# Patient Record
Sex: MALE | Race: WHITE | NOT HISPANIC OR LATINO | Employment: OTHER | URBAN - METROPOLITAN AREA
[De-identification: names, ages, dates, MRNs, and addresses within clinical notes are randomized per-mention and may not be internally consistent; named-entity substitution may affect disease eponyms.]

---

## 2017-07-17 RX ORDER — MONTELUKAST SODIUM 10 MG/1
TABLET ORAL
Qty: 90 TABLET | Refills: 0 | Status: SHIPPED | OUTPATIENT
Start: 2017-07-17 | End: 2017-10-27 | Stop reason: SDUPTHER

## 2017-08-09 DIAGNOSIS — I10 ESSENTIAL HYPERTENSION: ICD-10-CM

## 2017-08-09 DIAGNOSIS — E78.00 HYPERCHOLESTEROLEMIA: ICD-10-CM

## 2017-08-09 RX ORDER — PRAVASTATIN SODIUM 40 MG
TABLET ORAL
Qty: 90 TABLET | Refills: 1 | Status: SHIPPED | OUTPATIENT
Start: 2017-08-09 | End: 2017-10-27 | Stop reason: SDUPTHER

## 2017-08-09 RX ORDER — LISINOPRIL AND HYDROCHLOROTHIAZIDE 25; 20 MG/1; MG/1
TABLET ORAL
Qty: 90 TABLET | Refills: 1 | Status: SHIPPED | OUTPATIENT
Start: 2017-08-09 | End: 2017-10-27 | Stop reason: SDUPTHER

## 2017-10-27 ENCOUNTER — OFFICE VISIT (OUTPATIENT)
Dept: INTERNAL MEDICINE | Facility: CLINIC | Age: 62
End: 2017-10-27

## 2017-10-27 VITALS
OXYGEN SATURATION: 95 % | DIASTOLIC BLOOD PRESSURE: 84 MMHG | WEIGHT: 222.2 LBS | SYSTOLIC BLOOD PRESSURE: 134 MMHG | HEART RATE: 65 BPM | HEIGHT: 69 IN | BODY MASS INDEX: 32.91 KG/M2

## 2017-10-27 DIAGNOSIS — K21.9 GASTROESOPHAGEAL REFLUX DISEASE WITHOUT ESOPHAGITIS: ICD-10-CM

## 2017-10-27 DIAGNOSIS — K21.00 REFLUX ESOPHAGITIS: ICD-10-CM

## 2017-10-27 DIAGNOSIS — I10 ESSENTIAL HYPERTENSION: Primary | ICD-10-CM

## 2017-10-27 DIAGNOSIS — E55.9 VITAMIN D DEFICIENCY: ICD-10-CM

## 2017-10-27 DIAGNOSIS — E78.00 HYPERCHOLESTEROLEMIA: ICD-10-CM

## 2017-10-27 DIAGNOSIS — J43.8 OTHER EMPHYSEMA (HCC): ICD-10-CM

## 2017-10-27 PROCEDURE — 99214 OFFICE O/P EST MOD 30 MIN: CPT | Performed by: INTERNAL MEDICINE

## 2017-10-27 RX ORDER — MELATONIN
1000 DAILY
COMMUNITY
End: 2021-04-20 | Stop reason: HOSPADM

## 2017-10-27 RX ORDER — LISINOPRIL AND HYDROCHLOROTHIAZIDE 25; 20 MG/1; MG/1
1 TABLET ORAL DAILY
Qty: 30 TABLET | Refills: 6 | Status: SHIPPED | OUTPATIENT
Start: 2017-10-27 | End: 2018-03-08 | Stop reason: SDUPTHER

## 2017-10-27 RX ORDER — OMEPRAZOLE 20 MG/1
20 CAPSULE, DELAYED RELEASE ORAL DAILY
Qty: 30 CAPSULE | Refills: 6 | Status: SHIPPED | OUTPATIENT
Start: 2017-10-27 | End: 2018-06-10 | Stop reason: SDUPTHER

## 2017-10-27 RX ORDER — PRAVASTATIN SODIUM 40 MG
40 TABLET ORAL NIGHTLY
Qty: 30 TABLET | Refills: 6 | Status: SHIPPED | OUTPATIENT
Start: 2017-10-27 | End: 2018-03-08 | Stop reason: SDUPTHER

## 2017-10-27 RX ORDER — BUDESONIDE AND FORMOTEROL FUMARATE DIHYDRATE 160; 4.5 UG/1; UG/1
2 AEROSOL RESPIRATORY (INHALATION)
Qty: 1 INHALER | Refills: 3 | Status: SHIPPED | OUTPATIENT
Start: 2017-10-27 | End: 2018-02-09 | Stop reason: SDUPTHER

## 2017-10-27 RX ORDER — MONTELUKAST SODIUM 10 MG/1
10 TABLET ORAL NIGHTLY
Qty: 30 TABLET | Refills: 6 | Status: SHIPPED | OUTPATIENT
Start: 2017-10-27 | End: 2018-08-07 | Stop reason: SDUPTHER

## 2017-10-27 RX ORDER — ALBUTEROL SULFATE 90 UG/1
2 AEROSOL, METERED RESPIRATORY (INHALATION) 2 TIMES DAILY
Qty: 1 INHALER | Refills: 4 | Status: SHIPPED | OUTPATIENT
Start: 2017-10-27 | End: 2021-04-20 | Stop reason: HOSPADM

## 2017-10-27 NOTE — PROGRESS NOTES
Subjective     Claude Spencer is a 62 y.o. male, who presents with a chief complaint of   Chief Complaint   Patient presents with   • Follow-up     med check, follow up- Dr Castle patient    • Med Refill       HPI Comments: 63 yo M here for follow up    He has not had labs done and all of his problems are new to me as he normally sees Dr. Castle    He has COPD and is using Symbicort BID and albuterol as needed. He does not feel SOB regularly.     He was using Niacin and quit in 1/2017 due to flushing. He takes Pravastatin and tolerates well.     He has HTN that is chronic and he takes Prinzide daily. No CP or dizziness.          The following portions of the patient's history were reviewed and updated as appropriate: allergies, current medications, past family history, past medical history, past social history, past surgical history and problem list.    Allergies: Other    Current Outpatient Prescriptions:   •  albuterol (PROVENTIL HFA;VENTOLIN HFA) 108 (90 Base) MCG/ACT inhaler, Inhale 2 puffs 2 (Two) Times a Day., Disp: 1 inhaler, Rfl: 4  •  budesonide-formoterol (SYMBICORT) 160-4.5 MCG/ACT inhaler, Inhale 2 puffs 2 (Two) Times a Day., Disp: 1 inhaler, Rfl: 3  •  cholecalciferol (VITAMIN D3) 1000 units tablet, Take 1,000 Units by mouth Daily., Disp: , Rfl:   •  lisinopril-hydrochlorothiazide (PRINZIDE,ZESTORETIC) 20-25 MG per tablet, Take 1 tablet by mouth Daily., Disp: 30 tablet, Rfl: 6  •  montelukast (SINGULAIR) 10 MG tablet, Take 1 tablet by mouth Every Night., Disp: 30 tablet, Rfl: 6  •  Omega-3 Fatty Acids (FISH OIL) 1000 MG capsule capsule, Take  by mouth daily., Disp: , Rfl:   •  omeprazole (PRILOSEC) 20 MG capsule, Take 1 capsule by mouth Daily., Disp: 30 capsule, Rfl: 6  •  pravastatin (PRAVACHOL) 40 MG tablet, Take 1 tablet by mouth Every Night., Disp: 30 tablet, Rfl: 6  Medications Discontinued During This Encounter   Medication Reason   • budesonide-formoterol (SYMBICORT) 160-4.5 MCG/ACT inhaler  "Therapy completed   • vitamin D (ERGOCALCIFEROL) 88021 UNITS capsule capsule Therapy completed   • pravastatin (PRAVACHOL) 40 MG tablet Therapy completed   • omeprazole (PriLOSEC) 40 MG capsule Therapy completed   • niacin (NIACIN SR,NIASPAN ER) 500 MG CR capsule Therapy completed   • albuterol (PROVENTIL HFA;VENTOLIN HFA) 108 (90 BASE) MCG/ACT inhaler Reorder   • budesonide-formoterol (SYMBICORT) 160-4.5 MCG/ACT inhaler Reorder   • lisinopril-hydrochlorothiazide (PRINZIDE,ZESTORETIC) 20-25 MG per tablet Reorder   • montelukast (SINGULAIR) 10 MG tablet Reorder   • omeprazole (PRILOSEC) 20 MG capsule Reorder   • pravastatin (PRAVACHOL) 40 MG tablet Reorder       Review of Systems   Constitutional: Negative for fatigue and fever.   HENT: Negative for congestion.    Respiratory: Negative for cough and shortness of breath.    Cardiovascular: Negative for chest pain and palpitations.   Gastrointestinal: Negative for constipation, diarrhea, nausea and vomiting.   Genitourinary: Negative for difficulty urinating and dysuria.   Skin: Negative for rash.       Objective     /84 (BP Location: Left arm, Patient Position: Sitting, Cuff Size: Adult)  Pulse 65  Ht 69\" (175.3 cm)  Wt 222 lb 3.2 oz (101 kg)  SpO2 95%  BMI 32.81 kg/m2      Physical Exam   Constitutional: He is oriented to person, place, and time. He appears well-developed and well-nourished. No distress.   HENT:   Head: Normocephalic and atraumatic.   Right Ear: Hearing, tympanic membrane and external ear normal.   Left Ear: Hearing, tympanic membrane and external ear normal.   Nose: Nose normal.   Mouth/Throat: Uvula is midline, oropharynx is clear and moist and mucous membranes are normal. No oropharyngeal exudate. Tonsils are 0 on the right. Tonsils are 0 on the left. No tonsillar exudate.   Eyes: Conjunctivae are normal. Right eye exhibits no discharge. Left eye exhibits no discharge. No scleral icterus.   Neck: Neck supple.   Cardiovascular: Normal " rate, regular rhythm and normal heart sounds.  Exam reveals no gallop and no friction rub.    No murmur heard.  Pulmonary/Chest: Effort normal and breath sounds normal. No respiratory distress. He has no wheezes. He has no rales.   Lymphadenopathy:     He has no cervical adenopathy.   Neurological: He is alert and oriented to person, place, and time.   Skin: Skin is warm. No rash noted.   Psychiatric: He has a normal mood and affect. His behavior is normal.   Nursing note and vitals reviewed.        Results for orders placed or performed in visit on 09/27/16   Lipid Panel With / Chol / HDL Ratio   Result Value Ref Range    Total Cholesterol 171 0 - 200 mg/dL    Triglycerides 92 0 - 150 mg/dL    HDL Cholesterol 42 40 - 60 mg/dL    VLDL Cholesterol 18.4 8 - 32 mg/dL    LDL Cholesterol  111 (H) 0 - 100 mg/dL    Chol/HDL Ratio 4.07    PSA   Result Value Ref Range    PSA 0.938 0.000 - 4.000 ng/mL   Comprehensive Metabolic Panel   Result Value Ref Range    Glucose 85 65 - 99 mg/dL    BUN 11 6 - 20 mg/dL    Creatinine 0.86 0.76 - 1.27 mg/dL    eGFR Non African Am 91 >60 mL/min/1.73    eGFR African Am 110 >60 mL/min/1.73    BUN/Creatinine Ratio 12.8 7.0 - 25.0    Sodium 141 136 - 145 mmol/L    Potassium 4.3 3.5 - 5.2 mmol/L    Chloride 98 98 - 107 mmol/L    Total CO2 30.4 (H) 22.0 - 29.0 mmol/L    Calcium 9.0 8.6 - 10.5 mg/dL    Total Protein 6.8 6.0 - 8.5 g/dL    Albumin 4.40 3.50 - 5.20 g/dL    Globulin 2.4 gm/dL    A/G Ratio 1.8 g/dL    Total Bilirubin 0.4 0.2 - 1.2 mg/dL    Alkaline Phosphatase 50 40 - 129 U/L    AST (SGOT) 35 5 - 40 U/L    ALT (SGPT) 28 5 - 41 U/L   TSH   Result Value Ref Range    TSH 2.030 0.270 - 4.200 mIU/mL   Hemoglobin A1c   Result Value Ref Range    Hemoglobin A1C 5.20 4.80 - 5.60 %   Vitamin D 25 Hydroxy   Result Value Ref Range    25 Hydroxy, Vitamin D 36.6 ng/mL   CBC & AUTO Differential   Result Value Ref Range    WBC 5.83 4.80 - 10.80 10*3/mm3    RBC 4.68 (L) 4.70 - 6.10 10*6/mm3     Hemoglobin 14.9 14.0 - 18.0 g/dL    Hematocrit 45.4 42.0 - 52.0 %    MCV 97.0 (H) 80.0 - 94.0 fL    MCH 31.8 (H) 27.0 - 31.0 pg    MCHC 32.8 31.0 - 37.0 g/dL    RDW 12.2 11.5 - 14.5 %    Platelets 216 140 - 500 10*3/mm3    Neutrophil Rel % 58.8 45.0 - 70.0 %    Lymphocyte Rel % 28.6 20.0 - 45.0 %    Monocyte Rel % 9.4 (H) 3.0 - 8.0 %    Eosinophil Rel % 2.4 0.0 - 4.0 %    Basophil Rel % 0.3 0.0 - 2.0 %    Neutrophils Absolute 3.42 1.50 - 8.30 10*3/mm3    Lymphocytes Absolute 1.67 0.60 - 4.80 10*3/mm3    Monocytes Absolute 0.55 0.00 - 1.00 10*3/mm3    Eosinophils Absolute 0.14 0.10 - 0.30 10*3/mm3    Basophils Absolute 0.02 0.00 - 0.20 10*3/mm3    Immature Granulocyte Rel % 0.5 0.0 - 0.5 %    Immature Grans Absolute 0.03 0.00 - 0.03 10*3/mm3    nRBC 0.0 0.0 - 0.0 /100 WBC       Assessment/Plan   Claude was seen today for follow-up and med refill.    Diagnoses and all orders for this visit:    Essential hypertension  -     CBC & Differential  -     Comprehensive Metabolic Panel  -     Microalbumin / Creatinine Urine Ratio - Urine, Clean Catch    Hypercholesterolemia  -     pravastatin (PRAVACHOL) 40 MG tablet; Take 1 tablet by mouth Every Night.  -     Lipid Panel With LDL / HDL Ratio    Reflux esophagitis    Gastroesophageal reflux disease without esophagitis  -     CBC & Differential    Vitamin D deficiency  -     Vitamin D 25 Hydroxy    Other emphysema    Other orders  -     albuterol (PROVENTIL HFA;VENTOLIN HFA) 108 (90 Base) MCG/ACT inhaler; Inhale 2 puffs 2 (Two) Times a Day.  -     budesonide-formoterol (SYMBICORT) 160-4.5 MCG/ACT inhaler; Inhale 2 puffs 2 (Two) Times a Day.  -     lisinopril-hydrochlorothiazide (PRINZIDE,ZESTORETIC) 20-25 MG per tablet; Take 1 tablet by mouth Daily.  -     montelukast (SINGULAIR) 10 MG tablet; Take 1 tablet by mouth Every Night.  -     omeprazole (PRILOSEC) 20 MG capsule; Take 1 capsule by mouth Daily.      Continue all medications as prescribed. Blood pressure is stable   No  adjustments today until labs are reviewed which he is having drawn today   We will call with results and make changes if needed  Refills sent in today       Return in about 6 months (around 4/27/2018) for Recheck, Medicare Wellness with Dr. Castle .    Christal Harmon MD  10/27/2017

## 2017-10-28 LAB
25(OH)D3+25(OH)D2 SERPL-MCNC: 31.7 NG/ML
ALBUMIN SERPL-MCNC: 4.8 G/DL (ref 3.5–5.2)
ALBUMIN/CREAT UR: 12.6 MG/G CREAT (ref 0–30)
ALBUMIN/GLOB SERPL: 1.7 G/DL
ALP SERPL-CCNC: 45 U/L (ref 40–129)
ALT SERPL-CCNC: 31 U/L (ref 5–41)
AST SERPL-CCNC: 33 U/L (ref 5–40)
BASOPHILS # BLD AUTO: 0.05 10*3/MM3 (ref 0–0.2)
BASOPHILS NFR BLD AUTO: 0.5 % (ref 0–2)
BILIRUB SERPL-MCNC: 0.7 MG/DL (ref 0.2–1.2)
BUN SERPL-MCNC: 15 MG/DL (ref 8–23)
BUN/CREAT SERPL: 17.6 (ref 7–25)
CALCIUM SERPL-MCNC: 9.7 MG/DL (ref 8.8–10.5)
CHLORIDE SERPL-SCNC: 96 MMOL/L (ref 98–107)
CHOLEST SERPL-MCNC: 187 MG/DL (ref 0–200)
CO2 SERPL-SCNC: 30.4 MMOL/L (ref 22–29)
CREAT SERPL-MCNC: 0.85 MG/DL (ref 0.76–1.27)
CREAT UR-MCNC: 137.6 MG/DL
EOSINOPHIL # BLD AUTO: 0.16 10*3/MM3 (ref 0.1–0.3)
EOSINOPHIL NFR BLD AUTO: 1.7 % (ref 0–4)
ERYTHROCYTE [DISTWIDTH] IN BLOOD BY AUTOMATED COUNT: 12 % (ref 11.5–14.5)
GFR SERPLBLD CREATININE-BSD FMLA CKD-EPI: 111 ML/MIN/1.73
GFR SERPLBLD CREATININE-BSD FMLA CKD-EPI: 92 ML/MIN/1.73
GLOBULIN SER CALC-MCNC: 2.8 GM/DL
GLUCOSE SERPL-MCNC: 85 MG/DL (ref 65–99)
HCT VFR BLD AUTO: 47.9 % (ref 42–52)
HDLC SERPL-MCNC: 44 MG/DL (ref 40–60)
HGB BLD-MCNC: 16.5 G/DL (ref 14–18)
IMM GRANULOCYTES # BLD: 0.02 10*3/MM3 (ref 0–0.03)
IMM GRANULOCYTES NFR BLD: 0.2 % (ref 0–0.5)
LDLC SERPL CALC-MCNC: 122 MG/DL (ref 0–100)
LDLC/HDLC SERPL: 2.78 {RATIO}
LYMPHOCYTES # BLD AUTO: 1.97 10*3/MM3 (ref 0.6–4.8)
LYMPHOCYTES NFR BLD AUTO: 21.4 % (ref 20–45)
MCH RBC QN AUTO: 32.6 PG (ref 27–31)
MCHC RBC AUTO-ENTMCNC: 34.4 G/DL (ref 31–37)
MCV RBC AUTO: 94.7 FL (ref 80–94)
MICROALBUMIN UR-MCNC: 17.4 UG/ML
MONOCYTES # BLD AUTO: 0.75 10*3/MM3 (ref 0–1)
MONOCYTES NFR BLD AUTO: 8.2 % (ref 3–8)
NEUTROPHILS # BLD AUTO: 6.24 10*3/MM3 (ref 1.5–8.3)
NEUTROPHILS NFR BLD AUTO: 68 % (ref 45–70)
NRBC BLD AUTO-RTO: 0 /100 WBC (ref 0–0)
PLATELET # BLD AUTO: 253 10*3/MM3 (ref 140–500)
POTASSIUM SERPL-SCNC: 4.1 MMOL/L (ref 3.5–5.2)
PROT SERPL-MCNC: 7.6 G/DL (ref 6–8.5)
RBC # BLD AUTO: 5.06 10*6/MM3 (ref 4.7–6.1)
SODIUM SERPL-SCNC: 138 MMOL/L (ref 136–145)
TRIGL SERPL-MCNC: 103 MG/DL (ref 0–150)
VLDLC SERPL CALC-MCNC: 20.6 MG/DL (ref 8–32)
WBC # BLD AUTO: 9.19 10*3/MM3 (ref 4.8–10.8)

## 2017-10-29 NOTE — PROGRESS NOTES
Please call patient with these results and let him know that his labs are very good. I want him to continue his current medications and call us if he has questions or concerns prior to his follow up with Dr. Castle.

## 2017-10-30 ENCOUNTER — TELEPHONE (OUTPATIENT)
Dept: INTERNAL MEDICINE | Facility: CLINIC | Age: 62
End: 2017-10-30

## 2017-10-30 NOTE — TELEPHONE ENCOUNTER
PEREZ with details.     -- Message from Christal Harmon MD sent at 10/29/2017  6:33 PM EDT -----  Please call patient with these results and let him know that his labs are very good. I want him to continue his current medications and call us if he has questions or concerns prior to his follow up with Dr. Castle.

## 2018-02-09 ENCOUNTER — OFFICE VISIT (OUTPATIENT)
Dept: INTERNAL MEDICINE | Facility: CLINIC | Age: 63
End: 2018-02-09

## 2018-02-09 ENCOUNTER — HOSPITAL ENCOUNTER (OUTPATIENT)
Dept: GENERAL RADIOLOGY | Facility: HOSPITAL | Age: 63
Discharge: HOME | End: 2018-02-09
Attending: INTERNAL MEDICINE | Admitting: INTERNAL MEDICINE

## 2018-02-09 ENCOUNTER — TELEPHONE (OUTPATIENT)
Dept: INTERNAL MEDICINE | Facility: CLINIC | Age: 63
End: 2018-02-09

## 2018-02-09 VITALS — TEMPERATURE: 98.6 F | HEART RATE: 79 BPM | RESPIRATION RATE: 22 BRPM | OXYGEN SATURATION: 89 %

## 2018-02-09 DIAGNOSIS — J43.8 OTHER EMPHYSEMA (HCC): ICD-10-CM

## 2018-02-09 DIAGNOSIS — J40 BRONCHITIS: ICD-10-CM

## 2018-02-09 DIAGNOSIS — J43.8 OTHER EMPHYSEMA (HCC): Primary | ICD-10-CM

## 2018-02-09 PROCEDURE — 71046 X-RAY EXAM CHEST 2 VIEWS: CPT

## 2018-02-09 PROCEDURE — 99214 OFFICE O/P EST MOD 30 MIN: CPT | Performed by: INTERNAL MEDICINE

## 2018-02-09 PROCEDURE — 96372 THER/PROPH/DIAG INJ SC/IM: CPT | Performed by: INTERNAL MEDICINE

## 2018-02-09 RX ORDER — BUDESONIDE AND FORMOTEROL FUMARATE DIHYDRATE 160; 4.5 UG/1; UG/1
2 AEROSOL RESPIRATORY (INHALATION)
Qty: 1 INHALER | Refills: 3 | Status: SHIPPED | OUTPATIENT
Start: 2018-02-09 | End: 2019-03-02 | Stop reason: SDUPTHER

## 2018-02-09 RX ORDER — DOXYCYCLINE HYCLATE 100 MG/1
100 TABLET, DELAYED RELEASE ORAL 2 TIMES DAILY
Qty: 20 TABLET | Refills: 0 | Status: SHIPPED | OUTPATIENT
Start: 2018-02-09 | End: 2018-02-09 | Stop reason: SDUPTHER

## 2018-02-09 RX ORDER — PREDNISONE 10 MG/1
10 TABLET ORAL DAILY
Qty: 35 TABLET | Refills: 0 | Status: SHIPPED | OUTPATIENT
Start: 2018-02-09 | End: 2018-05-09

## 2018-02-09 RX ORDER — METHYLPREDNISOLONE SODIUM SUCCINATE 125 MG/2ML
125 INJECTION, POWDER, LYOPHILIZED, FOR SOLUTION INTRAMUSCULAR; INTRAVENOUS ONCE
Status: COMPLETED | OUTPATIENT
Start: 2018-02-09 | End: 2018-02-09

## 2018-02-09 RX ORDER — DOXYCYCLINE HYCLATE 100 MG/1
100 TABLET, DELAYED RELEASE ORAL 2 TIMES DAILY
Qty: 20 TABLET | Refills: 0 | Status: SHIPPED | OUTPATIENT
Start: 2018-02-09 | End: 2018-02-23 | Stop reason: SDUPTHER

## 2018-02-09 RX ORDER — PREDNISONE 10 MG/1
10 TABLET ORAL DAILY
Qty: 35 TABLET | Refills: 0 | Status: SHIPPED | OUTPATIENT
Start: 2018-02-09 | End: 2018-02-09 | Stop reason: SDUPTHER

## 2018-02-09 RX ORDER — METHYLPREDNISOLONE SODIUM SUCCINATE 125 MG/2ML
125 INJECTION, POWDER, LYOPHILIZED, FOR SOLUTION INTRAMUSCULAR; INTRAVENOUS EVERY 6 HOURS
Status: DISCONTINUED | OUTPATIENT
Start: 2018-02-09 | End: 2018-02-09

## 2018-02-09 RX ADMIN — METHYLPREDNISOLONE SODIUM SUCCINATE 125 MG: 125 INJECTION, POWDER, LYOPHILIZED, FOR SOLUTION INTRAMUSCULAR; INTRAVENOUS at 12:57

## 2018-02-09 NOTE — PROGRESS NOTES
Subjective     Claude Spencer is a 62 y.o. male, who presents with a chief complaint of   Chief Complaint   Patient presents with   • Cough   • Shortness of Breath       HPI     61 yo male with COPD, on home oxygen at night. Started one week of cough and congestion. He denies fever or chills.  Has new production with his cough. No dyspnea. Now yellow sputum which is new for him.  N vomiting. No trouble eating but has lost apetite. No diarrhea. Denies cp or palpitations.   He is not using home inhalers.  He does not know of any specific flu exposure but grandson has been sick. Here with wife and grandson.        The following portions of the patient's history were reviewed and updated as appropriate: allergies, current medications, past family history, past medical history, past social history, past surgical history and problem list.    Allergies: Other    Current Outpatient Prescriptions:   •  albuterol (PROVENTIL HFA;VENTOLIN HFA) 108 (90 Base) MCG/ACT inhaler, Inhale 2 puffs 2 (Two) Times a Day., Disp: 1 inhaler, Rfl: 4  •  budesonide-formoterol (SYMBICORT) 160-4.5 MCG/ACT inhaler, Inhale 2 puffs 2 (Two) Times a Day., Disp: 1 inhaler, Rfl: 3  •  cholecalciferol (VITAMIN D3) 1000 units tablet, Take 1,000 Units by mouth Daily., Disp: , Rfl:   •  lisinopril-hydrochlorothiazide (PRINZIDE,ZESTORETIC) 20-25 MG per tablet, Take 1 tablet by mouth Daily., Disp: 30 tablet, Rfl: 6  •  montelukast (SINGULAIR) 10 MG tablet, Take 1 tablet by mouth Every Night., Disp: 30 tablet, Rfl: 6  •  Omega-3 Fatty Acids (FISH OIL) 1000 MG capsule capsule, Take  by mouth daily., Disp: , Rfl:   •  omeprazole (PRILOSEC) 20 MG capsule, Take 1 capsule by mouth Daily., Disp: 30 capsule, Rfl: 6  •  pravastatin (PRAVACHOL) 40 MG tablet, Take 1 tablet by mouth Every Night., Disp: 30 tablet, Rfl: 6  •  predniSONE (DELTASONE) 10 MG tablet, Take 1 tablet by mouth Daily. 5 tab po daily for 2 days, 4 for 2 days, 3 for 2 days, 2 for 2 days, 1 for 2  days, Disp: 35 tablet, Rfl: 0    Current Facility-Administered Medications:   •  methylPREDNISolone sodium succinate (SOLU-Medrol) injection 125 mg, 125 mg, Intravenous, Q6H, Panfilo Castle MD  Medications Discontinued During This Encounter   Medication Reason   • budesonide-formoterol (SYMBICORT) 160-4.5 MCG/ACT inhaler Reorder   • predniSONE (DELTASONE) 10 MG tablet Reorder       Review of Systems   Constitutional: Positive for appetite change and fatigue. Negative for fever.   HENT: Positive for congestion, postnasal drip and rhinorrhea. Negative for sore throat.    Eyes: Negative.    Respiratory: Positive for cough, shortness of breath and wheezing.         Home oxygen   Cardiovascular: Negative for chest pain, palpitations and leg swelling.        No orthopnea or pND   Endocrine: Negative.    Genitourinary: Negative.    Musculoskeletal: Positive for arthralgias and myalgias.   Skin: Negative.    Neurological: Positive for headaches. Negative for dizziness.       Objective     Pulse 79  Temp 98.6 °F (37 °C)  Resp 22  SpO2 (!) 89%      Physical Exam   Constitutional: He is oriented to person, place, and time. He appears well-developed.   HENT:   Head: Normocephalic.   Right Ear: External ear normal.   Left Ear: External ear normal.   Mouth/Throat: Oropharynx is clear and moist.   Eyes: Pupils are equal, round, and reactive to light. Right eye exhibits no discharge.   Neck: No thyromegaly present.   Cardiovascular: Normal rate and regular rhythm.    No murmur heard.  Pulmonary/Chest: Effort normal. No respiratory distress. He has wheezes. He has rales.   Lymphadenopathy:     He has no cervical adenopathy.   Neurological: He is alert and oriented to person, place, and time.   Skin: Skin is warm.   Psychiatric: He has a normal mood and affect. His behavior is normal.   Nursing note and vitals reviewed.      Lab Results (most recent)     None      Flu testing negative    Results for orders placed or performed in  visit on 10/27/17   Comprehensive Metabolic Panel   Result Value Ref Range    Glucose 85 65 - 99 mg/dL    BUN 15 8 - 23 mg/dL    Creatinine 0.85 0.76 - 1.27 mg/dL    eGFR Non African Am 92 >60 mL/min/1.73    eGFR African Am 111 >60 mL/min/1.73    BUN/Creatinine Ratio 17.6 7.0 - 25.0    Sodium 138 136 - 145 mmol/L    Potassium 4.1 3.5 - 5.2 mmol/L    Chloride 96 (L) 98 - 107 mmol/L    Total CO2 30.4 (H) 22.0 - 29.0 mmol/L    Calcium 9.7 8.8 - 10.5 mg/dL    Total Protein 7.6 6.0 - 8.5 g/dL    Albumin 4.80 3.50 - 5.20 g/dL    Globulin 2.8 gm/dL    A/G Ratio 1.7 g/dL    Total Bilirubin 0.7 0.2 - 1.2 mg/dL    Alkaline Phosphatase 45 40 - 129 U/L    AST (SGOT) 33 5 - 40 U/L    ALT (SGPT) 31 5 - 41 U/L   Vitamin D 25 Hydroxy   Result Value Ref Range    25 Hydroxy, Vitamin D 31.7 ng/mL   Lipid Panel With LDL / HDL Ratio   Result Value Ref Range    Total Cholesterol 187 0 - 200 mg/dL    Triglycerides 103 0 - 150 mg/dL    HDL Cholesterol 44 40 - 60 mg/dL    VLDL Cholesterol 20.6 8 - 32 mg/dL    LDL Cholesterol  122 (H) 0 - 100 mg/dL    LDL/HDL Ratio 2.78    Microalbumin / Creatinine Urine Ratio - Urine, Clean Catch   Result Value Ref Range    Creatinine, Urine 137.6 Not Estab. mg/dL    Microalbumin, Urine 17.4 Not Estab. ug/mL    Microalbumin/Creatinine Ratio 12.6 0.0 - 30.0 mg/g creat   CBC & Differential   Result Value Ref Range    WBC 9.19 4.80 - 10.80 10*3/mm3    RBC 5.06 4.70 - 6.10 10*6/mm3    Hemoglobin 16.5 14.0 - 18.0 g/dL    Hematocrit 47.9 42.0 - 52.0 %    MCV 94.7 (H) 80.0 - 94.0 fL    MCH 32.6 (H) 27.0 - 31.0 pg    MCHC 34.4 31.0 - 37.0 g/dL    RDW 12.0 11.5 - 14.5 %    Platelets 253 140 - 500 10*3/mm3    Neutrophil Rel % 68.0 45.0 - 70.0 %    Lymphocyte Rel % 21.4 20.0 - 45.0 %    Monocyte Rel % 8.2 (H) 3.0 - 8.0 %    Eosinophil Rel % 1.7 0.0 - 4.0 %    Basophil Rel % 0.5 0.0 - 2.0 %    Neutrophils Absolute 6.24 1.50 - 8.30 10*3/mm3    Lymphocytes Absolute 1.97 0.60 - 4.80 10*3/mm3    Monocytes Absolute 0.75  0.00 - 1.00 10*3/mm3    Eosinophils Absolute 0.16 0.10 - 0.30 10*3/mm3    Basophils Absolute 0.05 0.00 - 0.20 10*3/mm3    Immature Granulocyte Rel % 0.2 0.0 - 0.5 %    Immature Grans Absolute 0.02 0.00 - 0.03 10*3/mm3    nRBC 0.0 0.0 - 0.0 /100 WBC   Oxygen 92%RA here, which is normal for him, does home oxgygen.     Assessment/Plan   Claude was seen today for cough and shortness of breath.    Diagnoses and all orders for this visit:    Other emphysema  -     XR Chest 2 View; Future  -     Discontinue: predniSONE (DELTASONE) 10 MG tablet; Take 1 tablet by mouth Daily. 5 tab po daily for 2 days, 4 for 2 days, 3 for 2 days, 2 for 2 days, 1 for 2 days  -     methylPREDNISolone sodium succinate (SOLU-Medrol) injection 125 mg; Infuse 2 mL into a venous catheter Every 6 (Six) Hours.  -     predniSONE (DELTASONE) 10 MG tablet; Take 1 tablet by mouth Daily. 5 tab po daily for 2 days, 4 for 2 days, 3 for 2 days, 2 for 2 days, 1 for 2 days    Bronchitis  -     XR Chest 2 View; Future  -     budesonide-formoterol (SYMBICORT) 160-4.5 MCG/ACT inhaler; Inhale 2 puffs 2 (Two) Times a Day.      Home nebulizer every 4 hours  Start prednisone tonight with dinner  Resume symbicort 2 puff twice daily  Continue home oxygen continuous while at home until sickness over  Er for any chest pain or trouble catching breath  Antibiotic, will do doxy if no consolidation.    Symbicort samples given     Return in about 4 weeks (around 3/9/2018).    Panfilo Castle MD  02/09/2018

## 2018-02-09 NOTE — TELEPHONE ENCOUNTER
Patients wife notified, she asked to have antbx sent to [facility] in [place] instead of [facility] in [place]. Meds sent.  ----- Message from Panfilo Castle MD sent at 2/9/2018  3:57 PM EST -----  No pneumonia.  Sent antibiotic

## 2018-02-09 NOTE — PATIENT INSTRUCTIONS
Assessment/Plan   Claude was seen today for cough and shortness of breath.    Diagnoses and all orders for this visit:    Other emphysema  -     XR Chest 2 View; Future  -     Discontinue: predniSONE (DELTASONE) 10 MG tablet; Take 1 tablet by mouth Daily. 5 tab po daily for 2 days, 4 for 2 days, 3 for 2 days, 2 for 2 days, 1 for 2 days  -     methylPREDNISolone sodium succinate (SOLU-Medrol) injection 125 mg; Infuse 2 mL into a venous catheter Every 6 (Six) Hours.  -     predniSONE (DELTASONE) 10 MG tablet; Take 1 tablet by mouth Daily. 5 tab po daily for 2 days, 4 for 2 days, 3 for 2 days, 2 for 2 days, 1 for 2 days    Bronchitis  -     XR Chest 2 View; Future  -     budesonide-formoterol (SYMBICORT) 160-4.5 MCG/ACT inhaler; Inhale 2 puffs 2 (Two) Times a Day.    Home nebulizer every 4 hours  Start prednisone tonight with dinner  Resume symbicort 2 puff twice daily  Continue home oxygen continuous while at home until sickness over  Er for any chest pain or trouble catching breath      No Follow-up on file.    Panfilo Castle MD  02/09/2018

## 2018-02-23 ENCOUNTER — TELEPHONE (OUTPATIENT)
Dept: INTERNAL MEDICINE | Facility: CLINIC | Age: 63
End: 2018-02-23

## 2018-02-23 RX ORDER — DOXYCYCLINE HYCLATE 100 MG/1
100 TABLET, DELAYED RELEASE ORAL 2 TIMES DAILY
Qty: 20 TABLET | Refills: 0 | Status: SHIPPED | OUTPATIENT
Start: 2018-02-23 | End: 2018-05-09

## 2018-02-23 RX ORDER — BENZONATATE 200 MG/1
200 CAPSULE ORAL 3 TIMES DAILY PRN
Qty: 30 CAPSULE | Refills: 0 | Status: SHIPPED | OUTPATIENT
Start: 2018-02-23 | End: 2018-05-09

## 2018-03-07 ENCOUNTER — TELEPHONE (OUTPATIENT)
Dept: INTERNAL MEDICINE | Facility: CLINIC | Age: 63
End: 2018-03-07

## 2018-03-07 NOTE — TELEPHONE ENCOUNTER
Scheduled with Dr. ARCE tomorrow.      ----- Message from KHADRA Cunningham sent at 3/7/2018 12:42 PM EST -----  Regarding: FW: COUGH MEDICINE  Contact: [phone]  We cannot prescribed codeine by phone, he will need an appt  ----- Message -----     From: Maribeth Mendez MA     Sent: 3/7/2018  11:15 AM       To: KHADRA Cunningham  Subject: FW: COUGH MEDICINE                               See 2/23 telephone note.  Appt?  ----- Message -----     From: Aria Ceja     Sent: 3/7/2018  10:48 AM       To: [facility]  Subject: COUGH MEDICINE                                   KINDIG PT    Patient called to say that he still cannot get rid of this cough, and he feels better, but cannot sleep because of the cough. He is coughing so hard that he is losing control of his bladder. He said that he can feel tightness in his chest when he coughs so hard. He is requesting cough med with codeine. I did question him about the chest pain and he said that it is only when he coughs.    irineo agrawal    Alternate phone number [phone]    Thanks!  aria

## 2018-03-08 DIAGNOSIS — E78.00 HYPERCHOLESTEROLEMIA: ICD-10-CM

## 2018-03-08 RX ORDER — PRAVASTATIN SODIUM 40 MG
TABLET ORAL
Qty: 30 TABLET | Refills: 3 | Status: SHIPPED | OUTPATIENT
Start: 2018-03-08 | End: 2018-05-09

## 2018-03-08 RX ORDER — LISINOPRIL AND HYDROCHLOROTHIAZIDE 25; 20 MG/1; MG/1
TABLET ORAL
Qty: 30 TABLET | Refills: 3 | Status: SHIPPED | OUTPATIENT
Start: 2018-03-08 | End: 2018-07-08 | Stop reason: SDUPTHER

## 2018-04-20 DIAGNOSIS — I10 ESSENTIAL HYPERTENSION: ICD-10-CM

## 2018-04-20 DIAGNOSIS — J43.8 OTHER EMPHYSEMA (HCC): ICD-10-CM

## 2018-04-20 DIAGNOSIS — E78.00 HYPERCHOLESTEROLEMIA: Primary | ICD-10-CM

## 2018-04-20 DIAGNOSIS — E55.9 VITAMIN D DEFICIENCY: ICD-10-CM

## 2018-04-20 DIAGNOSIS — E87.6 HYPOKALEMIA: ICD-10-CM

## 2018-04-20 DIAGNOSIS — M85.80 OSTEOPENIA, UNSPECIFIED LOCATION: ICD-10-CM

## 2018-04-20 LAB
25(OH)D3+25(OH)D2 SERPL-MCNC: 29.7 NG/ML
ALBUMIN SERPL-MCNC: 4.6 G/DL (ref 3.5–5.2)
ALBUMIN/GLOB SERPL: 1.6 G/DL
ALP SERPL-CCNC: 43 U/L (ref 40–129)
ALT SERPL-CCNC: 28 U/L (ref 5–41)
AST SERPL-CCNC: 36 U/L (ref 5–40)
BASOPHILS # BLD AUTO: 0.03 10*3/MM3 (ref 0–0.2)
BASOPHILS NFR BLD AUTO: 0.4 % (ref 0–2)
BILIRUB SERPL-MCNC: 0.8 MG/DL (ref 0.2–1.2)
BUN SERPL-MCNC: 17 MG/DL (ref 8–23)
BUN/CREAT SERPL: 17 (ref 7–25)
CALCIUM SERPL-MCNC: 9.6 MG/DL (ref 8.8–10.5)
CHLORIDE SERPL-SCNC: 97 MMOL/L (ref 98–107)
CHOLEST SERPL-MCNC: 196 MG/DL (ref 0–200)
CHOLEST/HDLC SERPL: 4.56 {RATIO}
CO2 SERPL-SCNC: 31.2 MMOL/L (ref 22–29)
CREAT SERPL-MCNC: 1 MG/DL (ref 0.76–1.27)
EOSINOPHIL # BLD AUTO: 0.2 10*3/MM3 (ref 0.1–0.3)
EOSINOPHIL NFR BLD AUTO: 2.6 % (ref 0–4)
ERYTHROCYTE [DISTWIDTH] IN BLOOD BY AUTOMATED COUNT: 12.5 % (ref 11.5–14.5)
GFR SERPLBLD CREATININE-BSD FMLA CKD-EPI: 76 ML/MIN/1.73
GFR SERPLBLD CREATININE-BSD FMLA CKD-EPI: 92 ML/MIN/1.73
GLOBULIN SER CALC-MCNC: 2.9 GM/DL
GLUCOSE SERPL-MCNC: 82 MG/DL (ref 65–99)
HBA1C MFR BLD: 5.2 % (ref 4.8–5.6)
HCT VFR BLD AUTO: 49.3 % (ref 42–52)
HDLC SERPL-MCNC: 43 MG/DL (ref 40–60)
HGB BLD-MCNC: 16.4 G/DL (ref 14–18)
IMM GRANULOCYTES # BLD: 0.02 10*3/MM3 (ref 0–0.03)
IMM GRANULOCYTES NFR BLD: 0.3 % (ref 0–0.5)
LDLC SERPL CALC-MCNC: 131 MG/DL (ref 0–100)
LYMPHOCYTES # BLD AUTO: 2.62 10*3/MM3 (ref 0.6–4.8)
LYMPHOCYTES NFR BLD AUTO: 34.1 % (ref 20–45)
MCH RBC QN AUTO: 33.1 PG (ref 27–31)
MCHC RBC AUTO-ENTMCNC: 33.3 G/DL (ref 31–37)
MCV RBC AUTO: 99.4 FL (ref 80–94)
MONOCYTES # BLD AUTO: 0.71 10*3/MM3 (ref 0–1)
MONOCYTES NFR BLD AUTO: 9.2 % (ref 3–8)
NEUTROPHILS # BLD AUTO: 4.11 10*3/MM3 (ref 1.5–8.3)
NEUTROPHILS NFR BLD AUTO: 53.4 % (ref 45–70)
NRBC BLD AUTO-RTO: 0 /100 WBC (ref 0–0)
PLATELET # BLD AUTO: 246 10*3/MM3 (ref 140–500)
POTASSIUM SERPL-SCNC: 4.2 MMOL/L (ref 3.5–5.2)
PROT SERPL-MCNC: 7.5 G/DL (ref 6–8.5)
PSA SERPL-MCNC: 0.97 NG/ML (ref 0–4)
RBC # BLD AUTO: 4.96 10*6/MM3 (ref 4.7–6.1)
SODIUM SERPL-SCNC: 139 MMOL/L (ref 136–145)
TRIGL SERPL-MCNC: 111 MG/DL (ref 0–150)
TSH SERPL DL<=0.005 MIU/L-ACNC: 1.35 MIU/ML (ref 0.27–4.2)
VLDLC SERPL CALC-MCNC: 22.2 MG/DL (ref 8–32)
WBC # BLD AUTO: 7.69 10*3/MM3 (ref 4.8–10.8)

## 2018-04-23 ENCOUNTER — TRANSCRIBE ORDERS (OUTPATIENT)
Dept: ADMINISTRATIVE | Facility: HOSPITAL | Age: 63
End: 2018-04-23

## 2018-04-23 ENCOUNTER — HOSPITAL ENCOUNTER (OUTPATIENT)
Dept: PULMONOLOGY | Facility: HOSPITAL | Age: 63
Discharge: HOME | End: 2018-04-23
Attending: INTERNAL MEDICINE | Admitting: INTERNAL MEDICINE

## 2018-04-23 DIAGNOSIS — R06.09 DOE (DYSPNEA ON EXERTION): Primary | ICD-10-CM

## 2018-04-23 PROCEDURE — 94726 PLETHYSMOGRAPHY LUNG VOLUMES: CPT

## 2018-04-23 PROCEDURE — 94729 DIFFUSING CAPACITY: CPT

## 2018-04-23 PROCEDURE — 94010 BREATHING CAPACITY TEST: CPT

## 2018-04-23 RX ORDER — ALBUTEROL SULFATE 2.5 MG/3ML
2.5 SOLUTION RESPIRATORY (INHALATION) ONCE
Status: COMPLETED | OUTPATIENT
Start: 2018-04-23 | End: 2018-04-23

## 2018-04-23 RX ADMIN — ALBUTEROL SULFATE 2.5 MG: 2.5 SOLUTION RESPIRATORY (INHALATION) at 15:56

## 2018-05-04 ENCOUNTER — TELEPHONE (OUTPATIENT)
Dept: INTERNAL MEDICINE | Facility: CLINIC | Age: 63
End: 2018-05-04

## 2018-05-04 NOTE — TELEPHONE ENCOUNTER
Has appointment next tuesday      ----- Message from Panfilo Castle MD sent at 5/4/2018  3:27 PM EDT -----  Has slight change to blood count, needs to stop alcohol as much as possible. Needs to take b complex.  Will talk cholesterol at next appt.

## 2018-05-09 ENCOUNTER — OFFICE VISIT (OUTPATIENT)
Dept: INTERNAL MEDICINE | Facility: CLINIC | Age: 63
End: 2018-05-09

## 2018-05-09 VITALS
HEIGHT: 69 IN | DIASTOLIC BLOOD PRESSURE: 68 MMHG | WEIGHT: 225 LBS | BODY MASS INDEX: 33.33 KG/M2 | RESPIRATION RATE: 16 BRPM | OXYGEN SATURATION: 94 % | HEART RATE: 55 BPM | SYSTOLIC BLOOD PRESSURE: 130 MMHG

## 2018-05-09 DIAGNOSIS — K63.5 COLORECTAL POLYP DETECTED ON COLONOSCOPY: ICD-10-CM

## 2018-05-09 DIAGNOSIS — E55.9 VITAMIN D DEFICIENCY: ICD-10-CM

## 2018-05-09 DIAGNOSIS — I10 ESSENTIAL HYPERTENSION: ICD-10-CM

## 2018-05-09 DIAGNOSIS — E78.00 HYPERCHOLESTEROLEMIA: Primary | ICD-10-CM

## 2018-05-09 DIAGNOSIS — Z00.00 ROUTINE HEALTH MAINTENANCE: ICD-10-CM

## 2018-05-09 LAB
APPEARANCE UR: CLEAR
BACTERIA #/AREA URNS HPF: ABNORMAL /HPF
BILIRUB BLD-MCNC: NEGATIVE MG/DL
BILIRUB UR QL STRIP: NEGATIVE
CLARITY, POC: CLEAR
COLOR UR: ABNORMAL
COLOR UR: YELLOW
EPI CELLS #/AREA URNS HPF: ABNORMAL /HPF
GLUCOSE UR QL: NEGATIVE
GLUCOSE UR STRIP-MCNC: NEGATIVE MG/DL
HGB UR QL STRIP: (no result)
KETONES UR QL STRIP: (no result)
KETONES UR QL: ABNORMAL
LEUKOCYTE EST, POC: NEGATIVE
LEUKOCYTE ESTERASE UR QL STRIP: NEGATIVE
NITRITE UR QL STRIP: NEGATIVE
NITRITE UR-MCNC: NEGATIVE MG/ML
PH UR STRIP: 6 [PH] (ref 4.5–8)
PH UR: 6 [PH] (ref 5–8)
PROT UR QL STRIP: NEGATIVE
PROT UR STRIP-MCNC: NEGATIVE MG/DL
RBC # UR STRIP: ABNORMAL /UL
RBC #/AREA URNS HPF: ABNORMAL /HPF
SP GR UR: 1.02 (ref 1–1.03)
SP GR UR: 1.03 (ref 1–1.03)
UROBILINOGEN UR QL: NORMAL
UROBILINOGEN UR STRIP-MCNC: (no result) MG/DL
WBC #/AREA URNS HPF: ABNORMAL /HPF

## 2018-05-09 PROCEDURE — 81003 URINALYSIS AUTO W/O SCOPE: CPT | Performed by: INTERNAL MEDICINE

## 2018-05-09 PROCEDURE — 99396 PREV VISIT EST AGE 40-64: CPT | Performed by: INTERNAL MEDICINE

## 2018-05-09 PROCEDURE — 93000 ELECTROCARDIOGRAM COMPLETE: CPT | Performed by: INTERNAL MEDICINE

## 2018-05-09 PROCEDURE — 99213 OFFICE O/P EST LOW 20 MIN: CPT | Performed by: INTERNAL MEDICINE

## 2018-05-09 RX ORDER — ROSUVASTATIN CALCIUM 10 MG/1
10 TABLET, COATED ORAL DAILY
Qty: 90 TABLET | Refills: 2 | Status: SHIPPED | OUTPATIENT
Start: 2018-05-09 | End: 2019-02-06 | Stop reason: SDUPTHER

## 2018-05-09 NOTE — PROGRESS NOTES
Claude Spencer is a 62 y.o. male, who presents with a chief complaint of   Chief Complaint   Patient presents with   • Annual Exam       HPI   61 yo male with pmhx COPD (30 pack year plus current smoke exposure).  HTN on lisinopril doing well.  HL on pravastatin.  He is having new R lower leg aching that is transient and inconsistent, worsening varicosities. Worried since mother with similar symptoms.  This is a new problem for me. He denies cold or consistently painful extremities. Some swelling bilateral that is mild and better with rest and elevation. He does not wear ERIC hose. Does have significant second hand smoke exposure.    No new cp or palpitations.     Family history updated. His mother did have amputations which has increased his anxiety regarding his leg pain.     The following portions of the patient's history were reviewed and updated as appropriate: allergies, current medications, past family history, past medical history, past social history, past surgical history and problem list.    Allergies: Other    Current Outpatient Prescriptions:   •  albuterol (PROVENTIL HFA;VENTOLIN HFA) 108 (90 Base) MCG/ACT inhaler, Inhale 2 puffs 2 (Two) Times a Day., Disp: 1 inhaler, Rfl: 4  •  budesonide-formoterol (SYMBICORT) 160-4.5 MCG/ACT inhaler, Inhale 2 puffs 2 (Two) Times a Day., Disp: 1 inhaler, Rfl: 3  •  cholecalciferol (VITAMIN D3) 1000 units tablet, Take 1,000 Units by mouth Daily., Disp: , Rfl:   •  lisinopril-hydrochlorothiazide (PRINZIDE,ZESTORETIC) 20-25 MG per tablet, TAKE ONE TABLET BY MOUTH DAILY, Disp: 30 tablet, Rfl: 3  •  montelukast (SINGULAIR) 10 MG tablet, Take 1 tablet by mouth Every Night., Disp: 30 tablet, Rfl: 6  •  Omega-3 Fatty Acids (FISH OIL) 1000 MG capsule capsule, Take  by mouth daily., Disp: , Rfl:   •  omeprazole (PRILOSEC) 20 MG capsule, Take 1 capsule by mouth Daily., Disp: 30 capsule, Rfl: 6  •  rosuvastatin (CRESTOR) 10 MG tablet, Take 1 tablet by mouth Daily., Disp:  "90 tablet, Rfl: 2  Medications Discontinued During This Encounter   Medication Reason   • benzonatate (TESSALON) 200 MG capsule *Therapy completed   • predniSONE (DELTASONE) 10 MG tablet *Therapy completed   • doxycycline (DORYX) 100 MG enteric coated tablet    • pravastatin (PRAVACHOL) 40 MG tablet        Review of Systems   Constitutional: Negative.    HENT: Negative.    Eyes: Negative.    Respiratory: Positive for cough. Negative for shortness of breath.         Chronic cough production of green sputum in morning   Endocrine: Negative.    Genitourinary: Negative.    Musculoskeletal: Negative for gait problem.        Leg pain   Skin:        Worried about moles and sore area R mustache region   Neurological: Negative.    Hematological: Negative.    Psychiatric/Behavioral: Negative.    All other systems reviewed and are negative.            /68   Pulse 55   Resp 16   Ht 175.3 cm (69\")   Wt 102 kg (225 lb)   SpO2 94%   BMI 33.23 kg/m²       Physical Exam   Constitutional: He is oriented to person, place, and time. He appears well-developed and well-nourished.   HENT:   Head: Normocephalic and atraumatic.   Right Ear: External ear normal.   Left Ear: External ear normal.   Nose: Nose normal.   Mouth/Throat: No oropharyngeal exudate.   Eyes: Conjunctivae and EOM are normal. Pupils are equal, round, and reactive to light.   Neck: Normal range of motion. Neck supple. No JVD present. No tracheal deviation present. No thyromegaly present.   Cardiovascular: Normal rate, regular rhythm, normal heart sounds and intact distal pulses.    No murmur heard.  Pulmonary/Chest: Effort normal and breath sounds normal.   Abdominal: Soft. Bowel sounds are normal. He exhibits no distension and no mass.   Musculoskeletal: Normal range of motion. He exhibits no edema.   Neurological: He is alert and oriented to person, place, and time.   Skin: Skin is warm and dry. Capillary refill takes less than 2 seconds.   Several moles on " back, numerous assymetric  One nevi R cheeck  Visible small varicosities   Psychiatric: He has a normal mood and affect. His behavior is normal.   Nursing note and vitals reviewed.      Lab Results (most recent)     None        UA today    Results for orders placed or performed in visit on 04/20/18   Comprehensive Metabolic Panel   Result Value Ref Range    Glucose 82 65 - 99 mg/dL    BUN 17 8 - 23 mg/dL    Creatinine 1.00 0.76 - 1.27 mg/dL    eGFR Non African Am 76 >60 mL/min/1.73    eGFR African Am 92 >60 mL/min/1.73    BUN/Creatinine Ratio 17.0 7.0 - 25.0    Sodium 139 136 - 145 mmol/L    Potassium 4.2 3.5 - 5.2 mmol/L    Chloride 97 (L) 98 - 107 mmol/L    Total CO2 31.2 (H) 22.0 - 29.0 mmol/L    Calcium 9.6 8.8 - 10.5 mg/dL    Total Protein 7.5 6.0 - 8.5 g/dL    Albumin 4.60 3.50 - 5.20 g/dL    Globulin 2.9 gm/dL    A/G Ratio 1.6 g/dL    Total Bilirubin 0.8 0.2 - 1.2 mg/dL    Alkaline Phosphatase 43 40 - 129 U/L    AST (SGOT) 36 5 - 40 U/L    ALT (SGPT) 28 5 - 41 U/L   Hemoglobin A1c   Result Value Ref Range    Hemoglobin A1C 5.20 4.80 - 5.60 %   Lipid Panel With / Chol / HDL Ratio   Result Value Ref Range    Total Cholesterol 196 0 - 200 mg/dL    Triglycerides 111 0 - 150 mg/dL    HDL Cholesterol 43 40 - 60 mg/dL    VLDL Cholesterol 22.2 8 - 32 mg/dL    LDL Cholesterol  131 (H) 0 - 100 mg/dL    Chol/HDL Ratio 4.56    TSH   Result Value Ref Range    TSH 1.350 0.270 - 4.200 mIU/mL   PSA SCREENING   Result Value Ref Range    PSA 0.966 0.000 - 4.000 ng/mL   Vitamin D 25 Hydroxy   Result Value Ref Range    25 Hydroxy, Vitamin D 29.7 ng/ml   CBC & Differential   Result Value Ref Range    WBC 7.69 4.80 - 10.80 10*3/mm3    RBC 4.96 4.70 - 6.10 10*6/mm3    Hemoglobin 16.4 14.0 - 18.0 g/dL    Hematocrit 49.3 42.0 - 52.0 %    MCV 99.4 (H) 80.0 - 94.0 fL    MCH 33.1 (H) 27.0 - 31.0 pg    MCHC 33.3 31.0 - 37.0 g/dL    RDW 12.5 11.5 - 14.5 %    Platelets 246 140 - 500 10*3/mm3    Neutrophil Rel % 53.4 45.0 - 70.0 %     Lymphocyte Rel % 34.1 20.0 - 45.0 %    Monocyte Rel % 9.2 (H) 3.0 - 8.0 %    Eosinophil Rel % 2.6 0.0 - 4.0 %    Basophil Rel % 0.4 0.0 - 2.0 %    Neutrophils Absolute 4.11 1.50 - 8.30 10*3/mm3    Lymphocytes Absolute 2.62 0.60 - 4.80 10*3/mm3    Monocytes Absolute 0.71 0.00 - 1.00 10*3/mm3    Eosinophils Absolute 0.20 0.10 - 0.30 10*3/mm3    Basophils Absolute 0.03 0.00 - 0.20 10*3/mm3    Immature Granulocyte Rel % 0.3 0.0 - 0.5 %    Immature Grans Absolute 0.02 0.00 - 0.03 10*3/mm3    nRBC 0.0 0.0 - 0.0 /100 WBC     ECG 12 Lead  Date/Time: 5/9/2018 9:39 AM  Performed by: BETH CASTLE  Authorized by: BETH CASTLE   Comparison: not compared with previous ECG   Rhythm: sinus rhythm  Rate: normal  BPM: 52  QRS axis: normal  Clinical impression: normal ECG          LDL not to goal, discussed with patient.      Claude was seen today for annual exam.    Diagnoses and all orders for this visit:    Hypercholesterolemia  -     rosuvastatin (CRESTOR) 10 MG tablet; Take 1 tablet by mouth Daily.    Essential hypertension    Vitamin D deficiency    Routine health maintenance  -     ECG 12 Lead  -     Ambulatory Referral to Dermatology    Colorectal polyp detected on colonoscopy  -     Ambulatory Referral to Gastroenterology    Continue symbicort 2 puff twice daily and rinse  Albuterol 4 puff at time of cough.  Try to avoid secondhand exposure as much as you can  prevnar 2016  Recommend pneumovax #1 at health department soon, then second after age 65.  Shingles vaccine is an option.   Flu shot at health department  Make sure to take vitamin D   PFTs with Dr. Lange  Vascular screening recommended in near future.  Handout given, recommend 3 vascular screening for 75 dollar  Vit D, folate and B12 level next visit. With cmp and flp   Colonoscopy 8/1/14  PSA normal    Return in about 6 months (around 11/9/2018).    Beth Castle MD  05/09/2018

## 2018-05-09 NOTE — PATIENT INSTRUCTIONS
Claude was seen today for annual exam.    Diagnoses and all orders for this visit:    Hypercholesterolemia  -     rosuvastatin (CRESTOR) 10 MG tablet; Take 1 tablet by mouth Daily.    Essential hypertension    Vitamin D deficiency    Routine health maintenance    Continue symbicort 2 puff twice daily and rinse  Albuterol 4 puff at time of cough.  Try to avoid secondhand exposure as much as you can  prevnar 2016  Recommend pneumovax #1 at health department soon, then second after age 65.  Shingles vaccine is an option.   Flu shot at health department  Make sure to take vitamin D   PFTs with Dr. Lange  Vascular screening recommended in near future.  Handout given, recommend 3 vascular screening for 75 dollar  Vit D, folate and B12 level next visit. With cmp and flp     Return in about 6 months (around 11/9/2018).    Panfilo Castle MD  05/09/2018

## 2018-06-11 RX ORDER — OMEPRAZOLE 20 MG/1
CAPSULE, DELAYED RELEASE ORAL
Qty: 90 CAPSULE | Refills: 3 | Status: SHIPPED | OUTPATIENT
Start: 2018-06-11 | End: 2019-02-22 | Stop reason: SDUPTHER

## 2018-07-09 RX ORDER — LISINOPRIL AND HYDROCHLOROTHIAZIDE 25; 20 MG/1; MG/1
TABLET ORAL
Qty: 90 TABLET | Refills: 2 | Status: SHIPPED | OUTPATIENT
Start: 2018-07-09 | End: 2019-03-12 | Stop reason: SDUPTHER

## 2018-08-07 RX ORDER — MONTELUKAST SODIUM 10 MG/1
TABLET ORAL
Qty: 90 TABLET | Refills: 1 | Status: SHIPPED | OUTPATIENT
Start: 2018-08-07 | End: 2019-02-06 | Stop reason: SDUPTHER

## 2018-11-08 ENCOUNTER — OFFICE VISIT (OUTPATIENT)
Dept: INTERNAL MEDICINE | Facility: CLINIC | Age: 63
End: 2018-11-08

## 2018-11-08 VITALS
SYSTOLIC BLOOD PRESSURE: 134 MMHG | BODY MASS INDEX: 30.36 KG/M2 | RESPIRATION RATE: 14 BRPM | HEART RATE: 64 BPM | OXYGEN SATURATION: 97 % | WEIGHT: 205 LBS | DIASTOLIC BLOOD PRESSURE: 86 MMHG | HEIGHT: 69 IN

## 2018-11-08 DIAGNOSIS — R63.4 WEIGHT LOSS, ABNORMAL: ICD-10-CM

## 2018-11-08 DIAGNOSIS — E78.00 HYPERCHOLESTEROLEMIA: ICD-10-CM

## 2018-11-08 DIAGNOSIS — Z72.0 TOBACCO ABUSE DISORDER: Primary | ICD-10-CM

## 2018-11-08 DIAGNOSIS — J43.8 OTHER EMPHYSEMA (HCC): ICD-10-CM

## 2018-11-08 DIAGNOSIS — I10 ESSENTIAL HYPERTENSION: ICD-10-CM

## 2018-11-08 LAB
ALBUMIN SERPL-MCNC: 4.7 G/DL (ref 3.5–5.2)
ALBUMIN/GLOB SERPL: 1.7 G/DL
ALP SERPL-CCNC: 55 U/L (ref 40–129)
ALT SERPL-CCNC: 25 U/L (ref 5–41)
AST SERPL-CCNC: 32 U/L (ref 5–40)
BASOPHILS # BLD AUTO: 0.04 10*3/MM3 (ref 0–0.2)
BASOPHILS NFR BLD AUTO: 0.5 % (ref 0–2)
BILIRUB SERPL-MCNC: 0.6 MG/DL (ref 0.2–1.2)
BUN SERPL-MCNC: 16 MG/DL (ref 8–23)
BUN/CREAT SERPL: 21.1 (ref 7–25)
CALCIUM SERPL-MCNC: 9.4 MG/DL (ref 8.8–10.5)
CHLORIDE SERPL-SCNC: 97 MMOL/L (ref 98–107)
CHOLEST SERPL-MCNC: 142 MG/DL (ref 0–200)
CO2 SERPL-SCNC: 32.9 MMOL/L (ref 22–29)
CREAT SERPL-MCNC: 0.76 MG/DL (ref 0.76–1.27)
EOSINOPHIL # BLD AUTO: 0.22 10*3/MM3 (ref 0.1–0.3)
EOSINOPHIL NFR BLD AUTO: 2.7 % (ref 0–4)
ERYTHROCYTE [DISTWIDTH] IN BLOOD BY AUTOMATED COUNT: 12 % (ref 11.5–14.5)
GLOBULIN SER CALC-MCNC: 2.7 GM/DL
GLUCOSE SERPL-MCNC: 77 MG/DL (ref 65–99)
HCT VFR BLD AUTO: 47.4 % (ref 42–52)
HDLC SERPL-MCNC: 42 MG/DL (ref 40–60)
HGB BLD-MCNC: 15.7 G/DL (ref 14–18)
IMM GRANULOCYTES # BLD: 0.02 10*3/MM3 (ref 0–0.03)
IMM GRANULOCYTES NFR BLD: 0.2 % (ref 0–0.5)
LDLC SERPL CALC-MCNC: 81 MG/DL (ref 0–100)
LDLC/HDLC SERPL: 1.93 {RATIO}
LYMPHOCYTES # BLD AUTO: 2.51 10*3/MM3 (ref 0.6–4.8)
LYMPHOCYTES NFR BLD AUTO: 30.3 % (ref 20–45)
MCH RBC QN AUTO: 32 PG (ref 27–31)
MCHC RBC AUTO-ENTMCNC: 33.1 G/DL (ref 31–37)
MCV RBC AUTO: 96.5 FL (ref 80–94)
MONOCYTES # BLD AUTO: 0.73 10*3/MM3 (ref 0–1)
MONOCYTES NFR BLD AUTO: 8.8 % (ref 3–8)
NEUTROPHILS # BLD AUTO: 4.77 10*3/MM3 (ref 1.5–8.3)
NEUTROPHILS NFR BLD AUTO: 57.5 % (ref 45–70)
NRBC BLD AUTO-RTO: 0 /100 WBC (ref 0–0)
PLATELET # BLD AUTO: 243 10*3/MM3 (ref 140–500)
POTASSIUM SERPL-SCNC: 4 MMOL/L (ref 3.5–5.2)
PROT SERPL-MCNC: 7.4 G/DL (ref 6–8.5)
RBC # BLD AUTO: 4.91 10*6/MM3 (ref 4.7–6.1)
SODIUM SERPL-SCNC: 140 MMOL/L (ref 136–145)
TRIGL SERPL-MCNC: 94 MG/DL (ref 0–150)
TSH SERPL DL<=0.005 MIU/L-ACNC: 1.75 MIU/ML (ref 0.27–4.2)
VLDLC SERPL CALC-MCNC: 18.8 MG/DL (ref 8–32)
WBC # BLD AUTO: 8.29 10*3/MM3 (ref 4.8–10.8)

## 2018-11-08 PROCEDURE — 99214 OFFICE O/P EST MOD 30 MIN: CPT | Performed by: INTERNAL MEDICINE

## 2018-11-08 RX ORDER — AZITHROMYCIN 250 MG/1
TABLET, FILM COATED ORAL
Qty: 6 TABLET | Refills: 0 | Status: SHIPPED | OUTPATIENT
Start: 2018-11-08 | End: 2021-04-16

## 2018-11-08 RX ORDER — METHYLPREDNISOLONE SODIUM SUCCINATE 125 MG/2ML
125 INJECTION, POWDER, LYOPHILIZED, FOR SOLUTION INTRAMUSCULAR; INTRAVENOUS EVERY 6 HOURS
Status: DISCONTINUED | OUTPATIENT
Start: 2018-11-08 | End: 2021-04-16

## 2018-11-08 RX ADMIN — METHYLPREDNISOLONE SODIUM SUCCINATE 125 MG: 125 INJECTION, POWDER, LYOPHILIZED, FOR SOLUTION INTRAMUSCULAR; INTRAVENOUS at 09:27
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
## 2019-02-06 DIAGNOSIS — E78.00 HYPERCHOLESTEROLEMIA: ICD-10-CM

## 2019-02-06 RX ORDER — ROSUVASTATIN CALCIUM 10 MG/1
TABLET, COATED ORAL
Qty: 90 TABLET | Refills: 1 | Status: SHIPPED | OUTPATIENT
Start: 2019-02-06

## 2019-02-06 RX ORDER — MONTELUKAST SODIUM 10 MG/1
TABLET ORAL
Qty: 30 TABLET | Refills: 0 | Status: SHIPPED | OUTPATIENT
Start: 2019-02-06 | End: 2019-03-12 | Stop reason: SDUPTHER

## 2019-02-22 ENCOUNTER — TELEPHONE (OUTPATIENT)
Dept: SURGERY | Facility: CLINIC | Age: 64
End: 2019-02-22

## 2019-02-22 RX ORDER — OMEPRAZOLE 20 MG/1
20 CAPSULE, DELAYED RELEASE ORAL DAILY
Qty: 90 CAPSULE | Refills: 3 | Status: SHIPPED | OUTPATIENT
Start: 2019-02-22 | End: 2021-04-16

## 2019-02-22 NOTE — TELEPHONE ENCOUNTER
PT CALLED BACK TO SAY HIS MED IS OMEPRAZOLE 20MG QD.  HE USES [facility] IN [place].  PT # [id number].

## 2019-03-02 DIAGNOSIS — J40 BRONCHITIS: ICD-10-CM

## 2019-03-04 RX ORDER — BUDESONIDE AND FORMOTEROL FUMARATE DIHYDRATE 160; 4.5 UG/1; UG/1
AEROSOL RESPIRATORY (INHALATION)
Qty: 10.2 G | Refills: 6 | Status: SHIPPED | OUTPATIENT
Start: 2019-03-04

## 2019-03-12 RX ORDER — MONTELUKAST SODIUM 10 MG/1
TABLET ORAL
Qty: 90 TABLET | Refills: 1 | Status: SHIPPED | OUTPATIENT
Start: 2019-03-12

## 2019-03-12 RX ORDER — OMEPRAZOLE 20 MG/1
CAPSULE, DELAYED RELEASE ORAL
Qty: 90 CAPSULE | Refills: 1 | Status: SHIPPED | OUTPATIENT
Start: 2019-03-12

## 2019-03-12 RX ORDER — LISINOPRIL AND HYDROCHLOROTHIAZIDE 25; 20 MG/1; MG/1
TABLET ORAL
Qty: 90 TABLET | Refills: 1 | Status: SHIPPED | OUTPATIENT
Start: 2019-03-12 | End: 2021-04-20 | Stop reason: HOSPADM

## 2019-03-27 ENCOUNTER — TELEPHONE (OUTPATIENT)
Dept: SURGERY | Facility: CLINIC | Age: 64
End: 2019-03-27

## 2019-03-27 NOTE — TELEPHONE ENCOUNTER
"We received a referral from  PCP, Dr. Jarvis to see the patient for abd pain/blood in stool.  I called the patient, no answer, Mr. Spencer called right back.     I tried scheduling him but he denied, said that he was feeling better, but he already has a stomach doctor and if he needed to see a doctor he would get in with his doctor, Dr. Gil.  He also said he \"really didn't have the expenses right now to pay for another dr visit\".    He questioned me about a medication that Dr. Jarvis had put him on,Omeprazole,  ask me what it was.  I told him it was a medicine for his stomach, he said Dr. Gil had already put him on a stomach medicine and wanted me to tell him whether to stop the medication or not.  I explained to Mr. Spencer that since we didn't prescribe either medicine and we hadnt seen him, that he needed to contact his PCP for advice.    Letter has been sent to Dr. Jarvis  "

## 2019-11-06 ENCOUNTER — TRANSCRIBE ORDERS (OUTPATIENT)
Dept: ADMINISTRATIVE | Facility: HOSPITAL | Age: 64
End: 2019-11-06

## 2019-11-06 ENCOUNTER — HOSPITAL ENCOUNTER (OUTPATIENT)
Dept: GENERAL RADIOLOGY | Facility: HOSPITAL | Age: 64
Discharge: HOME | End: 2019-11-06
Admitting: INTERNAL MEDICINE

## 2019-11-06 DIAGNOSIS — R05.9 COUGH: Primary | ICD-10-CM

## 2019-11-06 DIAGNOSIS — R05.9 COUGH: ICD-10-CM

## 2019-11-06 PROCEDURE — 71046 X-RAY EXAM CHEST 2 VIEWS: CPT

## 2020-11-30 ENCOUNTER — TRANSCRIBE ORDERS (OUTPATIENT)
Dept: PULMONOLOGY | Facility: HOSPITAL | Age: 65
End: 2020-11-30

## 2020-11-30 DIAGNOSIS — J44.9 CHRONIC OBSTRUCTIVE PULMONARY DISEASE, UNSPECIFIED COPD TYPE (HCC): Primary | ICD-10-CM

## 2021-04-16 ENCOUNTER — APPOINTMENT (OUTPATIENT)
Dept: GENERAL RADIOLOGY | Facility: HOSPITAL | Age: 66
End: 2021-04-16

## 2021-04-16 ENCOUNTER — HOSPITAL ENCOUNTER (INPATIENT)
Facility: HOSPITAL | Age: 66
LOS: 4 days | Discharge: ANOTHER HOSPITAL | End: 2021-04-20
Attending: EMERGENCY MEDICINE | Admitting: HOSPITALIST

## 2021-04-16 DIAGNOSIS — I48.91 ATRIAL FIBRILLATION WITH RAPID VENTRICULAR RESPONSE (HCC): Primary | ICD-10-CM

## 2021-04-16 LAB
ALBUMIN SERPL-MCNC: 4 G/DL (ref 3.5–5.2)
ALBUMIN/GLOB SERPL: 1.6 G/DL
ALP SERPL-CCNC: 84 U/L (ref 39–117)
ALT SERPL W P-5'-P-CCNC: 228 U/L (ref 1–41)
ANION GAP SERPL CALCULATED.3IONS-SCNC: 9.3 MMOL/L (ref 5–15)
AST SERPL-CCNC: 67 U/L (ref 1–40)
BASOPHILS # BLD AUTO: 0.02 10*3/MM3 (ref 0–0.2)
BASOPHILS NFR BLD AUTO: 0.1 % (ref 0–1.5)
BILIRUB SERPL-MCNC: 1.3 MG/DL (ref 0–1.2)
BUN SERPL-MCNC: 15 MG/DL (ref 8–23)
BUN/CREAT SERPL: 17.4 (ref 7–25)
CALCIUM SPEC-SCNC: 8.9 MG/DL (ref 8.6–10.5)
CHLORIDE SERPL-SCNC: 98 MMOL/L (ref 98–107)
CO2 SERPL-SCNC: 30.7 MMOL/L (ref 22–29)
CREAT SERPL-MCNC: 0.86 MG/DL (ref 0.76–1.27)
DEPRECATED RDW RBC AUTO: 50.2 FL (ref 37–54)
EOSINOPHIL # BLD AUTO: 0.09 10*3/MM3 (ref 0–0.4)
EOSINOPHIL NFR BLD AUTO: 0.6 % (ref 0.3–6.2)
ERYTHROCYTE [DISTWIDTH] IN BLOOD BY AUTOMATED COUNT: 13.6 % (ref 12.3–15.4)
GFR SERPL CREATININE-BSD FRML MDRD: 90 ML/MIN/1.73
GLOBULIN UR ELPH-MCNC: 2.5 GM/DL
GLUCOSE SERPL-MCNC: 104 MG/DL (ref 65–99)
HCT VFR BLD AUTO: 49.3 % (ref 37.5–51)
HGB BLD-MCNC: 16.1 G/DL (ref 13–17.7)
IMM GRANULOCYTES # BLD AUTO: 0.1 10*3/MM3 (ref 0–0.05)
IMM GRANULOCYTES NFR BLD AUTO: 0.7 % (ref 0–0.5)
LYMPHOCYTES # BLD AUTO: 1.55 10*3/MM3 (ref 0.7–3.1)
LYMPHOCYTES NFR BLD AUTO: 10.8 % (ref 19.6–45.3)
MAGNESIUM SERPL-MCNC: 1.9 MG/DL (ref 1.6–2.4)
MCH RBC QN AUTO: 32.7 PG (ref 26.6–33)
MCHC RBC AUTO-ENTMCNC: 32.7 G/DL (ref 31.5–35.7)
MCV RBC AUTO: 100 FL (ref 79–97)
MONOCYTES # BLD AUTO: 1.61 10*3/MM3 (ref 0.1–0.9)
MONOCYTES NFR BLD AUTO: 11.3 % (ref 5–12)
NEUTROPHILS NFR BLD AUTO: 10.93 10*3/MM3 (ref 1.7–7)
NEUTROPHILS NFR BLD AUTO: 76.5 % (ref 42.7–76)
NRBC BLD AUTO-RTO: 0 /100 WBC (ref 0–0.2)
NT-PROBNP SERPL-MCNC: 2963 PG/ML (ref 0–900)
PLATELET # BLD AUTO: 196 10*3/MM3 (ref 140–450)
PMV BLD AUTO: 10.7 FL (ref 6–12)
POTASSIUM SERPL-SCNC: 3.9 MMOL/L (ref 3.5–5.2)
PROT SERPL-MCNC: 6.5 G/DL (ref 6–8.5)
RBC # BLD AUTO: 4.93 10*6/MM3 (ref 4.14–5.8)
SARS-COV-2 RNA PNL SPEC NAA+PROBE: NOT DETECTED
SODIUM SERPL-SCNC: 138 MMOL/L (ref 136–145)
T4 FREE SERPL-MCNC: 1.48 NG/DL (ref 0.93–1.7)
TROPONIN T SERPL-MCNC: <0.01 NG/ML (ref 0–0.03)
TSH SERPL DL<=0.05 MIU/L-ACNC: 2.66 UIU/ML (ref 0.27–4.2)
WBC # BLD AUTO: 14.3 10*3/MM3 (ref 3.4–10.8)

## 2021-04-16 PROCEDURE — 85025 COMPLETE CBC W/AUTO DIFF WBC: CPT | Performed by: EMERGENCY MEDICINE

## 2021-04-16 PROCEDURE — 93010 ELECTROCARDIOGRAM REPORT: CPT | Performed by: INTERNAL MEDICINE

## 2021-04-16 PROCEDURE — 93005 ELECTROCARDIOGRAM TRACING: CPT | Performed by: EMERGENCY MEDICINE

## 2021-04-16 PROCEDURE — 84439 ASSAY OF FREE THYROXINE: CPT | Performed by: EMERGENCY MEDICINE

## 2021-04-16 PROCEDURE — 80053 COMPREHEN METABOLIC PANEL: CPT | Performed by: EMERGENCY MEDICINE

## 2021-04-16 PROCEDURE — 83880 ASSAY OF NATRIURETIC PEPTIDE: CPT | Performed by: EMERGENCY MEDICINE

## 2021-04-16 PROCEDURE — 25010000002 DIGOXIN PER 500 MCG: Performed by: EMERGENCY MEDICINE

## 2021-04-16 PROCEDURE — 99284 EMERGENCY DEPT VISIT MOD MDM: CPT

## 2021-04-16 PROCEDURE — 71045 X-RAY EXAM CHEST 1 VIEW: CPT

## 2021-04-16 PROCEDURE — 84484 ASSAY OF TROPONIN QUANT: CPT | Performed by: EMERGENCY MEDICINE

## 2021-04-16 PROCEDURE — 87635 SARS-COV-2 COVID-19 AMP PRB: CPT | Performed by: EMERGENCY MEDICINE

## 2021-04-16 PROCEDURE — 84443 ASSAY THYROID STIM HORMONE: CPT | Performed by: EMERGENCY MEDICINE

## 2021-04-16 PROCEDURE — 99223 1ST HOSP IP/OBS HIGH 75: CPT | Performed by: FAMILY MEDICINE

## 2021-04-16 PROCEDURE — 83735 ASSAY OF MAGNESIUM: CPT | Performed by: EMERGENCY MEDICINE

## 2021-04-16 PROCEDURE — 99285 EMERGENCY DEPT VISIT HI MDM: CPT | Performed by: EMERGENCY MEDICINE

## 2021-04-16 RX ORDER — ONDANSETRON 4 MG/1
4 TABLET, FILM COATED ORAL EVERY 6 HOURS PRN
Status: DISCONTINUED | OUTPATIENT
Start: 2021-04-16 | End: 2021-04-20 | Stop reason: HOSPADM

## 2021-04-16 RX ORDER — NITROGLYCERIN 0.4 MG/1
0.4 TABLET SUBLINGUAL
Status: DISCONTINUED | OUTPATIENT
Start: 2021-04-16 | End: 2021-04-20 | Stop reason: HOSPADM

## 2021-04-16 RX ORDER — DIGOXIN 0.25 MG/ML
250 INJECTION INTRAMUSCULAR; INTRAVENOUS ONCE
Status: COMPLETED | OUTPATIENT
Start: 2021-04-16 | End: 2021-04-16

## 2021-04-16 RX ORDER — ROSUVASTATIN CALCIUM 5 MG/1
10 TABLET, COATED ORAL DAILY
Status: DISCONTINUED | OUTPATIENT
Start: 2021-04-17 | End: 2021-04-20 | Stop reason: HOSPADM

## 2021-04-16 RX ORDER — ALBUTEROL SULFATE 2.5 MG/3ML
2.5 SOLUTION RESPIRATORY (INHALATION) EVERY 4 HOURS PRN
Status: DISCONTINUED | OUTPATIENT
Start: 2021-04-16 | End: 2021-04-16

## 2021-04-16 RX ORDER — ALUMINA, MAGNESIA, AND SIMETHICONE 2400; 2400; 240 MG/30ML; MG/30ML; MG/30ML
15 SUSPENSION ORAL EVERY 6 HOURS PRN
Status: DISCONTINUED | OUTPATIENT
Start: 2021-04-16 | End: 2021-04-20 | Stop reason: HOSPADM

## 2021-04-16 RX ORDER — DILTIAZEM HYDROCHLORIDE 5 MG/ML
INJECTION INTRAVENOUS
Status: COMPLETED
Start: 2021-04-16 | End: 2021-04-16

## 2021-04-16 RX ORDER — ACETAMINOPHEN 325 MG/1
650 TABLET ORAL EVERY 4 HOURS PRN
Status: DISCONTINUED | OUTPATIENT
Start: 2021-04-16 | End: 2021-04-20 | Stop reason: HOSPADM

## 2021-04-16 RX ORDER — PANTOPRAZOLE SODIUM 40 MG/1
40 TABLET, DELAYED RELEASE ORAL EVERY MORNING
Refills: 1 | Status: DISCONTINUED | OUTPATIENT
Start: 2021-04-17 | End: 2021-04-20 | Stop reason: HOSPADM

## 2021-04-16 RX ORDER — ACETAMINOPHEN 650 MG/1
650 SUPPOSITORY RECTAL EVERY 4 HOURS PRN
Status: DISCONTINUED | OUTPATIENT
Start: 2021-04-16 | End: 2021-04-20 | Stop reason: HOSPADM

## 2021-04-16 RX ORDER — SODIUM CHLORIDE 0.9 % (FLUSH) 0.9 %
1-10 SYRINGE (ML) INJECTION AS NEEDED
Status: DISCONTINUED | OUTPATIENT
Start: 2021-04-16 | End: 2021-04-20 | Stop reason: HOSPADM

## 2021-04-16 RX ORDER — ONDANSETRON 2 MG/ML
4 INJECTION INTRAMUSCULAR; INTRAVENOUS EVERY 6 HOURS PRN
Status: DISCONTINUED | OUTPATIENT
Start: 2021-04-16 | End: 2021-04-20 | Stop reason: HOSPADM

## 2021-04-16 RX ORDER — CALCIUM CARBONATE 200(500)MG
1 TABLET,CHEWABLE ORAL 2 TIMES DAILY PRN
Status: DISCONTINUED | OUTPATIENT
Start: 2021-04-16 | End: 2021-04-20 | Stop reason: HOSPADM

## 2021-04-16 RX ORDER — MONTELUKAST SODIUM 10 MG/1
10 TABLET ORAL NIGHTLY
Status: DISCONTINUED | OUTPATIENT
Start: 2021-04-16 | End: 2021-04-20 | Stop reason: HOSPADM

## 2021-04-16 RX ORDER — BUDESONIDE AND FORMOTEROL FUMARATE DIHYDRATE 160; 4.5 UG/1; UG/1
2 AEROSOL RESPIRATORY (INHALATION) 2 TIMES DAILY
Status: DISCONTINUED | OUTPATIENT
Start: 2021-04-16 | End: 2021-04-17

## 2021-04-16 RX ORDER — SODIUM CHLORIDE 0.9 % (FLUSH) 0.9 %
10 SYRINGE (ML) INJECTION AS NEEDED
Status: DISCONTINUED | OUTPATIENT
Start: 2021-04-16 | End: 2021-04-20 | Stop reason: HOSPADM

## 2021-04-16 RX ORDER — SODIUM CHLORIDE 9 MG/ML
40 INJECTION, SOLUTION INTRAVENOUS AS NEEDED
Status: DISCONTINUED | OUTPATIENT
Start: 2021-04-16 | End: 2021-04-20 | Stop reason: HOSPADM

## 2021-04-16 RX ORDER — IPRATROPIUM BROMIDE AND ALBUTEROL SULFATE 2.5; .5 MG/3ML; MG/3ML
3 SOLUTION RESPIRATORY (INHALATION)
Status: DISCONTINUED | OUTPATIENT
Start: 2021-04-16 | End: 2021-04-19

## 2021-04-16 RX ORDER — ACETAMINOPHEN 160 MG/5ML
650 SOLUTION ORAL EVERY 4 HOURS PRN
Status: DISCONTINUED | OUTPATIENT
Start: 2021-04-16 | End: 2021-04-20 | Stop reason: HOSPADM

## 2021-04-16 RX ORDER — SODIUM CHLORIDE 0.9 % (FLUSH) 0.9 %
10 SYRINGE (ML) INJECTION EVERY 12 HOURS SCHEDULED
Status: DISCONTINUED | OUTPATIENT
Start: 2021-04-16 | End: 2021-04-20 | Stop reason: HOSPADM

## 2021-04-16 RX ORDER — DILTIAZEM HYDROCHLORIDE 5 MG/ML
20 INJECTION INTRAVENOUS ONCE
Status: COMPLETED | OUTPATIENT
Start: 2021-04-16 | End: 2021-04-16

## 2021-04-16 RX ORDER — BUMETANIDE 0.25 MG/ML
2 INJECTION INTRAMUSCULAR; INTRAVENOUS ONCE
Status: COMPLETED | OUTPATIENT
Start: 2021-04-16 | End: 2021-04-16

## 2021-04-16 RX ADMIN — DILTIAZEM HYDROCHLORIDE 20 MG: 5 INJECTION INTRAVENOUS at 21:42

## 2021-04-16 RX ADMIN — DIGOXIN 250 MCG: 0.25 INJECTION INTRAMUSCULAR; INTRAVENOUS at 23:38

## 2021-04-16 RX ADMIN — SODIUM CHLORIDE 10 MG/HR: 900 INJECTION, SOLUTION INTRAVENOUS at 22:01

## 2021-04-16 RX ADMIN — BUMETANIDE 2 MG: 0.25 INJECTION INTRAMUSCULAR; INTRAVENOUS at 23:36

## 2021-04-17 PROBLEM — R06.02 SHORTNESS OF BREATH: Status: ACTIVE | Noted: 2021-04-17

## 2021-04-17 PROBLEM — J40 BRONCHITIS: Status: RESOLVED | Noted: 2018-02-09 | Resolved: 2021-04-17

## 2021-04-17 PROBLEM — E66.09 CLASS 1 OBESITY DUE TO EXCESS CALORIES WITH SERIOUS COMORBIDITY IN ADULT: Status: ACTIVE | Noted: 2021-04-17

## 2021-04-17 PROBLEM — I50.9 ACUTE CHF (CONGESTIVE HEART FAILURE): Status: ACTIVE | Noted: 2021-04-17

## 2021-04-17 PROBLEM — R63.4 WEIGHT LOSS, ABNORMAL: Status: RESOLVED | Noted: 2018-11-08 | Resolved: 2021-04-17

## 2021-04-17 PROBLEM — E66.811 CLASS 1 OBESITY DUE TO EXCESS CALORIES WITH SERIOUS COMORBIDITY IN ADULT: Status: ACTIVE | Noted: 2021-04-17

## 2021-04-17 PROBLEM — R07.89 CHEST DISCOMFORT: Status: ACTIVE | Noted: 2021-04-17

## 2021-04-17 PROBLEM — I48.91 ATRIAL FIBRILLATION: Status: ACTIVE | Noted: 2021-04-17

## 2021-04-17 LAB
ANION GAP SERPL CALCULATED.3IONS-SCNC: 10 MMOL/L (ref 5–15)
B PARAPERT DNA SPEC QL NAA+PROBE: NOT DETECTED
B PERT DNA SPEC QL NAA+PROBE: NOT DETECTED
BUN SERPL-MCNC: 12 MG/DL (ref 8–23)
BUN/CREAT SERPL: 16.9 (ref 7–25)
C PNEUM DNA NPH QL NAA+NON-PROBE: NOT DETECTED
CALCIUM SPEC-SCNC: 8.6 MG/DL (ref 8.6–10.5)
CHLORIDE SERPL-SCNC: 98 MMOL/L (ref 98–107)
CO2 SERPL-SCNC: 31 MMOL/L (ref 22–29)
CREAT SERPL-MCNC: 0.71 MG/DL (ref 0.76–1.27)
DEPRECATED RDW RBC AUTO: 49.7 FL (ref 37–54)
ERYTHROCYTE [DISTWIDTH] IN BLOOD BY AUTOMATED COUNT: 13.6 % (ref 12.3–15.4)
FLUAV SUBTYP SPEC NAA+PROBE: NOT DETECTED
FLUBV RNA ISLT QL NAA+PROBE: NOT DETECTED
GFR SERPL CREATININE-BSD FRML MDRD: 112 ML/MIN/1.73
GLUCOSE SERPL-MCNC: 103 MG/DL (ref 65–99)
HADV DNA SPEC NAA+PROBE: NOT DETECTED
HCOV 229E RNA SPEC QL NAA+PROBE: NOT DETECTED
HCOV HKU1 RNA SPEC QL NAA+PROBE: NOT DETECTED
HCOV NL63 RNA SPEC QL NAA+PROBE: NOT DETECTED
HCOV OC43 RNA SPEC QL NAA+PROBE: NOT DETECTED
HCT VFR BLD AUTO: 47.4 % (ref 37.5–51)
HGB BLD-MCNC: 15.5 G/DL (ref 13–17.7)
HMPV RNA NPH QL NAA+NON-PROBE: NOT DETECTED
HPIV1 RNA SPEC QL NAA+PROBE: NOT DETECTED
HPIV2 RNA SPEC QL NAA+PROBE: NOT DETECTED
HPIV3 RNA NPH QL NAA+PROBE: NOT DETECTED
HPIV4 P GENE NPH QL NAA+PROBE: NOT DETECTED
M PNEUMO IGG SER IA-ACNC: NOT DETECTED
MCH RBC QN AUTO: 32.2 PG (ref 26.6–33)
MCHC RBC AUTO-ENTMCNC: 32.7 G/DL (ref 31.5–35.7)
MCV RBC AUTO: 98.5 FL (ref 79–97)
PLATELET # BLD AUTO: 191 10*3/MM3 (ref 140–450)
PMV BLD AUTO: 10.6 FL (ref 6–12)
POTASSIUM SERPL-SCNC: 3.4 MMOL/L (ref 3.5–5.2)
RBC # BLD AUTO: 4.81 10*6/MM3 (ref 4.14–5.8)
RHINOVIRUS RNA SPEC NAA+PROBE: NOT DETECTED
RSV RNA NPH QL NAA+NON-PROBE: NOT DETECTED
SODIUM SERPL-SCNC: 139 MMOL/L (ref 136–145)
WBC # BLD AUTO: 12.89 10*3/MM3 (ref 3.4–10.8)

## 2021-04-17 PROCEDURE — 87070 CULTURE OTHR SPECIMN AEROBIC: CPT | Performed by: FAMILY MEDICINE

## 2021-04-17 PROCEDURE — 25010000002 FUROSEMIDE PER 20 MG: Performed by: INTERNAL MEDICINE

## 2021-04-17 PROCEDURE — 94640 AIRWAY INHALATION TREATMENT: CPT

## 2021-04-17 PROCEDURE — 25010000002 DIGOXIN PER 500 MCG: Performed by: INTERNAL MEDICINE

## 2021-04-17 PROCEDURE — 94799 UNLISTED PULMONARY SVC/PX: CPT

## 2021-04-17 PROCEDURE — 87633 RESP VIRUS 12-25 TARGETS: CPT | Performed by: FAMILY MEDICINE

## 2021-04-17 PROCEDURE — 80048 BASIC METABOLIC PNL TOTAL CA: CPT | Performed by: FAMILY MEDICINE

## 2021-04-17 PROCEDURE — 0100U HC BIOFIRE FILMARRAY RESP PANEL 2: CPT | Performed by: FAMILY MEDICINE

## 2021-04-17 PROCEDURE — 99254 IP/OBS CNSLTJ NEW/EST MOD 60: CPT | Performed by: INTERNAL MEDICINE

## 2021-04-17 PROCEDURE — 87205 SMEAR GRAM STAIN: CPT | Performed by: FAMILY MEDICINE

## 2021-04-17 PROCEDURE — 25010000002 ENOXAPARIN PER 10 MG: Performed by: FAMILY MEDICINE

## 2021-04-17 PROCEDURE — 99232 SBSQ HOSP IP/OBS MODERATE 35: CPT | Performed by: INTERNAL MEDICINE

## 2021-04-17 PROCEDURE — 85027 COMPLETE CBC AUTOMATED: CPT | Performed by: FAMILY MEDICINE

## 2021-04-17 RX ORDER — ARFORMOTEROL TARTRATE 15 UG/2ML
15 SOLUTION RESPIRATORY (INHALATION)
Status: DISCONTINUED | OUTPATIENT
Start: 2021-04-17 | End: 2021-04-19

## 2021-04-17 RX ORDER — BUMETANIDE 0.25 MG/ML
2 INJECTION INTRAMUSCULAR; INTRAVENOUS ONCE
Status: COMPLETED | OUTPATIENT
Start: 2021-04-17 | End: 2021-04-17

## 2021-04-17 RX ORDER — DIGOXIN 0.25 MG/ML
500 INJECTION INTRAMUSCULAR; INTRAVENOUS ONCE
Status: COMPLETED | OUTPATIENT
Start: 2021-04-17 | End: 2021-04-17

## 2021-04-17 RX ORDER — FUROSEMIDE 10 MG/ML
40 INJECTION INTRAMUSCULAR; INTRAVENOUS 2 TIMES DAILY
Status: DISCONTINUED | OUTPATIENT
Start: 2021-04-17 | End: 2021-04-18

## 2021-04-17 RX ORDER — BUDESONIDE 0.5 MG/2ML
0.5 INHALANT ORAL
Status: DISCONTINUED | OUTPATIENT
Start: 2021-04-17 | End: 2021-04-19

## 2021-04-17 RX ORDER — DIGOXIN 0.25 MG/ML
250 INJECTION INTRAMUSCULAR; INTRAVENOUS ONCE
Status: COMPLETED | OUTPATIENT
Start: 2021-04-17 | End: 2021-04-17

## 2021-04-17 RX ORDER — POTASSIUM CHLORIDE 20 MEQ/1
40 TABLET, EXTENDED RELEASE ORAL SEE ADMIN INSTRUCTIONS
Status: DISCONTINUED | OUTPATIENT
Start: 2021-04-17 | End: 2021-04-20 | Stop reason: HOSPADM

## 2021-04-17 RX ADMIN — BUMETANIDE 2 MG: 0.25 INJECTION INTRAMUSCULAR; INTRAVENOUS at 08:11

## 2021-04-17 RX ADMIN — DIGOXIN 500 MCG: 0.25 INJECTION INTRAMUSCULAR; INTRAVENOUS at 11:02

## 2021-04-17 RX ADMIN — ROSUVASTATIN CALCIUM 10 MG: 5 TABLET, FILM COATED ORAL at 08:11

## 2021-04-17 RX ADMIN — ENOXAPARIN SODIUM 40 MG: 40 INJECTION SUBCUTANEOUS at 08:11

## 2021-04-17 RX ADMIN — BUDESONIDE 0.5 MG: 0.5 INHALANT RESPIRATORY (INHALATION) at 19:55

## 2021-04-17 RX ADMIN — IPRATROPIUM BROMIDE AND ALBUTEROL SULFATE 3 ML: .5; 3 SOLUTION RESPIRATORY (INHALATION) at 19:55

## 2021-04-17 RX ADMIN — IPRATROPIUM BROMIDE AND ALBUTEROL SULFATE 3 ML: .5; 3 SOLUTION RESPIRATORY (INHALATION) at 12:49

## 2021-04-17 RX ADMIN — PANTOPRAZOLE SODIUM 40 MG: 40 TABLET, DELAYED RELEASE ORAL at 08:15

## 2021-04-17 RX ADMIN — MONTELUKAST SODIUM 10 MG: 10 TABLET, COATED ORAL at 20:53

## 2021-04-17 RX ADMIN — SODIUM CHLORIDE, PRESERVATIVE FREE 10 ML: 5 INJECTION INTRAVENOUS at 20:54

## 2021-04-17 RX ADMIN — SODIUM CHLORIDE, PRESERVATIVE FREE 10 ML: 5 INJECTION INTRAVENOUS at 08:17

## 2021-04-17 RX ADMIN — BUDESONIDE AND FORMOTEROL FUMARATE DIHYDRATE 2 PUFF: 160; 4.5 AEROSOL RESPIRATORY (INHALATION) at 09:10

## 2021-04-17 RX ADMIN — DILTIAZEM HYDROCHLORIDE 60 MG: 30 TABLET, FILM COATED ORAL at 17:13

## 2021-04-17 RX ADMIN — SODIUM CHLORIDE 15 MG/HR: 900 INJECTION, SOLUTION INTRAVENOUS at 04:32

## 2021-04-17 RX ADMIN — ARFORMOTEROL TARTRATE 15 MCG: 15 SOLUTION RESPIRATORY (INHALATION) at 20:39

## 2021-04-17 RX ADMIN — IPRATROPIUM BROMIDE AND ALBUTEROL SULFATE 3 ML: .5; 3 SOLUTION RESPIRATORY (INHALATION) at 15:55

## 2021-04-17 RX ADMIN — IPRATROPIUM BROMIDE AND ALBUTEROL SULFATE 3 ML: .5; 3 SOLUTION RESPIRATORY (INHALATION) at 07:55

## 2021-04-17 RX ADMIN — DILTIAZEM HYDROCHLORIDE 60 MG: 30 TABLET, FILM COATED ORAL at 11:02

## 2021-04-17 RX ADMIN — CALCIUM CARBONATE 1 TABLET: 500 TABLET, CHEWABLE ORAL at 08:15

## 2021-04-17 RX ADMIN — APIXABAN 5 MG: 2.5 TABLET, FILM COATED ORAL at 20:54

## 2021-04-17 RX ADMIN — FUROSEMIDE 40 MG: 10 INJECTION, SOLUTION INTRAMUSCULAR; INTRAVENOUS at 16:04

## 2021-04-17 RX ADMIN — APIXABAN 5 MG: 2.5 TABLET, FILM COATED ORAL at 08:11

## 2021-04-17 RX ADMIN — POTASSIUM CHLORIDE 40 MEQ: 1500 TABLET, EXTENDED RELEASE ORAL at 08:11

## 2021-04-17 RX ADMIN — DIGOXIN 250 MCG: 0.25 INJECTION INTRAMUSCULAR; INTRAVENOUS at 17:13

## 2021-04-17 NOTE — H&P
HISTORY AND PHYSICAL      Patient Care Team:  Armaan Jarvis DO as PCP - General (Family Medicine)    CHIEF COMPLAINT: Shortness of breath    HISTORY OF PRESENT ILLNESS:    65-year-old white male became ill several weeks ago with cough chest congestion shortness of breath and some sputum production.  He saw his primary care physician was given IM and p.o. antibiotics as well as steroid pack initially noticed to be provement.  Patient stated about a week later he started having symptoms again.  Has developed some orthopnea for the last 3 weeks some lower extremity edema and some abdominal distention.  He saw his physician again recommended hospitalization but he refused.  His daughter felt congested, the hospital.  Patient also has been having some intermittent vague left and right-sided chest discomfort feels like the burning pain.  He also has been having palpitations off and on for several weeks.\    After his course of antibiotics esterases cough has become productive scant sputum but occasionally still yellow-colored.  Not any fever chills or hemoptysis.  He has no prior cardiac history.    Past Medical History:   Diagnosis Date   • COPD (chronic obstructive pulmonary disease) (CMS/[facility])    • Hyperlipidemia    • Hypertension      Past Surgical History:   Procedure Laterality Date   • ANKLE SURGERY       Family History   Problem Relation Age of Onset   • COPD Mother 78   • Seizures Father    • Vasculitis Father    • Prostate cancer Maternal Uncle      Social History     Tobacco Use   • Smoking status: Former Smoker     Packs/day: 1.00     Years: 30.00     Pack years: 30.00     Quit date: 2000     Years since quittin.0   • Smokeless tobacco: Current User     Types: Chew   • Tobacco comment: quit 15 years ago, wife current smoker outside   Substance Use Topics   • Alcohol use: No   • Drug use: No     (Not in a hospital admission)    Allergies:  Patient has no known allergies.     Review of Systems  "  Constitutional: Positive for activity change and fatigue. Negative for appetite change.   HENT: Positive for congestion and postnasal drip. Negative for trouble swallowing.    Respiratory: Positive for cough, chest tightness, shortness of breath and wheezing.    Cardiovascular: Positive for chest pain and leg swelling.   Gastrointestinal: Positive for abdominal distention. Negative for abdominal pain, diarrhea, nausea and vomiting.   Endocrine: Negative for polyphagia and polyuria.   Genitourinary: Negative for frequency.   Musculoskeletal: Positive for back pain.   Skin: Negative for rash.   Neurological: Positive for weakness. Negative for light-headedness.   Hematological: Does not bruise/bleed easily.   Psychiatric/Behavioral: Negative for agitation and behavioral problems.       Vital Signs  Temp:  [98.1 °F (36.7 °C)] 98.1 °F (36.7 °C)  Heart Rate:  [115-158] 115  Resp:  [18] 18  BP: (142-156)/(129-136) 142/129  Oxygen Therapy  SpO2: 92 %  Device (Oxygen Therapy): nasal cannula  Flow (L/min): 2}  Body mass index is 32.49 kg/m².  Flowsheet Rows      First Filed Value   Admission Height  175.3 cm (69\") Documented at 04/16/2021 2120   Admission Weight  99.8 kg (220 lb) Documented at 04/16/2021 2120                 Physical Exam  Vitals and nursing note reviewed.   Constitutional:       General: He is not in acute distress.     Appearance: He is well-developed. He is ill-appearing. He is not toxic-appearing.   HENT:      Head: Normocephalic.   Eyes:      Conjunctiva/sclera: Conjunctivae normal.   Neck:      Thyroid: No thyromegaly.      Vascular: No JVD.   Cardiovascular:      Rate and Rhythm: Tachycardia present. Rhythm regularly irregular.      Heart sounds: Normal heart sounds. No murmur heard.     Pulmonary:      Effort: Pulmonary effort is normal. No respiratory distress.      Breath sounds: Examination of the right-lower field reveals rales. Examination of the left-lower field reveals rales. Wheezing " (Bilateral diffuse) and rales present.   Abdominal:      General: Bowel sounds are normal. There is no distension.      Palpations: Abdomen is soft.      Tenderness: There is no abdominal tenderness. There is no guarding.   Musculoskeletal:      Cervical back: Normal range of motion.      Right lower le+ Edema present.      Left lower le+ Edema present.   Skin:     General: Skin is warm and dry.      Findings: No rash.   Neurological:      Mental Status: He is alert and oriented to person, place, and time.      Cranial Nerves: Cranial nerves are intact.      Sensory: Sensation is intact.      Deep Tendon Reflexes: Reflexes are normal and symmetric.          Debilities/Disabilities Identified: Mild respiratory distress    Emotional Behavior: Anxious    Results Review:    I reviewed the patient's new clinical results.  Lab Results (most recent)     Procedure Component Value Units Date/Time    T4, Free [[id number]]  (Normal) Collected: 21    Specimen: Blood Updated: 21     Free T4 1.48 ng/dL     Narrative:      Results may be falsely increased if patient taking Biotin.      TSH [[id number]]  (Normal) Collected: 21    Specimen: Blood Updated: 21     TSH 2.660 uIU/mL     Troponin [[id number]]  (Normal) Collected: 21    Specimen: Blood Updated: 21     Troponin T <0.010 ng/mL     Narrative:      Troponin T Reference Range:  <= 0.03 ng/mL-   Negative for AMI  >0.03 ng/mL-     Abnormal for myocardial necrosis.  Clinicians would have to utilize clinical acumen, EKG, Troponin and serial changes to determine if it is an Acute Myocardial Infarction or myocardial injury due to an underlying chronic condition.       Results may be falsely decreased if patient taking Biotin.      BNP [[id number]]  (Abnormal) Collected: 21    Specimen: Blood Updated: 21     proBNP 2,963.0 pg/mL     Narrative:      Among patients with dyspnea, NT-proBNP is  highly sensitive for the detection of acute congestive heart failure. In addition NT-proBNP of <300 pg/ml effectively rules out acute congestive heart failure with 99% negative predictive value.    Results may be falsely decreased if patient taking Biotin.      Comprehensive Metabolic Panel [[id number]]  (Abnormal) Collected: 04/16/21 2147    Specimen: Blood Updated: 04/16/21 2214     Glucose 104 mg/dL      BUN 15 mg/dL      Creatinine 0.86 mg/dL      Sodium 138 mmol/L      Potassium 3.9 mmol/L      Chloride 98 mmol/L      CO2 30.7 mmol/L      Calcium 8.9 mg/dL      Total Protein 6.5 g/dL      Albumin 4.00 g/dL      ALT (SGPT) 228 U/L      AST (SGOT) 67 U/L      Alkaline Phosphatase 84 U/L      Total Bilirubin 1.3 mg/dL      eGFR Non African Amer 90 mL/min/1.73      Globulin 2.5 gm/dL      A/G Ratio 1.6 g/dL      BUN/Creatinine Ratio 17.4     Anion Gap 9.3 mmol/L     Narrative:      GFR Normal >60  Chronic Kidney Disease <60  Kidney Failure <15      Magnesium [[id number]]  (Normal) Collected: 04/16/21 2147    Specimen: Blood Updated: 04/16/21 2214     Magnesium 1.9 mg/dL     CBC & Differential [[id number]]  (Abnormal) Collected: 04/16/21 2147    Specimen: Blood Updated: 04/16/21 2200    Narrative:      The following orders were created for panel order CBC & Differential.  Procedure                               Abnormality         Status                     ---------                               -----------         ------                     CBC Auto Differential[[id number]]        Abnormal            Final result                 Please view results for these tests on the individual orders.    CBC Auto Differential [[id number]]  (Abnormal) Collected: 04/16/21 2147    Specimen: Blood Updated: 04/16/21 2200     WBC 14.30 10*3/mm3      RBC 4.93 10*6/mm3      Hemoglobin 16.1 g/dL      Hematocrit 49.3 %      .0 fL      MCH 32.7 pg      MCHC 32.7 g/dL      RDW 13.6 %      RDW-SD 50.2 fl      MPV 10.7 fL      Platelets  196 10*3/mm3      Neutrophil % 76.5 %      Lymphocyte % 10.8 %      Monocyte % 11.3 %      Eosinophil % 0.6 %      Basophil % 0.1 %      Immature Grans % 0.7 %      Neutrophils, Absolute 10.93 10*3/mm3      Lymphocytes, Absolute 1.55 10*3/mm3      Monocytes, Absolute 1.61 10*3/mm3      Eosinophils, Absolute 0.09 10*3/mm3      Basophils, Absolute 0.02 10*3/mm3      Immature Grans, Absolute 0.10 10*3/mm3      nRBC 0.0 /100 WBC           Imaging Results (Most Recent)     Procedure Component Value Units Date/Time    XR Chest 1 View [[id number]] Collected: 04/16/21 2240     Updated: 04/16/21 2243    Narrative:      CR Chest 1 Vw    INDICATION:   Shortness of air for 3 weeks.     COMPARISON:    11/6/2019    FINDINGS:  2 portable AP view(s) of the chest.  Heart size is top normal. There is vascular congestion. There are infiltrates in both mid to lower lung zones certainly worrisome for pulmonary edema although pneumonia is not excluded. No pneumothorax is identified.      Impression:      Findings most suggestive of CHF although pneumonia is not radiographically excluded.    Signer Name: Jay Angel MD   Signed: 4/16/2021 10:40 PM   Workstation Name: [facility]    [facility]        reviewed    ECG/EMG Results (most recent)     Procedure Component Value Units Date/Time    ECG 12 Lead [[id number]] Collected: 04/16/21 2121     Updated: 04/16/21 2130     QT Interval 307 ms     Narrative:      HEART RATE= 138  bpm  RR Interval= 434  ms  MA Interval=   ms  P Horizontal Axis=   deg  P Front Axis=   deg  QRSD Interval= 103  ms  QT Interval= 307  ms  QRS Axis= 59  deg  T Wave Axis= 19  deg  - ABNORMAL ECG -  Atrial fibrillation  Ventricular premature complex  Electronically Signed By:   Date and Time of Study: 2021-04-16 21:21:07        reviewed    Assessment/Plan       1.  New onset atrial fibrillation with rapid response patient was started on Cardizem drip his rate is improving still tachycardic we  will continue to monitor has been given 1 dose of digoxin.  We will continue with Cardizem drip and monitor cardiology be consulted.  His  CHADS2 shows appropriate for anticoagulation so we will go ahead and start Eliquis    2.  New onset congestive heart failure etiology be determined likely due to atrial fibrillation echocardiogram has been ordered    3.  COPD likely exacerbation patient is actively wheezing but not sure with some COPD exacerbation versus decompensated congestive heart failure.  We will go ahead and continue with duo nebs and monitor respiratory viral panel sputum culture be ordered    4.  GERD nothing acute continue with PPI      5.  Hypertension controlled hold home antihypertensives for now    6.  Chest pain atypical but has multiple risk factors for coronary disease we will await cardiology evaluation .    7.  Hyperlipidemia we will continue with home statin          I discussed the patients findings and my recommendations with patient and daughter    Gumaro Jarrell MD  04/16/21  23:12 EDT      Much of this encounter note is an electronic transcription/translation of spoken language to printed text using Dragon Software

## 2021-04-17 NOTE — PLAN OF CARE
"Goal Outcome Evaluation:  Plan of Care Reviewed With: patient  Progress: improving  Outcome Summary: Pt denies pain or discomfort this shift but does c/o \"devon horse\" in legs and it was explained to the pt that his potassium has been low and this can cause leg cramps and he is getting potassium replacement.  IV Cardiazem discontinued and PO Cardiazem started.  Denies chest pain.  Up with assist x1.  Utilizes urinal and is urinating frequently due to IV Bumex.  One dose of IV Digoxin administered.  O2 titrated down to 1L and pt is tolerating.  Wife in to visit this AM.  Daughter in to visit this afternoon.  Remains A-fib on the monitor.  Bed in lowest position.  "

## 2021-04-17 NOTE — H&P (VIEW-ONLY)
Date of Hospital Visit: 21  Encounter Provider: Wilfred Draper MD  Place of Service: [facility] CARDIOLOGY  Patient Name: Claude Spencer  :1955  Referral Provider: Dr Jarrell    Chief complaint: SOA, CP    History of Present Illness    Mr. Spencer is a 65-year-old gentleman who looks much older than stated age who presents with several weeks of generalized malaise, dyspnea, chest discomfort, and palpitations.  He saw his primary care physician several weeks ago and was given oral steroids, intramuscular antibiotics, oral antibiotics, and bronchodilators.  This helped for a few days but then he started to feel poorly again. He noted a rapid clearing heartbeat, exertional shortness of breath, increased thin yellow sputum production, chest discomfort in the left upper chest, orthopnea, and mild leg swelling.  He saw his primary care physician again, who recommended hospitalization, but the patient refused.  He finally agreed to come in yesterday after his symptoms became unbearable.  He has not had any fevers.  He cannot reliably tell me if his symptoms get worse with exertion.  He no longer smokes cigarettes but he does chew tobacco.  He does have COPD and uses oxygen at night, but has been having to use it during the day for the past several weeks as well.    He denies any history of heart problems and states that he had a heart work-up many many years ago that was unremarkable.    Past Medical History:   Diagnosis Date   • COPD (chronic obstructive pulmonary disease) (CMS/[facility])    • Gastroesophageal reflux disease 3/4/2016   • Hyperlipidemia    • Hypertension    • Osteopenia 3/4/2016    Description: Her bone density 2015 secondary to steroid use   • Vitamin D deficiency 3/4/2016       Past Surgical History:   Procedure Laterality Date   • ANKLE SURGERY         Prior to Admission medications    Medication Sig Start Date End Date Taking? Authorizing Provider    albuterol (PROVENTIL HFA;VENTOLIN HFA) 108 (90 Base) MCG/ACT inhaler Inhale 2 puffs 2 (Two) Times a Day. 10/27/17  Yes Christal Harmon MD   cholecalciferol (VITAMIN D3) 1000 units tablet Take 1,000 Units by mouth Daily.   Yes ProviderMaricarmen MD   lisinopril-hydrochlorothiazide (PRINZIDE,ZESTORETIC) 20-25 MG per tablet TAKE ONE TABLET BY MOUTH DAILY 3/12/19  Yes Panfilo Castle MD   montelukast (SINGULAIR) 10 MG tablet TAKE ONE TABLET BY MOUTH ONCE NIGHTLY 3/12/19  Yes Christal Harmon MD   Omega-3 Fatty Acids (FISH OIL) 1000 MG capsule capsule Take  by mouth daily. 7/30/15  Yes ProviderMaricarmen MD   omeprazole (priLOSEC) 20 MG capsule TAKE ONE CAPSULE BY MOUTH DAILY 3/12/19  Yes Christal Harmon MD   rosuvastatin (CRESTOR) 10 MG tablet TAKE ONE TABLET BY MOUTH DAILY 19  Yes Panfilo Castle MD   SYMBICORT 160-4.5 MCG/ACT inhaler INHALE TWO PUFFS BY MOUTH TWICE A DAY 3/4/19  Yes Lidia Shannon MD       Social History     Socioeconomic History   • Marital status:      Spouse name: Not on file   • Number of children: Not on file   • Years of education: Not on file   • Highest education level: Not on file   Tobacco Use   • Smoking status: Former Smoker     Packs/day: 1.00     Years: 30.00     Pack years: 30.00     Quit date: 2000     Years since quittin.0   • Smokeless tobacco: Current User     Types: Chew   • Tobacco comment: quit 15 years ago, wife current smoker outside   Substance and Sexual Activity   • Alcohol use: No   • Drug use: No   • Sexual activity: Yes     Partners: Female       Family History   Problem Relation Age of Onset   • COPD Mother 78   • Seizures Father    • Vasculitis Father    • Prostate cancer Maternal Uncle        Review of Systems   Constitutional: Positive for fatigue.   Respiratory: Positive for cough, shortness of breath and wheezing.    Cardiovascular: Positive for chest pain and palpitations.   All other systems reviewed and are  "negative.       Objective:     Vitals:    21 0804 21 0900 21 0910 21 0915   BP:  93/60     BP Location:  Right arm     Patient Position:  Lying     Pulse: 90 106 93 100   Resp:    Temp:       TempSrc:       SpO2: 93% 92% 94%    Weight:       Height:         Body mass index is 31.84 kg/m².    Last Weight and Admission Weight        21  0500   Weight: 97.8 kg (215 lb 9.6 oz)     Flowsheet Rows      First Filed Value   Admission Height  175.3 cm (69\") Documented at 2021 2120   Admission Weight  99.8 kg (220 lb) Documented at 2021 2120            Intake/Output Summary (Last 24 hours) at 2021 0956  Last data filed at 2021 0513  Gross per 24 hour   Intake 69 ml   Output 3150 ml   Net -3081 ml         Physical Exam  Constitutional:       General: He is not in acute distress.     Appearance: Normal appearance.   HENT:      Head: Normocephalic.      Nose: Nose normal.      Mouth/Throat:      Pharynx: Oropharynx is clear.   Eyes:      Conjunctiva/sclera: Conjunctivae normal.   Cardiovascular:      Rate and Rhythm: Tachycardia present. Rhythm irregular.      Pulses: Normal pulses.      Heart sounds: Normal heart sounds.      Comments: + varicosities  Pulmonary:      Effort: Pulmonary effort is normal.      Breath sounds: Examination of the right-middle field reveals rales. Examination of the right-lower field reveals decreased breath sounds. Examination of the left-lower field reveals wheezing. Decreased breath sounds, wheezing and rales present.   Abdominal:      Palpations: Abdomen is soft.      Tenderness: There is no abdominal tenderness.   Musculoskeletal:      Right lower le+ Edema present.      Left lower le+ Edema present.   Skin:     General: Skin is warm and dry.   Neurological:      General: No focal deficit present.      Mental Status: He is alert.   Psychiatric:         Mood and Affect: Mood normal.                 Lab Review:              "   Results from last 7 days   Lab Units 04/17/21  0434   SODIUM mmol/L 139   POTASSIUM mmol/L 3.4*   CHLORIDE mmol/L 98   CO2 mmol/L 31.0*   BUN mg/dL 12   CREATININE mg/dL 0.71*   GLUCOSE mg/dL 103*   CALCIUM mg/dL 8.6     Results from last 7 days   Lab Units 04/16/21  2147   TROPONIN T ng/mL <0.010     Results from last 7 days   Lab Units 04/17/21  0434   WBC 10*3/mm3 12.89*   HEMOGLOBIN g/dL 15.5   HEMATOCRIT % 47.4   PLATELETS 10*3/mm3 191             Results from last 7 days   Lab Units 04/16/21  2147   MAGNESIUM mg/dL 1.9           I personally viewed and interpreted the patient's EKG/Telemetry data -- AF, PVC, rapid rate, no ischemic changes    Assessment/Plan:     1. SOA  2. Chest discomfort  3. Acute CHF, suspect diastolic  4. COPD  5. Atrial fibrillation   6. HTN    Suspect that he has been in atrial fibrillation for a few weeks now and has developed congestive heart failure as result.  I suspect that it is diastolic, but we will need an echocardiogram to assess his left ventricular ejection fraction.  His chest discomfort is a bit vague and is not necessarily exertional.  He is certainly at high risk for coronary artery disease.  His troponin is normal and his EKG shows no ischemic changes, so he does not have acute coronary syndrome at this time.    He is actively wheezing on exam, so I am reticent to use a beta-blocker.  I am going to try to transition him to oral diltiazem.  I will give him a digoxin load.  He has been placed on apixaban so I will stop enoxaparin.  We will diurese him with intravenous furosemide and follow his electrolytes.  I will defer bronchodilators to the primary service.    Once his echo was performed on Monday, he will need some type of ischemic evaluation, but whether or not this is noninvasive or invasive will depend on the results of the echocardiogram.  He understands that he needs to stay through the weekend.

## 2021-04-17 NOTE — PLAN OF CARE
Goal Outcome Evaluation:      Vss , on Cardizem gtt.   No c/o chest pain. Cardiology consulted.

## 2021-04-17 NOTE — ED PROVIDER NOTES
"Subjective     History provided by:  Patient  History limited by: The patient is a very poor historian.    History of Present Illness    · Chief complaint: Shortness of breath and chest pain    · Location: Chest pain in the center of the chest    · Quality/Severity: Shortness of breath and pressure sensation in the center of the chest.    · Timing/Onset: 3 weeks ago    · Modifying Factors: The patient is unable to articulate any or aggravating or relieving factors.    · Associated symptoms: Denies cough or fever.  Reports associated swelling of his feet.    · Narrative: The patient is a 65-year-old white male complaining of shortness of breath and chest discomfort that has come and gone for the last 3 weeks.  He states that he saw his PCP Dr. King 2 weeks ago and again a week ago.  He states Dr. Christine saldaña got a chest x-ray and saw \"fluid\".  He states Dr. King prescribed him a pill which has not helped.  The patient is a very poor historian and has difficulty articulating his symptoms and will Dr. Joseph saldaña prescribed him.  The patient has a history of COPD and Dr. Lange is his pulmonologist.  He also has a history of hypertension.  He has no previous cardiac history.    Review of Systems   Constitutional: Negative for activity change, appetite change, chills, diaphoresis, fatigue and fever.   HENT: Negative for congestion, dental problem, ear pain, hearing loss, mouth sores, postnasal drip, rhinorrhea, sinus pressure, sore throat and voice change.    Eyes: Negative for photophobia, pain, discharge, redness and visual disturbance.   Respiratory: Positive for shortness of breath. Negative for cough, chest tightness, wheezing and stridor.    Cardiovascular: Positive for chest pain and leg swelling. Negative for palpitations.   Gastrointestinal: Negative for abdominal pain, diarrhea, nausea and vomiting.   Genitourinary: Negative for difficulty urinating, dysuria, flank pain, frequency, hematuria and urgency. " "  Musculoskeletal: Negative for arthralgias, back pain, gait problem, joint swelling, myalgias, neck pain and neck stiffness.   Skin: Negative for color change and rash.   Neurological: Negative for dizziness, tremors, seizures, syncope, facial asymmetry, speech difficulty, weakness, light-headedness, numbness and headaches.   Hematological: Negative for adenopathy.   Psychiatric/Behavioral: Negative.  Negative for confusion and decreased concentration. The patient is not nervous/anxious.      Past Medical History:   Diagnosis Date   • COPD (chronic obstructive pulmonary disease) (CMS/HCC)    • Hyperlipidemia    • Hypertension      BP (!) 142/129   Pulse 115   Temp 98.1 °F (36.7 °C) (Oral)   Resp 18   Ht 175.3 cm (69\")   Wt 99.8 kg (220 lb)   SpO2 92%   BMI 32.49 kg/m²     Past Medical History:   Diagnosis Date   • COPD (chronic obstructive pulmonary disease) (CMS/[facility])    • Hyperlipidemia    • Hypertension        No Known Allergies    Past Surgical History:   Procedure Laterality Date   • ANKLE SURGERY         Family History   Problem Relation Age of Onset   • COPD Mother 78   • Seizures Father    • Vasculitis Father    • Prostate cancer Maternal Uncle        Social History     Socioeconomic History   • Marital status:      Spouse name: Not on file   • Number of children: Not on file   • Years of education: Not on file   • Highest education level: Not on file   Tobacco Use   • Smoking status: Former Smoker     Packs/day: 1.00     Years: 30.00     Pack years: 30.00     Quit date: 2000     Years since quittin.0   • Smokeless tobacco: Current User     Types: Chew   • Tobacco comment: quit 15 years ago, wife current smoker outside   Substance and Sexual Activity   • Alcohol use: No   • Drug use: No   • Sexual activity: Yes     Partners: Female           Objective   Physical Exam  Vitals and nursing note reviewed.   Constitutional:       General: He is not in acute distress.     Appearance: He is " well-developed. He is not diaphoretic.      Comments: Patient appears in no acute distress and does not appear toxic.  Review of his vital signs: He is afebrile with a temperature 98.1, respirations normal 18 with a slightly diminished oxygen saturation of 93% on room air, tachycardic with a heart rate of 138, blood pressure markedly elevated 156/136.   HENT:      Head: Normocephalic and atraumatic.      Nose: Nose normal.      Mouth/Throat:      Mouth: Mucous membranes are moist.      Pharynx: Oropharynx is clear. No oropharyngeal exudate.   Eyes:      General: No scleral icterus.        Right eye: No discharge.         Left eye: No discharge.      Pupils: Pupils are equal, round, and reactive to light.   Neck:      Thyroid: No thyromegaly.      Vascular: No JVD.   Cardiovascular:      Rate and Rhythm: Tachycardia present. Rhythm irregular.      Heart sounds: Normal heart sounds. No murmur heard.     Pulmonary:      Effort: Pulmonary effort is normal.      Breath sounds: Wheezing present. No rales.      Comments: The patient has distant breath sounds with faint in phase expiratory wheezing consistent with COPD.  Chest:      Chest wall: No tenderness.   Abdominal:      General: Bowel sounds are normal. There is no distension.      Palpations: Abdomen is soft.      Tenderness: There is no abdominal tenderness.   Musculoskeletal:         General: No tenderness or deformity. Normal range of motion.      Cervical back: Normal range of motion and neck supple.      Right lower leg: Edema ( 1+) present.      Left lower leg: Edema ( 1+) present.   Lymphadenopathy:      Cervical: No cervical adenopathy.   Skin:     General: Skin is warm and dry.      Capillary Refill: Capillary refill takes less than 2 seconds.      Findings: No rash.   Neurological:      General: No focal deficit present.      Mental Status: He is alert and oriented to person, place, and time.      Cranial Nerves: No cranial nerve deficit.       Coordination: Coordination normal.      Comments: No focal motor sensory deficit   Psychiatric:         Mood and Affect: Mood normal.         Behavior: Behavior normal.         Thought Content: Thought content normal.         Judgment: Judgment normal.         Procedures           ED Course  ED Course as of Apr 16 2259 Fri Apr 16, 2021 2226 My interpretation of the patient's EKG tracing performed 21: 21 is atrial fibrillation with a rapid ventricular response of 138, normal axis, normal QRS conduction, isolated PVC, no acute ST segment elevation or depression consistent with ischemia.  The A. fib is new in comparison to tracing 5/9/2018.    [TP]   2226 My interpretation of the patient's chest x-ray is pulmonary vascular congestion consistent with congestive heart failure.    [TP]   2227 Review the patient's laboratory studies: His cardiac troponin is negative.  His proBNP is elevated at 2963 consistent with CHF.  Magnesium is normal.  CBC has an elevated white count of 14.3 with a left shift.  Hemoglobin, hematocrit and platelets within normal limits.  CMP has normal electrolytes and normal renal function test.  He has elevated transaminases with a ALT of 228 and AST is 67 and an elevated total bili of 1.3.    [TP]   2228 The patient was administered diltiazem 20 mg IV bolus and placed on a 10 mg/h drip.  Repeat examination at 22: 30 patient remained in A. fib with RVR with a rate of 115-130 and a blood pressure that improved to 128/100.    [TP]   2237 22: 36 the patient was discussed with Dr. Jarrell, hospitalist, who agreed to admit the patient to the intensive care unit for further evaluation and treatment of his A. fib with RVR.  Add Dr. Jarrell request the patient was administered Bumex 2 mg IV and digoxin 0.25mg IV.    [TP]      ED Course User Index  [TP] West Lopez MD                                           MDM  Number of Diagnoses or Management Options  Atrial fibrillation with rapid  ventricular response (CMS/HCC): new and requires workup     Amount and/or Complexity of Data Reviewed  Clinical lab tests: ordered and reviewed  Tests in the radiology section of CPT®: ordered and reviewed  Tests in the medicine section of CPT®: ordered and reviewed  Discuss the patient with other providers: yes    Risk of Complications, Morbidity, and/or Mortality  Presenting problems: high  Diagnostic procedures: high  Management options: high  General comments: My differential diagnosis for dyspnea includes but is not limited to:  Asthma, COPD, pneumonia, pulmonary embolus, acute respiratory distress syndrome, pneumothorax, pleural effusion, pulmonary fibrosis, congestive heart failure, myocardial infarction, DKA, uremia, acidosis, sepsis, anemia, drug related, hyperventilation, CNS disease   My differential diagnosis for chest pain includes but is not limited to:  Muscle strain, costochondritis, myositis, pleurisy, rib fracture, intercostal neuritis, herpes zoster, tumor, myocardial infarction, coronary syndrome, unstable angina, angina, aortic dissection, mitral valve prolapse, pericarditis, palpitations, pulmonary embolus, pneumonia, pneumothorax, lung cancer, GERD, esophagitis, esophageal spasm    Patient Progress  Patient progress: stable      Final diagnoses:   Atrial fibrillation with rapid ventricular response (CMS/HCC)       ED Disposition  ED Disposition     ED Disposition Condition Comment    Decision to Admit  Level of Care: Critical Care [6]   Diagnosis: Atrial fibrillation with rapid ventricular response (CMS/HCC) [472403]   Admitting Physician: JAYDON SOLIS [[id number]]   Bed Request Comments: New onset A. fib with RVR   Certification: I Certify That Inpatient Hospital Services Are Medically Necessary For Greater Than 2 Midnights            No follow-up provider specified.       Medication List      ASK your doctor about these medications    omeprazole 20 MG capsule  Commonly known as:  priLOSEC  TAKE ONE CAPSULE BY MOUTH DAILY  Ask about: Which instructions should I use?            Labs Reviewed   COMPREHENSIVE METABOLIC PANEL - Abnormal; Notable for the following components:       Result Value    Glucose 104 (*)     CO2 30.7 (*)     ALT (SGPT) 228 (*)     AST (SGOT) 67 (*)     Total Bilirubin 1.3 (*)     All other components within normal limits    Narrative:     GFR Normal >60  Chronic Kidney Disease <60  Kidney Failure <15     BNP (IN-HOUSE) - Abnormal; Notable for the following components:    proBNP 2,963.0 (*)     All other components within normal limits    Narrative:     Among patients with dyspnea, NT-proBNP is highly sensitive for the detection of acute congestive heart failure. In addition NT-proBNP of <300 pg/ml effectively rules out acute congestive heart failure with 99% negative predictive value.    Results may be falsely decreased if patient taking Biotin.     CBC WITH AUTO DIFFERENTIAL - Abnormal; Notable for the following components:    WBC 14.30 (*)     .0 (*)     Neutrophil % 76.5 (*)     Lymphocyte % 10.8 (*)     Immature Grans % 0.7 (*)     Neutrophils, Absolute 10.93 (*)     Monocytes, Absolute 1.61 (*)     Immature Grans, Absolute 0.10 (*)     All other components within normal limits   TROPONIN (IN-HOUSE) - Normal    Narrative:     Troponin T Reference Range:  <= 0.03 ng/mL-   Negative for AMI  >0.03 ng/mL-     Abnormal for myocardial necrosis.  Clinicians would have to utilize clinical acumen, EKG, Troponin and serial changes to determine if it is an Acute Myocardial Infarction or myocardial injury due to an underlying chronic condition.       Results may be falsely decreased if patient taking Biotin.     T4, FREE - Normal    Narrative:     Results may be falsely increased if patient taking Biotin.     TSH - Normal   MAGNESIUM - Normal   COVID PRE-OP / PRE-PROCEDURE SCREENING ORDER (NO ISOLATION)    Narrative:     The following orders were created for panel order  COVID PRE-OP / PRE-PROCEDURE SCREENING ORDER (NO ISOLATION) - Swab, Nasopharynx.  Procedure                               Abnormality         Status                     ---------                               -----------         ------                     COVID-19,[facility] IN-YI...[[id number]]                                                   Please view results for these tests on the individual orders.   COVID-19,[facility] IN-HOUSE,NASAL SWAB NO TRANSPORT MEDIA 2 HR TAT   CBC AND DIFFERENTIAL    Narrative:     The following orders were created for panel order CBC & Differential.  Procedure                               Abnormality         Status                     ---------                               -----------         ------                     CBC Auto Differential[[id number]]        Abnormal            Final result                 Please view results for these tests on the individual orders.     XR Chest 1 View   Final Result   Findings most suggestive of CHF although pneumonia is not radiographically excluded.      Signer Name: Jay Angel MD    Signed: 4/16/2021 10:40 PM    Workstation Name: [facility]     [facility]             Medication List      ASK your doctor about these medications    omeprazole 20 MG capsule  Commonly known as: priLOSEC  TAKE ONE CAPSULE BY MOUTH DAILY  Ask about: Which instructions should I use?                 West Lopez MD  04/16/21 [id number]

## 2021-04-17 NOTE — NURSING NOTE
Discharge Planning Assessment  ROXY Valdovinos     Patient Name: Claude Spencer  MRN: [id number]  Today's Date: 4/17/2021    Admit Date: 4/16/2021    Discharge Needs Assessment     Row Name 04/17/21 1225       Living Environment    Lives With  spouse    Name(s) of Who Lives With Patient  Kati Spencer, wife    Current Living Arrangements  home/apartment/condo single story house with a ramp to gain entry    Duration at Residence  8 years    Potentially Unsafe Housing Conditions  -- none    Primary Care Provided by  self    Provides Primary Care For  no one    Caregiving Concerns  no care giving concerns voiced by patient at this time    Family Caregiver if Needed  spouse    Family Caregiver Names  armando Parikh    Quality of Family Relationships  helpful;involved;supportive    Able to Return to Prior Arrangements  yes    Living Arrangement Comments  Pt states he lives with his wife in a single story house with a ramp to gain entry       Resource/Environmental Concerns    Resource/Environmental Concerns  none    Transportation Concerns  -- none       Transition Planning    Patient/Family Anticipates Transition to  home with family    Patient/Family Anticipated Services at Transition  none    Transportation Anticipated  family or friend will provide Pt states his wife will be able to provide ride home at discharge       Discharge Needs Assessment    Readmission Within the Last 30 Days  no previous admission in last 30 days    Current Outpatient/Agency/Support Group  -- none    Equipment Currently Used at Home  oxygen 2L [place] oxygen through [facility]    Concerns to be Addressed  no discharge needs identified;denies needs/concerns at this time    Concerns Comments  no discharge needs voiced by patient at this time.    Anticipated Changes Related to Illness  none    Equipment Needed After Discharge  none    Outpatient/Agency/Support Group Needs  -- pt states he does not think he will need these services at discharge     Discharge Facility/Level of Care Needs  -- pt states he does not think he will need these services at discharge    Provided Post Acute Provider List?  Refused    Refused Provider List Comment  offered community resources but patient declines the need for them at this time.    Patient's Choice of Community Agency(s)  none    Current Discharge Risk  -- none    Discharge Coordination/Progress  Pt states he plans on returning home at discharge with his wife to help if needed, no discharge needs voiced by patient at this time.        Discharge Plan     Row Name 04/17/21 1235       Plan    Plan  Home with wife    Patient/Family in Agreement with Plan  yes wife is at bedside and is also agreeable to this discharge plan    Plan Comments  Into room and introduced self and role of CM. Discussed discharge disposition with patient and his wife Kati with permission. Patient is currently resting in bed with Cardizem gtt remains infusing. Patient confirms that the info on his face is correct and that he see's Dr. King in [place] as his PCP. He states he using [facility] in [place] and has no problem picking up or paying for his medications. He also states that he does not  have a living will and declines information regarding one. Patient states he lives with his wife in a single story house with a ramp to gain entry and has no problem maneuvering the ramp or within the home. He states that he is independent with his ADL's and drives but his wife will be able to provide ride home at discharge. He also states that the only DME he uses at home is oxygen 2L via [place] at night and it is through [facility] and does not anticipate needing any other equipment at discharge. Patient states he has not used a home health agency in the past and does not think he will need this service at discharge. CM offered community resources but patient declines the need for them at this time. Patient states he plans on returning home  at discharge with his wife to help if needed, no discharge needs voiced by patient at this time. Patient and wife had no other questions or concerns regarding discharge plans. Name and number placed on white board in room. CM will continue to follow for needs.        Continued Care and Services - Admitted Since 4/16/2021    Coordination has not been started for this encounter.         Demographic Summary     Row Name 04/17/21 1234       General Information    Admission Type  inpatient    Arrived From  home    Referral Source  admission list    Reason for Consult  discharge planning    Preferred Language  English     Used During This Interaction  no       Contact Information    Permission Granted to Share Info With      Row Name 04/17/21 1224       General Information    Admission Type  inpatient    Arrived From  home    Required Notices Provided  Important Message from Medicare    Referral Source  admission list    Reason for Consult  discharge planning    Preferred Language  English     Used During This Interaction  no       Contact Information    Permission Granted to Share Info With          Functional Status    No documentation.       Psychosocial    No documentation.       Abuse/Neglect    No documentation.       Legal    No documentation.       Substance Abuse    No documentation.       Patient Forms    No documentation.           Sussy Beltran RN

## 2021-04-17 NOTE — PLAN OF CARE
Discharge Planning Assessment  ROXY Valdovinos     Patient Name: Claude Spencer  MRN: [id number]  Today's Date: 4/17/2021    Admit Date: 4/16/2021    Discharge Needs Assessment       Row Name 04/17/21 1225       Living Environment    Lives With  spouse    Name(s) of Who Lives With Patient  Kati Spencer, wife    Current Living Arrangements  home/apartment/condo single story house with a ramp to gain entry    Duration at Residence  8 years    Potentially Unsafe Housing Conditions  -- none    Primary Care Provided by  self    Provides Primary Care For  no one    Caregiving Concerns  no care giving concerns voiced by patient at this time    Family Caregiver if Needed  spouse    Family Caregiver Names  armando Parikh    Quality of Family Relationships  helpful;involved;supportive    Able to Return to Prior Arrangements  yes    Living Arrangement Comments  Pt states he lives with his wife in a single story house with a ramp to gain entry       Resource/Environmental Concerns    Resource/Environmental Concerns  none    Transportation Concerns  -- none       Transition Planning    Patient/Family Anticipates Transition to  home with family    Patient/Family Anticipated Services at Transition  none    Transportation Anticipated  family or friend will provide Pt states his wife will be able to provide ride home at discharge       Discharge Needs Assessment    Readmission Within the Last 30 Days  no previous admission in last 30 days    Current Outpatient/Agency/Support Group  -- none    Equipment Currently Used at Home  oxygen 2L [place] oxygen through [facility]    Concerns to be Addressed  no discharge needs identified;denies needs/concerns at this time    Concerns Comments  no discharge needs voiced by patient at this time.    Anticipated Changes Related to Illness  none    Equipment Needed After Discharge  none    Outpatient/Agency/Support Group Needs  -- pt states he does not think he will need these services at  discharge    Discharge Facility/Level of Care Needs  -- pt states he does not think he will need these services at discharge    Provided Post Acute Provider List?  Refused    Refused Provider List Comment  offered community resources but patient declines the need for them at this time.    Patient's Choice of Community Agency(s)  none    Current Discharge Risk  -- none    Discharge Coordination/Progress  Pt states he plans on returning home at discharge with his wife to help if needed, no discharge needs voiced by patient at this time.          Discharge Plan       Row Name 04/17/21 1235       Plan    Plan  Home with wife    Patient/Family in Agreement with Plan  yes wife is at bedside and is also agreeable to this discharge plan    Plan Comments  Into room and introduced self and role of CM. Discussed discharge disposition with patient and his wife Kati with permission. Patient is currently resting in bed with Cardizem gtt remains infusing. Patient confirms that the info on his face is correct and that he see's Dr. King in [place] as his PCP. He states he using [facility] pharmacy in [place] and has no problem picking up or paying for his medications. He also states that he does not  have a living will and declines information regarding one. Patient states he lives with his wife in a single story house with a ramp to gain entry and has no problem maneuvering the ramp or within the home. He states that he is independent with his ADL's and drives but his wife will be able to provide ride home at discharge. He also states that the only DME he uses at home is oxygen 2L via [place] at night and it is through [facility] and does not anticipate needing any other equipment at discharge. Patient states he has not used a home health agency in the past and does not think he will need this service at discharge. CM offered community resources but patient declines the need for them at this time. Patient states he plans on  returning home at discharge with his wife to help if needed, no discharge needs voiced by patient at this time. Patient and wife had no other quesitons or concerns regarding discharge plans. Name and number placed on white board in room. CM will continue to follow for needs.          Continued Care and Services - Admitted Since 4/16/2021    Coordination has not been started for this encounter.         Demographic Summary       Row Name 04/17/21 1234       General Information    Admission Type  inpatient    Arrived From  home    Referral Source  admission list    Reason for Consult  discharge planning    Preferred Language  English     Used During This Interaction  no       Contact Information    Permission Granted to Share Info With        Row Name 04/17/21 1224       General Information    Admission Type  inpatient    Arrived From  home    Required Notices Provided  Important Message from Medicare    Referral Source  admission list    Reason for Consult  discharge planning    Preferred Language  English     Used During This Interaction  no       Contact Information    Permission Granted to Share Info With            Functional Status    No documentation.       Psychosocial    No documentation.       Abuse/Neglect    No documentation.       Legal    No documentation.       Substance Abuse    No documentation.       Patient Forms    No documentation.           Sussy Beltran RN  Goal Outcome Evaluation:

## 2021-04-17 NOTE — PROGRESS NOTES
"    [facility] HOSPITALIST TEAM   PROGRESS NOTE      Patient Care Team:  Armaan Jarvis DO as PCP - General (Family Medicine)    Patient seen at bedside    Hospitalist Team      Patient Care Team:  Freddy, Christina Known as PCP - General          Subjective      Presenting History and Chief Complaints:      Chief Complaint:      Shortness of breath    Admission History:    Mr. Claude Spencer is a 65-year-old  male who is known to have HTN, HLD, COPD and GERD, who became ill several weeks ago with cough chest congestion shortness of breath and some sputum production.  He saw his primary care physician was given  antibiotics and steroids. He started having symptoms again.  Has developed some orthopnea for the last 3 weeks some lower extremity edema and some abdominal distention.  Also has been having some intermittent vague left and right-sided chest discomfort feels like the burning pain.  He also has been having palpitations off and on for several weeks.       Interval History and ROS:     Patient States As above: cough, palpitations and chest pain  Patient Complaints: As above  Patient Denies:  Any sx of fever, headache, abdominal pain or n/v  History taken from: Patient      Objective    Vital Signs  Temp:  [98 °F (36.7 °C)-98.7 °F (37.1 °C)] 98 °F (36.7 °C)  Heart Rate:  [] 86  Resp:  [16-20] 18  BP: ()/() 123/92    Flowsheet Rows      First Filed Value   Admission Height  175.3 cm (69\") Documented at 04/16/2021 2120   Admission Weight  99.8 kg (220 lb) Documented at 04/16/2021 2120              PHYSICAL EXAMINATION:      Physical Exam   VS: As above    Gen: pt alert, comfortably lying in bed, having no pain or difficulty breathing   HEENT: Normocephalic. PERRL     CV: Irregularly irregular, S1+ and S2+. No murmur, rubs or gallops appreciated.   Pul: A few rales and rhonchi to auscultation.    Abdm: bowel sounds present, abdomen soft, non-distended.   Extr: 1+ ankle/pedal " edema. No cyanosis or clubbing    MSK: Muscle tone and muscle strength are unremarkable    Neuro: pt is alert and responsive. No focal neurologic deficits    Skin: Warm and dry. Not diaphoretic         Results Review:     I reviewed the patient's new clinical results.    Lab Results (last 24 hours)     Procedure Component Value Units Date/Time    Basic Metabolic Panel [[id number]]  (Abnormal) Collected: 04/17/21 0434    Specimen: Blood Updated: 04/17/21 0615     Glucose 103 mg/dL      BUN 12 mg/dL      Creatinine 0.71 mg/dL      Sodium 139 mmol/L      Potassium 3.4 mmol/L      Chloride 98 mmol/L      CO2 31.0 mmol/L      Calcium 8.6 mg/dL      eGFR Non African Amer 112 mL/min/1.73      BUN/Creatinine Ratio 16.9     Anion Gap 10.0 mmol/L     Narrative:      GFR Normal >60  Chronic Kidney Disease <60  Kidney Failure <15      CBC (No Diff) [[id number]]  (Abnormal) Collected: 04/17/21 0434    Specimen: Blood Updated: 04/17/21 0527     WBC 12.89 10*3/mm3      RBC 4.81 10*6/mm3      Hemoglobin 15.5 g/dL      Hematocrit 47.4 %      MCV 98.5 fL      MCH 32.2 pg      MCHC 32.7 g/dL      RDW 13.6 %      RDW-SD 49.7 fl      MPV 10.6 fL      Platelets 191 10*3/mm3     COVID PRE-OP / PRE-PROCEDURE SCREENING ORDER (NO ISOLATION) - Swab, Nasal Cavity [[id number]]  (Normal) Collected: 04/16/21 2312    Specimen: Swab from Nasal Cavity Updated: 04/16/21 8579    Narrative:      The following orders were created for panel order COVID PRE-OP / PRE-PROCEDURE SCREENING ORDER (NO ISOLATION) - Swab, Nasal Cavity.  Procedure                               Abnormality         Status                     ---------                               -----------         ------                     COVID-19,[employer] IN-YI...[[id number]]  Normal              Final result                 Please view results for these tests on the individual orders.    COVID-19,[employer] IN-HOUSE,Nasal Swab No Transport Media 3-4 HR TAT - Swab, Nasal Cavity [[id number]]   (Normal) Collected: 04/16/21 2312    Specimen: Swab from Nasal Cavity Updated: 04/16/21 2350     COVID19 Not Detected    Narrative:      Fact sheet for providers: https://www.fda.gov/media/664054/download     Fact sheet for patients: https://www.fda.gov/media/213965/download    Test performed by PCR.    Consider negative results in combination with clinical observations, patient history, and epidemiological information.    T4, Free [[id number]]  (Normal) Collected: 04/16/21 2147    Specimen: Blood Updated: 04/16/21 2226     Free T4 1.48 ng/dL     Narrative:      Results may be falsely increased if patient taking Biotin.      TSH [[id number]]  (Normal) Collected: 04/16/21 2147    Specimen: Blood Updated: 04/16/21 2226     TSH 2.660 uIU/mL     Troponin [[id number]]  (Normal) Collected: 04/16/21 2147    Specimen: Blood Updated: 04/16/21 2222     Troponin T <0.010 ng/mL     Narrative:      Troponin T Reference Range:  <= 0.03 ng/mL-   Negative for AMI  >0.03 ng/mL-     Abnormal for myocardial necrosis.  Clinicians would have to utilize clinical acumen, EKG, Troponin and serial changes to determine if it is an Acute Myocardial Infarction or myocardial injury due to an underlying chronic condition.       Results may be falsely decreased if patient taking Biotin.      BNP [[id number]]  (Abnormal) Collected: 04/16/21 2147    Specimen: Blood Updated: 04/16/21 2220     proBNP 2,963.0 pg/mL     Narrative:      Among patients with dyspnea, NT-proBNP is highly sensitive for the detection of acute congestive heart failure. In addition NT-proBNP of <300 pg/ml effectively rules out acute congestive heart failure with 99% negative predictive value.    Results may be falsely decreased if patient taking Biotin.      Comprehensive Metabolic Panel [[id number]]  (Abnormal) Collected: 04/16/21 2147    Specimen: Blood Updated: 04/16/21 2214     Glucose 104 mg/dL      BUN 15 mg/dL      Creatinine 0.86 mg/dL      Sodium 138 mmol/L      Potassium  3.9 mmol/L      Chloride 98 mmol/L      CO2 30.7 mmol/L      Calcium 8.9 mg/dL      Total Protein 6.5 g/dL      Albumin 4.00 g/dL      ALT (SGPT) 228 U/L      AST (SGOT) 67 U/L      Alkaline Phosphatase 84 U/L      Total Bilirubin 1.3 mg/dL      eGFR Non African Amer 90 mL/min/1.73      Globulin 2.5 gm/dL      A/G Ratio 1.6 g/dL      BUN/Creatinine Ratio 17.4     Anion Gap 9.3 mmol/L     Narrative:      GFR Normal >60  Chronic Kidney Disease <60  Kidney Failure <15      Magnesium [[id number]]  (Normal) Collected: 04/16/21 2147    Specimen: Blood Updated: 04/16/21 2214     Magnesium 1.9 mg/dL     CBC & Differential [[id number]]  (Abnormal) Collected: 04/16/21 2147    Specimen: Blood Updated: 04/16/21 2200    Narrative:      The following orders were created for panel order CBC & Differential.  Procedure                               Abnormality         Status                     ---------                               -----------         ------                     CBC Auto Differential[[id number]]        Abnormal            Final result                 Please view results for these tests on the individual orders.    CBC Auto Differential [[id number]]  (Abnormal) Collected: 04/16/21 2147    Specimen: Blood Updated: 04/16/21 2200     WBC 14.30 10*3/mm3      RBC 4.93 10*6/mm3      Hemoglobin 16.1 g/dL      Hematocrit 49.3 %      .0 fL      MCH 32.7 pg      MCHC 32.7 g/dL      RDW 13.6 %      RDW-SD 50.2 fl      MPV 10.7 fL      Platelets 196 10*3/mm3      Neutrophil % 76.5 %      Lymphocyte % 10.8 %      Monocyte % 11.3 %      Eosinophil % 0.6 %      Basophil % 0.1 %      Immature Grans % 0.7 %      Neutrophils, Absolute 10.93 10*3/mm3      Lymphocytes, Absolute 1.55 10*3/mm3      Monocytes, Absolute 1.61 10*3/mm3      Eosinophils, Absolute 0.09 10*3/mm3      Basophils, Absolute 0.02 10*3/mm3      Immature Grans, Absolute 0.10 10*3/mm3      nRBC 0.0 /100 WBC           Imaging Results (Last 24 Hours)     Procedure  Component Value Units Date/Time    XR Chest 1 View [[id number]] Collected: 04/16/21 2240     Updated: 04/16/21 2243    Narrative:      CR Chest 1 Vw    INDICATION:   Shortness of air for 3 weeks.     COMPARISON:    11/6/2019    FINDINGS:  2 portable AP view(s) of the chest.  Heart size is top normal. There is vascular congestion. There are infiltrates in both mid to lower lung zones certainly worrisome for pulmonary edema although pneumonia is not excluded. No pneumothorax is identified.      Impression:      Findings most suggestive of CHF although pneumonia is not radiographically excluded.    Signer Name: Jay Angel MD   Signed: 4/16/2021 10:40 PM   Workstation Name: [facility]    [facility]          Xray reviewed personally by physician.      ECG reviewed personally by physician  ECG/EMG Results (most recent)     Procedure Component Value Units Date/Time    ECG 12 Lead [[id number]] Collected: 04/16/21 2121     Updated: 04/16/21 2130     QT Interval 307 ms     Narrative:      HEART RATE= 138  bpm  RR Interval= 434  ms  KS Interval=   ms  P Horizontal Axis=   deg  P Front Axis=   deg  QRSD Interval= 103  ms  QT Interval= 307  ms  QRS Axis= 59  deg  T Wave Axis= 19  deg  - ABNORMAL ECG -  Atrial fibrillation  Ventricular premature complex  Electronically Signed By:   Date and Time of Study: 2021-04-16 21:21:07          Medication Review:   I have reviewed the patient's current medication list    Current Facility-Administered Medications:   •  acetaminophen (TYLENOL) tablet 650 mg, 650 mg, Oral, Q4H PRN **OR** acetaminophen (TYLENOL) 160 MG/5ML solution 650 mg, 650 mg, Oral, Q4H PRN **OR** acetaminophen (TYLENOL) suppository 650 mg, 650 mg, Rectal, Q4H PRN, Gumaro Jarrell MD  •  aluminum-magnesium hydroxide-simethicone (MAALOX MAX) 400-400-40 MG/5ML suspension 15 mL, 15 mL, Oral, Q6H PRN, Gumaro Jarrell MD  •  apixaban (ELIQUIS) tablet 5 mg, 5 mg, Oral, Q12H,  Gumaro Jarrell MD, 5 mg at 04/17/21 0811  •  budesonide-formoterol (SYMBICORT) 160-4.5 MCG/ACT inhaler 2 puff, 2 puff, Inhalation, BID, Gumaro Jarrell MD, 2 puff at 04/17/21 0910  •  calcium carbonate (TUMS) chewable tablet 500 mg (200 mg elemental), 1 tablet, Oral, BID PRN, Gumaro Jarrell MD, 1 tablet at 04/17/21 0815  •  digoxin (LANOXIN) injection 250 mcg, 250 mcg, Intravenous, Once, Wilfred Draper MD  •  dilTIAZem (CARDIZEM) 100 mg in 100 mL NS infusion (ADV), 10 mg/hr, Intravenous, Titrated, Wilfred Draper MD, Last Rate: 10 mL/hr at 04/17/21 0513, 10 mg/hr at 04/17/21 0513  •  dilTIAZem (CARDIZEM) tablet 60 mg, 60 mg, Oral, Q6H, Wilfred Draper MD, 60 mg at 04/17/21 1102  •  furosemide (LASIX) injection 40 mg, 40 mg, Intravenous, BID, Wilfred Draper MD  •  ipratropium-albuterol (DUO-NEB) nebulizer solution 3 mL, 3 mL, Nebulization, 4x Daily - RT, Gumaro Jarrell MD, 3 mL at 04/17/21 0755  •  montelukast (SINGULAIR) tablet 10 mg, 10 mg, Oral, Nightly, Gumaro Jarrell MD  •  nitroglycerin (NITROSTAT) SL tablet 0.4 mg, 0.4 mg, Sublingual, Q5 Min PRN, Gumaro Jarrell MD  •  ondansetron (ZOFRAN) tablet 4 mg, 4 mg, Oral, Q6H PRN **OR** ondansetron (ZOFRAN) injection 4 mg, 4 mg, Intravenous, Q6H PRN, Gumaro Jarrell MD  •  pantoprazole (PROTONIX) EC tablet 40 mg, 40 mg, Oral, QAM, Gumaro Jarrell MD, 40 mg at 04/17/21 0815  •  potassium chloride (K-DUR,KLOR-CON) CR tablet 40 mEq, 40 mEq, Oral, See Admin Instructions, Gumaro Jarrell MD, 40 mEq at 04/17/21 0811  •  rosuvastatin (CRESTOR) tablet 10 mg, 10 mg, Oral, Daily, Gumaro Jarrell MD, 10 mg at 04/17/21 0811  •  sodium chloride 0.9 % flush 1-10 mL, 1-10 mL, Intravenous, PRN, Gumaro Jarrell MD  •  [COMPLETED] Insert peripheral IV, , , Once **AND** sodium chloride 0.9 % flush 10 mL, 10 mL, Intravenous, PRN, West Lopez MD  •  sodium  "chloride 0.9 % flush 10 mL, 10 mL, Intravenous, Q12H, Gumaro Jarrell MD, 10 mL at 04/17/21 0817  •  sodium chloride 0.9 % infusion 40 mL, 40 mL, Intravenous, PRN, Gumaro Jarrell MD      Assessment/Plan           PLAN:    -Labs and diagnostic tests reviewed: YES    -Diagnostic tests reviewed: YES    -Consultations: Cardiology    -Any new recommendations: As per Cardiology    -New Labs ordered: Serial Troponins, CBC, CMP    -New diagnostic tests ordered: N/A    -Any changes in medications:  MEDICATION CHANGES:   New Medications added: As per order sheet   Medication Dose changed: N/A   Medications deleted: N/A    -Placement issues: N/A    -Patient is clinically and hemodynamically stable    -To continue current management and supportive care    -Will follow patient closely    -Nothing new to add for right now    -Discharge planning issues: Patient should be able to go back home once ready for discharge      DIAGNOSES:      PRIMARY DIAGNOSES:          New onset Atrial Fib: On Inj Diltiazem and Inj Digoxin. Last pulse 84. TSH 2.660 and Free T4 1.48. Seen by Dr Draper. Note appreciated     Chronic anticoagulation: On Apixaban    CHF: New onset, most likely secondary to atrial fibrillation. On IV Furosemide. Echo has been ordered    Chest Pain: Atypical. Could be associated with \"Demand ischemia\" as Troponis are normal and EKG does not show any ST-T wave changes.     Leukocytosis: WBC 12.89 down from 14.30    HTN: Last /79. Will monitor    HLD: On Rosuvastatin    GERD: On Pantoprazole    COPD: On DuoNeb, Brovana and Budesonide via nebulizer. Shortness of breath has improved.     Radiographic Review:  CXR: From 4/16/2021  Findings most suggestive of CHF although pneumonia is not radiographically excluded.    EKG: From 4/16/2021  ABNORMAL ECG -  Atrial fibrillation  Ventricular premature complex    Anthropometric Analysis: BMI:  31.84 (Obesity Class I)    CODE Status: FULL CODE    Tobacco use: " Former. Quit 2000    Alcohol intake: N/A    Illegal Drug use: N/A    DVT Prophylaxis: Apixaban and SCDs          ?     SECONDARY DIAGNOSES:  ?     As above      SURGICAL DIAGNOSES:      As per Problem List      TODAY'S DISCHARGE:        N/A          Plan for disposition: Patient should be able to go back to Home once ready for discharge    Georges Jones MD  04/17/21  12:10 EDT            Georges Jones M.D.; MS; FACP; MPH; FRANCIE  Internal Medicine/ Hospitalist          Time: 30  minutes

## 2021-04-17 NOTE — CONSULTS
Date of Hospital Visit: 21  Encounter Provider: Wilfred Draper MD  Place of Service: [facility] CARDIOLOGY  Patient Name: Claude Spencer  :1955  Referral Provider: Dr Jarrell    Chief complaint: SOA, CP    History of Present Illness    Mr. Spencer is a 65-year-old gentleman who looks much older than stated age who presents with several weeks of generalized malaise, dyspnea, chest discomfort, and palpitations.  He saw his primary care physician several weeks ago and was given oral steroids, intramuscular antibiotics, oral antibiotics, and bronchodilators.  This helped for a few days but then he started to feel poorly again. He noted a rapid clearing heartbeat, exertional shortness of breath, increased thin yellow sputum production, chest discomfort in the left upper chest, orthopnea, and mild leg swelling.  He saw his primary care physician again, who recommended hospitalization, but the patient refused.  He finally agreed to come in yesterday after his symptoms became unbearable.  He has not had any fevers.  He cannot reliably tell me if his symptoms get worse with exertion.  He no longer smokes cigarettes but he does chew tobacco.  He does have COPD and uses oxygen at night, but has been having to use it during the day for the past several weeks as well.    He denies any history of heart problems and states that he had a heart work-up many many years ago that was unremarkable.    Past Medical History:   Diagnosis Date   • COPD (chronic obstructive pulmonary disease) (CMS/[facility])    • Gastroesophageal reflux disease 3/4/2016   • Hyperlipidemia    • Hypertension    • Osteopenia 3/4/2016    Description: Her bone density 2015 secondary to steroid use   • Vitamin D deficiency 3/4/2016       Past Surgical History:   Procedure Laterality Date   • ANKLE SURGERY         Prior to Admission medications    Medication Sig Start Date End Date Taking? Authorizing Provider    albuterol (PROVENTIL HFA;VENTOLIN HFA) 108 (90 Base) MCG/ACT inhaler Inhale 2 puffs 2 (Two) Times a Day. 10/27/17  Yes Christal Harmon MD   cholecalciferol (VITAMIN D3) 1000 units tablet Take 1,000 Units by mouth Daily.   Yes ProviderMaricarmen MD   lisinopril-hydrochlorothiazide (PRINZIDE,ZESTORETIC) 20-25 MG per tablet TAKE ONE TABLET BY MOUTH DAILY 3/12/19  Yes Panfilo Castle MD   montelukast (SINGULAIR) 10 MG tablet TAKE ONE TABLET BY MOUTH ONCE NIGHTLY 3/12/19  Yes Christal Harmon MD   Omega-3 Fatty Acids (FISH OIL) 1000 MG capsule capsule Take  by mouth daily. 7/30/15  Yes ProviderMaricarmen MD   omeprazole (priLOSEC) 20 MG capsule TAKE ONE CAPSULE BY MOUTH DAILY 3/12/19  Yes Christal Harmon MD   rosuvastatin (CRESTOR) 10 MG tablet TAKE ONE TABLET BY MOUTH DAILY 19  Yes Panfilo Castle MD   SYMBICORT 160-4.5 MCG/ACT inhaler INHALE TWO PUFFS BY MOUTH TWICE A DAY 3/4/19  Yes Lidia Shannon MD       Social History     Socioeconomic History   • Marital status:      Spouse name: Not on file   • Number of children: Not on file   • Years of education: Not on file   • Highest education level: Not on file   Tobacco Use   • Smoking status: Former Smoker     Packs/day: 1.00     Years: 30.00     Pack years: 30.00     Quit date: 2000     Years since quittin.0   • Smokeless tobacco: Current User     Types: Chew   • Tobacco comment: quit 15 years ago, wife current smoker outside   Substance and Sexual Activity   • Alcohol use: No   • Drug use: No   • Sexual activity: Yes     Partners: Female       Family History   Problem Relation Age of Onset   • COPD Mother 78   • Seizures Father    • Vasculitis Father    • Prostate cancer Maternal Uncle        Review of Systems   Constitutional: Positive for fatigue.   Respiratory: Positive for cough, shortness of breath and wheezing.    Cardiovascular: Positive for chest pain and palpitations.   All other systems reviewed and are  "negative.       Objective:     Vitals:    21 0804 21 0900 21 0910 21 0915   BP:  93/60     BP Location:  Right arm     Patient Position:  Lying     Pulse: 90 106 93 100   Resp:    Temp:       TempSrc:       SpO2: 93% 92% 94%    Weight:       Height:         Body mass index is 31.84 kg/m².    Last Weight and Admission Weight        21  0500   Weight: 97.8 kg (215 lb 9.6 oz)     Flowsheet Rows      First Filed Value   Admission Height  175.3 cm (69\") Documented at 2021 2120   Admission Weight  99.8 kg (220 lb) Documented at 2021 2120            Intake/Output Summary (Last 24 hours) at 2021 0956  Last data filed at 2021 0513  Gross per 24 hour   Intake 69 ml   Output 3150 ml   Net -3081 ml         Physical Exam  Constitutional:       General: He is not in acute distress.     Appearance: Normal appearance.   HENT:      Head: Normocephalic.      Nose: Nose normal.      Mouth/Throat:      Pharynx: Oropharynx is clear.   Eyes:      Conjunctiva/sclera: Conjunctivae normal.   Cardiovascular:      Rate and Rhythm: Tachycardia present. Rhythm irregular.      Pulses: Normal pulses.      Heart sounds: Normal heart sounds.      Comments: + varicosities  Pulmonary:      Effort: Pulmonary effort is normal.      Breath sounds: Examination of the right-middle field reveals rales. Examination of the right-lower field reveals decreased breath sounds. Examination of the left-lower field reveals wheezing. Decreased breath sounds, wheezing and rales present.   Abdominal:      Palpations: Abdomen is soft.      Tenderness: There is no abdominal tenderness.   Musculoskeletal:      Right lower le+ Edema present.      Left lower le+ Edema present.   Skin:     General: Skin is warm and dry.   Neurological:      General: No focal deficit present.      Mental Status: He is alert.   Psychiatric:         Mood and Affect: Mood normal.                 Lab Review:              "   Results from last 7 days   Lab Units 04/17/21  0434   SODIUM mmol/L 139   POTASSIUM mmol/L 3.4*   CHLORIDE mmol/L 98   CO2 mmol/L 31.0*   BUN mg/dL 12   CREATININE mg/dL 0.71*   GLUCOSE mg/dL 103*   CALCIUM mg/dL 8.6     Results from last 7 days   Lab Units 04/16/21  2147   TROPONIN T ng/mL <0.010     Results from last 7 days   Lab Units 04/17/21  0434   WBC 10*3/mm3 12.89*   HEMOGLOBIN g/dL 15.5   HEMATOCRIT % 47.4   PLATELETS 10*3/mm3 191             Results from last 7 days   Lab Units 04/16/21  2147   MAGNESIUM mg/dL 1.9           I personally viewed and interpreted the patient's EKG/Telemetry data -- AF, PVC, rapid rate, no ischemic changes    Assessment/Plan:     1. SOA  2. Chest discomfort  3. Acute CHF, suspect diastolic  4. COPD  5. Atrial fibrillation   6. HTN    Suspect that he has been in atrial fibrillation for a few weeks now and has developed congestive heart failure as result.  I suspect that it is diastolic, but we will need an echocardiogram to assess his left ventricular ejection fraction.  His chest discomfort is a bit vague and is not necessarily exertional.  He is certainly at high risk for coronary artery disease.  His troponin is normal and his EKG shows no ischemic changes, so he does not have acute coronary syndrome at this time.    He is actively wheezing on exam, so I am reticent to use a beta-blocker.  I am going to try to transition him to oral diltiazem.  I will give him a digoxin load.  He has been placed on apixaban so I will stop enoxaparin.  We will diurese him with intravenous furosemide and follow his electrolytes.  I will defer bronchodilators to the primary service.    Once his echo was performed on Monday, he will need some type of ischemic evaluation, but whether or not this is noninvasive or invasive will depend on the results of the echocardiogram.  He understands that he needs to stay through the weekend.

## 2021-04-18 LAB
ALBUMIN SERPL-MCNC: 3.5 G/DL (ref 3.5–5.2)
ALBUMIN/GLOB SERPL: 1.3 G/DL
ALP SERPL-CCNC: 66 U/L (ref 39–117)
ALT SERPL W P-5'-P-CCNC: 144 U/L (ref 1–41)
ANION GAP SERPL CALCULATED.3IONS-SCNC: 11 MMOL/L (ref 5–15)
AST SERPL-CCNC: 36 U/L (ref 1–40)
BASOPHILS # BLD AUTO: 0.01 10*3/MM3 (ref 0–0.2)
BASOPHILS NFR BLD AUTO: 0.1 % (ref 0–1.5)
BILIRUB SERPL-MCNC: 1.2 MG/DL (ref 0–1.2)
BUN SERPL-MCNC: 15 MG/DL (ref 8–23)
BUN/CREAT SERPL: 22.7 (ref 7–25)
CALCIUM SPEC-SCNC: 8.4 MG/DL (ref 8.6–10.5)
CHLORIDE SERPL-SCNC: 100 MMOL/L (ref 98–107)
CO2 SERPL-SCNC: 24 MMOL/L (ref 22–29)
CREAT SERPL-MCNC: 0.66 MG/DL (ref 0.76–1.27)
DEPRECATED RDW RBC AUTO: 48.9 FL (ref 37–54)
EOSINOPHIL # BLD AUTO: 0.17 10*3/MM3 (ref 0–0.4)
EOSINOPHIL NFR BLD AUTO: 1.7 % (ref 0.3–6.2)
ERYTHROCYTE [DISTWIDTH] IN BLOOD BY AUTOMATED COUNT: 13.4 % (ref 12.3–15.4)
GFR SERPL CREATININE-BSD FRML MDRD: 122 ML/MIN/1.73
GLOBULIN UR ELPH-MCNC: 2.6 GM/DL
GLUCOSE SERPL-MCNC: 100 MG/DL (ref 65–99)
HCT VFR BLD AUTO: 48.2 % (ref 37.5–51)
HGB BLD-MCNC: 15.7 G/DL (ref 13–17.7)
IMM GRANULOCYTES # BLD AUTO: 0.05 10*3/MM3 (ref 0–0.05)
IMM GRANULOCYTES NFR BLD AUTO: 0.5 % (ref 0–0.5)
LYMPHOCYTES # BLD AUTO: 1.36 10*3/MM3 (ref 0.7–3.1)
LYMPHOCYTES NFR BLD AUTO: 13.8 % (ref 19.6–45.3)
MAGNESIUM SERPL-MCNC: 2.1 MG/DL (ref 1.6–2.4)
MCH RBC QN AUTO: 32.3 PG (ref 26.6–33)
MCHC RBC AUTO-ENTMCNC: 32.6 G/DL (ref 31.5–35.7)
MCV RBC AUTO: 99.2 FL (ref 79–97)
MONOCYTES # BLD AUTO: 0.99 10*3/MM3 (ref 0.1–0.9)
MONOCYTES NFR BLD AUTO: 10 % (ref 5–12)
NEUTROPHILS NFR BLD AUTO: 7.29 10*3/MM3 (ref 1.7–7)
NEUTROPHILS NFR BLD AUTO: 73.9 % (ref 42.7–76)
NRBC BLD AUTO-RTO: 0 /100 WBC (ref 0–0.2)
PLATELET # BLD AUTO: 173 10*3/MM3 (ref 140–450)
PMV BLD AUTO: 10 FL (ref 6–12)
POTASSIUM SERPL-SCNC: 3.6 MMOL/L (ref 3.5–5.2)
PROCALCITONIN SERPL-MCNC: 0.06 NG/ML (ref 0–0.25)
PROT SERPL-MCNC: 6.1 G/DL (ref 6–8.5)
QT INTERVAL: 307 MS
RBC # BLD AUTO: 4.86 10*6/MM3 (ref 4.14–5.8)
SODIUM SERPL-SCNC: 135 MMOL/L (ref 136–145)
WBC # BLD AUTO: 9.87 10*3/MM3 (ref 3.4–10.8)

## 2021-04-18 PROCEDURE — 85025 COMPLETE CBC W/AUTO DIFF WBC: CPT | Performed by: INTERNAL MEDICINE

## 2021-04-18 PROCEDURE — 94799 UNLISTED PULMONARY SVC/PX: CPT

## 2021-04-18 PROCEDURE — 84145 PROCALCITONIN (PCT): CPT | Performed by: INTERNAL MEDICINE

## 2021-04-18 PROCEDURE — 80053 COMPREHEN METABOLIC PANEL: CPT | Performed by: INTERNAL MEDICINE

## 2021-04-18 PROCEDURE — 25010000002 FUROSEMIDE PER 20 MG: Performed by: INTERNAL MEDICINE

## 2021-04-18 PROCEDURE — 83735 ASSAY OF MAGNESIUM: CPT | Performed by: INTERNAL MEDICINE

## 2021-04-18 PROCEDURE — 99232 SBSQ HOSP IP/OBS MODERATE 35: CPT | Performed by: INTERNAL MEDICINE

## 2021-04-18 RX ORDER — DILTIAZEM HYDROCHLORIDE 120 MG/1
240 CAPSULE, COATED, EXTENDED RELEASE ORAL
Status: DISCONTINUED | OUTPATIENT
Start: 2021-04-18 | End: 2021-04-20 | Stop reason: HOSPADM

## 2021-04-18 RX ORDER — DIGOXIN 125 MCG
125 TABLET ORAL
Status: DISCONTINUED | OUTPATIENT
Start: 2021-04-18 | End: 2021-04-20 | Stop reason: HOSPADM

## 2021-04-18 RX ORDER — POTASSIUM CHLORIDE 20 MEQ/1
10 TABLET, EXTENDED RELEASE ORAL DAILY
Status: DISCONTINUED | OUTPATIENT
Start: 2021-04-18 | End: 2021-04-20 | Stop reason: HOSPADM

## 2021-04-18 RX ORDER — FUROSEMIDE 20 MG/1
40 TABLET ORAL DAILY
Status: DISCONTINUED | OUTPATIENT
Start: 2021-04-19 | End: 2021-04-20 | Stop reason: HOSPADM

## 2021-04-18 RX ADMIN — DILTIAZEM HYDROCHLORIDE 240 MG: 120 CAPSULE, COATED, EXTENDED RELEASE ORAL at 11:47

## 2021-04-18 RX ADMIN — APIXABAN 5 MG: 2.5 TABLET, FILM COATED ORAL at 08:49

## 2021-04-18 RX ADMIN — IPRATROPIUM BROMIDE AND ALBUTEROL SULFATE 3 ML: .5; 3 SOLUTION RESPIRATORY (INHALATION) at 15:17

## 2021-04-18 RX ADMIN — ARFORMOTEROL TARTRATE 15 MCG: 15 SOLUTION RESPIRATORY (INHALATION) at 09:41

## 2021-04-18 RX ADMIN — IPRATROPIUM BROMIDE AND ALBUTEROL SULFATE 3 ML: .5; 3 SOLUTION RESPIRATORY (INHALATION) at 11:01

## 2021-04-18 RX ADMIN — BUDESONIDE 0.5 MG: 0.5 INHALANT RESPIRATORY (INHALATION) at 07:04

## 2021-04-18 RX ADMIN — BUDESONIDE 0.5 MG: 0.5 INHALANT RESPIRATORY (INHALATION) at 19:31

## 2021-04-18 RX ADMIN — ROSUVASTATIN CALCIUM 10 MG: 5 TABLET, FILM COATED ORAL at 08:49

## 2021-04-18 RX ADMIN — DILTIAZEM HYDROCHLORIDE 60 MG: 30 TABLET, FILM COATED ORAL at 00:21

## 2021-04-18 RX ADMIN — IPRATROPIUM BROMIDE AND ALBUTEROL SULFATE 3 ML: .5; 3 SOLUTION RESPIRATORY (INHALATION) at 07:04

## 2021-04-18 RX ADMIN — ARFORMOTEROL TARTRATE 15 MCG: 15 SOLUTION RESPIRATORY (INHALATION) at 20:03

## 2021-04-18 RX ADMIN — APIXABAN 5 MG: 2.5 TABLET, FILM COATED ORAL at 20:39

## 2021-04-18 RX ADMIN — SODIUM CHLORIDE, PRESERVATIVE FREE 10 ML: 5 INJECTION INTRAVENOUS at 08:49

## 2021-04-18 RX ADMIN — POTASSIUM CHLORIDE 10 MEQ: 1500 TABLET, EXTENDED RELEASE ORAL at 08:49

## 2021-04-18 RX ADMIN — PANTOPRAZOLE SODIUM 40 MG: 40 TABLET, DELAYED RELEASE ORAL at 06:00

## 2021-04-18 RX ADMIN — IPRATROPIUM BROMIDE AND ALBUTEROL SULFATE 3 ML: .5; 3 SOLUTION RESPIRATORY (INHALATION) at 19:31

## 2021-04-18 RX ADMIN — DIGOXIN 125 MCG: 125 TABLET ORAL at 11:46

## 2021-04-18 RX ADMIN — FUROSEMIDE 40 MG: 10 INJECTION, SOLUTION INTRAMUSCULAR; INTRAVENOUS at 08:49

## 2021-04-18 RX ADMIN — DILTIAZEM HYDROCHLORIDE 60 MG: 30 TABLET, FILM COATED ORAL at 06:00

## 2021-04-18 RX ADMIN — MONTELUKAST SODIUM 10 MG: 10 TABLET, COATED ORAL at 20:39

## 2021-04-18 RX ADMIN — SODIUM CHLORIDE, PRESERVATIVE FREE 10 ML: 5 INJECTION INTRAVENOUS at 20:39

## 2021-04-18 NOTE — PLAN OF CARE
Problem: Adult Inpatient Plan of Care  Goal: Plan of Care Review  Outcome: Ongoing, Progressing  Flowsheets (Taken 4/18/2021 1936)  Progress: improving  Plan of Care Reviewed With: patient     Problem: Respiratory Compromise COPD (Chronic Obstructive Pulmonary Disease)  Goal: Effective Oxygenation and Ventilation  Intervention: Promote Airway Secretion Clearance  Recent Flowsheet Documentation  Taken 4/18/2021 1931 by Sixto Smith, RRT  Breathing Techniques/Airway Clearance: deep/controlled cough encouraged     Problem: Respiratory Compromise COPD (Chronic Obstructive Pulmonary Disease)  Goal: Effective Oxygenation and Ventilation  Intervention: Optimize Oxygenation and Ventilation  Recent Flowsheet Documentation  Taken 4/18/2021 1931 by Sixto Smith, RRT  Airway/Ventilation Management:   airway patency maintained   pulmonary hygiene promoted   Goal Outcome Evaluation:  Plan of Care Reviewed With: patient  Progress: improving

## 2021-04-18 NOTE — PROGRESS NOTES
*Acute CHF -- echo pending to determine if HFrEF vs HFpEF.  Patient has had good diuresis and improvement in respiratory function, and has terrible muscle cramping.  Will hold diuretics today and start oral diuretics tomorrow.    *Atrial fibrillation -- rate controlled.  Switch to long acting diltiazem, continue oral digoxin.  Digoxin level is not indicated in digoxin loading, I cancelled the lab order for tomorrow.  Will increase apixaban to 5mg BID.    *Chest pain -- no evidence of ACS.  If EF normal, will get perfusion stress test tomorrow (NPO after MN).  If not, will likely need cath given risk factors for CAD.

## 2021-04-18 NOTE — PLAN OF CARE
Goal Outcome Evaluation:  Plan of Care Reviewed With: patient, daughter  Progress: improving  Outcome Summary: Pt continues to improve; successfully weaned off of O2 during day but will  require O2 at night, pt is on O2 at night when he is at home. Pt will be NPO at midnight for an echo in the am and pt stated that he understood.

## 2021-04-18 NOTE — PLAN OF CARE
Problem: Adult Inpatient Plan of Care  Goal: Plan of Care Review  Outcome: Ongoing, Progressing  Flowsheets (Taken 4/17/2021 2001)  Progress: improving  Plan of Care Reviewed With: patient   Goal Outcome Evaluation:  Plan of Care Reviewed With: patient  Progress: improving

## 2021-04-18 NOTE — PROGRESS NOTES
"    [facility] HOSPITALIST TEAM   PROGRESS NOTE      Patient Care Team:  Jason King MD as PCP - General (Internal Medicine)    Patient seen at bedside    Hospitalist Team      Patient Care Team:  Provider, No Known as PCP - General          Subjective      Presenting History and Chief Complaints:      Chief Complaint:       Shortness of breath     Admission History:     Mr. Claude Spencer is a 65-year-old  male who is known to have HTN, HLD, COPD and GERD, who became ill several weeks ago with cough chest congestion shortness of breath and some sputum production.  He saw his primary care physician was given  antibiotics and steroids. He started having symptoms again.  Has developed some orthopnea for the last 3 weeks some lower extremity edema and some abdominal distention.  Also has been having some intermittent vague left and right-sided chest discomfort feels like the burning pain.  He also has been having palpitations off and on for several weeks.    Interval History and ROS:     Patient States Patient feels better. Breathing has improved since he has been here  Patient Complaints:  No new complaints except \"devon horse\" pain in legs  Patient Denies:  Any sx of fever, chest pain, abdominal pain or n/v  History taken from: Patient      Objective    Vital Signs  Temp:  [98 °F (36.7 °C)-98.7 °F (37.1 °C)] 98.7 °F (37.1 °C)  Heart Rate:  [] 75  Resp:  [14-24] 14  BP: ()/() 128/98    Flowsheet Rows      First Filed Value   Admission Height  175.3 cm (69\") Documented at 04/16/2021 2120   Admission Weight  99.8 kg (220 lb) Documented at 04/16/2021 2120              PHYSICAL EXAMINATION:      Physical Exam:     VS: As documented above  PE: GEN - A&O x 3, in NAD   HEENT - Normocephalic, PERRL, EOMI   CV - Irregularly irregualr, S1+, S2+. with no m/r/g  RESP - CTAB x no rales or rhonchi    ABD - s/nt/nd, NABS, no HSMeg, no palpable masses   MS - MAEW(moves all " extremities well) , 5/5 strength UE/LE   EXT - 1+ ankle/pedal edema. No cyanosis or clubbing  NEURO - CN II-XII intact, normal motor and sensory examinations without any focal neurologic deficits.  PSYCH - affect, mood congruent and appropriate       Results Review:     I reviewed the patient's new clinical results.    Lab Results (last 24 hours)     Procedure Component Value Units Date/Time    CBC & Differential [[id number]]  (Abnormal) Collected: 04/18/21 0558    Specimen: Blood Updated: 04/18/21 0609    Narrative:      The following orders were created for panel order CBC & Differential.  Procedure                               Abnormality         Status                     ---------                               -----------         ------                     CBC Auto Differential[[id number]]        Abnormal            Final result                 Please view results for these tests on the individual orders.    CBC Auto Differential [[id number]]  (Abnormal) Collected: 04/18/21 0558    Specimen: Blood Updated: 04/18/21 0609     WBC 9.87 10*3/mm3      RBC 4.86 10*6/mm3      Hemoglobin 15.7 g/dL      Hematocrit 48.2 %      MCV 99.2 fL      MCH 32.3 pg      MCHC 32.6 g/dL      RDW 13.4 %      RDW-SD 48.9 fl      MPV 10.0 fL      Platelets 173 10*3/mm3      Neutrophil % 73.9 %      Lymphocyte % 13.8 %      Monocyte % 10.0 %      Eosinophil % 1.7 %      Basophil % 0.1 %      Immature Grans % 0.5 %      Neutrophils, Absolute 7.29 10*3/mm3      Lymphocytes, Absolute 1.36 10*3/mm3      Monocytes, Absolute 0.99 10*3/mm3      Eosinophils, Absolute 0.17 10*3/mm3      Basophils, Absolute 0.01 10*3/mm3      Immature Grans, Absolute 0.05 10*3/mm3      nRBC 0.0 /100 WBC     Magnesium [[id number]] Collected: 04/18/21 0558    Specimen: Blood Updated: 04/18/21 0606    Comprehensive Metabolic Panel [[id number]] Collected: 04/18/21 0558    Specimen: Blood Updated: 04/18/21 0606    Procalcitonin [[id number]] Collected: 04/18/21 0558     Specimen: Blood Updated: 04/18/21 0606    Respiratory Culture - Sputum, Cough [[id number]] Collected: 04/17/21 1732    Specimen: Sputum from Cough Updated: 04/17/21 1930     Gram Stain Few (2+) WBCs per low power field      Moderate (3+) Gram positive cocci in pairs    Respiratory Panel, PCR (WITHOUT COVID) - Swab, Nasopharynx [[id number]]  (Normal) Collected: 04/17/21 1412    Specimen: Swab from Nasopharynx Updated: 04/17/21 1821     ADENOVIRUS, PCR Not Detected     Coronavirus 229E Not Detected     Coronavirus HKU1 Not Detected     Coronavirus NL63 Not Detected     Coronavirus OC43 Not Detected     Human Metapneumovirus Not Detected     Human Rhinovirus/Enterovirus Not Detected     Influenza B PCR Not Detected     Parainfluenza Virus 1 Not Detected     Parainfluenza Virus 2 Not Detected     Parainfluenza Virus 3 Not Detected     Parainfluenza Virus 4 Not Detected     Bordetella pertussis pcr Not Detected     Chlamydophila pneumoniae PCR Not Detected     Mycoplasma pneumo by PCR Not Detected     Influenza A PCR Not Detected     RSV, PCR Not Detected     Bordetella parapertussis PCR Not Detected    Narrative:      The coronavirus on the RVP is NOT COVID-19 and is NOT indicative of infection with COVID-19.           Imaging Results (Last 24 Hours)     ** No results found for the last 24 hours. **          Xray reviewed personally by physician.      ECG reviewed personally by physician  ECG/EMG Results (most recent)     Procedure Component Value Units Date/Time    ECG 12 Lead [[id number]] Collected: 04/16/21 2121     Updated: 04/16/21 2130     QT Interval 307 ms     Narrative:      HEART RATE= 138  bpm  RR Interval= 434  ms  GA Interval=   ms  P Horizontal Axis=   deg  P Front Axis=   deg  QRSD Interval= 103  ms  QT Interval= 307  ms  QRS Axis= 59  deg  T Wave Axis= 19  deg  - ABNORMAL ECG -  Atrial fibrillation  Ventricular premature complex  Electronically Signed By:   Date and Time of Study: 2021-04-16 21:21:07           Medication Review:   I have reviewed the patient's current medication list    Current Facility-Administered Medications:   •  acetaminophen (TYLENOL) tablet 650 mg, 650 mg, Oral, Q4H PRN **OR** acetaminophen (TYLENOL) 160 MG/5ML solution 650 mg, 650 mg, Oral, Q4H PRN **OR** acetaminophen (TYLENOL) suppository 650 mg, 650 mg, Rectal, Q4H PRN, Gumaro Jarrell MD  •  aluminum-magnesium hydroxide-simethicone (MAALOX MAX) 400-400-40 MG/5ML suspension 15 mL, 15 mL, Oral, Q6H PRN, Gumaro Jarrell MD  •  apixaban (ELIQUIS) tablet 5 mg, 5 mg, Oral, Q12H, Gumaro Jarrell MD, 5 mg at 04/17/21 2054  •  arformoterol (BROVANA) nebulizer solution 15 mcg, 15 mcg, Nebulization, BID - RT, Gumaro Jarrell MD, 15 mcg at 04/17/21 2039  •  budesonide (PULMICORT) nebulizer solution 0.5 mg, 0.5 mg, Nebulization, BID - RT, Gumrao Jarrell MD, 0.5 mg at 04/17/21 1955  •  calcium carbonate (TUMS) chewable tablet 500 mg (200 mg elemental), 1 tablet, Oral, BID PRN, Gumaro Jarrell MD, 1 tablet at 04/17/21 0815  •  dilTIAZem (CARDIZEM) 100 mg in 100 mL NS infusion (ADV), 10 mg/hr, Intravenous, Titrated, Wilfred Draper MD, Last Rate: 10 mL/hr at 04/17/21 0513, 10 mg/hr at 04/17/21 0513  •  dilTIAZem (CARDIZEM) tablet 60 mg, 60 mg, Oral, Q6H, Wilfred Draper MD, 60 mg at 04/18/21 0600  •  furosemide (LASIX) injection 40 mg, 40 mg, Intravenous, BID, Wilfred Draper MD, 40 mg at 04/17/21 1604  •  ipratropium-albuterol (DUO-NEB) nebulizer solution 3 mL, 3 mL, Nebulization, 4x Daily - RT, Gumaro Jarrell MD, 3 mL at 04/17/21 1955  •  montelukast (SINGULAIR) tablet 10 mg, 10 mg, Oral, Nightly, Gumaro Jarrell MD, 10 mg at 04/17/21 2053  •  nitroglycerin (NITROSTAT) SL tablet 0.4 mg, 0.4 mg, Sublingual, Q5 Min PRN, Gumaro Jarrell MD  •  ondansetron (ZOFRAN) tablet 4 mg, 4 mg, Oral, Q6H PRN **OR** ondansetron (ZOFRAN) injection 4 mg, 4 mg, Intravenous,  Q6H PRN, Gumaro Jarrell MD  •  pantoprazole (PROTONIX) EC tablet 40 mg, 40 mg, Oral, QAM, Gumaro Jarrell MD, 40 mg at 04/18/21 0600  •  potassium chloride (K-DUR,KLOR-CON) CR tablet 40 mEq, 40 mEq, Oral, See Admin Instructions, Gumaro Jarrell MD, 40 mEq at 04/17/21 0811  •  rosuvastatin (CRESTOR) tablet 10 mg, 10 mg, Oral, Daily, Gumaro Jarrell MD, 10 mg at 04/17/21 0811  •  sodium chloride 0.9 % flush 1-10 mL, 1-10 mL, Intravenous, PRN, Gumaro Jarrell MD  •  [COMPLETED] Insert peripheral IV, , , Once **AND** sodium chloride 0.9 % flush 10 mL, 10 mL, Intravenous, PRN, West Lopez MD  •  sodium chloride 0.9 % flush 10 mL, 10 mL, Intravenous, Q12H, Gumaor Jarrell MD, 10 mL at 04/17/21 2054  •  sodium chloride 0.9 % infusion 40 mL, 40 mL, Intravenous, PRN, Gumaro Jarrell MD      Assessment/Plan           PLAN:    -Labs and diagnostic tests reviewed: YES    -Diagnostic tests reviewed: YES    -Consultations: Cardiology    -Any new recommendations: As per cardiology    -New Labs ordered: CBC, CMP and Serum Digixin    -New diagnostic tests ordered: Repeat CXR in AM and ECHO in AM as well    -Any changes in medications:  MEDICATION CHANGES:   New Medications added: As per Order sheet   Medication Dose changed: N/A   Medications deleted: N/A    -Placement issues: N/A    -Patient is clinically and hemodynamically stable    -To continue current management and supportive care    -Will follow patient closely    -Nothing new to add for right now    -Discharge planning issues: Patient should be able to go back home once ready for discharge      DIAGNOSES:      PRIMARY DIAGNOSES:        New onset Atrial Fib: On Inj Diltiazem and Inj Digoxin. Last pulse 75. TSH 2.660 and Free T4 1.48. Seen by Dr Draper. Note appreciated. Rate under control. Will order PO Digoxin and check serum Digoxin level in AM     Chronic anticoagulation: On Apixaban     CHF:  "New onset, most likely secondary to atrial fibrillation. On IV Furosemide. Echo has been ordered. Will be done tomorrow. Will repeat CXR tomorrow    Hypokalemia: Serum K 3.4. Will add KCl     Chest Pain: Atypical. Could be associated with \"Demand ischemia\" as Troponis are normal and EKG does not show any ST-T wave changes. Patient denies any sx of chest pain at this time     Leukocytosis: WBC 9.87 down from 14.30. Procalcitonin 0.06. Noninfectious etiology     HTN: Last /98. Will monitor     HLD: On Rosuvastatin     GERD: On Pantoprazole     COPD: On DuoNeb, Brovana and Budesonide via nebulizer. Shortness of breath has improved. Patient is receiving breathing treatment this morning     Radiographic Review:  CXR: From 4/16/2021  Findings most suggestive of CHF although pneumonia is not radiographically excluded.     EKG: From 4/16/2021  ABNORMAL ECG -  Atrial fibrillation  Ventricular premature complex     Anthropometric Analysis: BMI:  31.84 (Obesity Class I)     CODE Status: FULL CODE     Tobacco use: Former. Quit 2000     Alcohol intake: N/A     Illegal Drug use: N/A     DVT Prophylaxis: Apixaban and SCDs                ?     SECONDARY DIAGNOSES:  ?     As above      SURGICAL DIAGNOSES:      As per Problem List      TODAY'S DISCHARGE:        N/A          Plan for disposition: Patient should be able to go back to Home once ready for discharge    Georges Jones MD  04/18/21  07:04 EDT            Georges Jones M.D.; MS; FACP; MPH; FRANCIE  Internal Medicine/ Hospitalist          Time:  30  minutes    "

## 2021-04-18 NOTE — PLAN OF CARE
Goal Outcome Evaluation:           Problem: Adult Inpatient Plan of Care  Goal: Plan of Care Review  Outcome: Ongoing, Progressing  Flowsheets  Taken 4/18/2021 0643 by Laura Del Angel RN  Progress: improving  Outcome Summary: Pt continues to improve throughout my shift 1900 - 0700.  VSS.  Rested well tonight.  Taken 4/17/2021 2001 by Sixto Smith RRT  Plan of Care Reviewed With: patient  Goal: Patient-Specific Goal (Individualized)  4/18/2021 0643 by Laura Del Angel RN  Outcome: Ongoing, Progressing  4/18/2021 0642 by Laura Del Angel RN  Flowsheets (Taken 4/18/2021 0642)  Patient-Specific Goals (Include Timeframe): maybe monday  Individualized Care Needs: getting better to get back home  Anxieties, Fears or Concerns: getting medications correctly  Goal: Absence of Hospital-Acquired Illness or Injury  Outcome: Ongoing, Progressing  Intervention: Identify and Manage Fall Risk  Recent Flowsheet Documentation  Taken 4/18/2021 0600 by Laura Del Angel RN  Safety Promotion/Fall Prevention:   safety round/check completed   lighting adjusted   fall prevention program maintained   clutter free environment maintained   assistive device/personal items within reach  Taken 4/18/2021 0415 by Laura Del Angel RN  Safety Promotion/Fall Prevention:   safety round/check completed   fall prevention program maintained   clutter free environment maintained   assistive device/personal items within reach   toileting scheduled  Taken 4/18/2021 0330 by Laura Del Angel RN  Safety Promotion/Fall Prevention:   safety round/check completed   lighting adjusted   fall prevention program maintained   assistive device/personal items within reach  Taken 4/18/2021 0200 by Laura Del Angel RN  Safety Promotion/Fall Prevention:   safety round/check completed   lighting adjusted   fall prevention program maintained   clutter free environment maintained   assistive device/personal items within reach  Taken 4/18/2021 0020 by Laura Del Angel  RN  Safety Promotion/Fall Prevention:   safety round/check completed   lighting adjusted   fall prevention program maintained   clutter free environment maintained   assistive device/personal items within reach  Taken 4/17/2021 2315 by Laura Del Angel RN  Safety Promotion/Fall Prevention:   safety round/check completed   lighting adjusted   fall prevention program maintained   clutter free environment maintained   assistive device/personal items within reach  Taken 4/17/2021 2200 by Laura Del Angel RN  Safety Promotion/Fall Prevention:   safety round/check completed   lighting adjusted   fall prevention program maintained   clutter free environment maintained   assistive device/personal items within reach  Taken 4/17/2021 2055 by Laura Del Angel RN  Safety Promotion/Fall Prevention:   safety round/check completed   lighting adjusted   fall prevention program maintained   clutter free environment maintained   assistive device/personal items within reach  Taken 4/17/2021 1930 by Laura Del Angel RN  Safety Promotion/Fall Prevention:   safety round/check completed   lighting adjusted   fall prevention program maintained   clutter free environment maintained   assistive device/personal items within reach  Intervention: Prevent Skin Injury  Recent Flowsheet Documentation  Taken 4/18/2021 0600 by Laura Del Angel RN  Body Position: position changed independently  Taken 4/18/2021 0415 by Laura Del Angel RN  Body Position: position changed independently  Taken 4/18/2021 0330 by Laura Del Angel RN  Body Position: position changed independently  Taken 4/18/2021 0200 by aLura Del Angel RN  Body Position: position changed independently  Taken 4/18/2021 0020 by Laura Del Angel RN  Body Position: position changed independently  Taken 4/17/2021 2315 by Laura Del Angel RN  Body Position: position changed independently  Taken 4/17/2021 2200 by Laura Del Angel RN  Body Position: position changed  independently  Taken 4/17/2021 2055 by Laura Del Angel RN  Body Position: position changed independently  Taken 4/17/2021 1930 by Laura Del Angel RN  Body Position: position changed independently  Goal: Optimal Comfort and Wellbeing  Outcome: Ongoing, Progressing  Intervention: Provide Person-Centered Care  Recent Flowsheet Documentation  Taken 4/18/2021 0415 by Laura Del Angel RN  Trust Relationship/Rapport:   care explained   choices provided   emotional support provided   empathic listening provided   questions answered   thoughts/feelings acknowledged  Taken 4/18/2021 0020 by Laura Del Angel RN  Trust Relationship/Rapport:   care explained   choices provided   emotional support provided   empathic listening provided   thoughts/feelings acknowledged  Taken 4/17/2021 2055 by Laura Del Angel RN  Trust Relationship/Rapport:   care explained   choices provided   emotional support provided   empathic listening provided   questions answered   questions encouraged   reassurance provided   thoughts/feelings acknowledged  Goal: Readiness for Transition of Care  Outcome: Ongoing, Progressing     Problem: Arrhythmia/Dysrhythmia  Goal: Normalized Cardiac Rhythm  Outcome: Ongoing, Progressing     Problem: Adjustment to Illness COPD (Chronic Obstructive Pulmonary Disease)  Goal: Optimal Chronic Illness Coping  Outcome: Ongoing, Progressing  Intervention: Support and Optimize Psychosocial Response  Recent Flowsheet Documentation  Taken 4/17/2021 2055 by Laura Del Angel RN  Life Transition/Adjustment: decision-making facilitated  Supportive Measures:   active listening utilized   counseling provided   self-care encouraged   self-reflection promoted   self-responsibility promoted   verbalization of feelings encouraged     Problem: Functional Ability Impaired COPD (Chronic Obstructive Pulmonary Disease)  Goal: Optimal Level of Functional [place]  Outcome: Ongoing, Progressing  Intervention: Optimize Functional  Ability  Recent Flowsheet Documentation  Taken 4/18/2021 0600 by Laura Del Angel RN  Activity Management: activity adjusted per tolerance  Taken 4/18/2021 0415 by Laura Del Angel RN  Activity Management: activity adjusted per tolerance  Taken 4/18/2021 0330 by Laura Del Angel RN  Activity Management: activity adjusted per tolerance  Taken 4/18/2021 0200 by Laura Del Angel RN  Activity Management: activity adjusted per tolerance  Taken 4/18/2021 0020 by Laura Del Angel RN  Activity Management: activity adjusted per tolerance  Taken 4/17/2021 2315 by Laura Del Angel RN  Activity Management: activity adjusted per tolerance  Taken 4/17/2021 2200 by Laura Del Angel RN  Activity Management: activity adjusted per tolerance  Taken 4/17/2021 2055 by Laura Del Angel RN  Activity Management: activity adjusted per tolerance  Environmental Support: calm environment promoted  Self-Care Promotion: independence encouraged  Taken 4/17/2021 1930 by Laura Del Angel RN  Activity Management: activity adjusted per tolerance     Problem: Infection COPD (Chronic Obstructive Pulmonary Disease)  Goal: Absence of Infection Signs and Symptoms  Outcome: Ongoing, Progressing     Problem: Oral Intake Inadequate COPD (Chronic Obstructive Pulmonary Disease)  Goal: Improved Nutrition Intake  Outcome: Ongoing, Progressing     Problem: Respiratory Compromise COPD (Chronic Obstructive Pulmonary Disease)  Goal: Effective Oxygenation and Ventilation  Outcome: Ongoing, Progressing  Intervention: Promote Airway Secretion Clearance  Recent Flowsheet Documentation  Taken 4/18/2021 0600 by Laura Del Angel RN  Activity Management: activity adjusted per tolerance  Taken 4/18/2021 0415 by Laura Del Angel RN  Activity Management: activity adjusted per tolerance  Taken 4/18/2021 0330 by Laura Del Angel RN  Activity Management: activity adjusted per tolerance  Taken 4/18/2021 0200 by Laura Del Angel RN  Activity Management: activity  adjusted per tolerance  Taken 4/18/2021 0020 by Laura Del Angel RN  Activity Management: activity adjusted per tolerance  Taken 4/17/2021 2315 by Laura Del Angel RN  Activity Management: activity adjusted per tolerance  Taken 4/17/2021 2200 by Laura Del Angel RN  Activity Management: activity adjusted per tolerance  Taken 4/17/2021 2055 by Laura Del Angel RN  Activity Management: activity adjusted per tolerance  Taken 4/17/2021 1930 by Laura Del Angel RN  Activity Management: activity adjusted per tolerance

## 2021-04-19 ENCOUNTER — APPOINTMENT (OUTPATIENT)
Dept: NUCLEAR MEDICINE | Facility: HOSPITAL | Age: 66
End: 2021-04-19

## 2021-04-19 ENCOUNTER — APPOINTMENT (OUTPATIENT)
Dept: GENERAL RADIOLOGY | Facility: HOSPITAL | Age: 66
End: 2021-04-19

## 2021-04-19 ENCOUNTER — APPOINTMENT (OUTPATIENT)
Dept: CARDIOLOGY | Facility: HOSPITAL | Age: 66
End: 2021-04-19

## 2021-04-19 DIAGNOSIS — R07.9 CHEST PAIN, UNSPECIFIED TYPE: Primary | ICD-10-CM

## 2021-04-19 LAB
ALBUMIN SERPL-MCNC: 3.6 G/DL (ref 3.5–5.2)
ALBUMIN/GLOB SERPL: 1.4 G/DL
ALP SERPL-CCNC: 71 U/L (ref 39–117)
ALT SERPL W P-5'-P-CCNC: 117 U/L (ref 1–41)
ANION GAP SERPL CALCULATED.3IONS-SCNC: 7.3 MMOL/L (ref 5–15)
AORTIC DIMENSIONLESS INDEX: 0.67 (DI)
AST SERPL-CCNC: 32 U/L (ref 1–40)
BACTERIA SPEC RESP CULT: NORMAL
BASOPHILS # BLD AUTO: 0.03 10*3/MM3 (ref 0–0.2)
BASOPHILS NFR BLD AUTO: 0.3 % (ref 0–1.5)
BH CV ECHO MEAS - ACS: 1.9 CM
BH CV ECHO MEAS - AO MAX PG: 5 MMHG
BH CV ECHO MEAS - AO MEAN PG (FULL): 2 MMHG
BH CV ECHO MEAS - AO MEAN PG: 3 MMHG
BH CV ECHO MEAS - AO ROOT AREA (BSA CORRECTED): 1.3
BH CV ECHO MEAS - AO ROOT AREA: 6.2 CM^2
BH CV ECHO MEAS - AO ROOT DIAM: 2.8 CM
BH CV ECHO MEAS - AO V2 MAX: 111 CM/SEC
BH CV ECHO MEAS - AO V2 MEAN: 73.3 CM/SEC
BH CV ECHO MEAS - AO V2 VTI: 20 CM
BH CV ECHO MEAS - AVA(I,A): 2.5 CM^2
BH CV ECHO MEAS - AVA(I,D): 2.5 CM^2
BH CV ECHO MEAS - BSA(HAYCOCK): 2.2 M^2
BH CV ECHO MEAS - BSA: 2.1 M^2
BH CV ECHO MEAS - BZI_BMI: 30.6 KILOGRAMS/M^2
BH CV ECHO MEAS - BZI_METRIC_HEIGHT: 175.3 CM
BH CV ECHO MEAS - BZI_METRIC_WEIGHT: 93.9 KG
BH CV ECHO MEAS - EDV(CUBED): 212.8 ML
BH CV ECHO MEAS - EDV(MOD-SP2): 108 ML
BH CV ECHO MEAS - EDV(MOD-SP4): 140 ML
BH CV ECHO MEAS - EDV(TEICH): 177.9 ML
BH CV ECHO MEAS - EF(CUBED): 58.3 %
BH CV ECHO MEAS - EF(MOD-BP): 51.7 %
BH CV ECHO MEAS - EF(MOD-SP2): 49.2 %
BH CV ECHO MEAS - EF(MOD-SP4): 52.7 %
BH CV ECHO MEAS - EF(TEICH): 49.1 %
BH CV ECHO MEAS - ESV(CUBED): 88.7 ML
BH CV ECHO MEAS - ESV(MOD-SP2): 54.9 ML
BH CV ECHO MEAS - ESV(MOD-SP4): 66.2 ML
BH CV ECHO MEAS - ESV(TEICH): 90.5 ML
BH CV ECHO MEAS - FS: 25.3 %
BH CV ECHO MEAS - IVS/LVPW: 0.92
BH CV ECHO MEAS - IVSD: 1.3 CM
BH CV ECHO MEAS - LAT PEAK E' VEL: 14.3 CM/SEC
BH CV ECHO MEAS - LV DIASTOLIC VOL/BSA (35-75): 66.8 ML/M^2
BH CV ECHO MEAS - LV MASS(C)D: 377.1 GRAMS
BH CV ECHO MEAS - LV MASS(C)DI: 179.9 GRAMS/M^2
BH CV ECHO MEAS - LV MAX PG: 2.6 MMHG
BH CV ECHO MEAS - LV MEAN PG: 1 MMHG
BH CV ECHO MEAS - LV SYSTOLIC VOL/BSA (12-30): 31.6 ML/M^2
BH CV ECHO MEAS - LV V1 MAX: 81.3 CM/SEC
BH CV ECHO MEAS - LV V1 MEAN: 51.6 CM/SEC
BH CV ECHO MEAS - LV V1 VTI: 13.4 CM
BH CV ECHO MEAS - LVIDD: 6 CM
BH CV ECHO MEAS - LVIDS: 4.5 CM
BH CV ECHO MEAS - LVLD AP2: 8.7 CM
BH CV ECHO MEAS - LVLD AP4: 8.1 CM
BH CV ECHO MEAS - LVLS AP2: 7.3 CM
BH CV ECHO MEAS - LVLS AP4: 7.1 CM
BH CV ECHO MEAS - LVOT AREA (M): 3.8 CM^2
BH CV ECHO MEAS - LVOT AREA: 3.8 CM^2
BH CV ECHO MEAS - LVOT DIAM: 2.2 CM
BH CV ECHO MEAS - LVPWD: 1.4 CM
BH CV ECHO MEAS - MED PEAK E' VEL: 9.3 CM/SEC
BH CV ECHO MEAS - MR MAX PG: 84.3 MMHG
BH CV ECHO MEAS - MR MAX VEL: 459 CM/SEC
BH CV ECHO MEAS - MV DEC SLOPE: 771 CM/SEC^2
BH CV ECHO MEAS - MV DEC TIME: 109 SEC
BH CV ECHO MEAS - MV E MAX VEL: 88.8 CM/SEC
BH CV ECHO MEAS - MV MEAN PG: 2 MMHG
BH CV ECHO MEAS - MV P1/2T MAX VEL: 94.5 CM/SEC
BH CV ECHO MEAS - MV P1/2T: 35.9 MSEC
BH CV ECHO MEAS - MV V2 MEAN: 60.2 CM/SEC
BH CV ECHO MEAS - MV V2 VTI: 15 CM
BH CV ECHO MEAS - MVA P1/2T LCG: 2.3 CM^2
BH CV ECHO MEAS - MVA(P1/2T): 6.1 CM^2
BH CV ECHO MEAS - MVA(VTI): 3.4 CM^2
BH CV ECHO MEAS - PA MAX PG: 2.8 MMHG
BH CV ECHO MEAS - PA V2 MAX: 84.2 CM/SEC
BH CV ECHO MEAS - QP/QS: 0.91
BH CV ECHO MEAS - RAP SYSTOLE: 3 MMHG
BH CV ECHO MEAS - RV MEAN PG: 1 MMHG
BH CV ECHO MEAS - RV V1 MEAN: 43.8 CM/SEC
BH CV ECHO MEAS - RV V1 VTI: 13.4 CM
BH CV ECHO MEAS - RVOT AREA: 3.5 CM^2
BH CV ECHO MEAS - RVOT DIAM: 2.1 CM
BH CV ECHO MEAS - SI(AO): 58.8 ML/M^2
BH CV ECHO MEAS - SI(CUBED): 59.2 ML/M^2
BH CV ECHO MEAS - SI(LVOT): 24.3 ML/M^2
BH CV ECHO MEAS - SI(MOD-SP2): 25.3 ML/M^2
BH CV ECHO MEAS - SI(MOD-SP4): 35.2 ML/M^2
BH CV ECHO MEAS - SI(TEICH): 41.7 ML/M^2
BH CV ECHO MEAS - SV(AO): 123.2 ML
BH CV ECHO MEAS - SV(CUBED): 124.1 ML
BH CV ECHO MEAS - SV(LVOT): 50.9 ML
BH CV ECHO MEAS - SV(MOD-SP2): 53.1 ML
BH CV ECHO MEAS - SV(MOD-SP4): 73.8 ML
BH CV ECHO MEAS - SV(RVOT): 46.4 ML
BH CV ECHO MEAS - SV(TEICH): 87.4 ML
BH CV ECHO MEAS - TAPSE (>1.6): 2.2 CM
BH CV ECHO MEASUREMENTS AVERAGE E/E' RATIO: 7.53
BH CV NUCLEAR PRIOR STUDY: 3
BH CV REST NUCLEAR ISOTOPE DOSE: 10 MCI
BH CV STRESS BP STAGE 1: NORMAL
BH CV STRESS COMMENTS STAGE 1: NORMAL
BH CV STRESS DOSE REGADENOSON STAGE 1: 0.4
BH CV STRESS DURATION MIN STAGE 1: 0
BH CV STRESS DURATION SEC STAGE 1: 30
BH CV STRESS HR STAGE 1: 106
BH CV STRESS NUCLEAR ISOTOPE DOSE: 30 MCI
BH CV STRESS O2 STAGE 1: 91
BH CV STRESS PROTOCOL 1: NORMAL
BH CV STRESS RECOVERY BP: NORMAL MMHG
BH CV STRESS RECOVERY HR: 135 BPM
BH CV STRESS RECOVERY O2: 94 %
BH CV STRESS STAGE 1: 1
BH CV XLRA - TDI S': 15 CM/SEC
BILIRUB SERPL-MCNC: 0.9 MG/DL (ref 0–1.2)
BUN SERPL-MCNC: 17 MG/DL (ref 8–23)
BUN/CREAT SERPL: 23 (ref 7–25)
CALCIUM SPEC-SCNC: 8.2 MG/DL (ref 8.6–10.5)
CHLORIDE SERPL-SCNC: 101 MMOL/L (ref 98–107)
CO2 SERPL-SCNC: 27.7 MMOL/L (ref 22–29)
CREAT SERPL-MCNC: 0.74 MG/DL (ref 0.76–1.27)
DEPRECATED RDW RBC AUTO: 48.5 FL (ref 37–54)
EOSINOPHIL # BLD AUTO: 0.23 10*3/MM3 (ref 0–0.4)
EOSINOPHIL NFR BLD AUTO: 1.9 % (ref 0.3–6.2)
ERYTHROCYTE [DISTWIDTH] IN BLOOD BY AUTOMATED COUNT: 13.2 % (ref 12.3–15.4)
GFR SERPL CREATININE-BSD FRML MDRD: 107 ML/MIN/1.73
GLOBULIN UR ELPH-MCNC: 2.5 GM/DL
GLUCOSE SERPL-MCNC: 98 MG/DL (ref 65–99)
GRAM STN SPEC: NORMAL
GRAM STN SPEC: NORMAL
HCT VFR BLD AUTO: 48.4 % (ref 37.5–51)
HGB BLD-MCNC: 16.1 G/DL (ref 13–17.7)
IMM GRANULOCYTES # BLD AUTO: 0.09 10*3/MM3 (ref 0–0.05)
IMM GRANULOCYTES NFR BLD AUTO: 0.8 % (ref 0–0.5)
LEFT ATRIUM VOLUME INDEX: 26 ML/M2
LV EF NUC BP: 34 %
LYMPHOCYTES # BLD AUTO: 1.7 10*3/MM3 (ref 0.7–3.1)
LYMPHOCYTES NFR BLD AUTO: 14.3 % (ref 19.6–45.3)
MAXIMAL PREDICTED HEART RATE: 157 BPM
MCH RBC QN AUTO: 32.9 PG (ref 26.6–33)
MCHC RBC AUTO-ENTMCNC: 33.3 G/DL (ref 31.5–35.7)
MCV RBC AUTO: 99 FL (ref 79–97)
MONOCYTES # BLD AUTO: 0.97 10*3/MM3 (ref 0.1–0.9)
MONOCYTES NFR BLD AUTO: 8.1 % (ref 5–12)
NEUTROPHILS NFR BLD AUTO: 74.6 % (ref 42.7–76)
NEUTROPHILS NFR BLD AUTO: 8.9 10*3/MM3 (ref 1.7–7)
NRBC BLD AUTO-RTO: 0 /100 WBC (ref 0–0.2)
PERCENT MAX PREDICTED HR: 98.09 %
PLATELET # BLD AUTO: 202 10*3/MM3 (ref 140–450)
PMV BLD AUTO: 10.4 FL (ref 6–12)
POTASSIUM SERPL-SCNC: 3.7 MMOL/L (ref 3.5–5.2)
PROT SERPL-MCNC: 6.1 G/DL (ref 6–8.5)
RBC # BLD AUTO: 4.89 10*6/MM3 (ref 4.14–5.8)
SINUS: 3.5 CM
SODIUM SERPL-SCNC: 136 MMOL/L (ref 136–145)
STRESS BASELINE BP: NORMAL MMHG
STRESS BASELINE HR: 114 BPM
STRESS O2 SAT REST: 92 %
STRESS PERCENT HR: 115 %
STRESS POST ESTIMATED WORKLOAD: 1 METS
STRESS POST EXERCISE DUR SEC: 30 SEC
STRESS POST O2 SAT PEAK: 94 %
STRESS POST PEAK BP: NORMAL MMHG
STRESS POST PEAK HR: 154 BPM
STRESS TARGET HR: 133 BPM
WBC # BLD AUTO: 11.92 10*3/MM3 (ref 3.4–10.8)

## 2021-04-19 PROCEDURE — 93016 CV STRESS TEST SUPVJ ONLY: CPT | Performed by: INTERNAL MEDICINE

## 2021-04-19 PROCEDURE — 0 TECHNETIUM SESTAMIBI: Performed by: HOSPITALIST

## 2021-04-19 PROCEDURE — 25010000002 PERFLUTREN (DEFINITY) 8.476 MG IN SODIUM CHLORIDE (PF) 0.9 % 10 ML INJECTION: Performed by: HOSPITALIST

## 2021-04-19 PROCEDURE — 99238 HOSP IP/OBS DSCHRG MGMT 30/<: CPT | Performed by: INTERNAL MEDICINE

## 2021-04-19 PROCEDURE — 78452 HT MUSCLE IMAGE SPECT MULT: CPT | Performed by: INTERNAL MEDICINE

## 2021-04-19 PROCEDURE — 71046 X-RAY EXAM CHEST 2 VIEWS: CPT

## 2021-04-19 PROCEDURE — 99232 SBSQ HOSP IP/OBS MODERATE 35: CPT | Performed by: HOSPITALIST

## 2021-04-19 PROCEDURE — 80053 COMPREHEN METABOLIC PANEL: CPT | Performed by: INTERNAL MEDICINE

## 2021-04-19 PROCEDURE — 85025 COMPLETE CBC W/AUTO DIFF WBC: CPT | Performed by: INTERNAL MEDICINE

## 2021-04-19 PROCEDURE — 94799 UNLISTED PULMONARY SVC/PX: CPT

## 2021-04-19 PROCEDURE — 93306 TTE W/DOPPLER COMPLETE: CPT

## 2021-04-19 PROCEDURE — 93017 CV STRESS TEST TRACING ONLY: CPT

## 2021-04-19 PROCEDURE — 93306 TTE W/DOPPLER COMPLETE: CPT | Performed by: INTERNAL MEDICINE

## 2021-04-19 PROCEDURE — 78452 HT MUSCLE IMAGE SPECT MULT: CPT

## 2021-04-19 PROCEDURE — 93018 CV STRESS TEST I&R ONLY: CPT | Performed by: INTERNAL MEDICINE

## 2021-04-19 PROCEDURE — 25010000002 REGADENOSON 0.4 MG/5ML SOLUTION: Performed by: HOSPITALIST

## 2021-04-19 PROCEDURE — A9500 TC99M SESTAMIBI: HCPCS | Performed by: HOSPITALIST

## 2021-04-19 RX ORDER — DIGOXIN 125 MCG
125 TABLET ORAL
Qty: 30 TABLET | Refills: 0 | Status: ON HOLD | OUTPATIENT
Start: 2021-04-20 | End: 2021-04-20 | Stop reason: SDUPTHER

## 2021-04-19 RX ORDER — IPRATROPIUM BROMIDE AND ALBUTEROL SULFATE 2.5; .5 MG/3ML; MG/3ML
3 SOLUTION RESPIRATORY (INHALATION) EVERY 4 HOURS PRN
Status: DISCONTINUED | OUTPATIENT
Start: 2021-04-19 | End: 2021-04-20 | Stop reason: HOSPADM

## 2021-04-19 RX ORDER — BUDESONIDE AND FORMOTEROL FUMARATE DIHYDRATE 160; 4.5 UG/1; UG/1
2 AEROSOL RESPIRATORY (INHALATION)
Status: DISCONTINUED | OUTPATIENT
Start: 2021-04-19 | End: 2021-04-20 | Stop reason: HOSPADM

## 2021-04-19 RX ORDER — DILTIAZEM HYDROCHLORIDE 240 MG/1
240 CAPSULE, COATED, EXTENDED RELEASE ORAL
Qty: 30 CAPSULE | Refills: 0 | Status: ON HOLD | OUTPATIENT
Start: 2021-04-20 | End: 2021-04-20 | Stop reason: SDUPTHER

## 2021-04-19 RX ADMIN — DILTIAZEM HYDROCHLORIDE 240 MG: 120 CAPSULE, COATED, EXTENDED RELEASE ORAL at 10:40

## 2021-04-19 RX ADMIN — TECHNETIUM TC 99M SESTAMIBI 1 DOSE: 1 INJECTION INTRAVENOUS at 08:40

## 2021-04-19 RX ADMIN — MONTELUKAST SODIUM 10 MG: 10 TABLET, COATED ORAL at 20:21

## 2021-04-19 RX ADMIN — BUDESONIDE AND FORMOTEROL FUMARATE DIHYDRATE 2 PUFF: 160; 4.5 AEROSOL RESPIRATORY (INHALATION) at 19:37

## 2021-04-19 RX ADMIN — POTASSIUM CHLORIDE 10 MEQ: 1500 TABLET, EXTENDED RELEASE ORAL at 10:41

## 2021-04-19 RX ADMIN — DIGOXIN 125 MCG: 125 TABLET ORAL at 12:54

## 2021-04-19 RX ADMIN — IPRATROPIUM BROMIDE AND ALBUTEROL SULFATE 3 ML: .5; 3 SOLUTION RESPIRATORY (INHALATION) at 07:16

## 2021-04-19 RX ADMIN — SODIUM CHLORIDE 3 ML: 9 INJECTION INTRAMUSCULAR; INTRAVENOUS; SUBCUTANEOUS at 08:14

## 2021-04-19 RX ADMIN — REGADENOSON 0.4 MG: 0.08 INJECTION, SOLUTION INTRAVENOUS at 10:18

## 2021-04-19 RX ADMIN — TECHNETIUM TC 99M SESTAMIBI 1 DOSE: 1 INJECTION INTRAVENOUS at 10:18

## 2021-04-19 RX ADMIN — ROSUVASTATIN CALCIUM 10 MG: 5 TABLET, FILM COATED ORAL at 10:42

## 2021-04-19 RX ADMIN — SODIUM CHLORIDE, PRESERVATIVE FREE 10 ML: 5 INJECTION INTRAVENOUS at 20:21

## 2021-04-19 RX ADMIN — SODIUM CHLORIDE, PRESERVATIVE FREE 10 ML: 5 INJECTION INTRAVENOUS at 10:44

## 2021-04-19 RX ADMIN — PANTOPRAZOLE SODIUM 40 MG: 40 TABLET, DELAYED RELEASE ORAL at 10:47

## 2021-04-19 RX ADMIN — BUDESONIDE 0.5 MG: 0.5 INHALANT RESPIRATORY (INHALATION) at 07:17

## 2021-04-19 RX ADMIN — ARFORMOTEROL TARTRATE 15 MCG: 15 SOLUTION RESPIRATORY (INHALATION) at 07:28

## 2021-04-19 RX ADMIN — FUROSEMIDE 40 MG: 20 TABLET ORAL at 10:42

## 2021-04-19 NOTE — PLAN OF CARE
Goal Outcome Evaluation:  Plan of Care Reviewed With: patient  Progress: no change  Outcome Summary: pt rested well overnight; VSS; Remains in AFIB with rate 's overnight. O2 @ 1L while asleep. Plan for echo this am. am labs pending. continue to monitor.

## 2021-04-19 NOTE — PROGRESS NOTES
"Adult Nutrition  Assessment/PES    Patient Name:  Claude Spencer  YOB: 1955  MRN: [id number]  Admit Date:  4/16/2021    Assessment Date:  4/19/2021    Comments:  Adv diet as indicated to Cardiac.     Pt will need further edu when more open to it, angry right now b/c wants water and food.     Will cont to follow and monitor.     Reason for Assessment     Row Name 04/19/21 1408          Reason for Assessment    Reason For Assessment  diagnosis/disease state CHF     Diagnosis  cardiac disease AFIB, new CHF, COPD pending stress for poss cath         Nutrition/Diet History     Row Name 04/19/21 1408          Nutrition/Diet History    Typical Food/Fluid Intake  RN reports daughter with pt. Pt asleep then easily wakes hearing our voices. NKFA. Denies issue chew/swallowing. Seems frustrated wants water. Reports using salt shaker and loves cheese \" don't take that away from me\". Daughter chimes in about canned soup and salt. Pt denies scale at home. Reports he has already lost wt. Doesn't appear ready for edu at this visit.         Anthropometrics     Row Name 04/19/21 1410 04/19/21 0813       Anthropometrics    Height  --  175.3 cm (69\")    Weight  -- 207#  93.9 kg (207 lb)       Ideal Body Weight (IBW)    Ideal Body Weight (IBW) (kg)  --  73.69    % Ideal Body Weight  --  127.43       Usual Body Weight (UBW)    Weight Gain  intentional diuretics  --    Weight Loss Time Frame  during admit 12# if scale accurate  --       Body Mass Index (BMI)    BMI (kg/m2)  --  30.63    BMI Assessment  BMI 30-34.9: obesity grade I  --        Labs/Tests/Procedures/Meds     Row Name 04/19/21 1411          Labs/Procedures/Meds    Lab Results Reviewed  reviewed     Lab Results Comments          Diagnostic Tests/Procedures    Diagnostic Test/Procedure Reviewed  reviewed     Diagnostic Test/Procedures Comments  stress results pending possible cath        Medications    Pertinent Medications Reviewed  reviewed     " "Pertinent Medications Comments  lasix, KCL           Estimated/Assessed Needs     Row Name 04/19/21 1411 04/19/21 0813       Calculation Measurements    Height  --  175.3 cm (69\")       Estimated/Assessed Needs    Additional Documentation  Calorie Requirements (Group);Fluid Requirements (Group);Protein Requirements (Group)  --       Calorie Requirements    Estimated Calorie Need Method  [facility]  --    Estimated Calorie Requirement Comment  2071 kcal ( mifflin 1.2)  --       Protein Requirements    Est Protein Requirement Amount (gms/kg)  1.0 gm protein 94 gm pro  --       Fluid Requirements    Estimated Fluid Requirement Method  other (see comments) 9143-9345 ml chf guidelines  --        Nutrition Prescription Ordered     Row Name 04/19/21 1412          Nutrition Prescription PO    Current PO Diet  NPO         Evaluation of Received Nutrient/Fluid Intake     Row Name 04/19/21 1413          Fluid Intake Evaluation    Oral Fluid (mL)  1680 100%        PO Evaluation    Number of Meals  5     % PO Intake  90               Problem/Interventions:  Problem 1     Row Name 04/19/21 1413          Nutrition Diagnoses Problem 1    Problem 1  Knowledge Deficit     Etiology (related to)  Medical Diagnosis     Cardiac  CHF     Signs/Symptoms (evidenced by)  Reported  Information Deficit               Intervention Goal     Row Name 04/19/21 1413          Intervention Goal    General  Provide information regarding MNT for treatment/condition;Maintain nutrition     PO  PO intake (%)     PO Intake %  50 % or greater     Weight  Appropriate weight loss lasix noted         Nutrition Intervention     Row Name 04/19/21 1414          Nutrition Intervention    RD/Tech Action  Follow Tx progress           Education/Evaluation     Row Name 04/19/21 1414          Education    Education  Education topics;Advised regarding habits/behavior;Provided education regarding;Education offered and refused Discussed avoid direct Na+ & processed " foods ( cured meat, can soup, ramen, cheese). Edu daily wt to monitor changes. Edu signs of fluid like SOA/swelling. Edu goal address yovani benoit [place] to avoid hospital.     Provided education regarding  Diet rationale;Key food habit change;Avoidance of associated complications     Education Topics  CHF;Na+     Advised Regarding Habits/Behavior  Seasoning food;Food choices        Monitor/Evaluation    Monitor  Per protocol;I&O;PO intake;Pertinent labs;Symptoms;Weight     Education Follow-up  Other (comment) pt angry he cannot drink water or eat. mad he will have to cut salt and cheese. pt not ready for edu at this time.           Electronically signed by:  Basia Vega RD  04/19/21 14:16 EDT

## 2021-04-19 NOTE — PAYOR COMM NOTE
"Annika Spencer (65 y.o. Male)     ATTN: NURSE REVIEWER   RE:  INPATIENT AUTH REQUEST   REF# [id number]  PLS REPLY TO [facility] PHONE [phone] FAX [phone]      Date of Birth Social Security Number Address Home Phone MRN    1955  [id number] [place] [address] [phone] [id number]    Anabaptism Marital Status          Other        Admission Date Admission Type Admitting Provider Attending Provider Department, Room/Bed    4/16/21 Emergency  Glen Holman MD [facility] ICU, ICU1/1    Discharge Date Discharge Disposition Discharge Destination                       Attending Provider: Glen Holman MD    Allergies: No Known Allergies    Isolation: None   Infection: None   Code Status: CPR    Ht: 175.3 cm (69\")   Wt: 93.9 kg (207 lb)    Admission Cmt: None   Principal Problem: None                Active Insurance as of 4/16/2021     Primary Coverage     Payor Plan Insurance Group Employer/Plan Group    Formerly [facility] [employer] Formerly [facility] [employer] BLUE [employer]IELD PPO [id number]     Payor Plan Address Payor Plan Phone Number Payor Plan Fax Number Effective Dates    [address] [phone]  1/1/2019 - None Entered    [facility] [address]       Subscriber Name Subscriber Birth Date Member ID       RINKUANTONIETA 3/13/1962 [id number]           Secondary Coverage     Payor Plan Insurance Group Employer/Plan Group    MEDICARE MEDICARE A ONLY      Payor Plan Address Payor Plan Phone Number Payor Plan Fax Number Effective Dates    [address] [phone]  3/1/2015 - None Entered    [facility] [address]       Subscriber Name Subscriber Birth Date Member ID       ANNIKA SPENCER 1955 [id number]                 Emergency Contacts      (Rel.) Home Phone Work Phone Mobile Phone    RINKULEXIE (Daughter) -- -- [phone]    Antonieta Spencer (Relative) [phone] -- --               History & Physical      MisbahmparaGumaro mar MD at 04/16/21 2312      "         HISTORY AND PHYSICAL      Patient Care Team:  Armaan Jarvis DO as PCP - General (Family Medicine)    CHIEF COMPLAINT: Shortness of breath    HISTORY OF PRESENT ILLNESS:    65-year-old white male became ill several weeks ago with cough chest congestion shortness of breath and some sputum production.  He saw his primary care physician was given IM and p.o. antibiotics as well as steroid pack initially noticed to be provement.  Patient stated about a week later he started having symptoms again.  Has developed some orthopnea for the last 3 weeks some lower extremity edema and some abdominal distention.  He saw his physician again recommended hospitalization but he refused.  His daughter felt congested, the hospital.  Patient also has been having some intermittent vague left and right-sided chest discomfort feels like the burning pain.  He also has been having palpitations off and on for several weeks.\    After his course of antibiotics esterases cough has become productive scant sputum but occasionally still yellow-colored.  Not any fever chills or hemoptysis.  He has no prior cardiac history.    Past Medical History:   Diagnosis Date   • COPD (chronic obstructive pulmonary disease) (CMS/[facility])    • Hyperlipidemia    • Hypertension      Past Surgical History:   Procedure Laterality Date   • ANKLE SURGERY       Family History   Problem Relation Age of Onset   • COPD Mother 78   • Seizures Father    • Vasculitis Father    • Prostate cancer Maternal Uncle      Social History     Tobacco Use   • Smoking status: Former Smoker     Packs/day: 1.00     Years: 30.00     Pack years: 30.00     Quit date: 2000     Years since quittin.0   • Smokeless tobacco: Current User     Types: Chew   • Tobacco comment: quit 15 years ago, wife current smoker outside   Substance Use Topics   • Alcohol use: No   • Drug use: No     (Not in a hospital admission)    Allergies:  Patient has no known allergies.     Review of Systems  "  Constitutional: Positive for activity change and fatigue. Negative for appetite change.   HENT: Positive for congestion and postnasal drip. Negative for trouble swallowing.    Respiratory: Positive for cough, chest tightness, shortness of breath and wheezing.    Cardiovascular: Positive for chest pain and leg swelling.   Gastrointestinal: Positive for abdominal distention. Negative for abdominal pain, diarrhea, nausea and vomiting.   Endocrine: Negative for polyphagia and polyuria.   Genitourinary: Negative for frequency.   Musculoskeletal: Positive for back pain.   Skin: Negative for rash.   Neurological: Positive for weakness. Negative for light-headedness.   Hematological: Does not bruise/bleed easily.   Psychiatric/Behavioral: Negative for agitation and behavioral problems.       Vital Signs  Temp:  [98.1 °F (36.7 °C)] 98.1 °F (36.7 °C)  Heart Rate:  [115-158] 115  Resp:  [18] 18  BP: (142-156)/(129-136) 142/129  Oxygen Therapy  SpO2: 92 %  Device (Oxygen Therapy): nasal cannula  Flow (L/min): 2}  Body mass index is 32.49 kg/m².  Flowsheet Rows      First Filed Value   Admission Height  175.3 cm (69\") Documented at 04/16/2021 2120   Admission Weight  99.8 kg (220 lb) Documented at 04/16/2021 2120                 Physical Exam  Vitals and nursing note reviewed.   Constitutional:       General: He is not in acute distress.     Appearance: He is well-developed. He is ill-appearing. He is not toxic-appearing.   HENT:      Head: Normocephalic.   Eyes:      Conjunctiva/sclera: Conjunctivae normal.   Neck:      Thyroid: No thyromegaly.      Vascular: No JVD.   Cardiovascular:      Rate and Rhythm: Tachycardia present. Rhythm regularly irregular.      Heart sounds: Normal heart sounds. No murmur heard.     Pulmonary:      Effort: Pulmonary effort is normal. No respiratory distress.      Breath sounds: Examination of the right-lower field reveals rales. Examination of the left-lower field reveals rales. Wheezing " (Bilateral diffuse) and rales present.   Abdominal:      General: Bowel sounds are normal. There is no distension.      Palpations: Abdomen is soft.      Tenderness: There is no abdominal tenderness. There is no guarding.   Musculoskeletal:      Cervical back: Normal range of motion.      Right lower le+ Edema present.      Left lower le+ Edema present.   Skin:     General: Skin is warm and dry.      Findings: No rash.   Neurological:      Mental Status: He is alert and oriented to person, place, and time.      Cranial Nerves: Cranial nerves are intact.      Sensory: Sensation is intact.      Deep Tendon Reflexes: Reflexes are normal and symmetric.          Debilities/Disabilities Identified: Mild respiratory distress    Emotional Behavior: Anxious    Results Review:    I reviewed the patient's new clinical results.  Lab Results (most recent)     Procedure Component Value Units Date/Time    T4, Free [[id number]]  (Normal) Collected: 21    Specimen: Blood Updated: 21     Free T4 1.48 ng/dL     Narrative:      Results may be falsely increased if patient taking Biotin.      TSH [[id number]]  (Normal) Collected: 21    Specimen: Blood Updated: 21     TSH 2.660 uIU/mL     Troponin [[id number]]  (Normal) Collected: 21    Specimen: Blood Updated: 21     Troponin T <0.010 ng/mL     Narrative:      Troponin T Reference Range:  <= 0.03 ng/mL-   Negative for AMI  >0.03 ng/mL-     Abnormal for myocardial necrosis.  Clinicians would have to utilize clinical acumen, EKG, Troponin and serial changes to determine if it is an Acute Myocardial Infarction or myocardial injury due to an underlying chronic condition.       Results may be falsely decreased if patient taking Biotin.      BNP [[id number]]  (Abnormal) Collected: 21    Specimen: Blood Updated: 21     proBNP 2,963.0 pg/mL     Narrative:      Among patients with dyspnea, NT-proBNP is  highly sensitive for the detection of acute congestive heart failure. In addition NT-proBNP of <300 pg/ml effectively rules out acute congestive heart failure with 99% negative predictive value.    Results may be falsely decreased if patient taking Biotin.      Comprehensive Metabolic Panel [[id number]]  (Abnormal) Collected: 04/16/21 2147    Specimen: Blood Updated: 04/16/21 2214     Glucose 104 mg/dL      BUN 15 mg/dL      Creatinine 0.86 mg/dL      Sodium 138 mmol/L      Potassium 3.9 mmol/L      Chloride 98 mmol/L      CO2 30.7 mmol/L      Calcium 8.9 mg/dL      Total Protein 6.5 g/dL      Albumin 4.00 g/dL      ALT (SGPT) 228 U/L      AST (SGOT) 67 U/L      Alkaline Phosphatase 84 U/L      Total Bilirubin 1.3 mg/dL      eGFR Non African Amer 90 mL/min/1.73      Globulin 2.5 gm/dL      A/G Ratio 1.6 g/dL      BUN/Creatinine Ratio 17.4     Anion Gap 9.3 mmol/L     Narrative:      GFR Normal >60  Chronic Kidney Disease <60  Kidney Failure <15      Magnesium [[id number]]  (Normal) Collected: 04/16/21 2147    Specimen: Blood Updated: 04/16/21 2214     Magnesium 1.9 mg/dL     CBC & Differential [[id number]]  (Abnormal) Collected: 04/16/21 2147    Specimen: Blood Updated: 04/16/21 2200    Narrative:      The following orders were created for panel order CBC & Differential.  Procedure                               Abnormality         Status                     ---------                               -----------         ------                     CBC Auto Differential[[id number]]        Abnormal            Final result                 Please view results for these tests on the individual orders.    CBC Auto Differential [[id number]]  (Abnormal) Collected: 04/16/21 2147    Specimen: Blood Updated: 04/16/21 2200     WBC 14.30 10*3/mm3      RBC 4.93 10*6/mm3      Hemoglobin 16.1 g/dL      Hematocrit 49.3 %      .0 fL      MCH 32.7 pg      MCHC 32.7 g/dL      RDW 13.6 %      RDW-SD 50.2 fl      MPV 10.7 fL      Platelets  196 10*3/mm3      Neutrophil % 76.5 %      Lymphocyte % 10.8 %      Monocyte % 11.3 %      Eosinophil % 0.6 %      Basophil % 0.1 %      Immature Grans % 0.7 %      Neutrophils, Absolute 10.93 10*3/mm3      Lymphocytes, Absolute 1.55 10*3/mm3      Monocytes, Absolute 1.61 10*3/mm3      Eosinophils, Absolute 0.09 10*3/mm3      Basophils, Absolute 0.02 10*3/mm3      Immature Grans, Absolute 0.10 10*3/mm3      nRBC 0.0 /100 WBC           Imaging Results (Most Recent)     Procedure Component Value Units Date/Time    XR Chest 1 View [[id number]] Collected: 04/16/21 2240     Updated: 04/16/21 2243    Narrative:      CR Chest 1 Vw    INDICATION:   Shortness of air for 3 weeks.     COMPARISON:    11/6/2019    FINDINGS:  2 portable AP view(s) of the chest.  Heart size is top normal. There is vascular congestion. There are infiltrates in both mid to lower lung zones certainly worrisome for pulmonary edema although pneumonia is not excluded. No pneumothorax is identified.      Impression:      Findings most suggestive of CHF although pneumonia is not radiographically excluded.    Signer Name: Jay Angel MD   Signed: 4/16/2021 10:40 PM   [facility] Name[facility]    [facility]        reviewed    ECG/EMG Results (most recent)     Procedure Component Value Units Date/Time    ECG 12 Lead [[id number]] Collected: 04/16/21 2121     Updated: 04/16/21 2130     QT Interval 307 ms     Narrative:      HEART RATE= 138  bpm  RR Interval= 434  ms  OK Interval=   ms  P Horizontal Axis=   deg  P Front Axis=   deg  QRSD Interval= 103  ms  QT Interval= 307  ms  QRS Axis= 59  deg  T Wave Axis= 19  deg  - ABNORMAL ECG -  Atrial fibrillation  Ventricular premature complex  Electronically Signed By:   Date and Time of Study: 2021-04-16 21:21:07        reviewed    Assessment/Plan       1.  New onset atrial fibrillation with rapid response patient was started on Cardizem drip his rate is improving still tachycardic we  will continue to monitor has been given 1 dose of digoxin.  We will continue with Cardizem drip and monitor cardiology be consulted.  His  CHADS2 shows appropriate for anticoagulation so we will go ahead and start Eliquis    2.  New onset congestive heart failure etiology be determined likely due to atrial fibrillation echocardiogram has been ordered    3.  COPD likely exacerbation patient is actively wheezing but not sure with some COPD exacerbation versus decompensated congestive heart failure.  We will go ahead and continue with duo nebs and monitor respiratory viral panel sputum culture be ordered    4.  GERD nothing acute continue with PPI      5.  Hypertension controlled hold home antihypertensives for now    6.  Chest pain atypical but has multiple risk factors for coronary disease we will await cardiology evaluation .    7.  Hyperlipidemia we will continue with home statin          I discussed the patients findings and my recommendations with patient and daughter    Gumaro Jarrell MD  04/16/21  23:12 EDT      Much of this encounter note is an electronic transcription/translation of spoken language to printed text using Dragon Software      Electronically signed by Gumaro Jarrell MD at 04/17/21 1259       Vital Signs (last 3 days)     Date/Time   Temp   Temp src   Pulse   Resp   BP   Patient Position   SpO2    04/19/21 1302   --   --   115   --   --   --   92    04/19/21 1254   --   --   (!) 123   --   (!) 121/101   --   --    04/19/21 1201   97.5 (36.4)   Oral   118   18   (!) 121/101   Lying   92    04/19/21 1154   --   --   (!) 126   --   --   --   92    04/19/21 1040   --   --   (!) 135   --   (!) 147/110   --   --    04/19/21 0813   --   --   --   --   113/84   --   --    04/19/21 0728   --   --   --   --   --   --   95    04/19/21 0716   --   --   100   20   --   --   93    04/19/21 0600   --   --   91   18   113/84   Lying   93    04/19/21 0530   --   --   86   --   --   --   93     04/19/21 0400   98 (36.7)   Oral   71   20   105/79   Lying   93    04/19/21 0100   --   --   80   --   --   --   92    04/19/21 0000   98.1 (36.7)   Oral   114   20   131/89   Lying   93    04/18/21 2330   --   --   94   --   --   --   93    04/18/21 2142   --   --   110   --   --   --   93    04/18/21 2127   --   --   --   --   --   --   (!) 88    04/18/21 2007   --   --   99   22   --   --   --    04/18/21 2003   --   --   97   20   --   Lying   92    04/18/21 2001   98.9 (37.2)   Oral   120   22   114/91   Lying   90    04/18/21 1940   --   --   103   22   --   --   --    04/18/21 1931   --   --   105   20   --   Lying   93    04/18/21 1800   --   --   92   18   115/81   Lying   91    04/18/21 1700   --   --   92   18   --   --   91 04/18/21 1600   --   --   111   18   126/96   Lying   91    04/18/21 1543   --   --   97   --   --   --   91 04/18/21 1524   --   --   102   16   --   --   91 04/18/21 1517   --   --   100   16   --   --   92    04/18/21 1400   --   --   103   --   136/93   --   91 04/18/21 1300   --   --   (!) 130   --   --   --   92    04/18/21 1200   --   --   118   --   129/97   --   90 04/18/21 1146   --   --   (!) 130   --   --   --   --    04/18/21 1109   --   --   95   16   --   --   90 04/18/21 1102   --   --   90   16   --   --   92    04/18/21 1101   --   --   86   16   --   --   92    04/18/21 1000   --   --   89   18   121/73   Sitting   94    04/18/21 0948   --   --   91   20   --   --   93    04/18/21 0941   --   --   90   20   --   --   93    04/18/21 0923   --   --   90   --   --   --   93    04/18/21 0900   --   --   103   --   --   --   95    04/18/21 0845   --   --   87   18   --   --   92 04/18/21 0801   97.5 (36.4)   Oral   77   18   125/84   Lying   94    04/18/21 0800   --   --   89   --   --   --   92    04/18/21 0714   --   --   69   16   --   --   94    04/18/21 0704   --   --   80   16   --   --   96    04/18/21 0700   --   --   70   16   --   --    95    04/18/21 0600   --   --   75   14   128/98   Lying   94    04/18/21 0400   --   --   66   16   126/99   Lying   94    04/18/21 0200   --   --   86   18   112/79   Lying   94    04/18/21 0000   98.7 (37.1)   Oral   77   18   129/97   Lying   95    04/17/21 2300   --   --   81   18   134/86   Lying   95    04/17/21 2200   --   --   108   18   111/84   Lying   94    04/17/21 2100   --   --   111   18   129/89   Lying   (!) 88    04/17/21 2046   --   --   87   20   --   --   --    04/17/21 2039   --   --   86   20   --   Lying   92    04/17/21 2005   --   --   79   20   --   --   --    04/17/21 1955   --   --   75   22   --   Lying   94    04/17/21 1933   98 (36.7)   Oral   --   --   --   --   --    04/17/21 1800   --   --   86   20   126/81   Lying   90    04/17/21 1700   --   --   89   22   92/81   Lying   90    04/17/21 1602   --   --   --   24   --   --   --    04/17/21 1600   --   --   74   20   120/84   Lying   93    04/17/21 1555   --   --   72   24   --   --   93    04/17/21 1506   98.3 (36.8)   Oral   90   16   108/92   Lying   94    04/17/21 1500   --   --   66   18   108/92   Lying   92    04/17/21 1431   --   --   84   --   107/79   --   95    04/17/21 1400   --   --   85   18   104/77   Lying   94    04/17/21 1300   --   --   64   20   104/74   Lying   93    04/17/21 1258   --   --   68   24   --   --   93    04/17/21 1249   --   --   64   24   --   --   94    04/17/21 1200   98.4 (36.9)   Oral   99   22   128/99   Lying   94    04/17/21 1100   --   --   86   18   123/92   Lying   95    04/17/21 1000   --   --   97   18   115/86   Lying   95    04/17/21 0915   --   --   100   16   --   --   --    04/17/21 0910   --   --   93   16   --   --   94    04/17/21 0900   --   --   106   18   93/60   Lying   92    04/17/21 0804   --   --   90   16   --   --   93 04/17/21 0800   98 (36.7)   Oral   80   18   (!) 144/126   Lying   95    04/17/21 0755   --   --   100   20   --   --   95 04/17/21 0530   --    --   68   --   139/95   --   94    04/17/21 0500   --   --   82   --   (!) 88/81   --   95    04/17/21 0430   --   --   96   --   118/82   --   94    04/17/21 0400   --   --   96   --   (!) 127/102   --   94    04/17/21 0330   --   --   89   --   105/80   --   93    04/17/21 0300   --   --   90   16   112/97   --   93    04/17/21 0230   --   --   105   18   127/85   --   94    04/17/21 0200   --   --   99   --   116/95   --   93    04/17/21 0145   --   --   --   --   --   --   93    04/17/21 0130   --   --   120   18   124/89   --   93    04/17/21 0100   --   --   (!) 144   18   (!) 119/109   --   94    04/17/21 0030   --   --   115   20   103/86   --   93    04/17/21 0000   98.7 (37.1)   Oral   --   16   --   Lying   --    04/16/21 2330   --   --   117   --   123/93   --   95    04/16/21 2300   --   --   (!) 126   --   109/97   --   93 04/16/21 2258   --   --   (!) 122   --   (!) 115/102   --   94 04/16/21 2230   --   --   (!) 134   --   126/99   --   94 04/16/21 2215   --   --   102   --   128/100   --   94 04/16/21 2201   --   --   90   --   (!) 133/103   --   93 04/16/21 2145   --   --   (!) 133   --   102/89   --   92 04/16/21 2143   --   --   115   --   126/88   --   92    04/16/21 2133   --   --   (!) 158   --   (!) 142/129   --   91    04/16/21 2123   98.1 (36.7)   Oral   --   --   --   --   --    04/16/21 2120   --   --   (!) 138   18   (!) 156/136   --   93    04/16/21 2119   --   --   --   --   (!) 156/136   --   --              Lines, Drains & Airways    Active LDAs     Name:   Placement date:   Placement time:   Site:   Days:    Peripheral IV 04/18/21 2040 Left Forearm   04/18/21 2040    Forearm   less than 1         Inactive LDAs     Name:   Placement date:   Placement time:   Removal date:   Removal time:   Site:   Days:    [REMOVED] Peripheral IV 04/16/21 2148 Anterior;Distal;Left;Upper Antecubital   04/16/21 2148 04/18/21 2030    Antecubital   1                   Facility-Administered Medications as of 4/19/2021   Medication Dose Route Frequency Provider Last Rate Last Admin   • acetaminophen (TYLENOL) tablet 650 mg  650 mg Oral Q4H PRN Gumaro Jarrell MD        Or   • acetaminophen (TYLENOL) 160 MG/5ML solution 650 mg  650 mg Oral Q4H PRN Gumaro Jarrell MD        Or   • acetaminophen (TYLENOL) suppository 650 mg  650 mg Rectal Q4H PRN Gumaro Jarrell MD       • aluminum-magnesium hydroxide-simethicone (MAALOX MAX) 400-400-40 MG/5ML suspension 15 mL  15 mL Oral Q6H PRN Gumaro Jarrell MD       • arformoterol (BROVANA) nebulizer solution 15 mcg  15 mcg Nebulization BID - RT Gumaro Jarrell MD   15 mcg at 04/19/21 0728   • budesonide (PULMICORT) nebulizer solution 0.5 mg  0.5 mg Nebulization BID - RT Gumaro Jarrell MD   0.5 mg at 04/19/21 0717   • [COMPLETED] bumetanide (BUMEX) injection 2 mg  2 mg Intravenous Once West Lopez MD   2 mg at 04/16/21 2336   • [COMPLETED] bumetanide (BUMEX) injection 2 mg  2 mg Intravenous Once Gumaro Jarrell MD   2 mg at 04/17/21 0811   • calcium carbonate (TUMS) chewable tablet 500 mg (200 mg elemental)  1 tablet Oral BID PRN Gumaro Jarrell MD   1 tablet at 04/17/21 0815   • [COMPLETED] digoxin (LANOXIN) injection 250 mcg  250 mcg Intravenous Once West Lopez MD   250 mcg at 04/16/21 2338   • [COMPLETED] digoxin (LANOXIN) injection 250 mcg  250 mcg Intravenous Once Wilfred Draper MD   250 mcg at 04/17/21 1713   • [COMPLETED] digoxin (LANOXIN) injection 500 mcg  500 mcg Intravenous Once Wilfred Draper MD   500 mcg at 04/17/21 1102   • digoxin (LANOXIN) tablet 125 mcg  125 mcg Oral Daily Georges Jones MD   125 mcg at 04/19/21 1254   • [COMPLETED] dilTIAZem (CARDIZEM) injection 20 mg  20 mg Intravenous Once West Lopez MD   20 mg at 04/16/21 8742   • dilTIAZem CD (CARDIZEM CD) 24 hr capsule 240 mg  240 mg Oral Q24H Wilfred Draper,  MD   240 mg at 04/19/21 1040   • furosemide (LASIX) tablet 40 mg  40 mg Oral Daily Wilfred Draper MD   40 mg at 04/19/21 1042   • ipratropium-albuterol (DUO-NEB) nebulizer solution 3 mL  3 mL Nebulization 4x Daily - RT Gumaro Jarrell MD   3 mL at 04/19/21 0716   • montelukast (SINGULAIR) tablet 10 mg  10 mg Oral Nightly Gumaro Jarrell MD   10 mg at 04/18/21 2039   • nitroglycerin (NITROSTAT) SL tablet 0.4 mg  0.4 mg Sublingual Q5 Min PRN Gumaro Jarrell MD       • ondansetron (ZOFRAN) tablet 4 mg  4 mg Oral Q6H PRN Gumaro Jarrell MD        Or   • ondansetron (ZOFRAN) injection 4 mg  4 mg Intravenous Q6H PRN Gumaro Jarrell MD       • pantoprazole (PROTONIX) EC tablet 40 mg  40 mg Oral QAM Gumaro Jarrell MD   40 mg at 04/19/21 1047   • [COMPLETED] perflutren (DEFINITY) 8.476 mg in Sodium Chloride (PF) 0.9 % 10 mL injection  3 mL Intravenous Once in imaging Glen Holman MD   3 mL at 04/19/21 0814   • potassium chloride (K-DUR,KLOR-CON) CR tablet 10 mEq  10 mEq Oral Daily Georges Jones MD   10 mEq at 04/19/21 1041   • potassium chloride (K-DUR,KLOR-CON) CR tablet 40 mEq  40 mEq Oral See Admin Instructions Gumaro Jarrell MD   40 mEq at 04/17/21 0811   • [COMPLETED] regadenoson (LEXISCAN) injection 0.4 mg  0.4 mg Intravenous Once in imaging Glen Holman MD   0.4 mg at 04/19/21 1018   • rosuvastatin (CRESTOR) tablet 10 mg  10 mg Oral Daily Gumaro Jarrell MD   10 mg at 04/19/21 1042   • sodium chloride 0.9 % flush 1-10 mL  1-10 mL Intravenous PRN Gumaro Jarrell MD       • sodium chloride 0.9 % flush 10 mL  10 mL Intravenous PRN West Lopez MD       • sodium chloride 0.9 % flush 10 mL  10 mL Intravenous Q12H Gumaro Jarrell MD   10 mL at 04/19/21 1044   • sodium chloride 0.9 % infusion 40 mL  40 mL Intravenous PRN Gumaro Jarrell MD       • [COMPLETED] technetium sestamibi  (CARDIOLITE) injection 1 dose  1 dose Intravenous Once in imaging Glen Holman MD   1 dose at 04/19/21 0840   • [COMPLETED] technetium sestamibi (CARDIOLITE) injection 1 dose  1 dose Intravenous Once in imaging Glen Holman MD   1 dose at 04/19/21 1018     Blood Administration Record (From admission, onward)    None        Lab Results (last 72 hours)     Procedure Component Value Units Date/Time    Respiratory Culture - Sputum, Cough [[id number]] Collected: 04/17/21 1732    Specimen: Sputum from Cough Updated: 04/19/21 1236     Respiratory Culture Light growth (2+) Normal Respiratory Kyara: NO S.aureus/MRSA or Pseudomonas aeruginosa     Gram Stain Few (2+) WBCs per low power field      Moderate (3+) Gram positive cocci in pairs    Comprehensive Metabolic Panel [[id number]]  (Abnormal) Collected: 04/19/21 0443    Specimen: Blood Updated: 04/19/21 0554     Glucose 98 mg/dL      BUN 17 mg/dL      Creatinine 0.74 mg/dL      Sodium 136 mmol/L      Potassium 3.7 mmol/L      Chloride 101 mmol/L      CO2 27.7 mmol/L      Calcium 8.2 mg/dL      Total Protein 6.1 g/dL      Albumin 3.60 g/dL      ALT (SGPT) 117 U/L      AST (SGOT) 32 U/L      Alkaline Phosphatase 71 U/L      Total Bilirubin 0.9 mg/dL      eGFR Non African Amer 107 mL/min/1.73      Globulin 2.5 gm/dL      A/G Ratio 1.4 g/dL      BUN/Creatinine Ratio 23.0     Anion Gap 7.3 mmol/L     Narrative:      GFR Normal >60  Chronic Kidney Disease <60  Kidney Failure <15      CBC & Differential [[id number]]  (Abnormal) Collected: 04/19/21 0443    Specimen: Blood Updated: 04/19/21 0539    Narrative:      The following orders were created for panel order CBC & Differential.  Procedure                               Abnormality         Status                     ---------                               -----------         ------                     CBC Auto Differential[[id number]]        Abnormal            Final result                 Please view results for these tests on  the individual orders.    CBC Auto Differential [[id number]]  (Abnormal) Collected: 04/19/21 0443    Specimen: Blood Updated: 04/19/21 0539     WBC 11.92 10*3/mm3      RBC 4.89 10*6/mm3      Hemoglobin 16.1 g/dL      Hematocrit 48.4 %      MCV 99.0 fL      MCH 32.9 pg      MCHC 33.3 g/dL      RDW 13.2 %      RDW-SD 48.5 fl      MPV 10.4 fL      Platelets 202 10*3/mm3      Neutrophil % 74.6 %      Lymphocyte % 14.3 %      Monocyte % 8.1 %      Eosinophil % 1.9 %      Basophil % 0.3 %      Immature Grans % 0.8 %      Neutrophils, Absolute 8.90 10*3/mm3      Lymphocytes, Absolute 1.70 10*3/mm3      Monocytes, Absolute 0.97 10*3/mm3      Eosinophils, Absolute 0.23 10*3/mm3      Basophils, Absolute 0.03 10*3/mm3      Immature Grans, Absolute 0.09 10*3/mm3      nRBC 0.0 /100 WBC     Comprehensive Metabolic Panel [[id number]]  (Abnormal) Collected: 04/18/21 0558    Specimen: Blood Updated: 04/18/21 0731     Glucose 100 mg/dL      BUN 15 mg/dL      Creatinine 0.66 mg/dL      Sodium 135 mmol/L      Potassium 3.6 mmol/L      Comment: Slight hemolysis detected by analyzer. Results may be affected.        Chloride 100 mmol/L      CO2 24.0 mmol/L      Calcium 8.4 mg/dL      Total Protein 6.1 g/dL      Albumin 3.50 g/dL      ALT (SGPT) 144 U/L      AST (SGOT) 36 U/L      Alkaline Phosphatase 66 U/L      Total Bilirubin 1.2 mg/dL      eGFR Non African Amer 122 mL/min/1.73      Globulin 2.6 gm/dL      A/G Ratio 1.3 g/dL      BUN/Creatinine Ratio 22.7     Anion Gap 11.0 mmol/L     Narrative:      GFR Normal >60  Chronic Kidney Disease <60  Kidney Failure <15      Procalcitonin [[id number]]  (Normal) Collected: 04/18/21 0558    Specimen: Blood Updated: 04/18/21 0715     Procalcitonin 0.06 ng/mL     Narrative:      Results may be falsely decreased if patient taking Biotin.     Magnesium [[id number]]  (Normal) Collected: 04/18/21 0558    Specimen: Blood Updated: 04/18/21 0710     Magnesium 2.1 mg/dL     CBC & Differential [[id number]]   (Abnormal) Collected: 04/18/21 0558    Specimen: Blood Updated: 04/18/21 0609    Narrative:      The following orders were created for panel order CBC & Differential.  Procedure                               Abnormality         Status                     ---------                               -----------         ------                     CBC Auto Differential[[id number]]        Abnormal            Final result                 Please view results for these tests on the individual orders.    CBC Auto Differential [[id number]]  (Abnormal) Collected: 04/18/21 0558    Specimen: Blood Updated: 04/18/21 0609     WBC 9.87 10*3/mm3      RBC 4.86 10*6/mm3      Hemoglobin 15.7 g/dL      Hematocrit 48.2 %      MCV 99.2 fL      MCH 32.3 pg      MCHC 32.6 g/dL      RDW 13.4 %      RDW-SD 48.9 fl      MPV 10.0 fL      Platelets 173 10*3/mm3      Neutrophil % 73.9 %      Lymphocyte % 13.8 %      Monocyte % 10.0 %      Eosinophil % 1.7 %      Basophil % 0.1 %      Immature Grans % 0.5 %      Neutrophils, Absolute 7.29 10*3/mm3      Lymphocytes, Absolute 1.36 10*3/mm3      Monocytes, Absolute 0.99 10*3/mm3      Eosinophils, Absolute 0.17 10*3/mm3      Basophils, Absolute 0.01 10*3/mm3      Immature Grans, Absolute 0.05 10*3/mm3      nRBC 0.0 /100 WBC     Respiratory Panel, PCR (WITHOUT COVID) - Swab, Nasopharynx [[id number]]  (Normal) Collected: 04/17/21 1412    Specimen: Swab from Nasopharynx Updated: 04/17/21 1821     ADENOVIRUS, PCR Not Detected     Coronavirus 229E Not Detected     Coronavirus HKU1 Not Detected     Coronavirus NL63 Not Detected     Coronavirus OC43 Not Detected     Human Metapneumovirus Not Detected     Human Rhinovirus/Enterovirus Not Detected     Influenza B PCR Not Detected     Parainfluenza Virus 1 Not Detected     Parainfluenza Virus 2 Not Detected     Parainfluenza Virus 3 Not Detected     Parainfluenza Virus 4 Not Detected     Bordetella pertussis pcr Not Detected     Chlamydophila pneumoniae PCR Not  Detected     Mycoplasma pneumo by PCR Not Detected     Influenza A PCR Not Detected     RSV, PCR Not Detected     Bordetella parapertussis PCR Not Detected    Narrative:      The coronavirus on the RVP is NOT COVID-19 and is NOT indicative of infection with COVID-19.     Basic Metabolic Panel [[id number]]  (Abnormal) Collected: 04/17/21 0434    Specimen: Blood Updated: 04/17/21 0615     Glucose 103 mg/dL      BUN 12 mg/dL      Creatinine 0.71 mg/dL      Sodium 139 mmol/L      Potassium 3.4 mmol/L      Chloride 98 mmol/L      CO2 31.0 mmol/L      Calcium 8.6 mg/dL      eGFR Non African Amer 112 mL/min/1.73      BUN/Creatinine Ratio 16.9     Anion Gap 10.0 mmol/L     Narrative:      GFR Normal >60  Chronic Kidney Disease <60  Kidney Failure <15      CBC (No Diff) [[id number]]  (Abnormal) Collected: 04/17/21 0434    Specimen: Blood Updated: 04/17/21 0527     WBC 12.89 10*3/mm3      RBC 4.81 10*6/mm3      Hemoglobin 15.5 g/dL      Hematocrit 47.4 %      MCV 98.5 fL      MCH 32.2 pg      MCHC 32.7 g/dL      RDW 13.6 %      RDW-SD 49.7 fl      MPV 10.6 fL      Platelets 191 10*3/mm3     COVID PRE-OP / PRE-PROCEDURE SCREENING ORDER (NO ISOLATION) - Swab, Nasal Cavity [[id number]]  (Normal) Collected: 04/16/21 2312    Specimen: Swab from Nasal Cavity Updated: 04/16/21 0663    Narrative:      The following orders were created for panel order COVID PRE-OP / PRE-PROCEDURE SCREENING ORDER (NO ISOLATION) - Swab, Nasal Cavity.  Procedure                               Abnormality         Status                     ---------                               -----------         ------                     COVID-19,[employer] IN-YI...[[id number]]  Normal              Final result                 Please view results for these tests on the individual orders.    COVID-19,[employer] IN-HOUSE,Nasal Swab No Transport Media 3-4 HR TAT - Swab, Nasal Cavity [[id number]]  (Normal) Collected: 04/16/21 2312    Specimen: Swab from Nasal Cavity Updated:  04/16/21 2350     COVID19 Not Detected    Narrative:      Fact sheet for providers: https://www.fda.gov/media/960085/download     Fact sheet for patients: https://www.fda.gov/media/782919/download    Test performed by PCR.    Consider negative results in combination with clinical observations, patient history, and epidemiological information.    T4, Free [[id number]]  (Normal) Collected: 04/16/21 2147    Specimen: Blood Updated: 04/16/21 2226     Free T4 1.48 ng/dL     Narrative:      Results may be falsely increased if patient taking Biotin.      TSH [[id number]]  (Normal) Collected: 04/16/21 2147    Specimen: Blood Updated: 04/16/21 2226     TSH 2.660 uIU/mL     Troponin [[id number]]  (Normal) Collected: 04/16/21 2147    Specimen: Blood Updated: 04/16/21 2222     Troponin T <0.010 ng/mL     Narrative:      Troponin T Reference Range:  <= 0.03 ng/mL-   Negative for AMI  >0.03 ng/mL-     Abnormal for myocardial necrosis.  Clinicians would have to utilize clinical acumen, EKG, Troponin and serial changes to determine if it is an Acute Myocardial Infarction or myocardial injury due to an underlying chronic condition.       Results may be falsely decreased if patient taking Biotin.      BNP [[id number]]  (Abnormal) Collected: 04/16/21 2147    Specimen: Blood Updated: 04/16/21 2220     proBNP 2,963.0 pg/mL     Narrative:      Among patients with dyspnea, NT-proBNP is highly sensitive for the detection of acute congestive heart failure. In addition NT-proBNP of <300 pg/ml effectively rules out acute congestive heart failure with 99% negative predictive value.    Results may be falsely decreased if patient taking Biotin.      Comprehensive Metabolic Panel [[id number]]  (Abnormal) Collected: 04/16/21 2147    Specimen: Blood Updated: 04/16/21 2214     Glucose 104 mg/dL      BUN 15 mg/dL      Creatinine 0.86 mg/dL      Sodium 138 mmol/L      Potassium 3.9 mmol/L      Chloride 98 mmol/L      CO2 30.7 mmol/L      Calcium 8.9 mg/dL       Total Protein 6.5 g/dL      Albumin 4.00 g/dL      ALT (SGPT) 228 U/L      AST (SGOT) 67 U/L      Alkaline Phosphatase 84 U/L      Total Bilirubin 1.3 mg/dL      eGFR Non African Amer 90 mL/min/1.73      Globulin 2.5 gm/dL      A/G Ratio 1.6 g/dL      BUN/Creatinine Ratio 17.4     Anion Gap 9.3 mmol/L     Narrative:      GFR Normal >60  Chronic Kidney Disease <60  Kidney Failure <15      Magnesium [[id number]]  (Normal) Collected: 04/16/21 2147    Specimen: Blood Updated: 04/16/21 2214     Magnesium 1.9 mg/dL     CBC & Differential [[id number]]  (Abnormal) Collected: 04/16/21 2147    Specimen: Blood Updated: 04/16/21 2200    Narrative:      The following orders were created for panel order CBC & Differential.  Procedure                               Abnormality         Status                     ---------                               -----------         ------                     CBC Auto Differential[[id number]]        Abnormal            Final result                 Please view results for these tests on the individual orders.    CBC Auto Differential [[id number]]  (Abnormal) Collected: 04/16/21 2147    Specimen: Blood Updated: 04/16/21 2200     WBC 14.30 10*3/mm3      RBC 4.93 10*6/mm3      Hemoglobin 16.1 g/dL      Hematocrit 49.3 %      .0 fL      MCH 32.7 pg      MCHC 32.7 g/dL      RDW 13.6 %      RDW-SD 50.2 fl      MPV 10.7 fL      Platelets 196 10*3/mm3      Neutrophil % 76.5 %      Lymphocyte % 10.8 %      Monocyte % 11.3 %      Eosinophil % 0.6 %      Basophil % 0.1 %      Immature Grans % 0.7 %      Neutrophils, Absolute 10.93 10*3/mm3      Lymphocytes, Absolute 1.55 10*3/mm3      Monocytes, Absolute 1.61 10*3/mm3      Eosinophils, Absolute 0.09 10*3/mm3      Basophils, Absolute 0.02 10*3/mm3      Immature Grans, Absolute 0.10 10*3/mm3      nRBC 0.0 /100 WBC           Imaging Results (Last 72 Hours)     Procedure Component Value Units Date/Time    XR Chest PA & Lateral [[id number]] Collected:  04/19/21 0845     Updated: 04/19/21 0848    Narrative:      PA AND LATERAL CHEST, 4/19/2021 8:40 AM     HISTORY:  chf; I48.91-Unspecified atrial fibrillation   short is very low oxygen  saturation for 3 days. Normal chest x-ray 04/16/2021     COMPARISON:  04/16/2021     TECHNIQUE:  PA and lateral upright chest series.     FINDINGS:  Heart size and pulmonary vascularity are normal. The lungs are expanded  and clear. No visible pulmonary infiltrate or pleural effusion.           Impression:      Lower lobe infiltrates noted previously have resolved. There is no  active disease     This report was finalized on 4/19/2021 8:46 AM by Dr. Chase Allison MD.       XR Chest 1 View [[id number]] Collected: 04/16/21 2240     Updated: 04/16/21 2243    Narrative:      CR Chest 1 Vw    INDICATION:   Shortness of air for 3 weeks.     COMPARISON:    11/6/2019    FINDINGS:  2 portable AP view(s) of the chest.  Heart size is top normal. There is vascular congestion. There are infiltrates in both mid to lower lung zones certainly worrisome for pulmonary edema although pneumonia is not excluded. No pneumothorax is identified.      Impression:      Findings most suggestive of CHF although pneumonia is not radiographically excluded.    Signer Name: Jay Angel MD   Signed: 4/16/2021 10:40 PM   Workstation Name: [facility]    [facility]        ECG/EMG Results (last 72 hours)     Procedure Component Value Units Date/Time    ECG 12 Lead [[id number]] Collected: 04/16/21 2121     Updated: 04/18/21 1804     QT Interval 307 ms     Narrative:      HEART RATE= 138  bpm  RR Interval= 434  ms  CT Interval=   ms  P Horizontal Axis=   deg  P Front Axis=   deg  QRSD Interval= 103  ms  QT Interval= 307  ms  QRS Axis= 59  deg  T Wave Axis= 19  deg  - ABNORMAL ECG -  Atrial fibrillation  Ventricular premature complex  NO PRIOR TRACING AVAILABLE FOR COMPARISON  Electronically Signed By: Ankur Mckinnon ([facility]) 18-Apr-2021 18:04:27  Date  and Time of Study: 2021-04-16 21:21:07    Adult Transthoracic Echo Complete w/ Color, Spectral and Contrast if Necessary Per Protocol [[id number]] Collected: 04/19/21 0742     Updated: 04/19/21 0824     BSA 2.1 m^2      IVSd 1.3 cm      LVIDd 6.0 cm      LVIDs 4.5 cm      LVPWd 1.4 cm      IVS/LVPW 0.92     FS 25.3 %      EDV(Teich) 177.9 ml      ESV(Teich) 90.5 ml      EF(Teich) 49.1 %      EDV(cubed) 212.8 ml      ESV(cubed) 88.7 ml      EF(cubed) 58.3 %      LV mass(C)d 377.1 grams      LV mass(C)dI 179.9 grams/m^2      SV(Teich) 87.4 ml      SI(Teich) 41.7 ml/m^2      SV(cubed) 124.1 ml      SI(cubed) 59.2 ml/m^2      Ao root diam 2.8 cm      Ao root area 6.2 cm^2      ACS 1.9 cm      LVOT diam 2.2 cm      LVOT area 3.8 cm^2      LVOT area(traced) 3.8 cm^2      RVOT diam 2.1 cm      RVOT area 3.5 cm^2      LVLd ap4 8.1 cm      EDV(MOD-sp4) 140.0 ml      LVLs ap4 7.1 cm      ESV(MOD-sp4) 66.2 ml      EF(MOD-sp4) 52.7 %      LVLd ap2 8.7 cm      EDV(MOD-sp2) 108.0 ml      LVLs ap2 7.3 cm      ESV(MOD-sp2) 54.9 ml      EF(MOD-sp2) 49.2 %      SV(MOD-sp4) 73.8 ml      SI(MOD-sp4) 35.2 ml/m^2      SV(MOD-sp2) 53.1 ml      SI(MOD-sp2) 25.3 ml/m^2      Ao root area (BSA corrected) 1.3     LV Egan Vol (BSA corrected) 66.8 ml/m^2      LV Sys Vol (BSA corrected) 31.6 ml/m^2      TAPSE (>1.6) 2.2 cm      EF(MOD-bp) 51.7 %      MV E max bc 88.8 cm/sec      MV V2 mean 60.2 cm/sec      MV mean PG 2.0 mmHg      MV V2 VTI 15.0 cm      MVA(VTI) 3.4 cm^2      MV P1/2t max bc 94.5 cm/sec      MV P1/2t 35.9 msec      MVA(P1/2t) 6.1 cm^2      MV dec slope 771.0 cm/sec^2      MV dec time 109 sec      Ao V2 mean 73.3 cm/sec      Ao mean PG 3.0 mmHg      Ao mean PG (full) 2.0 mmHg      Ao V2 VTI 20.0 cm      MARKUS(I,A) 2.5 cm^2      MARKUS(I,D) 2.5 cm^2      LV V1 mean PG 1.0 mmHg      LV V1 mean 51.6 cm/sec      LV V1 VTI 13.4 cm      MR max bc 459.0 cm/sec      MR max PG 84.3 mmHg      SV(Ao) 123.2 ml      SI(Ao) 58.8 ml/m^2       SV(LVOT) 50.9 ml      SV(RVOT) 46.4 ml      SI(LVOT) 24.3 ml/m^2      PA V2 max 84.2 cm/sec      PA max PG 2.8 mmHg      RV V1 mean PG 1.0 mmHg      RV V1 mean 43.8 cm/sec      RV V1 VTI 13.4 cm      Qp/Qs 0.91     MVA P1/2T LCG 2.3 cm^2      Lat Peak E' Efren 14.3 cm/sec      Med Peak E' Efren 9.3 cm/sec      RV S' 15.0 cm/sec       CV ECHO MISTY - BZI_BMI 30.6 kilograms/m^2       CV ECHO MISTY - BSA(HAYCOCK) 2.2 m^2       CV ECHO MISTY - BZI_METRIC_WEIGHT 93.9 kg       CV ECHO MISTY - BZI_METRIC_HEIGHT 175.3 cm      Avg E/e' ratio 7.53     Sinus 3.5 cm      Dimensionless Index 0.67 (DI)      LA Volume Index 26.0 mL/m2      Ao pk efren 111.0 cm/sec      LV V1 max PG 2.6 mmHg      LV V1 max 81.3 cm/sec      RAP systole 3 mmHg      Ao max PG 5 mmHg     Narrative:      · The left ventricular cavity is moderately dilated.  · Left ventricular diastolic function was indeterminate.  · Calculated left ventricular EF = 51.7% Estimated left ventricular EF was   in agreement with the calculated left ventricular EF. Left ventricular   systolic function is normal.  · Left ventricular wall thickness is consistent with mild concentric   hypertrophy.             Ventilator/Non-Invasive Ventilation Settings (From admission, onward) Comment    None        Operative/Procedure Notes (last 72 hours) (Notes from 04/16/21 1608 through 04/19/21 1608)    No notes of this type exist for this encounter.            Physician Progress Notes (last 72 hours) (Notes from 04/16/21 1609 through 04/19/21 1609)      Krystal Eng MD at 04/19/21 3568             LOS: 3 days   Patient Care Team:  Jason King MD as PCP - General (Internal Medicine)    Chief Complaint:  Follow-up atrial fibrillation and CHF.    Interval History:     His breathing is better.  He is diuresing.  He denies chest pain or pressure this morning.  He has no dizziness and does not feel his heart racing.    Objective   Vital Signs  Temp:  [97.5 °F (36.4 °C)-98.9 °F  (37.2 °C)] 98 °F (36.7 °C)  Heart Rate:  [] 100  Resp:  [16-22] 20  BP: (105-136)/(73-97) 113/84    Intake/Output Summary (Last 24 hours) at 4/19/2021 0747  Last data filed at 4/19/2021 0440  Gross per 24 hour   Intake 1080 ml   Output 3125 ml   Net -2045 ml       Comfortable NAD  Neck supple, no JVD or thyromegaly appreciated  S1/S2 irregular, rate controlled no m/r/g  Lungs CTA B, normal effort  Abdomen S/NT/ND (+) BS, no HSM appreciated  Extremities warm, no clubbing, cyanosis, or edema  No visible or palpable skin lesions  A/Ox4, mood and affect appropriate    Results Review:      Results from last 7 days   Lab Units 04/19/21 0443 04/18/21  0558 04/17/21  0434   SODIUM mmol/L 136 135* 139   POTASSIUM mmol/L 3.7 3.6 3.4*   CHLORIDE mmol/L 101 100 98   CO2 mmol/L 27.7 24.0 31.0*   BUN mg/dL 17 15 12   CREATININE mg/dL 0.74* 0.66* 0.71*   GLUCOSE mg/dL 98 100* 103*   CALCIUM mg/dL 8.2* 8.4* 8.6     Results from last 7 days   Lab Units 04/16/21  2147   TROPONIN T ng/mL <0.010     Results from last 7 days   Lab Units 04/19/21  0443 04/18/21  0558 04/17/21  0434   WBC 10*3/mm3 11.92* 9.87 12.89*   HEMOGLOBIN g/dL 16.1 15.7 15.5   HEMATOCRIT % 48.4 48.2 47.4   PLATELETS 10*3/mm3 202 173 191             Results from last 7 days   Lab Units 04/18/21  0558   MAGNESIUM mg/dL 2.1           I reviewed the patient's new clinical results.  I personally viewed and interpreted the patient's EKG/Telemetry data        Medication Review:   apixaban, 5 mg, Oral, Q12H  arformoterol, 15 mcg, Nebulization, BID - RT  budesonide, 0.5 mg, Nebulization, BID - RT  digoxin, 125 mcg, Oral, Daily  dilTIAZem CD, 240 mg, Oral, Q24H  furosemide, 40 mg, Oral, Daily  ipratropium-albuterol, 3 mL, Nebulization, 4x Daily - RT  montelukast, 10 mg, Oral, Nightly  pantoprazole, 40 mg, Oral, QAM  potassium chloride, 10 mEq, Oral, Daily  potassium chloride, 40 mEq, Oral, See Admin Instructions  rosuvastatin, 10 mg, Oral, Daily  sodium chloride, 10  mL, Intravenous, Q12H             Assessment/Plan       Chronic obstructive pulmonary disease (CMS/[facility])    Hypertension    Atrial fibrillation (CMS/[facility])    Class 1 obesity due to excess calories with serious comorbidity in adult    Acute CHF (congestive heart failure) (CMS/HCC)    Shortness of breath    Chest discomfort       1. SOA - likely due to a fib and HFpEF - await echo and if normal stress nuclear.   2. Chest discomfort  3. Acute CHF, suspect diastolic  4. COPD  5. Atrial fibrillation - rate controlled - on apixaban.   6. HTN    Stress or cardiac catheterization based on results of echocardiogram done today.  I am holding his apixaban until this decision is made.  He is on oral Lasix and seems to be doing well.  Vitals are stable.    Addendum: Stress test today, echocardiogram shows low normal ejection fraction.    Krystal Eng MD  04/19/21  07:47 EDT        Electronically signed by Krystal Eng MD at 04/19/21 0835     Wilfred Draper MD at 04/18/21 1000        *Acute CHF -- echo pending to determine if HFrEF vs HFpEF.  Patient has had good diuresis and improvement in respiratory function, and has terrible muscle cramping.  Will hold diuretics today and start oral diuretics tomorrow.    *Atrial fibrillation -- rate controlled.  Switch to long acting diltiazem, continue oral digoxin.  Digoxin level is not indicated in digoxin loading, I cancelled the lab order for tomorrow.  Will increase apixaban to 5mg BID.    *Chest pain -- no evidence of ACS.  If EF normal, will get perfusion stress test tomorrow (NPO after MN).  If not, will likely need cath given risk factors for CAD.      Electronically signed by Wilfred Draper MD at 04/18/21 1002     Georges Jones MD at 04/18/21 0704              [facility] HOSPITALIST TEAM   PROGRESS NOTE      Patient Care Team:  Jason King MD as PCP - General (Internal Medicine)    Patient seen at bedside    Hospitalist Team      Patient  "Care Team:  Provider, No Known as PCP - General          Subjective      Presenting History and Chief Complaints:      Chief Complaint:       Shortness of breath     Admission History:     Mr. Claude Spencer is a 65-year-old  male who is known to have HTN, HLD, COPD and GERD, who became ill several weeks ago with cough chest congestion shortness of breath and some sputum production.  He saw his primary care physician was given  antibiotics and steroids. He started having symptoms again.  Has developed some orthopnea for the last 3 weeks some lower extremity edema and some abdominal distention.  Also has been having some intermittent vague left and right-sided chest discomfort feels like the burning pain.  He also has been having palpitations off and on for several weeks.    Interval History and ROS:     Patient States Patient feels better. Breathing has improved since he has been here  Patient Complaints:  No new complaints except \"devon horse\" pain in legs  Patient Denies:  Any sx of fever, chest pain, abdominal pain or n/v  History taken from: Patient      Objective    Vital Signs  Temp:  [98 °F (36.7 °C)-98.7 °F (37.1 °C)] 98.7 °F (37.1 °C)  Heart Rate:  [] 75  Resp:  [14-24] 14  BP: ()/() 128/98    Flowsheet Rows      First Filed Value   Admission Height  175.3 cm (69\") Documented at 04/16/2021 2120   Admission Weight  99.8 kg (220 lb) Documented at 04/16/2021 2120              PHYSICAL EXAMINATION:      Physical Exam:     VS: As documented above  PE: GEN - A&O x 3, in NAD   HEENT - Normocephalic, PERRL, EOMI   CV - Irregularly irregualr, S1+, S2+. with no m/r/g  RESP - CTAB x no rales or rhonchi    ABD - s/nt/nd, NABS, no HSMeg, no palpable masses   MS - MAEW(moves all extremities well) , 5/5 strength UE/LE   EXT - 1+ ankle/pedal edema. No cyanosis or clubbing  NEURO - CN II-XII intact, normal motor and sensory examinations without any focal neurologic deficits.  PSYCH - affect, " mood congruent and appropriate       Results Review:     I reviewed the patient's new clinical results.    Lab Results (last 24 hours)     Procedure Component Value Units Date/Time    CBC & Differential [[id number]]  (Abnormal) Collected: 04/18/21 0558    Specimen: Blood Updated: 04/18/21 0609    Narrative:      The following orders were created for panel order CBC & Differential.  Procedure                               Abnormality         Status                     ---------                               -----------         ------                     CBC Auto Differential[[id number]]        Abnormal            Final result                 Please view results for these tests on the individual orders.    CBC Auto Differential [[id number]]  (Abnormal) Collected: 04/18/21 0558    Specimen: Blood Updated: 04/18/21 0609     WBC 9.87 10*3/mm3      RBC 4.86 10*6/mm3      Hemoglobin 15.7 g/dL      Hematocrit 48.2 %      MCV 99.2 fL      MCH 32.3 pg      MCHC 32.6 g/dL      RDW 13.4 %      RDW-SD 48.9 fl      MPV 10.0 fL      Platelets 173 10*3/mm3      Neutrophil % 73.9 %      Lymphocyte % 13.8 %      Monocyte % 10.0 %      Eosinophil % 1.7 %      Basophil % 0.1 %      Immature Grans % 0.5 %      Neutrophils, Absolute 7.29 10*3/mm3      Lymphocytes, Absolute 1.36 10*3/mm3      Monocytes, Absolute 0.99 10*3/mm3      Eosinophils, Absolute 0.17 10*3/mm3      Basophils, Absolute 0.01 10*3/mm3      Immature Grans, Absolute 0.05 10*3/mm3      nRBC 0.0 /100 WBC     Magnesium [[id number]] Collected: 04/18/21 0558    Specimen: Blood Updated: 04/18/21 0606    Comprehensive Metabolic Panel [[id number]] Collected: 04/18/21 0558    Specimen: Blood Updated: 04/18/21 0606    Procalcitonin [[id number]] Collected: 04/18/21 0558    Specimen: Blood Updated: 04/18/21 0606    Respiratory Culture - Sputum, Cough [[id number]] Collected: 04/17/21 1732    Specimen: Sputum from Cough Updated: 04/17/21 1930     Gram Stain Few (2+) WBCs per low power field       Moderate (3+) Gram positive cocci in pairs    Respiratory Panel, PCR (WITHOUT COVID) - Swab, Nasopharynx [[id number]]  (Normal) Collected: 04/17/21 1412    Specimen: Swab from Nasopharynx Updated: 04/17/21 1821     ADENOVIRUS, PCR Not Detected     Coronavirus 229E Not Detected     Coronavirus HKU1 Not Detected     Coronavirus NL63 Not Detected     Coronavirus OC43 Not Detected     Human Metapneumovirus Not Detected     Human Rhinovirus/Enterovirus Not Detected     Influenza B PCR Not Detected     Parainfluenza Virus 1 Not Detected     Parainfluenza Virus 2 Not Detected     Parainfluenza Virus 3 Not Detected     Parainfluenza Virus 4 Not Detected     Bordetella pertussis pcr Not Detected     Chlamydophila pneumoniae PCR Not Detected     Mycoplasma pneumo by PCR Not Detected     Influenza A PCR Not Detected     RSV, PCR Not Detected     Bordetella parapertussis PCR Not Detected    Narrative:      The coronavirus on the RVP is NOT COVID-19 and is NOT indicative of infection with COVID-19.           Imaging Results (Last 24 Hours)     ** No results found for the last 24 hours. **          Xray reviewed personally by physician.      ECG reviewed personally by physician  ECG/EMG Results (most recent)     Procedure Component Value Units Date/Time    ECG 12 Lead [[id number]] Collected: 04/16/21 2121     Updated: 04/16/21 2130     QT Interval 307 ms     Narrative:      HEART RATE= 138  bpm  RR Interval= 434  ms  UT Interval=   ms  P Horizontal Axis=   deg  P Front Axis=   deg  QRSD Interval= 103  ms  QT Interval= 307  ms  QRS Axis= 59  deg  T Wave Axis= 19  deg  - ABNORMAL ECG -  Atrial fibrillation  Ventricular premature complex  Electronically Signed By:   Date and Time of Study: 2021-04-16 21:21:07          Medication Review:   I have reviewed the patient's current medication list    Current Facility-Administered Medications:   •  acetaminophen (TYLENOL) tablet 650 mg, 650 mg, Oral, Q4H PRN **OR** acetaminophen  (TYLENOL) 160 MG/5ML solution 650 mg, 650 mg, Oral, Q4H PRN **OR** acetaminophen (TYLENOL) suppository 650 mg, 650 mg, Rectal, Q4H PRN, Gumaro Jarrell MD  •  aluminum-magnesium hydroxide-simethicone (MAALOX MAX) 400-400-40 MG/5ML suspension 15 mL, 15 mL, Oral, Q6H PRN, Gumaro Jarrell MD  •  apixaban (ELIQUIS) tablet 5 mg, 5 mg, Oral, Q12H, Gumaro Jarrell MD, 5 mg at 04/17/21 2054  •  arformoterol (BROVANA) nebulizer solution 15 mcg, 15 mcg, Nebulization, BID - RT, Gumaro Jarrell MD, 15 mcg at 04/17/21 2039  •  budesonide (PULMICORT) nebulizer solution 0.5 mg, 0.5 mg, Nebulization, BID - RT, Gumaro Jarrell MD, 0.5 mg at 04/17/21 1955  •  calcium carbonate (TUMS) chewable tablet 500 mg (200 mg elemental), 1 tablet, Oral, BID PRN, Gumaro Jarrell MD, 1 tablet at 04/17/21 0815  •  dilTIAZem (CARDIZEM) 100 mg in 100 mL NS infusion (ADV), 10 mg/hr, Intravenous, Titrated, Wilfred Draper MD, Last Rate: 10 mL/hr at 04/17/21 0513, 10 mg/hr at 04/17/21 0513  •  dilTIAZem (CARDIZEM) tablet 60 mg, 60 mg, Oral, Q6H, Wilfred Draper MD, 60 mg at 04/18/21 0600  •  furosemide (LASIX) injection 40 mg, 40 mg, Intravenous, BID, Wilfred Draper MD, 40 mg at 04/17/21 1604  •  ipratropium-albuterol (DUO-NEB) nebulizer solution 3 mL, 3 mL, Nebulization, 4x Daily - RT, Gumaro Jarrell MD, 3 mL at 04/17/21 1955  •  montelukast (SINGULAIR) tablet 10 mg, 10 mg, Oral, Nightly, Gumaro Jarrell MD, 10 mg at 04/17/21 2053  •  nitroglycerin (NITROSTAT) SL tablet 0.4 mg, 0.4 mg, Sublingual, Q5 Min PRN, Gumaro Jarrell MD  •  ondansetron (ZOFRAN) tablet 4 mg, 4 mg, Oral, Q6H PRN **OR** ondansetron (ZOFRAN) injection 4 mg, 4 mg, Intravenous, Q6H PRN, Gumaro Jarrell MD  •  pantoprazole (PROTONIX) EC tablet 40 mg, 40 mg, Oral, QAM, Gumaro Jarrell MD, 40 mg at 04/18/21 0600  •  potassium chloride (K-DUR,KLOR-CON) CR tablet 40 mEq,  40 mEq, Oral, See Admin Instructions, Gumaro Jarrell MD, 40 mEq at 04/17/21 0811  •  rosuvastatin (CRESTOR) tablet 10 mg, 10 mg, Oral, Daily, Gumaro Jarrell MD, 10 mg at 04/17/21 0811  •  sodium chloride 0.9 % flush 1-10 mL, 1-10 mL, Intravenous, PRN, Gumaro Jarrell MD  •  [COMPLETED] Insert peripheral IV, , , Once **AND** sodium chloride 0.9 % flush 10 mL, 10 mL, Intravenous, PRN, West Lopez MD  •  sodium chloride 0.9 % flush 10 mL, 10 mL, Intravenous, Q12H, Gumaro Jarrell MD, 10 mL at 04/17/21 2054  •  sodium chloride 0.9 % infusion 40 mL, 40 mL, Intravenous, PRN, Gumaro Jarrell MD      Assessment/Plan           PLAN:    -Labs and diagnostic tests reviewed: YES    -Diagnostic tests reviewed: YES    -Consultations: Cardiology    -Any new recommendations: As per cardiology    -New Labs ordered: CBC, CMP and Serum Digixin    -New diagnostic tests ordered: Repeat CXR in AM and ECHO in AM as well    -Any changes in medications:  MEDICATION CHANGES:   New Medications added: As per Order sheet   Medication Dose changed: N/A   Medications deleted: N/A    -Placement issues: N/A    -Patient is clinically and hemodynamically stable    -To continue current management and supportive care    -Will follow patient closely    -Nothing new to add for right now    -Discharge planning issues: Patient should be able to go back home once ready for discharge      DIAGNOSES:      PRIMARY DIAGNOSES:        New onset Atrial Fib: On Inj Diltiazem and Inj Digoxin. Last pulse 75. TSH 2.660 and Free T4 1.48. Seen by Dr Draper. Note appreciated. Rate under control. Will order PO Digoxin and check serum Digoxin level in AM     Chronic anticoagulation: On Apixaban     CHF: New onset, most likely secondary to atrial fibrillation. On IV Furosemide. Echo has been ordered. Will be done tomorrow. Will repeat CXR tomorrow    Hypokalemia: Serum K 3.4. Will add KCl     Chest Pain: Atypical.  "Could be associated with \"Demand ischemia\" as Troponis are normal and EKG does not show any ST-T wave changes. Patient denies any sx of chest pain at this time     Leukocytosis: WBC 9.87 down from 14.30. Procalcitonin 0.06. Noninfectious etiology     HTN: Last /98. Will monitor     HLD: On Rosuvastatin     GERD: On Pantoprazole     COPD: On DuoNeb, Brovana and Budesonide via nebulizer. Shortness of breath has improved. Patient is receiving breathing treatment this morning     Radiographic Review:  CXR: From 4/16/2021  Findings most suggestive of CHF although pneumonia is not radiographically excluded.     EKG: From 4/16/2021  ABNORMAL ECG -  Atrial fibrillation  Ventricular premature complex     Anthropometric Analysis: BMI:  31.84 (Obesity Class I)     CODE Status: FULL CODE     Tobacco use: Former. Quit 2000     Alcohol intake: N/A     Illegal Drug use: N/A     DVT Prophylaxis: Apixaban and SCDs                ?     SECONDARY DIAGNOSES:  ?     As above      SURGICAL DIAGNOSES:      As per Problem List      TODAY'S DISCHARGE:        N/A          Plan for disposition: Patient should be able to go back to Home once ready for discharge    Georges Jones MD  04/18/21  07:04 EDT            Georges Jones M.D.; MS; FACP; MPH; FRANCIE  Internal Medicine/ Hospitalist          Time:  30  minutes      Electronically signed by Georges Jones MD at 04/18/21 0733     Georges Jones MD at 04/17/21 1210              [facility] HOSPITALIST TEAM   PROGRESS NOTE      Patient Care Team:  Armaan Jarvis DO as PCP - General (Family Medicine)    Patient seen at bedside    Hospitalist Team      Patient Care Team:  Provider, No Known as PCP - General          Subjective      Presenting History and Chief Complaints:      Chief Complaint:      Shortness of breath    Admission History:    Mr. Claude Spencer is a 65-year-old  male who is known to have HTN, HLD, COPD and GERD, who became ill several weeks ago " "with cough chest congestion shortness of breath and some sputum production.  He saw his primary care physician was given  antibiotics and steroids. He started having symptoms again.  Has developed some orthopnea for the last 3 weeks some lower extremity edema and some abdominal distention.  Also has been having some intermittent vague left and right-sided chest discomfort feels like the burning pain.  He also has been having palpitations off and on for several weeks.       Interval History and ROS:     Patient States As above: cough, palpitations and chest pain  Patient Complaints: As above  Patient Denies:  Any sx of fever, headache, abdominal pain or n/v  History taken from: Patient      Objective    Vital Signs  Temp:  [98 °F (36.7 °C)-98.7 °F (37.1 °C)] 98 °F (36.7 °C)  Heart Rate:  [] 86  Resp:  [16-20] 18  BP: ()/() 123/92    Flowsheet Rows      First Filed Value   Admission Height  175.3 cm (69\") Documented at 04/16/2021 2120   Admission Weight  99.8 kg (220 lb) Documented at 04/16/2021 2120              PHYSICAL EXAMINATION:      Physical Exam   VS: As above    Gen: pt alert, comfortably lying in bed, having no pain or difficulty breathing   HEENT: Normocephalic. PERRL     CV: Irregularly irregular, S1+ and S2+. No murmur, rubs or gallops appreciated.   Pul: A few rales and rhonchi to auscultation.    Abdm: bowel sounds present, abdomen soft, non-distended.   Extr: 1+ ankle/pedal edema. No cyanosis or clubbing    MSK: Muscle tone and muscle strength are unremarkable    Neuro: pt is alert and responsive. No focal neurologic deficits    Skin: Warm and dry. Not diaphoretic         Results Review:     I reviewed the patient's new clinical results.    Lab Results (last 24 hours)     Procedure Component Value Units Date/Time    Basic Metabolic Panel [[id number]]  (Abnormal) Collected: 04/17/21 0434    Specimen: Blood Updated: 04/17/21 0615     Glucose 103 mg/dL      BUN 12 mg/dL      Creatinine " 0.71 mg/dL      Sodium 139 mmol/L      Potassium 3.4 mmol/L      Chloride 98 mmol/L      CO2 31.0 mmol/L      Calcium 8.6 mg/dL      eGFR Non African Amer 112 mL/min/1.73      BUN/Creatinine Ratio 16.9     Anion Gap 10.0 mmol/L     Narrative:      GFR Normal >60  Chronic Kidney Disease <60  Kidney Failure <15      CBC (No Diff) [[id number]]  (Abnormal) Collected: 04/17/21 0434    Specimen: Blood Updated: 04/17/21 0527     WBC 12.89 10*3/mm3      RBC 4.81 10*6/mm3      Hemoglobin 15.5 g/dL      Hematocrit 47.4 %      MCV 98.5 fL      MCH 32.2 pg      MCHC 32.7 g/dL      RDW 13.6 %      RDW-SD 49.7 fl      MPV 10.6 fL      Platelets 191 10*3/mm3     COVID PRE-OP / PRE-PROCEDURE SCREENING ORDER (NO ISOLATION) - Swab, Nasal Cavity [[id number]]  (Normal) Collected: 04/16/21 2312    Specimen: Swab from Nasal Cavity Updated: 04/16/21 2350    Narrative:      The following orders were created for panel order COVID PRE-OP / PRE-PROCEDURE SCREENING ORDER (NO ISOLATION) - Swab, Nasal Cavity.  Procedure                               Abnormality         Status                     ---------                               -----------         ------                     COVID-19,[employer] IN-YI...[[id number]]  Normal              Final result                 Please view results for these tests on the individual orders.    COVID-19,[employer] IN-HOUSE,Nasal Swab No Transport Media 3-4 HR TAT - Swab, Nasal Cavity [[id number]]  (Normal) Collected: 04/16/21 2312    Specimen: Swab from Nasal Cavity Updated: 04/16/21 2350     COVID19 Not Detected    Narrative:      Fact sheet for providers: https://www.fda.gov/media/906128/download     Fact sheet for patients: https://www.fda.gov/media/505806/download    Test performed by PCR.    Consider negative results in combination with clinical observations, patient history, and epidemiological information.    T4, Free [[id number]]  (Normal) Collected: 04/16/21 2147    Specimen: Blood Updated: 04/16/21 2226      Free T4 1.48 ng/dL     Narrative:      Results may be falsely increased if patient taking Biotin.      TSH [[id number]]  (Normal) Collected: 04/16/21 2147    Specimen: Blood Updated: 04/16/21 2226     TSH 2.660 uIU/mL     Troponin [[id number]]  (Normal) Collected: 04/16/21 2147    Specimen: Blood Updated: 04/16/21 2222     Troponin T <0.010 ng/mL     Narrative:      Troponin T Reference Range:  <= 0.03 ng/mL-   Negative for AMI  >0.03 ng/mL-     Abnormal for myocardial necrosis.  Clinicians would have to utilize clinical acumen, EKG, Troponin and serial changes to determine if it is an Acute Myocardial Infarction or myocardial injury due to an underlying chronic condition.       Results may be falsely decreased if patient taking Biotin.      BNP [[id number]]  (Abnormal) Collected: 04/16/21 2147    Specimen: Blood Updated: 04/16/21 2220     proBNP 2,963.0 pg/mL     Narrative:      Among patients with dyspnea, NT-proBNP is highly sensitive for the detection of acute congestive heart failure. In addition NT-proBNP of <300 pg/ml effectively rules out acute congestive heart failure with 99% negative predictive value.    Results may be falsely decreased if patient taking Biotin.      Comprehensive Metabolic Panel [[id number]]  (Abnormal) Collected: 04/16/21 2147    Specimen: Blood Updated: 04/16/21 2214     Glucose 104 mg/dL      BUN 15 mg/dL      Creatinine 0.86 mg/dL      Sodium 138 mmol/L      Potassium 3.9 mmol/L      Chloride 98 mmol/L      CO2 30.7 mmol/L      Calcium 8.9 mg/dL      Total Protein 6.5 g/dL      Albumin 4.00 g/dL      ALT (SGPT) 228 U/L      AST (SGOT) 67 U/L      Alkaline Phosphatase 84 U/L      Total Bilirubin 1.3 mg/dL      eGFR Non African Amer 90 mL/min/1.73      Globulin 2.5 gm/dL      A/G Ratio 1.6 g/dL      BUN/Creatinine Ratio 17.4     Anion Gap 9.3 mmol/L     Narrative:      GFR Normal >60  Chronic Kidney Disease <60  Kidney Failure <15      Magnesium [[id number]]  (Normal) Collected:  04/16/21 2147    Specimen: Blood Updated: 04/16/21 2214     Magnesium 1.9 mg/dL     CBC & Differential [[id number]]  (Abnormal) Collected: 04/16/21 2147    Specimen: Blood Updated: 04/16/21 2200    Narrative:      The following orders were created for panel order CBC & Differential.  Procedure                               Abnormality         Status                     ---------                               -----------         ------                     CBC Auto Differential[[id number]]        Abnormal            Final result                 Please view results for these tests on the individual orders.    CBC Auto Differential [[id number]]  (Abnormal) Collected: 04/16/21 2147    Specimen: Blood Updated: 04/16/21 2200     WBC 14.30 10*3/mm3      RBC 4.93 10*6/mm3      Hemoglobin 16.1 g/dL      Hematocrit 49.3 %      .0 fL      MCH 32.7 pg      MCHC 32.7 g/dL      RDW 13.6 %      RDW-SD 50.2 fl      MPV 10.7 fL      Platelets 196 10*3/mm3      Neutrophil % 76.5 %      Lymphocyte % 10.8 %      Monocyte % 11.3 %      Eosinophil % 0.6 %      Basophil % 0.1 %      Immature Grans % 0.7 %      Neutrophils, Absolute 10.93 10*3/mm3      Lymphocytes, Absolute 1.55 10*3/mm3      Monocytes, Absolute 1.61 10*3/mm3      Eosinophils, Absolute 0.09 10*3/mm3      Basophils, Absolute 0.02 10*3/mm3      Immature Grans, Absolute 0.10 10*3/mm3      nRBC 0.0 /100 WBC           Imaging Results (Last 24 Hours)     Procedure Component Value Units Date/Time    XR Chest 1 View [[id number]] Collected: 04/16/21 2240     Updated: 04/16/21 2243    Narrative:      CR Chest 1 Vw    INDICATION:   Shortness of air for 3 weeks.     COMPARISON:    11/6/2019    FINDINGS:  2 portable AP view(s) of the chest.  Heart size is top normal. There is vascular congestion. There are infiltrates in both mid to lower lung zones certainly worrisome for pulmonary edema although pneumonia is not excluded. No pneumothorax is identified.      Impression:       Findings most suggestive of CHF although pneumonia is not radiographically excluded.    Signer Name: Jay Angel MD   Signed: 4/16/2021 10:40 PM   Workstation Name: [email or web address]    [facility] reviewed personally by physician.      ECG reviewed personally by physician  ECG/EMG Results (most recent)     Procedure Component Value Units Date/Time    ECG 12 Lead [[id number]] Collected: 04/16/21 2121     Updated: 04/16/21 2130     QT Interval 307 ms     Narrative:      HEART RATE= 138  bpm  RR Interval= 434  ms  AZ Interval=   ms  P Horizontal Axis=   deg  P Front Axis=   deg  QRSD Interval= 103  ms  QT Interval= 307  ms  QRS Axis= 59  deg  T Wave Axis= 19  deg  - ABNORMAL ECG -  Atrial fibrillation  Ventricular premature complex  Electronically Signed By:   Date and Time of Study: 2021-04-16 21:21:07          Medication Review:   I have reviewed the patient's current medication list    Current Facility-Administered Medications:   •  acetaminophen (TYLENOL) tablet 650 mg, 650 mg, Oral, Q4H PRN **OR** acetaminophen (TYLENOL) 160 MG/5ML solution 650 mg, 650 mg, Oral, Q4H PRN **OR** acetaminophen (TYLENOL) suppository 650 mg, 650 mg, Rectal, Q4H PRN, Gumaro Jarrell MD  •  aluminum-magnesium hydroxide-simethicone (MAALOX MAX) 400-400-40 MG/5ML suspension 15 mL, 15 mL, Oral, Q6H PRN, Gumaro Jarrell MD  •  apixaban (ELIQUIS) tablet 5 mg, 5 mg, Oral, Q12H, Gumaro Jarrell MD, 5 mg at 04/17/21 0811  •  budesonide-formoterol (SYMBICORT) 160-4.5 MCG/ACT inhaler 2 puff, 2 puff, Inhalation, BID, Gumaro Jarrell MD, 2 puff at 04/17/21 0910  •  calcium carbonate (TUMS) chewable tablet 500 mg (200 mg elemental), 1 tablet, Oral, BID PRN, Gumaro Jarrell MD, 1 tablet at 04/17/21 0815  •  digoxin (LANOXIN) injection 250 mcg, 250 mcg, Intravenous, Once, Wilfred Draper MD  •  dilTIAZem (CARDIZEM) 100 mg in 100 mL NS infusion (ADV), 10 mg/hr,  Intravenous, Titrated, Wilfred Draper MD, Last Rate: 10 mL/hr at 04/17/21 0513, 10 mg/hr at 04/17/21 0513  •  dilTIAZem (CARDIZEM) tablet 60 mg, 60 mg, Oral, Q6H, Wilfred Draper MD, 60 mg at 04/17/21 1102  •  furosemide (LASIX) injection 40 mg, 40 mg, Intravenous, BID, Wilfred Draper MD  •  ipratropium-albuterol (DUO-NEB) nebulizer solution 3 mL, 3 mL, Nebulization, 4x Daily - RT, Gumaro Jarrell MD, 3 mL at 04/17/21 0755  •  montelukast (SINGULAIR) tablet 10 mg, 10 mg, Oral, Nightly, Gumaro Jarrell MD  •  nitroglycerin (NITROSTAT) SL tablet 0.4 mg, 0.4 mg, Sublingual, Q5 Min PRN, Gumaro Jarrell MD  •  ondansetron (ZOFRAN) tablet 4 mg, 4 mg, Oral, Q6H PRN **OR** ondansetron (ZOFRAN) injection 4 mg, 4 mg, Intravenous, Q6H PRN, Gumaro Jarrell MD  •  pantoprazole (PROTONIX) EC tablet 40 mg, 40 mg, Oral, QAM, Gumaro Jarrell MD, 40 mg at 04/17/21 0815  •  potassium chloride (K-DUR,KLOR-CON) CR tablet 40 mEq, 40 mEq, Oral, See Admin Instructions, Gumaro Jarrell MD, 40 mEq at 04/17/21 0811  •  rosuvastatin (CRESTOR) tablet 10 mg, 10 mg, Oral, Daily, Gumaro Jarrell MD, 10 mg at 04/17/21 0811  •  sodium chloride 0.9 % flush 1-10 mL, 1-10 mL, Intravenous, PRN, Gumaro Jarrell MD  •  [COMPLETED] Insert peripheral IV, , , Once **AND** sodium chloride 0.9 % flush 10 mL, 10 mL, Intravenous, PRN, West Lopez MD  •  sodium chloride 0.9 % flush 10 mL, 10 mL, Intravenous, Q12H, Gumaro Jarrell MD, 10 mL at 04/17/21 0817  •  sodium chloride 0.9 % infusion 40 mL, 40 mL, Intravenous, PRN, Gumaro Jarrell MD      Assessment/Plan           PLAN:    -Labs and diagnostic tests reviewed: YES    -Diagnostic tests reviewed: YES    -Consultations: Cardiology    -Any new recommendations: As per Cardiology    -New Labs ordered: Serial Troponins, CBC, CMP    -New diagnostic tests ordered: N/A    -Any changes in  "medications:  MEDICATION CHANGES:   New Medications added: As per order sheet   Medication Dose changed: N/A   Medications deleted: N/A    -Placement issues: N/A    -Patient is clinically and hemodynamically stable    -To continue current management and supportive care    -Will follow patient closely    -Nothing new to add for right now    -Discharge planning issues: Patient should be able to go back home once ready for discharge      DIAGNOSES:      PRIMARY DIAGNOSES:          New onset Atrial Fib: On Inj Diltiazem and Inj Digoxin. Last pulse 84. TSH 2.660 and Free T4 1.48. Seen by Dr Draper. Note appreciated     Chronic anticoagulation: On Apixaban    CHF: New onset, most likely secondary to atrial fibrillation. On IV Furosemide. Echo has been ordered    Chest Pain: Atypical. Could be associated with \"Demand ischemia\" as Troponis are normal and EKG does not show any ST-T wave changes.     Leukocytosis: WBC 12.89 down from 14.30    HTN: Last /79. Will monitor    HLD: On Rosuvastatin    GERD: On Pantoprazole    COPD: On DuoNeb, Brovana and Budesonide via nebulizer. Shortness of breath has improved.     Radiographic Review:  CXR: From 4/16/2021  Findings most suggestive of CHF although pneumonia is not radiographically excluded.    EKG: From 4/16/2021  ABNORMAL ECG -  Atrial fibrillation  Ventricular premature complex    Anthropometric Analysis: BMI:  31.84 (Obesity Class I)    CODE Status: FULL CODE    Tobacco use: Former. Quit 2000    Alcohol intake: N/A    Illegal Drug use: N/A    DVT Prophylaxis: Apixaban and SCDs          ?     SECONDARY DIAGNOSES:  ?     As above      SURGICAL DIAGNOSES:      As per Problem List      TODAY'S DISCHARGE:        N/A          Plan for disposition: Patient should be able to go back to Home once ready for discharge    Georges Jones MD  04/17/21  12:10 EDT            Georges Jones M.D.; MS; FACP; MPH; FRANCIE  Internal Medicine/ Hospitalist          Time: 30  " minutes      Electronically signed by Georges Jones MD at 21 1503          Consult Notes (last 72 hours) (Notes from 21 1609 through 21 1609)      Wilfred Draper MD at 21 0938      Consult Orders    1. Inpatient Cardiology Consult [[id number]] ordered by Gumaro Jarrell MD at 21 1566               Date of Hospital Visit: 21  Encounter Provider: Wilfred Draper MD  Place of Service: [facility] CARDIOLOGY  Patient Name: Claude Spencer  :1955  Referral Provider: Dr Jarrell    Chief complaint: SOA, CP    History of Present Illness    Mr. Spencer is a 65-year-old gentleman who looks much older than stated age who presents with several weeks of generalized malaise, dyspnea, chest discomfort, and palpitations.  He saw his primary care physician several weeks ago and was given oral steroids, intramuscular antibiotics, oral antibiotics, and bronchodilators.  This helped for a few days but then he started to feel poorly again. He noted a rapid clearing heartbeat, exertional shortness of breath, increased thin yellow sputum production, chest discomfort in the left upper chest, orthopnea, and mild leg swelling.  He saw his primary care physician again, who recommended hospitalization, but the patient refused.  He finally agreed to come in yesterday after his symptoms became unbearable.  He has not had any fevers.  He cannot reliably tell me if his symptoms get worse with exertion.  He no longer smokes cigarettes but he does chew tobacco.  He does have COPD and uses oxygen at night, but has been having to use it during the day for the past several weeks as well.    He denies any history of heart problems and states that he had a heart work-up many many years ago that was unremarkable.    Past Medical History:   Diagnosis Date   • COPD (chronic obstructive pulmonary disease) (CMS/[facility])    • Gastroesophageal reflux disease 3/4/2016   •  Hyperlipidemia    • Hypertension    • Osteopenia 3/4/2016    Description: Her bone density 2015 secondary to steroid use   • Vitamin D deficiency 3/4/2016       Past Surgical History:   Procedure Laterality Date   • ANKLE SURGERY         Prior to Admission medications    Medication Sig Start Date End Date Taking? Authorizing Provider   albuterol (PROVENTIL HFA;VENTOLIN HFA) 108 (90 Base) MCG/ACT inhaler Inhale 2 puffs 2 (Two) Times a Day. 10/27/17  Yes Christal Harmon MD   cholecalciferol (VITAMIN D3) 1000 units tablet Take 1,000 Units by mouth Daily.   Yes Maricarmen Hayes MD   lisinopril-hydrochlorothiazide (PRINZIDE,ZESTORETIC) 20-25 MG per tablet TAKE ONE TABLET BY MOUTH DAILY 3/12/19  Yes Panfilo Castle MD   montelukast (SINGULAIR) 10 MG tablet TAKE ONE TABLET BY MOUTH ONCE NIGHTLY 3/12/19  Yes Christal Harmon MD   Omega-3 Fatty Acids (FISH OIL) 1000 MG capsule capsule Take  by mouth daily. 7/30/15  Yes Maricarmen Hayes MD   omeprazole (priLOSEC) 20 MG capsule TAKE ONE CAPSULE BY MOUTH DAILY 3/12/19  Yes Christal Harmon MD   rosuvastatin (CRESTOR) 10 MG tablet TAKE ONE TABLET BY MOUTH DAILY 19  Yes Panfilo Castle MD   SYMBICORT 160-4.5 MCG/ACT inhaler INHALE TWO PUFFS BY MOUTH TWICE A DAY 3/4/19  Yes Lidia Shannon MD       Social History     Socioeconomic History   • Marital status:      Spouse name: Not on file   • Number of children: Not on file   • Years of education: Not on file   • Highest education level: Not on file   Tobacco Use   • Smoking status: Former Smoker     Packs/day: 1.00     Years: 30.00     Pack years: 30.00     Quit date: 2000     Years since quittin.0   • Smokeless tobacco: Current User     Types: Chew   • Tobacco comment: quit 15 years ago, wife current smoker outside   Substance and Sexual Activity   • Alcohol use: No   • Drug use: No   • Sexual activity: Yes     Partners: Female       Family History   Problem Relation Age of Onset  "  • COPD Mother 78   • Seizures Father    • Vasculitis Father    • Prostate cancer Maternal Uncle        Review of Systems   Constitutional: Positive for fatigue.   Respiratory: Positive for cough, shortness of breath and wheezing.    Cardiovascular: Positive for chest pain and palpitations.   All other systems reviewed and are negative.       Objective:     Vitals:    04/17/21 0804 04/17/21 0900 04/17/21 0910 04/17/21 0915   BP:  93/60     BP Location:  Right arm     Patient Position:  Lying     Pulse: 90 106 93 100   Resp: 16 18 16 16   Temp:       TempSrc:       SpO2: 93% 92% 94%    Weight:       Height:         Body mass index is 31.84 kg/m².    Last Weight and Admission Weight        04/17/21  0500   Weight: 97.8 kg (215 lb 9.6 oz)     Flowsheet Rows      First Filed Value   Admission Height  175.3 cm (69\") Documented at 04/16/2021 2120   Admission Weight  99.8 kg (220 lb) Documented at 04/16/2021 2120            Intake/Output Summary (Last 24 hours) at 4/17/2021 0956  Last data filed at 4/17/2021 0513  Gross per 24 hour   Intake 69 ml   Output 3150 ml   Net -3081 ml         Physical Exam  Constitutional:       General: He is not in acute distress.     Appearance: Normal appearance.   HENT:      Head: Normocephalic.      Nose: Nose normal.      Mouth/Throat:      Pharynx: Oropharynx is clear.   Eyes:      Conjunctiva/sclera: Conjunctivae normal.   Cardiovascular:      Rate and Rhythm: Tachycardia present. Rhythm irregular.      Pulses: Normal pulses.      Heart sounds: Normal heart sounds.      Comments: + varicosities  Pulmonary:      Effort: Pulmonary effort is normal.      Breath sounds: Examination of the right-middle field reveals rales. Examination of the right-lower field reveals decreased breath sounds. Examination of the left-lower field reveals wheezing. Decreased breath sounds, wheezing and rales present.   Abdominal:      Palpations: Abdomen is soft.      Tenderness: There is no abdominal " tenderness.   Musculoskeletal:      Right lower le+ Edema present.      Left lower le+ Edema present.   Skin:     General: Skin is warm and dry.   Neurological:      General: No focal deficit present.      Mental Status: He is alert.   Psychiatric:         Mood and Affect: Mood normal.                 Lab Review:                Results from last 7 days   Lab Units 21  0434   SODIUM mmol/L 139   POTASSIUM mmol/L 3.4*   CHLORIDE mmol/L 98   CO2 mmol/L 31.0*   BUN mg/dL 12   CREATININE mg/dL 0.71*   GLUCOSE mg/dL 103*   CALCIUM mg/dL 8.6     Results from last 7 days   Lab Units 21  2147   TROPONIN T ng/mL <0.010     Results from last 7 days   Lab Units 21  0434   WBC 10*3/mm3 12.89*   HEMOGLOBIN g/dL 15.5   HEMATOCRIT % 47.4   PLATELETS 10*3/mm3 191             Results from last 7 days   Lab Units 21  2147   MAGNESIUM mg/dL 1.9           I personally viewed and interpreted the patient's EKG/Telemetry data -- AF, PVC, rapid rate, no ischemic changes    Assessment/Plan:     1. SOA  2. Chest discomfort  3. Acute CHF, suspect diastolic  4. COPD  5. Atrial fibrillation   6. HTN    Suspect that he has been in atrial fibrillation for a few weeks now and has developed congestive heart failure as result.  I suspect that it is diastolic, but we will need an echocardiogram to assess his left ventricular ejection fraction.  His chest discomfort is a bit vague and is not necessarily exertional.  He is certainly at high risk for coronary artery disease.  His troponin is normal and his EKG shows no ischemic changes, so he does not have acute coronary syndrome at this time.    He is actively wheezing on exam, so I am reticent to use a beta-blocker.  I am going to try to transition him to oral diltiazem.  I will give him a digoxin load.  He has been placed on apixaban so I will stop enoxaparin.  We will diurese him with intravenous furosemide and follow his electrolytes.  I will defer bronchodilators to  the primary service.    Once his echo was performed on Monday, he will need some type of ischemic evaluation, but whether or not this is noninvasive or invasive will depend on the results of the echocardiogram.  He understands that he needs to stay through the weekend.                  Electronically signed by Wilfred Draper MD at 04/17/21 0906

## 2021-04-19 NOTE — PROGRESS NOTES
LOS: 3 days   Patient Care Team:  Jason King MD as PCP - General (Internal Medicine)    Chief Complaint:  Follow-up atrial fibrillation and CHF.    Interval History:     His breathing is better.  He is diuresing.  He denies chest pain or pressure this morning.  He has no dizziness and does not feel his heart racing.    Objective   Vital Signs  Temp:  [97.5 °F (36.4 °C)-98.9 °F (37.2 °C)] 98 °F (36.7 °C)  Heart Rate:  [] 100  Resp:  [16-22] 20  BP: (105-136)/(73-97) 113/84    Intake/Output Summary (Last 24 hours) at 4/19/2021 0747  Last data filed at 4/19/2021 0440  Gross per 24 hour   Intake 1080 ml   Output 3125 ml   Net -2045 ml       Comfortable NAD  Neck supple, no JVD or thyromegaly appreciated  S1/S2 irregular, rate controlled no m/r/g  Lungs CTA B, normal effort  Abdomen S/NT/ND (+) BS, no HSM appreciated  Extremities warm, no clubbing, cyanosis, or edema  No visible or palpable skin lesions  A/Ox4, mood and affect appropriate    Results Review:      Results from last 7 days   Lab Units 04/19/21 0443 04/18/21 0558 04/17/21  0434   SODIUM mmol/L 136 135* 139   POTASSIUM mmol/L 3.7 3.6 3.4*   CHLORIDE mmol/L 101 100 98   CO2 mmol/L 27.7 24.0 31.0*   BUN mg/dL 17 15 12   CREATININE mg/dL 0.74* 0.66* 0.71*   GLUCOSE mg/dL 98 100* 103*   CALCIUM mg/dL 8.2* 8.4* 8.6     Results from last 7 days   Lab Units 04/16/21  2147   TROPONIN T ng/mL <0.010     Results from last 7 days   Lab Units 04/19/21 0443 04/18/21  0558 04/17/21  0434   WBC 10*3/mm3 11.92* 9.87 12.89*   HEMOGLOBIN g/dL 16.1 15.7 15.5   HEMATOCRIT % 48.4 48.2 47.4   PLATELETS 10*3/mm3 202 173 191             Results from last 7 days   Lab Units 04/18/21  0558   MAGNESIUM mg/dL 2.1           I reviewed the patient's new clinical results.  I personally viewed and interpreted the patient's EKG/Telemetry data        Medication Review:   apixaban, 5 mg, Oral, Q12H  arformoterol, 15 mcg, Nebulization, BID - RT  budesonide, 0.5 mg,  Nebulization, BID - RT  digoxin, 125 mcg, Oral, Daily  dilTIAZem CD, 240 mg, Oral, Q24H  furosemide, 40 mg, Oral, Daily  ipratropium-albuterol, 3 mL, Nebulization, 4x Daily - RT  montelukast, 10 mg, Oral, Nightly  pantoprazole, 40 mg, Oral, QAM  potassium chloride, 10 mEq, Oral, Daily  potassium chloride, 40 mEq, Oral, See Admin Instructions  rosuvastatin, 10 mg, Oral, Daily  sodium chloride, 10 mL, Intravenous, Q12H             Assessment/Plan       Chronic obstructive pulmonary disease (CMS/HCC)    Hypertension    Atrial fibrillation (CMS/HCC)    Class 1 obesity due to excess calories with serious comorbidity in adult    Acute CHF (congestive heart failure) (CMS/HCC)    Shortness of breath    Chest discomfort       1. SOA - likely due to a fib and HFpEF - await echo and if normal stress nuclear.   2. Chest discomfort  3. Acute CHF, suspect diastolic  4. COPD  5. Atrial fibrillation - rate controlled - on apixaban.   6. HTN    Stress or cardiac catheterization based on results of echocardiogram done today.  I am holding his apixaban until this decision is made.  He is on oral Lasix and seems to be doing well.  Vitals are stable.    Addendum: Stress test today, echocardiogram shows low normal ejection fraction.    Krystal Eng MD  04/19/21  07:47 EDT

## 2021-04-19 NOTE — PROGRESS NOTES
"Hospitalist Team      Patient Care Team:  Jason King MD as PCP - General (Internal Medicine)        Chief Complaint:  SOA and Chest Pressure    Subjective    Interval History and ROS:     Patient and I had a long conversation.  His daughter came in and we repeated the conversation.  They both know the results of the stress test.  They understand why he needs the cardiac catheterization. She wanted to go in with him to the catheterization and I told her I have no idea what the visiting policies are at [facility].  Patient was in no distress and no chest pain and no new complaints.  Information is from patient, family, nursing and chart and personal observations      Objective    Vital Signs  Temp:  [97.5 °F (36.4 °C)-98.9 °F (37.2 °C)] 97.5 °F (36.4 °C)  Heart Rate:  [] 115  Resp:  [18-22] 18  BP: (105-147)/() 121/101  Oxygen Therapy  SpO2: 92 %  Pulse Oximetry Type: Intermittent  Device (Oxygen Therapy): room air  Flow (L/min): 1  Probe Placed On (Pulse Ox): Left:, finger}  Flowsheet Rows      First Filed Value   Admission Height  175.3 cm (69\") Documented at 04/16/2021 2120   Admission Weight  99.8 kg (220 lb) Documented at 04/16/2021 2120          Body mass index is 30.57 kg/m².      Physical Exam:    Physical Exam  Vitals and nursing note reviewed.   Constitutional:       Appearance: Normal appearance.   Cardiovascular:      Rate and Rhythm: Normal rate. Rhythm irregular.   Pulmonary:      Effort: Pulmonary effort is normal.      Breath sounds: Normal breath sounds.   Abdominal:      Palpations: Abdomen is soft.   Skin:     General: Skin is warm and dry.   Neurological:      General: No focal deficit present.      Mental Status: He is alert and oriented to person, place, and time.         Results Review:     I reviewed the patient's new clinical results.    Lab Results (last 24 hours)     Procedure Component Value Units Date/Time    Respiratory Culture - Sputum, Cough [[id number]] Collected: " 04/17/21 1732    Specimen: Sputum from Cough Updated: 04/19/21 1236     Respiratory Culture Light growth (2+) Normal Respiratory Kyara: NO S.aureus/MRSA or Pseudomonas aeruginosa     Gram Stain Few (2+) WBCs per low power field      Moderate (3+) Gram positive cocci in pairs    Comprehensive Metabolic Panel [[id number]]  (Abnormal) Collected: 04/19/21 0443    Specimen: Blood Updated: 04/19/21 0554     Glucose 98 mg/dL      BUN 17 mg/dL      Creatinine 0.74 mg/dL      Sodium 136 mmol/L      Potassium 3.7 mmol/L      Chloride 101 mmol/L      CO2 27.7 mmol/L      Calcium 8.2 mg/dL      Total Protein 6.1 g/dL      Albumin 3.60 g/dL      ALT (SGPT) 117 U/L      AST (SGOT) 32 U/L      Alkaline Phosphatase 71 U/L      Total Bilirubin 0.9 mg/dL      eGFR Non African Amer 107 mL/min/1.73      Globulin 2.5 gm/dL      A/G Ratio 1.4 g/dL      BUN/Creatinine Ratio 23.0     Anion Gap 7.3 mmol/L     Narrative:      GFR Normal >60  Chronic Kidney Disease <60  Kidney Failure <15      CBC & Differential [[id number]]  (Abnormal) Collected: 04/19/21 0443    Specimen: Blood Updated: 04/19/21 0539    Narrative:      The following orders were created for panel order CBC & Differential.  Procedure                               Abnormality         Status                     ---------                               -----------         ------                     CBC Auto Differential[[id number]]        Abnormal            Final result                 Please view results for these tests on the individual orders.    CBC Auto Differential [[id number]]  (Abnormal) Collected: 04/19/21 0443    Specimen: Blood Updated: 04/19/21 0539     WBC 11.92 10*3/mm3      RBC 4.89 10*6/mm3      Hemoglobin 16.1 g/dL      Hematocrit 48.4 %      MCV 99.0 fL      MCH 32.9 pg      MCHC 33.3 g/dL      RDW 13.2 %      RDW-SD 48.5 fl      MPV 10.4 fL      Platelets 202 10*3/mm3      Neutrophil % 74.6 %      Lymphocyte % 14.3 %      Monocyte % 8.1 %      Eosinophil % 1.9 %       Basophil % 0.3 %      Immature Grans % 0.8 %      Neutrophils, Absolute 8.90 10*3/mm3      Lymphocytes, Absolute 1.70 10*3/mm3      Monocytes, Absolute 0.97 10*3/mm3      Eosinophils, Absolute 0.23 10*3/mm3      Basophils, Absolute 0.03 10*3/mm3      Immature Grans, Absolute 0.09 10*3/mm3      nRBC 0.0 /100 WBC           Imaging Results (Last 24 Hours)     Procedure Component Value Units Date/Time    XR Chest PA & Lateral [[id number]] Collected: 04/19/21 0845     Updated: 04/19/21 0848    Narrative:      PA AND LATERAL CHEST, 4/19/2021 8:40 AM     HISTORY:  chf; I48.91-Unspecified atrial fibrillation   short is very low oxygen  saturation for 3 days. Normal chest x-ray 04/16/2021     COMPARISON:  04/16/2021     TECHNIQUE:  PA and lateral upright chest series.     FINDINGS:  Heart size and pulmonary vascularity are normal. The lungs are expanded  and clear. No visible pulmonary infiltrate or pleural effusion.           Impression:      Lower lobe infiltrates noted previously have resolved. There is no  active disease     This report was finalized on 4/19/2021 8:46 AM by Dr. Chase Allison MD.             X-ray not reviewed personally by physician.      ECG tracings reviewed personally by physician  ECG/EMG Results (most recent)     Procedure Component Value Units Date/Time    ECG 12 Lead [[id number]] Collected: 04/16/21 2121     Updated: 04/18/21 1804     QT Interval 307 ms     Narrative:      HEART RATE= 138  bpm  RR Interval= 434  ms  IL Interval=   ms  P Horizontal Axis=   deg  P Front Axis=   deg  QRSD Interval= 103  ms  QT Interval= 307  ms  QRS Axis= 59  deg  T Wave Axis= 19  deg  - ABNORMAL ECG -  Atrial fibrillation  Ventricular premature complex  NO PRIOR TRACING AVAILABLE FOR COMPARISON  Electronically Signed By: [facility]) 18-Apr-2021 18:04:27  Date and Time of Study: 2021-04-16 21:21:07    Adult Transthoracic Echo Complete w/ Color, Spectral and Contrast if Necessary Per Protocol [[id number]]  Collected: 04/19/21 0742     Updated: 04/19/21 0824     BSA 2.1 m^2      IVSd 1.3 cm      LVIDd 6.0 cm      LVIDs 4.5 cm      LVPWd 1.4 cm      IVS/LVPW 0.92     FS 25.3 %      EDV(Teich) 177.9 ml      ESV(Teich) 90.5 ml      EF(Teich) 49.1 %      EDV(cubed) 212.8 ml      ESV(cubed) 88.7 ml      EF(cubed) 58.3 %      LV mass(C)d 377.1 grams      LV mass(C)dI 179.9 grams/m^2      SV(Teich) 87.4 ml      SI(Teich) 41.7 ml/m^2      SV(cubed) 124.1 ml      SI(cubed) 59.2 ml/m^2      Ao root diam 2.8 cm      Ao root area 6.2 cm^2      ACS 1.9 cm      LVOT diam 2.2 cm      LVOT area 3.8 cm^2      LVOT area(traced) 3.8 cm^2      RVOT diam 2.1 cm      RVOT area 3.5 cm^2      LVLd ap4 8.1 cm      EDV(MOD-sp4) 140.0 ml      LVLs ap4 7.1 cm      ESV(MOD-sp4) 66.2 ml      EF(MOD-sp4) 52.7 %      LVLd ap2 8.7 cm      EDV(MOD-sp2) 108.0 ml      LVLs ap2 7.3 cm      ESV(MOD-sp2) 54.9 ml      EF(MOD-sp2) 49.2 %      SV(MOD-sp4) 73.8 ml      SI(MOD-sp4) 35.2 ml/m^2      SV(MOD-sp2) 53.1 ml      SI(MOD-sp2) 25.3 ml/m^2      Ao root area (BSA corrected) 1.3     LV Egan Vol (BSA corrected) 66.8 ml/m^2      LV Sys Vol (BSA corrected) 31.6 ml/m^2      TAPSE (>1.6) 2.2 cm      EF(MOD-bp) 51.7 %      MV E max bc 88.8 cm/sec      MV V2 mean 60.2 cm/sec      MV mean PG 2.0 mmHg      MV V2 VTI 15.0 cm      MVA(VTI) 3.4 cm^2      MV P1/2t max bc 94.5 cm/sec      MV P1/2t 35.9 msec      MVA(P1/2t) 6.1 cm^2      MV dec slope 771.0 cm/sec^2      MV dec time 109 sec      Ao V2 mean 73.3 cm/sec      Ao mean PG 3.0 mmHg      Ao mean PG (full) 2.0 mmHg      Ao V2 VTI 20.0 cm      MARKUS(I,A) 2.5 cm^2      MARKUS(I,D) 2.5 cm^2      LV V1 mean PG 1.0 mmHg      LV V1 mean 51.6 cm/sec      LV V1 VTI 13.4 cm      MR max bc 459.0 cm/sec      MR max PG 84.3 mmHg      SV(Ao) 123.2 ml      SI(Ao) 58.8 ml/m^2      SV(LVOT) 50.9 ml      SV(RVOT) 46.4 ml      SI(LVOT) 24.3 ml/m^2      PA V2 max 84.2 cm/sec      PA max PG 2.8 mmHg      RV V1 mean PG 1.0 mmHg      RV  V1 mean 43.8 cm/sec      RV V1 VTI 13.4 cm      Qp/Qs 0.91     MVA P1/2T LCG 2.3 cm^2      Lat Peak E' Efren 14.3 cm/sec      Med Peak E' Efren 9.3 cm/sec      RV S' 15.0 cm/sec       CV ECHO MISTY - BZI_BMI 30.6 kilograms/m^2       CV ECHO MISTY - BSA(HAYCOCK) 2.2 m^2       CV ECHO MISTY - BZI_METRIC_WEIGHT 93.9 kg       CV ECHO MISTY - BZI_METRIC_HEIGHT 175.3 cm      Avg E/e' ratio 7.53     Sinus 3.5 cm      Dimensionless Index 0.67 (DI)      LA Volume Index 26.0 mL/m2      Ao pk efren 111.0 cm/sec      LV V1 max PG 2.6 mmHg      LV V1 max 81.3 cm/sec      RAP systole 3 mmHg      Ao max PG 5 mmHg     Narrative:      · The left ventricular cavity is moderately dilated.  · Left ventricular diastolic function was indeterminate.  · Calculated left ventricular EF = 51.7% Estimated left ventricular EF was   in agreement with the calculated left ventricular EF. Left ventricular   systolic function is normal.  · Left ventricular wall thickness is consistent with mild concentric   hypertrophy.             Medication Review:   I have reviewed the patient's current medication list    Current Facility-Administered Medications:   •  acetaminophen (TYLENOL) tablet 650 mg, 650 mg, Oral, Q4H PRN **OR** acetaminophen (TYLENOL) 160 MG/5ML solution 650 mg, 650 mg, Oral, Q4H PRN **OR** acetaminophen (TYLENOL) suppository 650 mg, 650 mg, Rectal, Q4H PRN, Gumaro Jarrell MD  •  aluminum-magnesium hydroxide-simethicone (MAALOX MAX) 400-400-40 MG/5ML suspension 15 mL, 15 mL, Oral, Q6H PRN, Gumaro Jarrell MD  •  arformoterol (BROVANA) nebulizer solution 15 mcg, 15 mcg, Nebulization, BID - RT, Gumaro Jarrell MD, 15 mcg at 04/19/21 0728  •  budesonide (PULMICORT) nebulizer solution 0.5 mg, 0.5 mg, Nebulization, BID - RT, Gumaro Jarrell MD, 0.5 mg at 04/19/21 0717  •  calcium carbonate (TUMS) chewable tablet 500 mg (200 mg elemental), 1 tablet, Oral, BID PRN, Gumaro Jarrell MD, 1  tablet at 04/17/21 0815  •  digoxin (LANOXIN) tablet 125 mcg, 125 mcg, Oral, Daily, Georges Jones MD, 125 mcg at 04/19/21 1254  •  dilTIAZem CD (CARDIZEM CD) 24 hr capsule 240 mg, 240 mg, Oral, Q24H, Wilfred Draper MD, 240 mg at 04/19/21 1040  •  furosemide (LASIX) tablet 40 mg, 40 mg, Oral, Daily, Wilfred Draper MD, 40 mg at 04/19/21 1042  •  ipratropium-albuterol (DUO-NEB) nebulizer solution 3 mL, 3 mL, Nebulization, 4x Daily - RT, Gumaro Jarrell MD, 3 mL at 04/19/21 0716  •  montelukast (SINGULAIR) tablet 10 mg, 10 mg, Oral, Nightly, Gumaro Jarrell MD, 10 mg at 04/18/21 2039  •  nitroglycerin (NITROSTAT) SL tablet 0.4 mg, 0.4 mg, Sublingual, Q5 Min PRN, Gumaro Jarrell MD  •  ondansetron (ZOFRAN) tablet 4 mg, 4 mg, Oral, Q6H PRN **OR** ondansetron (ZOFRAN) injection 4 mg, 4 mg, Intravenous, Q6H PRN, Gumaro Jarrell MD  •  pantoprazole (PROTONIX) EC tablet 40 mg, 40 mg, Oral, QAM, Gumaro Jarrell MD, 40 mg at 04/19/21 1047  •  potassium chloride (K-DUR,KLOR-CON) CR tablet 10 mEq, 10 mEq, Oral, Daily, Georges Jones MD, 10 mEq at 04/19/21 1041  •  potassium chloride (K-DUR,KLOR-CON) CR tablet 40 mEq, 40 mEq, Oral, See Admin Instructions, Gumaro Jarrell MD, 40 mEq at 04/17/21 0811  •  rosuvastatin (CRESTOR) tablet 10 mg, 10 mg, Oral, Daily, Gumaro Jarrell MD, 10 mg at 04/19/21 1042  •  sodium chloride 0.9 % flush 1-10 mL, 1-10 mL, Intravenous, PRN, Gumaro Jarrell MD  •  [COMPLETED] Insert peripheral IV, , , Once **AND** sodium chloride 0.9 % flush 10 mL, 10 mL, Intravenous, PRN, West Lopez MD  •  sodium chloride 0.9 % flush 10 mL, 10 mL, Intravenous, Q12H, Gumaro Jarrell MD, 10 mL at 04/19/21 1044  •  sodium chloride 0.9 % infusion 40 mL, 40 mL, Intravenous, PRN, Gumaro Jarrell MD      Assessment/Plan     Abnormal stress test with chest discomfort   Inferior wall medium size  abnormality   Stress test tomorrow   May eat   Hold eliquis   Discussed with his nurse    COPD/ SOA    Essential Hypertension    Atrial fib    Acute CHF possibly diastolic and systolic/ await cath tomorrow to further diagnose   Echo shows low normal EF    Obesity/ Body mass index is 30.57 kg/m².              Plan for disposition:  Cardiac Catheterization in       Meera Cruz DO  04/19/21  15:32 EDT      Time: 30 min

## 2021-04-19 NOTE — CASE MANAGEMENT/SOCIAL WORK
Continued Stay Note  ROXY Valdovinos     Patient Name: Claude Spencer  MRN: [id number]  Today's Date: 4/19/2021    Admit Date: 4/16/2021    Discharge Plan     Row Name 04/19/21 1617       Plan    Plan  plan home w wife    Plan Comments  Follow up visit, patient is sitting up in bed, permission to speak with daughter visiting. No dc needs noted, CM # placed on white board, will continue to follow.        Discharge Codes    No documentation.             Luis Davis RN

## 2021-04-19 NOTE — DISCHARGE SUMMARY
Claude Spencer  1955  [id number]        Hospitalist discharge Summary    Date of Admission: 4/16/2021  Date of Discharge:  4/19/2021    Primary Discharge Diagnoses: Elevated troponin, abnormal stress test acute on chronic heart failure        PCP  Patient Care Team:  Jason King MD as PCP - General (Internal Medicine)    Consults:   Consults       Date and Time Order Name Status Description    4/16/2021 11:09 PM Inpatient Cardiology Consult Completed             Operations and Procedures Performed:       Adult Transthoracic Echo Complete w/ Color, Spectral and Contrast if Necessary Per Protocol    Result Date: 4/19/2021  Narrative: · The left ventricular cavity is moderately dilated. · Left ventricular diastolic function was indeterminate. · Calculated left ventricular EF = 51.7% Estimated left ventricular EF was in agreement with the calculated left ventricular EF. Left ventricular systolic function is normal. · Left ventricular wall thickness is consistent with mild concentric hypertrophy.      XR Chest 1 View    Result Date: 4/16/2021  Narrative: CR Chest 1 Vw INDICATION: Shortness of air for 3 weeks. COMPARISON:  11/6/2019 FINDINGS: 2 portable AP view(s) of the chest.  Heart size is top normal. There is vascular congestion. There are infiltrates in both mid to lower lung zones certainly worrisome for pulmonary edema although pneumonia is not excluded. No pneumothorax is identified.     Impression: Findings most suggestive of CHF although pneumonia is not radiographically excluded. Signer Name: Jay Angel MD  Signed: 4/16/2021 10:40 PM  Workstation Name: [facility]  [facility]    Stress Test With Myocardial Perfusion One Day    Result Date: 4/19/2021  Narrative: · Left ventricular ejection fraction is moderately reduced. (Calculated EF = 34%). · Myocardial perfusion imaging indicates a medium-sized infarct located in the inferior wall with no significant ischemia  noted.      XR Chest PA & Lateral    Result Date: 4/19/2021  Narrative: PA AND LATERAL CHEST, 4/19/2021 8:40 AM  HISTORY: chf; I48.91-Unspecified atrial fibrillation   short is very low oxygen saturation for 3 days. Normal chest x-ray 04/16/2021  COMPARISON: 04/16/2021  TECHNIQUE: PA and lateral upright chest series.  FINDINGS: Heart size and pulmonary vascularity are normal. The lungs are expanded and clear. No visible pulmonary infiltrate or pleural effusion.       Impression: Lower lobe infiltrates noted previously have resolved. There is no active disease  This report was finalized on 4/19/2021 8:46 AM by Dr. Chase Allison MD.        Allergies:  has No Known Allergies.        Discharge Medications:    budesonide-formoterol, 2 puff, Inhalation, BID - RT  digoxin, 125 mcg, Oral, Daily  dilTIAZem CD, 240 mg, Oral, Q24H  furosemide, 40 mg, Oral, Daily  montelukast, 10 mg, Oral, Nightly  pantoprazole, 40 mg, Oral, QAM  potassium chloride, 10 mEq, Oral, Daily  potassium chloride, 40 mEq, Oral, See Admin Instructions  rosuvastatin, 10 mg, Oral, Daily  sodium chloride, 10 mL, Intravenous, Q12H        History of Present Illness:  See H&P    Hospital Course  Patient ultimately admitted, 2D echo and stress test were checked secondary to shortness of air with A. fib heart failure with preserved EF.  Patient had chest discomfort ultimately with abnormal stress test with moderate size inferior infarct, EF 34% by gated imaging.  2D echo had a discrepant EF which was 50 to 55% with mild LV enlargement.  EKG from 416 revealed A. fib RVR but no Q waves or abnormal ST-T wave findings.  Troponin was ultimately unremarkable although proBNP was elevated.  Patient will be transferred to [facility] for invasive ischemic evaluation given abnormal findings per cardiology team.  Presently hemodynamically and electrically stable.-Lipid studies reviewed LDL is 81 total cholesterol 142.  He is without chest pain or dizziness or  palpitations on my encounter.  Diuresis ongoing normal creatinine less than 1.    Patient on Eliquis for stroke risk reduction with paroxysmal atrial fibrillation, will leave to cardiology to discontinue prior to left heart catheterization depending on timing.        Last Lab Results:   Lab Results (most recent)       Procedure Component Value Units Date/Time    Respiratory Culture - Sputum, Cough [[id number]] Collected: 04/17/21 1732    Specimen: Sputum from Cough Updated: 04/19/21 1236     Respiratory Culture Light growth (2+) Normal Respiratory Kyara: NO S.aureus/MRSA or Pseudomonas aeruginosa     Gram Stain Few (2+) WBCs per low power field      Moderate (3+) Gram positive cocci in pairs    Comprehensive Metabolic Panel [[id number]]  (Abnormal) Collected: 04/19/21 0443    Specimen: Blood Updated: 04/19/21 0554     Glucose 98 mg/dL      BUN 17 mg/dL      Creatinine 0.74 mg/dL      Sodium 136 mmol/L      Potassium 3.7 mmol/L      Chloride 101 mmol/L      CO2 27.7 mmol/L      Calcium 8.2 mg/dL      Total Protein 6.1 g/dL      Albumin 3.60 g/dL      ALT (SGPT) 117 U/L      AST (SGOT) 32 U/L      Alkaline Phosphatase 71 U/L      Total Bilirubin 0.9 mg/dL      eGFR Non African Amer 107 mL/min/1.73      Globulin 2.5 gm/dL      A/G Ratio 1.4 g/dL      BUN/Creatinine Ratio 23.0     Anion Gap 7.3 mmol/L     Narrative:      GFR Normal >60  Chronic Kidney Disease <60  Kidney Failure <15      CBC & Differential [[id number]]  (Abnormal) Collected: 04/19/21 0443    Specimen: Blood Updated: 04/19/21 0539    Narrative:      The following orders were created for panel order CBC & Differential.  Procedure                               Abnormality         Status                     ---------                               -----------         ------                     CBC Auto Differential[[id number]]        Abnormal            Final result                 Please view results for these tests on the individual orders.    CBC Auto  Differential [[id number]]  (Abnormal) Collected: 04/19/21 0443    Specimen: Blood Updated: 04/19/21 0539     WBC 11.92 10*3/mm3      RBC 4.89 10*6/mm3      Hemoglobin 16.1 g/dL      Hematocrit 48.4 %      MCV 99.0 fL      MCH 32.9 pg      MCHC 33.3 g/dL      RDW 13.2 %      RDW-SD 48.5 fl      MPV 10.4 fL      Platelets 202 10*3/mm3      Neutrophil % 74.6 %      Lymphocyte % 14.3 %      Monocyte % 8.1 %      Eosinophil % 1.9 %      Basophil % 0.3 %      Immature Grans % 0.8 %      Neutrophils, Absolute 8.90 10*3/mm3      Lymphocytes, Absolute 1.70 10*3/mm3      Monocytes, Absolute 0.97 10*3/mm3      Eosinophils, Absolute 0.23 10*3/mm3      Basophils, Absolute 0.03 10*3/mm3      Immature Grans, Absolute 0.09 10*3/mm3      nRBC 0.0 /100 WBC     Comprehensive Metabolic Panel [[id number]]  (Abnormal) Collected: 04/18/21 0558    Specimen: Blood Updated: 04/18/21 0731     Glucose 100 mg/dL      BUN 15 mg/dL      Creatinine 0.66 mg/dL      Sodium 135 mmol/L      Potassium 3.6 mmol/L      Comment: Slight hemolysis detected by analyzer. Results may be affected.        Chloride 100 mmol/L      CO2 24.0 mmol/L      Calcium 8.4 mg/dL      Total Protein 6.1 g/dL      Albumin 3.50 g/dL      ALT (SGPT) 144 U/L      AST (SGOT) 36 U/L      Alkaline Phosphatase 66 U/L      Total Bilirubin 1.2 mg/dL      eGFR Non African Amer 122 mL/min/1.73      Globulin 2.6 gm/dL      A/G Ratio 1.3 g/dL      BUN/Creatinine Ratio 22.7     Anion Gap 11.0 mmol/L     Narrative:      GFR Normal >60  Chronic Kidney Disease <60  Kidney Failure <15      Procalcitonin [[id number]]  (Normal) Collected: 04/18/21 0558    Specimen: Blood Updated: 04/18/21 0715     Procalcitonin 0.06 ng/mL     Narrative:      Results may be falsely decreased if patient taking Biotin.     Magnesium [[id number]]  (Normal) Collected: 04/18/21 0558    Specimen: Blood Updated: 04/18/21 0710     Magnesium 2.1 mg/dL     CBC & Differential [[id number]]  (Abnormal) Collected: 04/18/21 0501     Specimen: Blood Updated: 04/18/21 0609    Narrative:      The following orders were created for panel order CBC & Differential.  Procedure                               Abnormality         Status                     ---------                               -----------         ------                     CBC Auto Differential[[id number]]        Abnormal            Final result                 Please view results for these tests on the individual orders.    CBC Auto Differential [[id number]]  (Abnormal) Collected: 04/18/21 0558    Specimen: Blood Updated: 04/18/21 0609     WBC 9.87 10*3/mm3      RBC 4.86 10*6/mm3      Hemoglobin 15.7 g/dL      Hematocrit 48.2 %      MCV 99.2 fL      MCH 32.3 pg      MCHC 32.6 g/dL      RDW 13.4 %      RDW-SD 48.9 fl      MPV 10.0 fL      Platelets 173 10*3/mm3      Neutrophil % 73.9 %      Lymphocyte % 13.8 %      Monocyte % 10.0 %      Eosinophil % 1.7 %      Basophil % 0.1 %      Immature Grans % 0.5 %      Neutrophils, Absolute 7.29 10*3/mm3      Lymphocytes, Absolute 1.36 10*3/mm3      Monocytes, Absolute 0.99 10*3/mm3      Eosinophils, Absolute 0.17 10*3/mm3      Basophils, Absolute 0.01 10*3/mm3      Immature Grans, Absolute 0.05 10*3/mm3      nRBC 0.0 /100 WBC     Respiratory Panel, PCR (WITHOUT COVID) - Swab, Nasopharynx [[id number]]  (Normal) Collected: 04/17/21 1412    Specimen: Swab from Nasopharynx Updated: 04/17/21 1821     ADENOVIRUS, PCR Not Detected     Coronavirus 229E Not Detected     Coronavirus HKU1 Not Detected     Coronavirus NL63 Not Detected     Coronavirus OC43 Not Detected     Human Metapneumovirus Not Detected     Human Rhinovirus/Enterovirus Not Detected     Influenza B PCR Not Detected     Parainfluenza Virus 1 Not Detected     Parainfluenza Virus 2 Not Detected     Parainfluenza Virus 3 Not Detected     Parainfluenza Virus 4 Not Detected     Bordetella pertussis pcr Not Detected     Chlamydophila pneumoniae PCR Not Detected     Mycoplasma pneumo by PCR Not  Detected     Influenza A PCR Not Detected     RSV, PCR Not Detected     Bordetella parapertussis PCR Not Detected    Narrative:      The coronavirus on the RVP is NOT COVID-19 and is NOT indicative of infection with COVID-19.     Basic Metabolic Panel [[id number]]  (Abnormal) Collected: 04/17/21 0434    Specimen: Blood Updated: 04/17/21 0615     Glucose 103 mg/dL      BUN 12 mg/dL      Creatinine 0.71 mg/dL      Sodium 139 mmol/L      Potassium 3.4 mmol/L      Chloride 98 mmol/L      CO2 31.0 mmol/L      Calcium 8.6 mg/dL      eGFR Non African Amer 112 mL/min/1.73      BUN/Creatinine Ratio 16.9     Anion Gap 10.0 mmol/L     Narrative:      GFR Normal >60  Chronic Kidney Disease <60  Kidney Failure <15      CBC (No Diff) [[id number]]  (Abnormal) Collected: 04/17/21 0434    Specimen: Blood Updated: 04/17/21 0527     WBC 12.89 10*3/mm3      RBC 4.81 10*6/mm3      Hemoglobin 15.5 g/dL      Hematocrit 47.4 %      MCV 98.5 fL      MCH 32.2 pg      MCHC 32.7 g/dL      RDW 13.6 %      RDW-SD 49.7 fl      MPV 10.6 fL      Platelets 191 10*3/mm3     COVID PRE-OP / PRE-PROCEDURE SCREENING ORDER (NO ISOLATION) - Swab, Nasal Cavity [[id number]]  (Normal) Collected: 04/16/21 2312    Specimen: Swab from Nasal Cavity Updated: 04/16/21 2350    Narrative:      The following orders were created for panel order COVID PRE-OP / PRE-PROCEDURE SCREENING ORDER (NO ISOLATION) - Swab, Nasal Cavity.  Procedure                               Abnormality         Status                     ---------                               -----------         ------                     COVID-19,[employer] IN-YI...[[id number]]  Normal              Final result                 Please view results for these tests on the individual orders.    COVID-19,[employer] IN-HOUSE,Nasal Swab No Transport Media 3-4 HR TAT - Swab, Nasal Cavity [[id number]]  (Normal) Collected: 04/16/21 2312    Specimen: Swab from Nasal Cavity Updated: 04/16/21 2350     COVID19 Not Detected     Narrative:      Fact sheet for providers: https://www.fda.gov/media/622909/download     Fact sheet for patients: https://www.fda.gov/media/059640/download    Test performed by PCR.    Consider negative results in combination with clinical observations, patient history, and epidemiological information.    T4, Free [[id number]]  (Normal) Collected: 04/16/21 2147    Specimen: Blood Updated: 04/16/21 2226     Free T4 1.48 ng/dL     Narrative:      Results may be falsely increased if patient taking Biotin.      TSH [[id number]]  (Normal) Collected: 04/16/21 2147    Specimen: Blood Updated: 04/16/21 2226     TSH 2.660 uIU/mL     Troponin [[id number]]  (Normal) Collected: 04/16/21 2147    Specimen: Blood Updated: 04/16/21 2222     Troponin T <0.010 ng/mL     Narrative:      Troponin T Reference Range:  <= 0.03 ng/mL-   Negative for AMI  >0.03 ng/mL-     Abnormal for myocardial necrosis.  Clinicians would have to utilize clinical acumen, EKG, Troponin and serial changes to determine if it is an Acute Myocardial Infarction or myocardial injury due to an underlying chronic condition.       Results may be falsely decreased if patient taking Biotin.      BNP [[id number]]  (Abnormal) Collected: 04/16/21 2147    Specimen: Blood Updated: 04/16/21 2220     proBNP 2,963.0 pg/mL     Narrative:      Among patients with dyspnea, NT-proBNP is highly sensitive for the detection of acute congestive heart failure. In addition NT-proBNP of <300 pg/ml effectively rules out acute congestive heart failure with 99% negative predictive value.    Results may be falsely decreased if patient taking Biotin.      Magnesium [[id number]]  (Normal) Collected: 04/16/21 2147    Specimen: Blood Updated: 04/16/21 2214     Magnesium 1.9 mg/dL           Imaging Results (Most Recent)       Procedure Component Value Units Date/Time    XR Chest PA & Lateral [[id number]] Collected: 04/19/21 0845     Updated: 04/19/21 0848    Narrative:      PA AND LATERAL CHEST,  4/19/2021 8:40 AM     HISTORY:  chf; I48.91-Unspecified atrial fibrillation   short is very low oxygen  saturation for 3 days. Normal chest x-ray 04/16/2021     COMPARISON:  04/16/2021     TECHNIQUE:  PA and lateral upright chest series.     FINDINGS:  Heart size and pulmonary vascularity are normal. The lungs are expanded  and clear. No visible pulmonary infiltrate or pleural effusion.           Impression:      Lower lobe infiltrates noted previously have resolved. There is no  active disease     This report was finalized on 4/19/2021 8:46 AM by Dr. Chase Allison MD.       XR Chest 1 View [[id number]] Collected: 04/16/21 2240     Updated: 04/16/21 2243    Narrative:      CR Chest 1 Vw    INDICATION:   Shortness of air for 3 weeks.     COMPARISON:    11/6/2019    FINDINGS:  2 portable AP view(s) of the chest.  Heart size is top normal. There is vascular congestion. There are infiltrates in both mid to lower lung zones certainly worrisome for pulmonary edema although pneumonia is not excluded. No pneumothorax is identified.      Impression:      Findings most suggestive of CHF although pneumonia is not radiographically excluded.    Signer Name: Jay Angel MD   Signed: 4/16/2021 10:40 PM   Workstation Name: [facility]    [facility]            PROCEDURES      Condition on Discharge: Stable and ambulatory    Physical Exam at Discharge  Vital Signs  Temp:  [97.5 °F (36.4 °C)-98.9 °F (37.2 °C)] 97.5 °F (36.4 °C)  Heart Rate:  [] 100  Resp:  [18-22] 20  BP: (105-147)/() 121/101    Physical Exam:  Physical Exam   Constitutional: Patient appears well-developed and well-nourished and in no acute distress   HEENT:   Head: Normocephalic and atraumatic.   Eyes:  Pupils are equal, round, and reactive to light. EOM are intact. Sclerae are anicteric and noninjected.  Mouth and Throat: Patient has moist mucous membranes. Oropharynx is clear of any erythema or exudate.     Neck: Neck supple. No  JVD present. No thyromegaly present. No lymphadenopathy present.  Cardiovascular:  irregular rhythm, S1 normal and S2 normal.  Exam reveals no gallop and no friction rub.  No murmur heard.  Pulmonary/Chest: Lungs are clear to auscultation bilaterally. No respiratory distress. No wheezes. No rhonchi. No rales.   Abdominal: Soft. Bowel sounds are normal. No distension and no mass. There is no hepatosplenomegaly. There is no tenderness.   Musculoskeletal: Normal muscle tone  Extremities: No edema. Pulses are palpable in all 4 extremities.  Neurological: Patient is alert and oriented to person, place, and time. Cranial nerves II-XII are grossly intact with no focal deficits.  Skin: Skin is warm. No rash noted. Nails show no clubbing.  No cyanosis or erythema.    Discharge Disposition  Stable, discharge for readmission of [facility] for invasive ischemic evaluation    Discharge Diet:   Cardiac diet  Dietary Orders (From admission, onward)       Start     Ordered    04/20/21 0001  NPO Diet  Diet Effective Midnight      04/19/21 1503    04/19/21 1501  Diet Regular; Low Sodium; No Added Salt  Diet Effective Now     Question Answer Comment   Diet Texture / Consistency Regular    Common Modifiers Low Sodium    Low Sodium Restriction: No Added Salt        04/19/21 1503                    Activity at Discharge:  To be determined cardiology    Pre-discharge education  To be determined by cardiology and excepting hospitalist      Follow-up Appointments  Future Appointments   Date Time Provider Department Center   11/17/2021  2:30 PM [facility] PFT/EEG ROOM [facility]          Test Results Pending at Discharge        Bj Gutierrez MD  04/19/21  19:48 EDT

## 2021-04-19 NOTE — PLAN OF CARE
Goal Outcome Evaluation:  Plan of Care Reviewed With: patient  Progress: improving     Problem: Adult Inpatient Plan of Care  Goal: Patient-Specific Goal (Individualized)  Outcome: Ongoing, Progressing pt wife & Ann at bedside today. Plan to go to cath lab in am at [facility]. Family updated     Problem: Adult Inpatient Plan of Care  Goal: Optimal Comfort and Wellbeing  Intervention: Provide Person-Centered Care  Recent Flowsheet Documentation  Taken 4/19/2021 1600 by Kimberly Quinones RN  Trust Relationship/Rapport: care explained  Taken 4/19/2021 1200 by Kimberly Quinones RN  Trust Relationship/Rapport: questions answered  Taken 4/19/2021 0833 by Kimberly Quinones RN  Trust Relationship/Rapport: care explained     Problem: Arrhythmia/Dysrhythmia  Goal: Normalized Cardiac Rhythm  Outcome: Ongoing, Progressing  Intervention: Monitor and Manage Cardiac Rhythm Effects  Recent Flowsheet Documentation  Taken 4/19/2021 1600 by Kimberly Quinones RN  VTE Prevention/Management: dorsiflexion/plantar flexion performed  Taken 4/19/2021 1400 by Kimberly Quinones RN  Stabilization Measures: legs elevated  Taken 4/19/2021 0833 by Kimberly Quinones RN  Stabilization Measures: respiratory support increased  VTE Prevention/Management: dorsiflexion/plantar flexion performed       Remains off of O2 during day but will require O2 at night as he does at home. Pt will be NPO at midnight for cath lab transfer in the am and pt stated that he understood. Wife & Ann at bedside this shift.  Sat up in chair part of day. Had CXR, ECHO, stress   test today. Scheduled for cath at 7am. Ems transfer set up for 0610.

## 2021-04-19 NOTE — SIGNIFICANT NOTE
"   04/19/21 1400   Provider Notification   Reason for Communication Evaluate   Provider Name Berenice NP   Notification Route Unable to reach   Response Waiting for response       · Left ventricular ejection fraction is moderately reduced. (Calculated EF = 34%).  · Myocardial perfusion imaging indicates a medium-sized infarct located in the inferior wall with no significant ischemia noted.    Attempting to get ahold of cardiology to review case and eval stress test results for further instruction/ orders. Pt has -been NPO-need clarification for next step in plan of care.    Second call resulted in vm -states needing mail box #. Coco to get ahold of NP. Will call office & ans service back.    [phone] call to answering service - told they were not allowed to page cardiologist. Was sent to triage nurse who will attempt to get ahold of someone for call back.    cardiology Triage nurse \" Todgkelley\" returned call indicating ok to feed pt then NPO after midnight.  Will continue to hold eliquis.      Plan for cath in am, ems transport arranged for 0610 pickup to transfer to [facility] for 0700 cath.   "

## 2021-04-20 ENCOUNTER — HOSPITAL ENCOUNTER (OUTPATIENT)
Facility: HOSPITAL | Age: 66
Setting detail: HOSPITAL OUTPATIENT SURGERY
Discharge: HOME | End: 2021-04-20
Attending: INTERNAL MEDICINE | Admitting: INTERNAL MEDICINE

## 2021-04-20 VITALS
TEMPERATURE: 98 F | SYSTOLIC BLOOD PRESSURE: 141 MMHG | DIASTOLIC BLOOD PRESSURE: 88 MMHG | OXYGEN SATURATION: 95 % | BODY MASS INDEX: 30.04 KG/M2 | RESPIRATION RATE: 20 BRPM | HEART RATE: 81 BPM | WEIGHT: 202.8 LBS | HEIGHT: 69 IN

## 2021-04-20 VITALS
OXYGEN SATURATION: 96 % | WEIGHT: 207 LBS | RESPIRATION RATE: 18 BRPM | BODY MASS INDEX: 30.66 KG/M2 | HEIGHT: 69 IN | DIASTOLIC BLOOD PRESSURE: 104 MMHG | TEMPERATURE: 97.4 F | SYSTOLIC BLOOD PRESSURE: 118 MMHG | HEART RATE: 112 BPM

## 2021-04-20 DIAGNOSIS — R07.9 CHEST PAIN, UNSPECIFIED TYPE: ICD-10-CM

## 2021-04-20 PROCEDURE — 25010000002 MIDAZOLAM PER 1 MG: Performed by: INTERNAL MEDICINE

## 2021-04-20 PROCEDURE — 25010000002 FENTANYL CITRATE (PF) 100 MCG/2ML SOLUTION: Performed by: INTERNAL MEDICINE

## 2021-04-20 PROCEDURE — C1894 INTRO/SHEATH, NON-LASER: HCPCS | Performed by: INTERNAL MEDICINE

## 2021-04-20 PROCEDURE — 0 IOPAMIDOL PER 1 ML: Performed by: INTERNAL MEDICINE

## 2021-04-20 PROCEDURE — 99152 MOD SED SAME PHYS/QHP 5/>YRS: CPT | Performed by: INTERNAL MEDICINE

## 2021-04-20 PROCEDURE — C1769 GUIDE WIRE: HCPCS | Performed by: INTERNAL MEDICINE

## 2021-04-20 PROCEDURE — 93458 L HRT ARTERY/VENTRICLE ANGIO: CPT | Performed by: INTERNAL MEDICINE

## 2021-04-20 PROCEDURE — 25010000002 HEPARIN (PORCINE) PER 1000 UNITS: Performed by: INTERNAL MEDICINE

## 2021-04-20 RX ORDER — DIGOXIN 125 MCG
125 TABLET ORAL
Qty: 30 TABLET | Refills: 3 | Status: SHIPPED | OUTPATIENT
Start: 2021-04-20 | End: 2021-09-24

## 2021-04-20 RX ORDER — ONDANSETRON 2 MG/ML
4 INJECTION INTRAMUSCULAR; INTRAVENOUS EVERY 6 HOURS PRN
Status: CANCELLED | OUTPATIENT
Start: 2021-04-20

## 2021-04-20 RX ORDER — MIDAZOLAM HYDROCHLORIDE 1 MG/ML
INJECTION INTRAMUSCULAR; INTRAVENOUS AS NEEDED
Status: DISCONTINUED | OUTPATIENT
Start: 2021-04-20 | End: 2021-04-20 | Stop reason: HOSPADM

## 2021-04-20 RX ORDER — SODIUM CHLORIDE 9 MG/ML
50 INJECTION, SOLUTION INTRAVENOUS CONTINUOUS
Status: DISCONTINUED | OUTPATIENT
Start: 2021-04-20 | End: 2021-04-20 | Stop reason: HOSPADM

## 2021-04-20 RX ORDER — SODIUM CHLORIDE 9 MG/ML
75 INJECTION, SOLUTION INTRAVENOUS CONTINUOUS
Status: DISCONTINUED | OUTPATIENT
Start: 2021-04-20 | End: 2021-04-20 | Stop reason: HOSPADM

## 2021-04-20 RX ORDER — FENTANYL CITRATE 50 UG/ML
INJECTION, SOLUTION INTRAMUSCULAR; INTRAVENOUS AS NEEDED
Status: DISCONTINUED | OUTPATIENT
Start: 2021-04-20 | End: 2021-04-20 | Stop reason: HOSPADM

## 2021-04-20 RX ORDER — ONDANSETRON 4 MG/1
4 TABLET, FILM COATED ORAL EVERY 6 HOURS PRN
Status: CANCELLED | OUTPATIENT
Start: 2021-04-20

## 2021-04-20 RX ORDER — SODIUM CHLORIDE 0.9 % (FLUSH) 0.9 %
3 SYRINGE (ML) INJECTION EVERY 12 HOURS SCHEDULED
Status: DISCONTINUED | OUTPATIENT
Start: 2021-04-20 | End: 2021-04-20 | Stop reason: HOSPADM

## 2021-04-20 RX ORDER — DIGOXIN 125 MCG
125 TABLET ORAL ONCE
Status: COMPLETED | OUTPATIENT
Start: 2021-04-20 | End: 2021-04-20

## 2021-04-20 RX ORDER — HYDROCODONE BITARTRATE AND ACETAMINOPHEN 5; 325 MG/1; MG/1
1 TABLET ORAL EVERY 4 HOURS PRN
Status: CANCELLED | OUTPATIENT
Start: 2021-04-20

## 2021-04-20 RX ORDER — DILTIAZEM HYDROCHLORIDE 240 MG/1
240 CAPSULE, COATED, EXTENDED RELEASE ORAL
Status: COMPLETED | OUTPATIENT
Start: 2021-04-20 | End: 2021-04-20

## 2021-04-20 RX ORDER — SODIUM CHLORIDE 0.9 % (FLUSH) 0.9 %
10 SYRINGE (ML) INJECTION AS NEEDED
Status: DISCONTINUED | OUTPATIENT
Start: 2021-04-20 | End: 2021-04-20 | Stop reason: HOSPADM

## 2021-04-20 RX ORDER — ACETAMINOPHEN 325 MG/1
650 TABLET ORAL EVERY 4 HOURS PRN
Status: CANCELLED | OUTPATIENT
Start: 2021-04-20

## 2021-04-20 RX ORDER — DILTIAZEM HYDROCHLORIDE 240 MG/1
240 CAPSULE, COATED, EXTENDED RELEASE ORAL
Qty: 30 CAPSULE | Refills: 6 | Status: SHIPPED | OUTPATIENT
Start: 2021-04-20 | End: 2021-04-27

## 2021-04-20 RX ORDER — FUROSEMIDE 40 MG/1
40 TABLET ORAL DAILY
Qty: 30 TABLET | Refills: 11 | Status: SHIPPED | OUTPATIENT
Start: 2021-04-20

## 2021-04-20 RX ORDER — POTASSIUM CHLORIDE 750 MG/1
10 TABLET, FILM COATED, EXTENDED RELEASE ORAL DAILY
Qty: 30 TABLET | Refills: 11 | Status: SHIPPED | OUTPATIENT
Start: 2021-04-20

## 2021-04-20 RX ORDER — LIDOCAINE HYDROCHLORIDE 20 MG/ML
INJECTION, SOLUTION INFILTRATION; PERINEURAL AS NEEDED
Status: DISCONTINUED | OUTPATIENT
Start: 2021-04-20 | End: 2021-04-20 | Stop reason: HOSPADM

## 2021-04-20 RX ADMIN — DIGOXIN 125 MCG: 125 TABLET ORAL at 11:25

## 2021-04-20 RX ADMIN — SODIUM CHLORIDE 75 ML/HR: 9 INJECTION, SOLUTION INTRAVENOUS at 07:19

## 2021-04-20 RX ADMIN — DILTIAZEM HYDROCHLORIDE 240 MG: 240 CAPSULE, COATED, EXTENDED RELEASE ORAL at 11:25

## 2021-04-20 NOTE — PAYOR COMM NOTE
"[facility]   &  [facility]  [address]    [address] [place] [address]    [place] [address]    Sandra Lobominaiggy  Utilization Review/Room Reservations  Phone: [phone], [phone] or [phone]  Fax: [phone]  Email: [email or web address]  Please call, fax back, or email with authorization or any questions! Thanks!      REQUESTED CLINICAL  AUTH#[id number]          This fax contains any of the following:  Face Sheet, H&P, progress notes, consults, orders, meds, lab results, labor record, vitals, delivery worksheet, op note, d/c summary.  The information contained in this fax is confidential for the use of the Individual or entity named above. If the reader of this message is not the Intended recipient (or the employee or agent responsible to deliver it to the Intended recipient), you are hereby notified that any dissemination, distribution, or copy of this communication is prohibited. If you have received this communication in error, please notify us by collect telephone call and return the original message to us at the above address at our expense.  Claude Spencer (65 y.o. Male)     Date of Birth Social Security Number Address Home Phone MRN    [id number]  [id number] [phone] [id number]    Mu-ism Marital Status          Other        Admission Date Admission Type Admitting Provider Attending Provider Department, Room/Bed    4/16/21 Emergency   [facility] ICU, ICU1/1    Discharge Date Discharge Disposition Discharge Destination        4/20/2021 [facility] ([facility] - External)              Attending Provider: (none)   Allergies: No Known Allergies    Isolation: None   Infection: None   Code Status: Prior    Ht: 175.3 cm (69\")   Wt: 92 kg (202 lb 12.8 oz)    Admission Cmt: None   Principal Problem: None                Active Insurance as of 4/16/2021     Primary " Coverage     Payor Plan Insurance Group Employer/Plan Group    ANTHEM BLUE CROSS ANTHEM BLUE CROSS BLUE SHIELD PPO [id number]     Payor Plan Address Payor Plan Phone Number Payor Plan Fax Number Effective Dates    [address] [phone]  1/1/2019 - None Entered    [facility] [id number]       Subscriber Name Subscriber Birth Date Member ID       ANTONIETA SPENCER 3/13/1962 [id number]           Secondary Coverage     Payor Plan Insurance Group Employer/Plan Group    MEDICARE MEDICARE A ONLY      Payor Plan Address Payor Plan Phone Number Payor Plan Fax Number Effective Dates    [address] [phone]  3/1/2015 - None Entered    [facility] [id number]       Subscriber Name Subscriber Birth Date Member ID       ANNIKA SPENCER 1955 [id number]                 Emergency Contacts      (Rel.) Home Phone Work Phone Mobile Phone    LEXIE SPENCER (Daughter) -- -- [phone]    Antonieta Spencer (Relative) [phone] -- --               History & Physical      Gumaro Jarrell MD at 04/16/21 8397              HISTORY AND PHYSICAL      Patient Care Team:  Armaan Jarvis DO as PCP - General (Family Medicine)    CHIEF COMPLAINT: Shortness of breath    HISTORY OF PRESENT ILLNESS:    65-year-old white male became ill several weeks ago with cough chest congestion shortness of breath and some sputum production.  He saw his primary care physician was given IM and p.o. antibiotics as well as steroid pack initially noticed to be provement.  Patient stated about a week later he started having symptoms again.  Has developed some orthopnea for the last 3 weeks some lower extremity edema and some abdominal distention.  He saw his physician again recommended hospitalization but he refused.  His daughter felt congested, the hospital.  Patient also has been having some intermittent vague left and right-sided chest discomfort feels like the burning pain.  He also has been having palpitations off and on for  several weeks.\    After his course of antibiotics esterases cough has become productive scant sputum but occasionally still yellow-colored.  Not any fever chills or hemoptysis.  He has no prior cardiac history.    Past Medical History:   Diagnosis Date   • COPD (chronic obstructive pulmonary disease) (CMS/HCC)    • Hyperlipidemia    • Hypertension      Past Surgical History:   Procedure Laterality Date   • ANKLE SURGERY       Family History   Problem Relation Age of Onset   • COPD Mother 78   • Seizures Father    • Vasculitis Father    • Prostate cancer Maternal Uncle      Social History     Tobacco Use   • Smoking status: Former Smoker     Packs/day: 1.00     Years: 30.00     Pack years: 30.00     Quit date: 2000     Years since quittin.0   • Smokeless tobacco: Current User     Types: Chew   • Tobacco comment: quit 15 years ago, wife current smoker outside   Substance Use Topics   • Alcohol use: No   • Drug use: No     (Not in a hospital admission)    Allergies:  Patient has no known allergies.     Review of Systems   Constitutional: Positive for activity change and fatigue. Negative for appetite change.   HENT: Positive for congestion and postnasal drip. Negative for trouble swallowing.    Respiratory: Positive for cough, chest tightness, shortness of breath and wheezing.    Cardiovascular: Positive for chest pain and leg swelling.   Gastrointestinal: Positive for abdominal distention. Negative for abdominal pain, diarrhea, nausea and vomiting.   Endocrine: Negative for polyphagia and polyuria.   Genitourinary: Negative for frequency.   Musculoskeletal: Positive for back pain.   Skin: Negative for rash.   Neurological: Positive for weakness. Negative for light-headedness.   Hematological: Does not bruise/bleed easily.   Psychiatric/Behavioral: Negative for agitation and behavioral problems.       Vital Signs  Temp:  [98.1 °F (36.7 °C)] 98.1 °F (36.7 °C)  Heart Rate:  [115-158] 115  Resp:  [18] 18  BP:  "(142-156)/(129-136) 142/129  Oxygen Therapy  SpO2: 92 %  Device (Oxygen Therapy): nasal cannula  Flow (L/min): 2}  Body mass index is 32.49 kg/m².  Flowsheet Rows      First Filed Value   Admission Height  175.3 cm (69\") Documented at 2021   Admission Weight  99.8 kg (220 lb) Documented at 2021                 Physical Exam  Vitals and nursing note reviewed.   Constitutional:       General: He is not in acute distress.     Appearance: He is well-developed. He is ill-appearing. He is not toxic-appearing.   HENT:      Head: Normocephalic.   Eyes:      Conjunctiva/sclera: Conjunctivae normal.   Neck:      Thyroid: No thyromegaly.      Vascular: No JVD.   Cardiovascular:      Rate and Rhythm: Tachycardia present. Rhythm regularly irregular.      Heart sounds: Normal heart sounds. No murmur heard.     Pulmonary:      Effort: Pulmonary effort is normal. No respiratory distress.      Breath sounds: Examination of the right-lower field reveals rales. Examination of the left-lower field reveals rales. Wheezing (Bilateral diffuse) and rales present.   Abdominal:      General: Bowel sounds are normal. There is no distension.      Palpations: Abdomen is soft.      Tenderness: There is no abdominal tenderness. There is no guarding.   Musculoskeletal:      Cervical back: Normal range of motion.      Right lower le+ Edema present.      Left lower le+ Edema present.   Skin:     General: Skin is warm and dry.      Findings: No rash.   Neurological:      Mental Status: He is alert and oriented to person, place, and time.      Cranial Nerves: Cranial nerves are intact.      Sensory: Sensation is intact.      Deep Tendon Reflexes: Reflexes are normal and symmetric.          Debilities/Disabilities Identified: Mild respiratory distress    Emotional Behavior: Anxious    Results Review:    I reviewed the patient's new clinical results.  Lab Results (most recent)     Procedure Component Value Units Date/Time "    T4, Free [[id number]]  (Normal) Collected: 04/16/21 2147    Specimen: Blood Updated: 04/16/21 2226     Free T4 1.48 ng/dL     Narrative:      Results may be falsely increased if patient taking Biotin.      TSH [[id number]]  (Normal) Collected: 04/16/21 2147    Specimen: Blood Updated: 04/16/21 2226     TSH 2.660 uIU/mL     Troponin [[id number]]  (Normal) Collected: 04/16/21 2147    Specimen: Blood Updated: 04/16/21 2222     Troponin T <0.010 ng/mL     Narrative:      Troponin T Reference Range:  <= 0.03 ng/mL-   Negative for AMI  >0.03 ng/mL-     Abnormal for myocardial necrosis.  Clinicians would have to utilize clinical acumen, EKG, Troponin and serial changes to determine if it is an Acute Myocardial Infarction or myocardial injury due to an underlying chronic condition.       Results may be falsely decreased if patient taking Biotin.      BNP [[id number]]  (Abnormal) Collected: 04/16/21 2147    Specimen: Blood Updated: 04/16/21 2220     proBNP 2,963.0 pg/mL     Narrative:      Among patients with dyspnea, NT-proBNP is highly sensitive for the detection of acute congestive heart failure. In addition NT-proBNP of <300 pg/ml effectively rules out acute congestive heart failure with 99% negative predictive value.    Results may be falsely decreased if patient taking Biotin.      Comprehensive Metabolic Panel [[id number]]  (Abnormal) Collected: 04/16/21 2147    Specimen: Blood Updated: 04/16/21 2214     Glucose 104 mg/dL      BUN 15 mg/dL      Creatinine 0.86 mg/dL      Sodium 138 mmol/L      Potassium 3.9 mmol/L      Chloride 98 mmol/L      CO2 30.7 mmol/L      Calcium 8.9 mg/dL      Total Protein 6.5 g/dL      Albumin 4.00 g/dL      ALT (SGPT) 228 U/L      AST (SGOT) 67 U/L      Alkaline Phosphatase 84 U/L      Total Bilirubin 1.3 mg/dL      eGFR Non African Amer 90 mL/min/1.73      Globulin 2.5 gm/dL      A/G Ratio 1.6 g/dL      BUN/Creatinine Ratio 17.4     Anion Gap 9.3 mmol/L     Narrative:      GFR Normal  >60  Chronic Kidney Disease <60  Kidney Failure <15      Magnesium [[id number]]  (Normal) Collected: 04/16/21 2147    Specimen: Blood Updated: 04/16/21 2214     Magnesium 1.9 mg/dL     CBC & Differential [[id number]]  (Abnormal) Collected: 04/16/21 2147    Specimen: Blood Updated: 04/16/21 2200    Narrative:      The following orders were created for panel order CBC & Differential.  Procedure                               Abnormality         Status                     ---------                               -----------         ------                     CBC Auto Differential[[id number]]        Abnormal            Final result                 Please view results for these tests on the individual orders.    CBC Auto Differential [[id number]]  (Abnormal) Collected: 04/16/21 2147    Specimen: Blood Updated: 04/16/21 2200     WBC 14.30 10*3/mm3      RBC 4.93 10*6/mm3      Hemoglobin 16.1 g/dL      Hematocrit 49.3 %      .0 fL      MCH 32.7 pg      MCHC 32.7 g/dL      RDW 13.6 %      RDW-SD 50.2 fl      MPV 10.7 fL      Platelets 196 10*3/mm3      Neutrophil % 76.5 %      Lymphocyte % 10.8 %      Monocyte % 11.3 %      Eosinophil % 0.6 %      Basophil % 0.1 %      Immature Grans % 0.7 %      Neutrophils, Absolute 10.93 10*3/mm3      Lymphocytes, Absolute 1.55 10*3/mm3      Monocytes, Absolute 1.61 10*3/mm3      Eosinophils, Absolute 0.09 10*3/mm3      Basophils, Absolute 0.02 10*3/mm3      Immature Grans, Absolute 0.10 10*3/mm3      nRBC 0.0 /100 WBC           Imaging Results (Most Recent)     Procedure Component Value Units Date/Time    XR Chest 1 View [[id number]] Collected: 04/16/21 2240     Updated: 04/16/21 2243    Narrative:      CR Chest 1 Vw    INDICATION:   Shortness of air for 3 weeks.     COMPARISON:    11/6/2019    FINDINGS:  2 portable AP view(s) of the chest.  Heart size is top normal. There is vascular congestion. There are infiltrates in both mid to lower lung zones certainly worrisome for pulmonary  edema although pneumonia is not excluded. No pneumothorax is identified.      Impression:      Findings most suggestive of CHF although pneumonia is not radiographically excluded.    Signer Name: Jay Angel MD   Signed: 4/16/2021 10:40 PM   Workstation Name: [email or web address]    [facility]        reviewed    ECG/EMG Results (most recent)     Procedure Component Value Units Date/Time    ECG 12 Lead [[id number]] Collected: 04/16/21 2121     Updated: 04/16/21 2130     QT Interval 307 ms     Narrative:      HEART RATE= 138  bpm  RR Interval= 434  ms  SD Interval=   ms  P Horizontal Axis=   deg  P Front Axis=   deg  QRSD Interval= 103  ms  QT Interval= 307  ms  QRS Axis= 59  deg  T Wave Axis= 19  deg  - ABNORMAL ECG -  Atrial fibrillation  Ventricular premature complex  Electronically Signed By:   Date and Time of Study: 2021-04-16 21:21:07        reviewed    Assessment/Plan       1.  New onset atrial fibrillation with rapid response patient was started on Cardizem drip his rate is improving still tachycardic we will continue to monitor has been given 1 dose of digoxin.  We will continue with Cardizem drip and monitor cardiology be consulted.  His  CHADS2 shows appropriate for anticoagulation so we will go ahead and start Eliquis    2.  New onset congestive heart failure etiology be determined likely due to atrial fibrillation echocardiogram has been ordered    3.  COPD likely exacerbation patient is actively wheezing but not sure with some COPD exacerbation versus decompensated congestive heart failure.  We will go ahead and continue with duo nebs and monitor respiratory viral panel sputum culture be ordered    4.  GERD nothing acute continue with PPI      5.  Hypertension controlled hold home antihypertensives for now    6.  Chest pain atypical but has multiple risk factors for coronary disease we will await cardiology evaluation .    7.  Hyperlipidemia we will continue with home statin          I  "discussed the patients findings and my recommendations with patient and daughter    Gumaro Jarrell MD  04/16/21  23:12 EDT      Much of this encounter note is an electronic transcription/translation of spoken language to printed text using Dragon Software      Electronically signed by Gumaro Jarrell MD at 04/17/21 1259          Emergency Department Notes      West Lopez MD at 04/16/21 8588          Subjective     History provided by:  Patient  History limited by: The patient is a very poor historian.    History of Present Illness    · Chief complaint: Shortness of breath and chest pain    · Location: Chest pain in the center of the chest    · Quality/Severity: Shortness of breath and pressure sensation in the center of the chest.    · Timing/Onset: 3 weeks ago    · Modifying Factors: The patient is unable to articulate any or aggravating or relieving factors.    · Associated symptoms: Denies cough or fever.  Reports associated swelling of his feet.    · Narrative: The patient is a 65-year-old white male complaining of shortness of breath and chest discomfort that has come and gone for the last 3 weeks.  He states that he saw his PCP Dr. King 2 weeks ago and again a week ago.  He states Dr. Christine saldaña got a chest x-ray and saw \"fluid\".  He states Dr. King prescribed him a pill which has not helped.  The patient is a very poor historian and has difficulty articulating his symptoms and will Dr. Joseph saldaña prescribed him.  The patient has a history of COPD and Dr. Lange is his pulmonologist.  He also has a history of hypertension.  He has no previous cardiac history.    Review of Systems   Constitutional: Negative for activity change, appetite change, chills, diaphoresis, fatigue and fever.   HENT: Negative for congestion, dental problem, ear pain, hearing loss, mouth sores, postnasal drip, rhinorrhea, sinus pressure, sore throat and voice change.    Eyes: Negative for photophobia, pain, " "discharge, redness and visual disturbance.   Respiratory: Positive for shortness of breath. Negative for cough, chest tightness, wheezing and stridor.    Cardiovascular: Positive for chest pain and leg swelling. Negative for palpitations.   Gastrointestinal: Negative for abdominal pain, diarrhea, nausea and vomiting.   Genitourinary: Negative for difficulty urinating, dysuria, flank pain, frequency, hematuria and urgency.   Musculoskeletal: Negative for arthralgias, back pain, gait problem, joint swelling, myalgias, neck pain and neck stiffness.   Skin: Negative for color change and rash.   Neurological: Negative for dizziness, tremors, seizures, syncope, facial asymmetry, speech difficulty, weakness, light-headedness, numbness and headaches.   Hematological: Negative for adenopathy.   Psychiatric/Behavioral: Negative.  Negative for confusion and decreased concentration. The patient is not nervous/anxious.      Past Medical History:   Diagnosis Date   • COPD (chronic obstructive pulmonary disease) (CMS/HCC)    • Hyperlipidemia    • Hypertension      BP (!) 142/129   Pulse 115   Temp 98.1 °F (36.7 °C) (Oral)   Resp 18   Ht 175.3 cm (69\")   Wt 99.8 kg (220 lb)   SpO2 92%   BMI 32.49 kg/m²     Past Medical History:   Diagnosis Date   • COPD (chronic obstructive pulmonary disease) (CMS/HCC)    • Hyperlipidemia    • Hypertension        No Known Allergies    Past Surgical History:   Procedure Laterality Date   • ANKLE SURGERY         Family History   Problem Relation Age of Onset   • COPD Mother 78   • Seizures Father    • Vasculitis Father    • Prostate cancer Maternal Uncle        Social History     Socioeconomic History   • Marital status:      Spouse name: Not on file   • Number of children: Not on file   • Years of education: Not on file   • Highest education level: Not on file   Tobacco Use   • Smoking status: Former Smoker     Packs/day: 1.00     Years: 30.00     Pack years: 30.00     Quit date: " 2000     Years since quittin.0   • Smokeless tobacco: Current User     Types: Chew   • Tobacco comment: quit 15 years ago, wife current smoker outside   Substance and Sexual Activity   • Alcohol use: No   • Drug use: No   • Sexual activity: Yes     Partners: Female           Objective   Physical Exam  Vitals and nursing note reviewed.   Constitutional:       General: He is not in acute distress.     Appearance: He is well-developed. He is not diaphoretic.      Comments: Patient appears in no acute distress and does not appear toxic.  Review of his vital signs: He is afebrile with a temperature 98.1, respirations normal 18 with a slightly diminished oxygen saturation of 93% on room air, tachycardic with a heart rate of 138, blood pressure markedly elevated 156/136.   HENT:      Head: Normocephalic and atraumatic.      Nose: Nose normal.      Mouth/Throat:      Mouth: Mucous membranes are moist.      Pharynx: Oropharynx is clear. No oropharyngeal exudate.   Eyes:      General: No scleral icterus.        Right eye: No discharge.         Left eye: No discharge.      Pupils: Pupils are equal, round, and reactive to light.   Neck:      Thyroid: No thyromegaly.      Vascular: No JVD.   Cardiovascular:      Rate and Rhythm: Tachycardia present. Rhythm irregular.      Heart sounds: Normal heart sounds. No murmur heard.     Pulmonary:      Effort: Pulmonary effort is normal.      Breath sounds: Wheezing present. No rales.      Comments: The patient has distant breath sounds with faint in phase expiratory wheezing consistent with COPD.  Chest:      Chest wall: No tenderness.   Abdominal:      General: Bowel sounds are normal. There is no distension.      Palpations: Abdomen is soft.      Tenderness: There is no abdominal tenderness.   Musculoskeletal:         General: No tenderness or deformity. Normal range of motion.      Cervical back: Normal range of motion and neck supple.      Right lower leg: Edema ( 1+)  present.      Left lower leg: Edema ( 1+) present.   Lymphadenopathy:      Cervical: No cervical adenopathy.   Skin:     General: Skin is warm and dry.      Capillary Refill: Capillary refill takes less than 2 seconds.      Findings: No rash.   Neurological:      General: No focal deficit present.      Mental Status: He is alert and oriented to person, place, and time.      Cranial Nerves: No cranial nerve deficit.      Coordination: Coordination normal.      Comments: No focal motor sensory deficit   Psychiatric:         Mood and Affect: Mood normal.         Behavior: Behavior normal.         Thought Content: Thought content normal.         Judgment: Judgment normal.         Procedures          ED Course  ED Course as of Apr 16 2259 Fri Apr 16, 2021 2226 My interpretation of the patient's EKG tracing performed 21: 21 is atrial fibrillation with a rapid ventricular response of 138, normal axis, normal QRS conduction, isolated PVC, no acute ST segment elevation or depression consistent with ischemia.  The A. fib is new in comparison to tracing 5/9/2018.    [TP]   2226 My interpretation of the patient's chest x-ray is pulmonary vascular congestion consistent with congestive heart failure.    [TP]   2227 Review the patient's laboratory studies: His cardiac troponin is negative.  His proBNP is elevated at 2963 consistent with CHF.  Magnesium is normal.  CBC has an elevated white count of 14.3 with a left shift.  Hemoglobin, hematocrit and platelets within normal limits.  CMP has normal electrolytes and normal renal function test.  He has elevated transaminases with a ALT of 228 and AST is 67 and an elevated total bili of 1.3.    [TP]   2228 The patient was administered diltiazem 20 mg IV bolus and placed on a 10 mg/h drip.  Repeat examination at 22: 30 patient remained in A. fib with RVR with a rate of 115-130 and a blood pressure that improved to 128/100.    [TP]   2237 22: 36 the patient was discussed with   [facility] hospitalist, who agreed to admit the patient to the intensive care unit for further evaluation and treatment of his A. fib with RVR.  Add Dr. Jarrell request the patient was administered Bumex 2 mg IV and digoxin 0.25mg IV.    [TP]      ED Course User Index  [TP] West Lopez MD                                           MDM  Number of Diagnoses or Management Options  Atrial fibrillation with rapid ventricular response (CMS/HCC): new and requires workup     Amount and/or Complexity of Data Reviewed  Clinical lab tests: ordered and reviewed  Tests in the radiology section of CPT®: ordered and reviewed  Tests in the medicine section of CPT®: ordered and reviewed  Discuss the patient with other providers: yes    Risk of Complications, Morbidity, and/or Mortality  Presenting problems: high  Diagnostic procedures: high  Management options: high  General comments: My differential diagnosis for dyspnea includes but is not limited to:  Asthma, COPD, pneumonia, pulmonary embolus, acute respiratory distress syndrome, pneumothorax, pleural effusion, pulmonary fibrosis, congestive heart failure, myocardial infarction, DKA, uremia, acidosis, sepsis, anemia, drug related, hyperventilation, CNS disease   My differential diagnosis for chest pain includes but is not limited to:  Muscle strain, costochondritis, myositis, pleurisy, rib fracture, intercostal neuritis, herpes zoster, tumor, myocardial infarction, coronary syndrome, unstable angina, angina, aortic dissection, mitral valve prolapse, pericarditis, palpitations, pulmonary embolus, pneumonia, pneumothorax, lung cancer, GERD, esophagitis, esophageal spasm    Patient Progress  Patient progress: stable      Final diagnoses:   Atrial fibrillation with rapid ventricular response (CMS/HCC)       ED Disposition  ED Disposition     ED Disposition Condition Comment    Decision to Admit  Level of Care: Critical Care [6]   Diagnosis: Atrial fibrillation with rapid  ventricular response (CMS/[facility]) [[id number]]   Admitting Physician: JAYDON SOLIS [[id number]]   Bed Request Comments: New onset A. fib with RVR   Certification: I Certify That Inpatient Hospital Services Are Medically Necessary For Greater Than 2 Midnights            No follow-up provider specified.       Medication List      ASK your doctor about these medications    omeprazole 20 MG capsule  Commonly known as: priLOSEC  TAKE ONE CAPSULE BY MOUTH DAILY  Ask about: Which instructions should I use?            Labs Reviewed   COMPREHENSIVE METABOLIC PANEL - Abnormal; Notable for the following components:       Result Value    Glucose 104 (*)     CO2 30.7 (*)     ALT (SGPT) 228 (*)     AST (SGOT) 67 (*)     Total Bilirubin 1.3 (*)     All other components within normal limits    Narrative:     GFR Normal >60  Chronic Kidney Disease <60  Kidney Failure <15     BNP (IN-HOUSE) - Abnormal; Notable for the following components:    proBNP 2,963.0 (*)     All other components within normal limits    Narrative:     Among patients with dyspnea, NT-proBNP is highly sensitive for the detection of acute congestive heart failure. In addition NT-proBNP of <300 pg/ml effectively rules out acute congestive heart failure with 99% negative predictive value.    Results may be falsely decreased if patient taking Biotin.     CBC WITH AUTO DIFFERENTIAL - Abnormal; Notable for the following components:    WBC 14.30 (*)     .0 (*)     Neutrophil % 76.5 (*)     Lymphocyte % 10.8 (*)     Immature Grans % 0.7 (*)     Neutrophils, Absolute 10.93 (*)     Monocytes, Absolute 1.61 (*)     Immature Grans, Absolute 0.10 (*)     All other components within normal limits   TROPONIN (IN-HOUSE) - Normal    Narrative:     Troponin T Reference Range:  <= 0.03 ng/mL-   Negative for AMI  >0.03 ng/mL-     Abnormal for myocardial necrosis.  Clinicians would have to utilize clinical acumen, EKG, Troponin and serial changes to determine if it is an Acute  Myocardial Infarction or myocardial injury due to an underlying chronic condition.       Results may be falsely decreased if patient taking Biotin.     T4, FREE - Normal    Narrative:     Results may be falsely increased if patient taking Biotin.     TSH - Normal   MAGNESIUM - Normal   COVID PRE-OP / PRE-PROCEDURE SCREENING ORDER (NO ISOLATION)    Narrative:     The following orders were created for panel order COVID PRE-OP / PRE-PROCEDURE SCREENING ORDER (NO ISOLATION) - Swab, Nasopharynx.  Procedure                               Abnormality         Status                     ---------                               -----------         ------                     COVID-19,[facility] IN-YI...[[id number]]                                                   Please view results for these tests on the individual orders.   COVID-19,[facility] IN-HOUSE,NASAL SWAB NO TRANSPORT MEDIA 2 HR TAT   CBC AND DIFFERENTIAL    Narrative:     The following orders were created for panel order CBC & Differential.  Procedure                               Abnormality         Status                     ---------                               -----------         ------                     CBC Auto Differential[[id number]]        Abnormal            Final result                 Please view results for these tests on the individual orders.     XR Chest 1 View   Final Result   Findings most suggestive of CHF although pneumonia is not radiographically excluded.      Signer Name: Jay Angel MD    Signed: 4/16/2021 10:40 PM    Workstation Name: CHRISTINA     [facility]             Medication List      ASK your doctor about these medications    omeprazole 20 MG capsule  Commonly known as: priLOSEC  TAKE ONE CAPSULE BY MOUTH DAILY  Ask about: Which instructions should I use?                 West Lopez MD  04/16/21 2237      Electronically signed by West Lopez MD at 04/16/21 9632       Operative/Procedure Notes  "(last 7 days) (Notes from 04/13/21 1520 through 04/20/21 1520)    No notes of this type exist for this encounter.            Physician Progress Notes (last 7 days) (Notes from 04/13/21 1520 through 04/20/21 1520)      Meera Cruz DO at 04/19/21 1532          Hospitalist Team      Patient Care Team:  Jason King MD as PCP - General (Internal Medicine)        Chief Complaint:  SOA and Chest Pressure    Subjective    Interval History and ROS:     Patient and I had a long conversation.  His daughter came in and we repeated the conversation.  They both know the results of the stress test.  They understand why he needs the cardiac catheterization. She wanted to go in with him to the catheterization and I told her I have no idea what the visiting policies are at [facility].  Patient was in no distress and no chest pain and no new complaints.  Information is from patient, family, nursing and chart and personal observations      Objective    Vital Signs  Temp:  [97.5 °F (36.4 °C)-98.9 °F (37.2 °C)] 97.5 °F (36.4 °C)  Heart Rate:  [] 115  Resp:  [18-22] 18  BP: (105-147)/() 121/101  Oxygen Therapy  SpO2: 92 %  Pulse Oximetry Type: Intermittent  Device (Oxygen Therapy): room air  Flow (L/min): 1  Probe Placed On (Pulse Ox): Left:, finger}  Flowsheet Rows      First Filed Value   Admission Height  175.3 cm (69\") Documented at 04/16/2021 2120   Admission Weight  99.8 kg (220 lb) Documented at 04/16/2021 2120          Body mass index is 30.57 kg/m².      Physical Exam:    Physical Exam  Vitals and nursing note reviewed.   Constitutional:       Appearance: Normal appearance.   Cardiovascular:      Rate and Rhythm: Normal rate. Rhythm irregular.   Pulmonary:      Effort: Pulmonary effort is normal.      Breath sounds: Normal breath sounds.   Abdominal:      Palpations: Abdomen is soft.   Skin:     General: Skin is warm and dry.   Neurological:      General: No focal deficit present.      Mental Status: " He is alert and oriented to person, place, and time.         Results Review:     I reviewed the patient's new clinical results.    Lab Results (last 24 hours)     Procedure Component Value Units Date/Time    Respiratory Culture - Sputum, Cough [[id number]] Collected: 04/17/21 1732    Specimen: Sputum from Cough Updated: 04/19/21 1236     Respiratory Culture Light growth (2+) Normal Respiratory Kyara: NO S.aureus/MRSA or Pseudomonas aeruginosa     Gram Stain Few (2+) WBCs per low power field      Moderate (3+) Gram positive cocci in pairs    Comprehensive Metabolic Panel [[id number]]  (Abnormal) Collected: 04/19/21 0443    Specimen: Blood Updated: 04/19/21 0554     Glucose 98 mg/dL      BUN 17 mg/dL      Creatinine 0.74 mg/dL      Sodium 136 mmol/L      Potassium 3.7 mmol/L      Chloride 101 mmol/L      CO2 27.7 mmol/L      Calcium 8.2 mg/dL      Total Protein 6.1 g/dL      Albumin 3.60 g/dL      ALT (SGPT) 117 U/L      AST (SGOT) 32 U/L      Alkaline Phosphatase 71 U/L      Total Bilirubin 0.9 mg/dL      eGFR Non African Amer 107 mL/min/1.73      Globulin 2.5 gm/dL      A/G Ratio 1.4 g/dL      BUN/Creatinine Ratio 23.0     Anion Gap 7.3 mmol/L     Narrative:      GFR Normal >60  Chronic Kidney Disease <60  Kidney Failure <15      CBC & Differential [[id number]]  (Abnormal) Collected: 04/19/21 0443    Specimen: Blood Updated: 04/19/21 0539    Narrative:      The following orders were created for panel order CBC & Differential.  Procedure                               Abnormality         Status                     ---------                               -----------         ------                     CBC Auto Differential[[id number]]        Abnormal            Final result                 Please view results for these tests on the individual orders.    CBC Auto Differential [[id number]]  (Abnormal) Collected: 04/19/21 0443    Specimen: Blood Updated: 04/19/21 0539     WBC 11.92 10*3/mm3      RBC 4.89 10*6/mm3       Hemoglobin 16.1 g/dL      Hematocrit 48.4 %      MCV 99.0 fL      MCH 32.9 pg      MCHC 33.3 g/dL      RDW 13.2 %      RDW-SD 48.5 fl      MPV 10.4 fL      Platelets 202 10*3/mm3      Neutrophil % 74.6 %      Lymphocyte % 14.3 %      Monocyte % 8.1 %      Eosinophil % 1.9 %      Basophil % 0.3 %      Immature Grans % 0.8 %      Neutrophils, Absolute 8.90 10*3/mm3      Lymphocytes, Absolute 1.70 10*3/mm3      Monocytes, Absolute 0.97 10*3/mm3      Eosinophils, Absolute 0.23 10*3/mm3      Basophils, Absolute 0.03 10*3/mm3      Immature Grans, Absolute 0.09 10*3/mm3      nRBC 0.0 /100 WBC           Imaging Results (Last 24 Hours)     Procedure Component Value Units Date/Time    XR Chest PA & Lateral [[id number]] Collected: 04/19/21 0845     Updated: 04/19/21 0848    Narrative:      PA AND LATERAL CHEST, 4/19/2021 8:40 AM     HISTORY:  chf; I48.91-Unspecified atrial fibrillation   short is very low oxygen  saturation for 3 days. Normal chest x-ray 04/16/2021     COMPARISON:  04/16/2021     TECHNIQUE:  PA and lateral upright chest series.     FINDINGS:  Heart size and pulmonary vascularity are normal. The lungs are expanded  and clear. No visible pulmonary infiltrate or pleural effusion.           Impression:      Lower lobe infiltrates noted previously have resolved. There is no  active disease     This report was finalized on 4/19/2021 8:46 AM by Dr. Chase Allison MD.             X-ray not reviewed personally by physician.      ECG tracings reviewed personally by physician  ECG/EMG Results (most recent)     Procedure Component Value Units Date/Time    ECG 12 Lead [[id number]] Collected: 04/16/21 2121     Updated: 04/18/21 1804     QT Interval 307 ms     Narrative:      HEART RATE= 138  bpm  RR Interval= 434  ms  HI Interval=   ms  P Horizontal Axis=   deg  P Front Axis=   deg  QRSD Interval= 103  ms  QT Interval= 307  ms  QRS Axis= 59  deg  T Wave Axis= 19  deg  - ABNORMAL ECG -  Atrial fibrillation  Ventricular premature  complex  NO PRIOR TRACING AVAILABLE FOR COMPARISON  Electronically Signed By: Ankur Mckinnon ([facility]) 18-Apr-2021 18:04:27  Date and Time of Study: 2021-04-16 21:21:07    Adult Transthoracic Echo Complete w/ Color, Spectral and Contrast if Necessary Per Protocol [[id number]] Collected: 04/19/21 0742     Updated: 04/19/21 0824     BSA 2.1 m^2      IVSd 1.3 cm      LVIDd 6.0 cm      LVIDs 4.5 cm      LVPWd 1.4 cm      IVS/LVPW 0.92     FS 25.3 %      EDV(Teich) 177.9 ml      ESV(Teich) 90.5 ml      EF(Teich) 49.1 %      EDV(cubed) 212.8 ml      ESV(cubed) 88.7 ml      EF(cubed) 58.3 %      LV mass(C)d 377.1 grams      LV mass(C)dI 179.9 grams/m^2      SV(Teich) 87.4 ml      SI(Teich) 41.7 ml/m^2      SV(cubed) 124.1 ml      SI(cubed) 59.2 ml/m^2      Ao root diam 2.8 cm      Ao root area 6.2 cm^2      ACS 1.9 cm      LVOT diam 2.2 cm      LVOT area 3.8 cm^2      LVOT area(traced) 3.8 cm^2      RVOT diam 2.1 cm      RVOT area 3.5 cm^2      LVLd ap4 8.1 cm      EDV(MOD-sp4) 140.0 ml      LVLs ap4 7.1 cm      ESV(MOD-sp4) 66.2 ml      EF(MOD-sp4) 52.7 %      LVLd ap2 8.7 cm      EDV(MOD-sp2) 108.0 ml      LVLs ap2 7.3 cm      ESV(MOD-sp2) 54.9 ml      EF(MOD-sp2) 49.2 %      SV(MOD-sp4) 73.8 ml      SI(MOD-sp4) 35.2 ml/m^2      SV(MOD-sp2) 53.1 ml      SI(MOD-sp2) 25.3 ml/m^2      Ao root area (BSA corrected) 1.3     LV Egan Vol (BSA corrected) 66.8 ml/m^2      LV Sys Vol (BSA corrected) 31.6 ml/m^2      TAPSE (>1.6) 2.2 cm      EF(MOD-bp) 51.7 %      MV E max bc 88.8 cm/sec      MV V2 mean 60.2 cm/sec      MV mean PG 2.0 mmHg      MV V2 VTI 15.0 cm      MVA(VTI) 3.4 cm^2      MV P1/2t max bc 94.5 cm/sec      MV P1/2t 35.9 msec      MVA(P1/2t) 6.1 cm^2      MV dec slope 771.0 cm/sec^2      MV dec time 109 sec      Ao V2 mean 73.3 cm/sec      Ao mean PG 3.0 mmHg      Ao mean PG (full) 2.0 mmHg      Ao V2 VTI 20.0 cm      MARKUS(I,A) 2.5 cm^2      MARKUS(I,D) 2.5 cm^2      LV V1 mean PG 1.0 mmHg      LV V1 mean 51.6 cm/sec      LV  V1 VTI 13.4 cm      MR max efren 459.0 cm/sec      MR max PG 84.3 mmHg      SV(Ao) 123.2 ml      SI(Ao) 58.8 ml/m^2      SV(LVOT) 50.9 ml      SV(RVOT) 46.4 ml      SI(LVOT) 24.3 ml/m^2      PA V2 max 84.2 cm/sec      PA max PG 2.8 mmHg      RV V1 mean PG 1.0 mmHg      RV V1 mean 43.8 cm/sec      RV V1 VTI 13.4 cm      Qp/Qs 0.91     MVA P1/2T LCG 2.3 cm^2      Lat Peak E' Efren 14.3 cm/sec      Med Peak E' Efren 9.3 cm/sec      RV S' 15.0 cm/sec       CV ECHO MISTY - BZI_BMI 30.6 kilograms/m^2       CV ECHO MITSY - BSA(HAYCOCK) 2.2 m^2       CV ECHO MISTY - BZI_METRIC_WEIGHT 93.9 kg       CV ECHO MISTY - BZI_METRIC_HEIGHT 175.3 cm      Avg E/e' ratio 7.53     Sinus 3.5 cm      Dimensionless Index 0.67 (DI)      LA Volume Index 26.0 mL/m2      Ao pk efren 111.0 cm/sec      LV V1 max PG 2.6 mmHg      LV V1 max 81.3 cm/sec      RAP systole 3 mmHg      Ao max PG 5 mmHg     Narrative:      · The left ventricular cavity is moderately dilated.  · Left ventricular diastolic function was indeterminate.  · Calculated left ventricular EF = 51.7% Estimated left ventricular EF was   in agreement with the calculated left ventricular EF. Left ventricular   systolic function is normal.  · Left ventricular wall thickness is consistent with mild concentric   hypertrophy.             Medication Review:   I have reviewed the patient's current medication list    Current Facility-Administered Medications:   •  acetaminophen (TYLENOL) tablet 650 mg, 650 mg, Oral, Q4H PRN **OR** acetaminophen (TYLENOL) 160 MG/5ML solution 650 mg, 650 mg, Oral, Q4H PRN **OR** acetaminophen (TYLENOL) suppository 650 mg, 650 mg, Rectal, Q4H PRN, Gumaro Jarrell MD  •  aluminum-magnesium hydroxide-simethicone (MAALOX MAX) 400-400-40 MG/5ML suspension 15 mL, 15 mL, Oral, Q6H PRN, Gumaro Jarrell MD  •  arformoterol (BROVANA) nebulizer solution 15 mcg, 15 mcg, Nebulization, BID - RT, Gumaro Jarrell MD, 15 mcg at 04/19/21 0728  •   budesonide (PULMICORT) nebulizer solution 0.5 mg, 0.5 mg, Nebulization, BID - RT, Gumaro Jarrell MD, 0.5 mg at 04/19/21 0717  •  calcium carbonate (TUMS) chewable tablet 500 mg (200 mg elemental), 1 tablet, Oral, BID PRN, Gumaro Jarrell MD, 1 tablet at 04/17/21 0815  •  digoxin (LANOXIN) tablet 125 mcg, 125 mcg, Oral, Daily, Georges Jones MD, 125 mcg at 04/19/21 1254  •  dilTIAZem CD (CARDIZEM CD) 24 hr capsule 240 mg, 240 mg, Oral, Q24H, Wilfred Draper MD, 240 mg at 04/19/21 1040  •  furosemide (LASIX) tablet 40 mg, 40 mg, Oral, Daily, Wilfred Draper MD, 40 mg at 04/19/21 1042  •  ipratropium-albuterol (DUO-NEB) nebulizer solution 3 mL, 3 mL, Nebulization, 4x Daily - RT, Gumaro Jarrell MD, 3 mL at 04/19/21 0716  •  montelukast (SINGULAIR) tablet 10 mg, 10 mg, Oral, Nightly, Gumaro Jarrell MD, 10 mg at 04/18/21 2039  •  nitroglycerin (NITROSTAT) SL tablet 0.4 mg, 0.4 mg, Sublingual, Q5 Min PRN, Gumaro Jarrell MD  •  ondansetron (ZOFRAN) tablet 4 mg, 4 mg, Oral, Q6H PRN **OR** ondansetron (ZOFRAN) injection 4 mg, 4 mg, Intravenous, Q6H PRN, Gumaro Jarrell MD  •  pantoprazole (PROTONIX) EC tablet 40 mg, 40 mg, Oral, QAM, Gumaro Jarrell MD, 40 mg at 04/19/21 1047  •  potassium chloride (K-DUR,KLOR-CON) CR tablet 10 mEq, 10 mEq, Oral, Daily, Georges Jones MD, 10 mEq at 04/19/21 1041  •  potassium chloride (K-DUR,KLOR-CON) CR tablet 40 mEq, 40 mEq, Oral, See Admin Instructions, Gumaro Jarrell MD, 40 mEq at 04/17/21 0811  •  rosuvastatin (CRESTOR) tablet 10 mg, 10 mg, Oral, Daily, Gumaro Jarrell MD, 10 mg at 04/19/21 1042  •  sodium chloride 0.9 % flush 1-10 mL, 1-10 mL, Intravenous, PRN, Gumaro Jarrell MD  •  [COMPLETED] Insert peripheral IV, , , Once **AND** sodium chloride 0.9 % flush 10 mL, 10 mL, Intravenous, PRN, West Lopez MD  •  sodium chloride 0.9 % flush 10 mL, 10 mL, Intravenous,  Q12H, Gumaro Jarrell MD, 10 mL at 04/19/21 1044  •  sodium chloride 0.9 % infusion 40 mL, 40 mL, Intravenous, PRN, Gumaro Jarrell MD      Assessment/Plan     Abnormal stress test with chest discomfort   Inferior wall medium size abnormality   Stress test tomorrow   May eat   Hold eliquis   Discussed with his nurse    COPD/ SOA    Essential Hypertension    Atrial fib    Acute CHF possibly diastolic and systolic/ await cath tomorrow to further diagnose   Echo shows low normal EF    Obesity/ Body mass index is 30.57 kg/m².              Plan for disposition:  Cardiac Catheterization in       Meera Cruz DO  04/19/21  15:32 EDT      Time: 30 min    Electronically signed by Meera Cruz DO at 04/19/21 4386     Krystal Eng MD at 04/19/21 0723             LOS: 3 days   Patient Care Team:  Jason King MD as PCP - General (Internal Medicine)    Chief Complaint:  Follow-up atrial fibrillation and CHF.    Interval History:     His breathing is better.  He is diuresing.  He denies chest pain or pressure this morning.  He has no dizziness and does not feel his heart racing.    Objective   Vital Signs  Temp:  [97.5 °F (36.4 °C)-98.9 °F (37.2 °C)] 98 °F (36.7 °C)  Heart Rate:  [] 100  Resp:  [16-22] 20  BP: (105-136)/(73-97) 113/84    Intake/Output Summary (Last 24 hours) at 4/19/2021 0747  Last data filed at 4/19/2021 0440  Gross per 24 hour   Intake 1080 ml   Output 3125 ml   Net -2045 ml       Comfortable NAD  Neck supple, no JVD or thyromegaly appreciated  S1/S2 irregular, rate controlled no m/r/g  Lungs CTA B, normal effort  Abdomen S/NT/ND (+) BS, no HSM appreciated  Extremities warm, no clubbing, cyanosis, or edema  No visible or palpable skin lesions  A/Ox4, mood and affect appropriate    Results Review:      Results from last 7 days   Lab Units 04/19/21  0443 04/18/21  0558 04/17/21  0434   SODIUM mmol/L 136 135* 139   POTASSIUM mmol/L 3.7 3.6 3.4*   CHLORIDE  mmol/L 101 100 98   CO2 mmol/L 27.7 24.0 31.0*   BUN mg/dL 17 15 12   CREATININE mg/dL 0.74* 0.66* 0.71*   GLUCOSE mg/dL 98 100* 103*   CALCIUM mg/dL 8.2* 8.4* 8.6     Results from last 7 days   Lab Units 04/16/21  2147   TROPONIN T ng/mL <0.010     Results from last 7 days   Lab Units 04/19/21  0443 04/18/21  0558 04/17/21  0434   WBC 10*3/mm3 11.92* 9.87 12.89*   HEMOGLOBIN g/dL 16.1 15.7 15.5   HEMATOCRIT % 48.4 48.2 47.4   PLATELETS 10*3/mm3 202 173 191             Results from last 7 days   Lab Units 04/18/21  0558   MAGNESIUM mg/dL 2.1           I reviewed the patient's new clinical results.  I personally viewed and interpreted the patient's EKG/Telemetry data        Medication Review:   apixaban, 5 mg, Oral, Q12H  arformoterol, 15 mcg, Nebulization, BID - RT  budesonide, 0.5 mg, Nebulization, BID - RT  digoxin, 125 mcg, Oral, Daily  dilTIAZem CD, 240 mg, Oral, Q24H  furosemide, 40 mg, Oral, Daily  ipratropium-albuterol, 3 mL, Nebulization, 4x Daily - RT  montelukast, 10 mg, Oral, Nightly  pantoprazole, 40 mg, Oral, QAM  potassium chloride, 10 mEq, Oral, Daily  potassium chloride, 40 mEq, Oral, See Admin Instructions  rosuvastatin, 10 mg, Oral, Daily  sodium chloride, 10 mL, Intravenous, Q12H             Assessment/Plan       Chronic obstructive pulmonary disease (CMS/HCC)    Hypertension    Atrial fibrillation (CMS/HCC)    Class 1 obesity due to excess calories with serious comorbidity in adult    Acute CHF (congestive heart failure) (CMS/HCC)    Shortness of breath    Chest discomfort       1. SOA - likely due to a fib and HFpEF - await echo and if normal stress nuclear.   2. Chest discomfort  3. Acute CHF, suspect diastolic  4. COPD  5. Atrial fibrillation - rate controlled - on apixaban.   6. HTN    Stress or cardiac catheterization based on results of echocardiogram done today.  I am holding his apixaban until this decision is made.  He is on oral Lasix and seems to be doing well.  Vitals are  stable.    Addendum: Stress test today, echocardiogram shows low normal ejection fraction.    Krystal Eng MD  04/19/21  07:47 EDT        Electronically signed by Krystal Eng MD at 04/19/21 0835     Wilfred Draper MD at 04/18/21 1000        *Acute CHF -- echo pending to determine if HFrEF vs HFpEF.  Patient has had good diuresis and improvement in respiratory function, and has terrible muscle cramping.  Will hold diuretics today and start oral diuretics tomorrow.    *Atrial fibrillation -- rate controlled.  Switch to long acting diltiazem, continue oral digoxin.  Digoxin level is not indicated in digoxin loading, I cancelled the lab order for tomorrow.  Will increase apixaban to 5mg BID.    *Chest pain -- no evidence of ACS.  If EF normal, will get perfusion stress test tomorrow (NPO after MN).  If not, will likely need cath given risk factors for CAD.      Electronically signed by Wilfred Draper MD at 04/18/21 1002     Georges Jones MD at 04/18/21 0704              [facility] HOSPITALIST TEAM   PROGRESS NOTE      Patient Care Team:  Jason King MD as PCP - General (Internal Medicine)    Patient seen at bedside    Hospitalist Team      Patient Care Team:  Provider, No Known as PCP - General          Subjective      Presenting History and Chief Complaints:      Chief Complaint:       Shortness of breath     Admission History:     Mr. Claude Spencer is a 65-year-old  male who is known to have HTN, HLD, COPD and GERD, who became ill several weeks ago with cough chest congestion shortness of breath and some sputum production.  He saw his primary care physician was given  antibiotics and steroids. He started having symptoms again.  Has developed some orthopnea for the last 3 weeks some lower extremity edema and some abdominal distention.  Also has been having some intermittent vague left and right-sided chest discomfort feels like the burning pain.  He also has been  "having palpitations off and on for several weeks.    Interval History and ROS:     Patient States Patient feels better. Breathing has improved since he has been here  Patient Complaints:  No new complaints except \"devon horse\" pain in legs  Patient Denies:  Any sx of fever, chest pain, abdominal pain or n/v  History taken from: Patient      Objective    Vital Signs  Temp:  [98 °F (36.7 °C)-98.7 °F (37.1 °C)] 98.7 °F (37.1 °C)  Heart Rate:  [] 75  Resp:  [14-24] 14  BP: ()/() 128/98    Flowsheet Rows      First Filed Value   Admission Height  175.3 cm (69\") Documented at 04/16/2021 2120   Admission Weight  99.8 kg (220 lb) Documented at 04/16/2021 2120              PHYSICAL EXAMINATION:      Physical Exam:     VS: As documented above  PE: GEN - A&O x 3, in NAD   HEENT - Normocephalic, PERRL, EOMI   CV - Irregularly irregualr, S1+, S2+. with no m/r/g  RESP - CTAB x no rales or rhonchi    ABD - s/nt/nd, NABS, no HSMeg, no palpable masses   MS - MAEW(moves all extremities well) , 5/5 strength UE/LE   EXT - 1+ ankle/pedal edema. No cyanosis or clubbing  NEURO - CN II-XII intact, normal motor and sensory examinations without any focal neurologic deficits.  PSYCH - affect, mood congruent and appropriate       Results Review:     I reviewed the patient's new clinical results.    Lab Results (last 24 hours)     Procedure Component Value Units Date/Time    CBC & Differential [[id number]]  (Abnormal) Collected: 04/18/21 0558    Specimen: Blood Updated: 04/18/21 0609    Narrative:      The following orders were created for panel order CBC & Differential.  Procedure                               Abnormality         Status                     ---------                               -----------         ------                     CBC Auto Differential[[id number]]        Abnormal            Final result                 Please view results for these tests on the individual orders.    CBC Auto Differential " [[id number]]  (Abnormal) Collected: 04/18/21 0558    Specimen: Blood Updated: 04/18/21 0609     WBC 9.87 10*3/mm3      RBC 4.86 10*6/mm3      Hemoglobin 15.7 g/dL      Hematocrit 48.2 %      MCV 99.2 fL      MCH 32.3 pg      MCHC 32.6 g/dL      RDW 13.4 %      RDW-SD 48.9 fl      MPV 10.0 fL      Platelets 173 10*3/mm3      Neutrophil % 73.9 %      Lymphocyte % 13.8 %      Monocyte % 10.0 %      Eosinophil % 1.7 %      Basophil % 0.1 %      Immature Grans % 0.5 %      Neutrophils, Absolute 7.29 10*3/mm3      Lymphocytes, Absolute 1.36 10*3/mm3      Monocytes, Absolute 0.99 10*3/mm3      Eosinophils, Absolute 0.17 10*3/mm3      Basophils, Absolute 0.01 10*3/mm3      Immature Grans, Absolute 0.05 10*3/mm3      nRBC 0.0 /100 WBC     Magnesium [[id number]] Collected: 04/18/21 0558    Specimen: Blood Updated: 04/18/21 0606    Comprehensive Metabolic Panel [[id number]] Collected: 04/18/21 0558    Specimen: Blood Updated: 04/18/21 0606    Procalcitonin [[id number]] Collected: 04/18/21 0558    Specimen: Blood Updated: 04/18/21 0606    Respiratory Culture - Sputum, Cough [[id number]] Collected: 04/17/21 1732    Specimen: Sputum from Cough Updated: 04/17/21 1930     Gram Stain Few (2+) WBCs per low power field      Moderate (3+) Gram positive cocci in pairs    Respiratory Panel, PCR (WITHOUT COVID) - Swab, Nasopharynx [[id number]]  (Normal) Collected: 04/17/21 1412    Specimen: Swab from Nasopharynx Updated: 04/17/21 1821     ADENOVIRUS, PCR Not Detected     Coronavirus 229E Not Detected     Coronavirus HKU1 Not Detected     Coronavirus NL63 Not Detected     Coronavirus OC43 Not Detected     Human Metapneumovirus Not Detected     Human Rhinovirus/Enterovirus Not Detected     Influenza B PCR Not Detected     Parainfluenza Virus 1 Not Detected     Parainfluenza Virus 2 Not Detected     Parainfluenza Virus 3 Not Detected     Parainfluenza Virus 4 Not Detected     Bordetella pertussis pcr Not Detected     Chlamydophila pneumoniae PCR  Not Detected     Mycoplasma pneumo by PCR Not Detected     Influenza A PCR Not Detected     RSV, PCR Not Detected     Bordetella parapertussis PCR Not Detected    Narrative:      The coronavirus on the RVP is NOT COVID-19 and is NOT indicative of infection with COVID-19.           Imaging Results (Last 24 Hours)     ** No results found for the last 24 hours. **          Xray reviewed personally by physician.      ECG reviewed personally by physician  ECG/EMG Results (most recent)     Procedure Component Value Units Date/Time    ECG 12 Lead [[id number]] Collected: 04/16/21 2121     Updated: 04/16/21 2130     QT Interval 307 ms     Narrative:      HEART RATE= 138  bpm  RR Interval= 434  ms  LA Interval=   ms  P Horizontal Axis=   deg  P Front Axis=   deg  QRSD Interval= 103  ms  QT Interval= 307  ms  QRS Axis= 59  deg  T Wave Axis= 19  deg  - ABNORMAL ECG -  Atrial fibrillation  Ventricular premature complex  Electronically Signed By:   Date and Time of Study: 2021-04-16 21:21:07          Medication Review:   I have reviewed the patient's current medication list    Current Facility-Administered Medications:   •  acetaminophen (TYLENOL) tablet 650 mg, 650 mg, Oral, Q4H PRN **OR** acetaminophen (TYLENOL) 160 MG/5ML solution 650 mg, 650 mg, Oral, Q4H PRN **OR** acetaminophen (TYLENOL) suppository 650 mg, 650 mg, Rectal, Q4H PRN, Gumaro Jarrell MD  •  aluminum-magnesium hydroxide-simethicone (MAALOX MAX) 400-400-40 MG/5ML suspension 15 mL, 15 mL, Oral, Q6H PRN, Gumaro Jarrell MD  •  apixaban (ELIQUIS) tablet 5 mg, 5 mg, Oral, Q12H, Gumaro Jarrell MD, 5 mg at 04/17/21 2054  •  arformoterol (BROVANA) nebulizer solution 15 mcg, 15 mcg, Nebulization, BID - RT, Gumaro Jarrell MD, 15 mcg at 04/17/21 2039  •  budesonide (PULMICORT) nebulizer solution 0.5 mg, 0.5 mg, Nebulization, BID - RT, Gumaro Jarrell MD, 0.5 mg at 04/17/21 1955  •  calcium carbonate (TUMS) chewable  tablet 500 mg (200 mg elemental), 1 tablet, Oral, BID PRN, Gumaro Jarrell MD, 1 tablet at 04/17/21 0815  •  dilTIAZem (CARDIZEM) 100 mg in 100 mL NS infusion (ADV), 10 mg/hr, Intravenous, Titrated, Wilfred Draper MD, Last Rate: 10 mL/hr at 04/17/21 0513, 10 mg/hr at 04/17/21 0513  •  dilTIAZem (CARDIZEM) tablet 60 mg, 60 mg, Oral, Q6H, Wilfred Draper MD, 60 mg at 04/18/21 0600  •  furosemide (LASIX) injection 40 mg, 40 mg, Intravenous, BID, Wilfred Draper MD, 40 mg at 04/17/21 1604  •  ipratropium-albuterol (DUO-NEB) nebulizer solution 3 mL, 3 mL, Nebulization, 4x Daily - RT, Gumaro Jarrell MD, 3 mL at 04/17/21 1955  •  montelukast (SINGULAIR) tablet 10 mg, 10 mg, Oral, Nightly, Gumaro Jarrell MD, 10 mg at 04/17/21 2053  •  nitroglycerin (NITROSTAT) SL tablet 0.4 mg, 0.4 mg, Sublingual, Q5 Min PRN, Gumaro Jarrell MD  •  ondansetron (ZOFRAN) tablet 4 mg, 4 mg, Oral, Q6H PRN **OR** ondansetron (ZOFRAN) injection 4 mg, 4 mg, Intravenous, Q6H PRN, Gumaro Jarrell MD  •  pantoprazole (PROTONIX) EC tablet 40 mg, 40 mg, Oral, QAM, Gumaro Jarrell MD, 40 mg at 04/18/21 0600  •  potassium chloride (K-DUR,KLOR-CON) CR tablet 40 mEq, 40 mEq, Oral, See Admin Instructions, Gumaro Jarrell MD, 40 mEq at 04/17/21 0811  •  rosuvastatin (CRESTOR) tablet 10 mg, 10 mg, Oral, Daily, Gumaro Jarrell MD, 10 mg at 04/17/21 0811  •  sodium chloride 0.9 % flush 1-10 mL, 1-10 mL, Intravenous, PRN, Gumaro Jarrell MD  •  [COMPLETED] Insert peripheral IV, , , Once **AND** sodium chloride 0.9 % flush 10 mL, 10 mL, Intravenous, PRN, West Lopez MD  •  sodium chloride 0.9 % flush 10 mL, 10 mL, Intravenous, Q12H, Gumaro Jarrell MD, 10 mL at 04/17/21 2054  •  sodium chloride 0.9 % infusion 40 mL, 40 mL, Intravenous, PRN, Gumaro Jarrell MD      Assessment/Plan           PLAN:    -Labs and diagnostic tests reviewed:  "YES    -Diagnostic tests reviewed: YES    -Consultations: Cardiology    -Any new recommendations: As per cardiology    -New Labs ordered: CBC, CMP and Serum Digixin    -New diagnostic tests ordered: Repeat CXR in AM and ECHO in AM as well    -Any changes in medications:  MEDICATION CHANGES:   New Medications added: As per Order sheet   Medication Dose changed: N/A   Medications deleted: N/A    -Placement issues: N/A    -Patient is clinically and hemodynamically stable    -To continue current management and supportive care    -Will follow patient closely    -Nothing new to add for right now    -Discharge planning issues: Patient should be able to go back home once ready for discharge      DIAGNOSES:      PRIMARY DIAGNOSES:        New onset Atrial Fib: On Inj Diltiazem and Inj Digoxin. Last pulse 75. TSH 2.660 and Free T4 1.48. Seen by Dr Draper. Note appreciated. Rate under control. Will order PO Digoxin and check serum Digoxin level in AM     Chronic anticoagulation: On Apixaban     CHF: New onset, most likely secondary to atrial fibrillation. On IV Furosemide. Echo has been ordered. Will be done tomorrow. Will repeat CXR tomorrow    Hypokalemia: Serum K 3.4. Will add KCl     Chest Pain: Atypical. Could be associated with \"Demand ischemia\" as Troponis are normal and EKG does not show any ST-T wave changes. Patient denies any sx of chest pain at this time     Leukocytosis: WBC 9.87 down from 14.30. Procalcitonin 0.06. Noninfectious etiology     HTN: Last /98. Will monitor     HLD: On Rosuvastatin     GERD: On Pantoprazole     COPD: On DuoNeb, Brovana and Budesonide via nebulizer. Shortness of breath has improved. Patient is receiving breathing treatment this morning     Radiographic Review:  CXR: From 4/16/2021  Findings most suggestive of CHF although pneumonia is not radiographically excluded.     EKG: From 4/16/2021  ABNORMAL ECG -  Atrial fibrillation  Ventricular premature complex     Anthropometric " Analysis: BMI:  31.84 (Obesity Class I)     CODE Status: FULL CODE     Tobacco use: Former. Quit 2000     Alcohol intake: N/A     Illegal Drug use: N/A     DVT Prophylaxis: Apixaban and SCDs                ?     SECONDARY DIAGNOSES:  ?     As above      SURGICAL DIAGNOSES:      As per Problem List      TODAY'S DISCHARGE:        N/A          Plan for disposition: Patient should be able to go back to Home once ready for discharge    Georges Jones MD  04/18/21  07:04 EDT            Georges Jones M.D.; MS; FACP; MPH; FRANCIE  Internal Medicine/ Hospitalist          Time:  30  minutes      Electronically signed by Georges Jones MD at 04/18/21 0733     Georges Jones MD at 04/17/21 1210              [facility] HOSPITALIST TEAM   PROGRESS NOTE      Patient Care Team:  Armaan Jarvis DO as PCP - General (Family Medicine)    Patient seen at bedside    Hospitalist Team      Patient Care Team:  Provider, No Known as PCP - General          Subjective      Presenting History and Chief Complaints:      Chief Complaint:      Shortness of breath    Admission History:    Mr. Claude Spencer is a 65-year-old  male who is known to have HTN, HLD, COPD and GERD, who became ill several weeks ago with cough chest congestion shortness of breath and some sputum production.  He saw his primary care physician was given  antibiotics and steroids. He started having symptoms again.  Has developed some orthopnea for the last 3 weeks some lower extremity edema and some abdominal distention.  Also has been having some intermittent vague left and right-sided chest discomfort feels like the burning pain.  He also has been having palpitations off and on for several weeks.       Interval History and ROS:     Patient States As above: cough, palpitations and chest pain  Patient Complaints: As above  Patient Denies:  Any sx of fever, headache, abdominal pain or n/v  History taken from: Patient      Objective    Vital  "Signs  Temp:  [98 °F (36.7 °C)-98.7 °F (37.1 °C)] 98 °F (36.7 °C)  Heart Rate:  [] 86  Resp:  [16-20] 18  BP: ()/() 123/92    Flowsheet Rows      First Filed Value   Admission Height  175.3 cm (69\") Documented at 04/16/2021 2120   Admission Weight  99.8 kg (220 lb) Documented at 04/16/2021 2120              PHYSICAL EXAMINATION:      Physical Exam   VS: As above    Gen: pt alert, comfortably lying in bed, having no pain or difficulty breathing   HEENT: Normocephalic. PERRL     CV: Irregularly irregular, S1+ and S2+. No murmur, rubs or gallops appreciated.   Pul: A few rales and rhonchi to auscultation.    Abdm: bowel sounds present, abdomen soft, non-distended.   Extr: 1+ ankle/pedal edema. No cyanosis or clubbing    MSK: Muscle tone and muscle strength are unremarkable    Neuro: pt is alert and responsive. No focal neurologic deficits    Skin: Warm and dry. Not diaphoretic         Results Review:     I reviewed the patient's new clinical results.    Lab Results (last 24 hours)     Procedure Component Value Units Date/Time    Basic Metabolic Panel [[id number]]  (Abnormal) Collected: 04/17/21 0434    Specimen: Blood Updated: 04/17/21 0615     Glucose 103 mg/dL      BUN 12 mg/dL      Creatinine 0.71 mg/dL      Sodium 139 mmol/L      Potassium 3.4 mmol/L      Chloride 98 mmol/L      CO2 31.0 mmol/L      Calcium 8.6 mg/dL      eGFR Non African Amer 112 mL/min/1.73      BUN/Creatinine Ratio 16.9     Anion Gap 10.0 mmol/L     Narrative:      GFR Normal >60  Chronic Kidney Disease <60  Kidney Failure <15      CBC (No Diff) [[id number]]  (Abnormal) Collected: 04/17/21 0434    Specimen: Blood Updated: 04/17/21 0527     WBC 12.89 10*3/mm3      RBC 4.81 10*6/mm3      Hemoglobin 15.5 g/dL      Hematocrit 47.4 %      MCV 98.5 fL      MCH 32.2 pg      MCHC 32.7 g/dL      RDW 13.6 %      RDW-SD 49.7 fl      MPV 10.6 fL      Platelets 191 10*3/mm3     COVID PRE-OP / PRE-PROCEDURE SCREENING ORDER (NO ISOLATION) - " Swab, Nasal Cavity [[id number]]  (Normal) Collected: 04/16/21 2312    Specimen: Swab from Nasal Cavity Updated: 04/16/21 2350    Narrative:      The following orders were created for panel order COVID PRE-OP / PRE-PROCEDURE SCREENING ORDER (NO ISOLATION) - Swab, Nasal Cavity.  Procedure                               Abnormality         Status                     ---------                               -----------         ------                     COVID-19,[employer] IN-YI...[[id number]]  Normal              Final result                 Please view results for these tests on the individual orders.    COVID-19,[employer] [employer] IN-HOUSE,Nasal Swab No Transport Media 3-4 HR TAT - Swab, Nasal Cavity [[id number]]  (Normal) Collected: 04/16/21 2312    Specimen: Swab from Nasal Cavity Updated: 04/16/21 2350     COVID19 Not Detected    Narrative:      Fact sheet for providers: https://www.fda.gov/media/506299/download     Fact sheet for patients: https://www.fda.gov/media/163299/download    Test performed by PCR.    Consider negative results in combination with clinical observations, patient history, and epidemiological information.    T4, Free [[id number]]  (Normal) Collected: 04/16/21 2147    Specimen: Blood Updated: 04/16/21 2226     Free T4 1.48 ng/dL     Narrative:      Results may be falsely increased if patient taking Biotin.      TSH [[id number]]  (Normal) Collected: 04/16/21 2147    Specimen: Blood Updated: 04/16/21 2226     TSH 2.660 uIU/mL     Troponin [[id number]]  (Normal) Collected: 04/16/21 2147    Specimen: Blood Updated: 04/16/21 2222     Troponin T <0.010 ng/mL     Narrative:      Troponin T Reference Range:  <= 0.03 ng/mL-   Negative for AMI  >0.03 ng/mL-     Abnormal for myocardial necrosis.  Clinicians would have to utilize clinical acumen, EKG, Troponin and serial changes to determine if it is an Acute Myocardial Infarction or myocardial injury due to an underlying chronic condition.       Results may be falsely  decreased if patient taking Biotin.      BNP [[id number]]  (Abnormal) Collected: 04/16/21 2147    Specimen: Blood Updated: 04/16/21 2220     proBNP 2,963.0 pg/mL     Narrative:      Among patients with dyspnea, NT-proBNP is highly sensitive for the detection of acute congestive heart failure. In addition NT-proBNP of <300 pg/ml effectively rules out acute congestive heart failure with 99% negative predictive value.    Results may be falsely decreased if patient taking Biotin.      Comprehensive Metabolic Panel [[id number]]  (Abnormal) Collected: 04/16/21 2147    Specimen: Blood Updated: 04/16/21 2214     Glucose 104 mg/dL      BUN 15 mg/dL      Creatinine 0.86 mg/dL      Sodium 138 mmol/L      Potassium 3.9 mmol/L      Chloride 98 mmol/L      CO2 30.7 mmol/L      Calcium 8.9 mg/dL      Total Protein 6.5 g/dL      Albumin 4.00 g/dL      ALT (SGPT) 228 U/L      AST (SGOT) 67 U/L      Alkaline Phosphatase 84 U/L      Total Bilirubin 1.3 mg/dL      eGFR Non African Amer 90 mL/min/1.73      Globulin 2.5 gm/dL      A/G Ratio 1.6 g/dL      BUN/Creatinine Ratio 17.4     Anion Gap 9.3 mmol/L     Narrative:      GFR Normal >60  Chronic Kidney Disease <60  Kidney Failure <15      Magnesium [[id number]]  (Normal) Collected: 04/16/21 2147    Specimen: Blood Updated: 04/16/21 2214     Magnesium 1.9 mg/dL     CBC & Differential [[id number]]  (Abnormal) Collected: 04/16/21 2147    Specimen: Blood Updated: 04/16/21 2200    Narrative:      The following orders were created for panel order CBC & Differential.  Procedure                               Abnormality         Status                     ---------                               -----------         ------                     CBC Auto Differential[[id number]]        Abnormal            Final result                 Please view results for these tests on the individual orders.    CBC Auto Differential [[id number]]  (Abnormal) Collected: 04/16/21 2147    Specimen: Blood Updated: 04/16/21  2200     WBC 14.30 10*3/mm3      RBC 4.93 10*6/mm3      Hemoglobin 16.1 g/dL      Hematocrit 49.3 %      .0 fL      MCH 32.7 pg      MCHC 32.7 g/dL      RDW 13.6 %      RDW-SD 50.2 fl      MPV 10.7 fL      Platelets 196 10*3/mm3      Neutrophil % 76.5 %      Lymphocyte % 10.8 %      Monocyte % 11.3 %      Eosinophil % 0.6 %      Basophil % 0.1 %      Immature Grans % 0.7 %      Neutrophils, Absolute 10.93 10*3/mm3      Lymphocytes, Absolute 1.55 10*3/mm3      Monocytes, Absolute 1.61 10*3/mm3      Eosinophils, Absolute 0.09 10*3/mm3      Basophils, Absolute 0.02 10*3/mm3      Immature Grans, Absolute 0.10 10*3/mm3      nRBC 0.0 /100 WBC           Imaging Results (Last 24 Hours)     Procedure Component Value Units Date/Time    XR Chest 1 View [[id number]] Collected: 04/16/21 2240     Updated: 04/16/21 2243    Narrative:      CR Chest 1 Vw    INDICATION:   Shortness of air for 3 weeks.     COMPARISON:    11/6/2019    FINDINGS:  2 portable AP view(s) of the chest.  Heart size is top normal. There is vascular congestion. There are infiltrates in both mid to lower lung zones certainly worrisome for pulmonary edema although pneumonia is not excluded. No pneumothorax is identified.      Impression:      Findings most suggestive of CHF although pneumonia is not radiographically excluded.    Signer Name: Jay Angel MD   Signed: 4/16/2021 10:40 PM   Workstation Name: [facility]    [facility]          Xray reviewed personally by physician.      ECG reviewed personally by physician  ECG/EMG Results (most recent)     Procedure Component Value Units Date/Time    ECG 12 Lead [[id number]] Collected: 04/16/21 2121     Updated: 04/16/21 2130     QT Interval 307 ms     Narrative:      HEART RATE= 138  bpm  RR Interval= 434  ms  ID Interval=   ms  P Horizontal Axis=   deg  P Front Axis=   deg  QRSD Interval= 103  ms  QT Interval= 307  ms  QRS Axis= 59  deg  T Wave Axis= 19  deg  - ABNORMAL ECG  -  Atrial fibrillation  Ventricular premature complex  Electronically Signed By:   Date and Time of Study: 2021-04-16 21:21:07          Medication Review:   I have reviewed the patient's current medication list    Current Facility-Administered Medications:   •  acetaminophen (TYLENOL) tablet 650 mg, 650 mg, Oral, Q4H PRN **OR** acetaminophen (TYLENOL) 160 MG/5ML solution 650 mg, 650 mg, Oral, Q4H PRN **OR** acetaminophen (TYLENOL) suppository 650 mg, 650 mg, Rectal, Q4H PRN, Gumaro Jarrell MD  •  aluminum-magnesium hydroxide-simethicone (MAALOX MAX) 400-400-40 MG/5ML suspension 15 mL, 15 mL, Oral, Q6H PRN, Gumaro Jarrell MD  •  apixaban (ELIQUIS) tablet 5 mg, 5 mg, Oral, Q12H, Gumaro Jarrell MD, 5 mg at 04/17/21 0811  •  budesonide-formoterol (SYMBICORT) 160-4.5 MCG/ACT inhaler 2 puff, 2 puff, Inhalation, BID, Gumaro Jarrell MD, 2 puff at 04/17/21 0910  •  calcium carbonate (TUMS) chewable tablet 500 mg (200 mg elemental), 1 tablet, Oral, BID PRN, Gumaro Jarrell MD, 1 tablet at 04/17/21 0815  •  digoxin (LANOXIN) injection 250 mcg, 250 mcg, Intravenous, Once, Wilfred Draper MD  •  dilTIAZem (CARDIZEM) 100 mg in 100 mL NS infusion (ADV), 10 mg/hr, Intravenous, Titrated, Wilfred Draper MD, Last Rate: 10 mL/hr at 04/17/21 0513, 10 mg/hr at 04/17/21 0513  •  dilTIAZem (CARDIZEM) tablet 60 mg, 60 mg, Oral, Q6H, Wilfred Draper MD, 60 mg at 04/17/21 1102  •  furosemide (LASIX) injection 40 mg, 40 mg, Intravenous, BID, Wilfred Draper MD  •  ipratropium-albuterol (DUO-NEB) nebulizer solution 3 mL, 3 mL, Nebulization, 4x Daily - RT, Gumaro Jarrell MD, 3 mL at 04/17/21 0755  •  montelukast (SINGULAIR) tablet 10 mg, 10 mg, Oral, Nightly, Gumaro Jarrell MD  •  nitroglycerin (NITROSTAT) SL tablet 0.4 mg, 0.4 mg, Sublingual, Q5 Min PRN, Gumaro Jarrell MD  •  ondansetron (ZOFRAN) tablet 4 mg, 4 mg, Oral, Q6H PRN **OR** ondansetron  (ZOFRAN) injection 4 mg, 4 mg, Intravenous, Q6H PRN, Gumaro Jarrell MD  •  pantoprazole (PROTONIX) EC tablet 40 mg, 40 mg, Oral, QAM, Gumaro Jarrell MD, 40 mg at 04/17/21 0815  •  potassium chloride (K-DUR,KLOR-CON) CR tablet 40 mEq, 40 mEq, Oral, See Admin Instructions, Gumaro Jarrell MD, 40 mEq at 04/17/21 0811  •  rosuvastatin (CRESTOR) tablet 10 mg, 10 mg, Oral, Daily, Gumaro Jarrell MD, 10 mg at 04/17/21 0811  •  sodium chloride 0.9 % flush 1-10 mL, 1-10 mL, Intravenous, PRN, Gumaro Jarrell MD  •  [COMPLETED] Insert peripheral IV, , , Once **AND** sodium chloride 0.9 % flush 10 mL, 10 mL, Intravenous, PRN, West Lopez MD  •  sodium chloride 0.9 % flush 10 mL, 10 mL, Intravenous, Q12H, Gumaro Jarrell MD, 10 mL at 04/17/21 0817  •  sodium chloride 0.9 % infusion 40 mL, 40 mL, Intravenous, PRN, Gumaro Jarrell MD      Assessment/Plan           PLAN:    -Labs and diagnostic tests reviewed: YES    -Diagnostic tests reviewed: YES    -Consultations: Cardiology    -Any new recommendations: As per Cardiology    -New Labs ordered: Serial Troponins, CBC, CMP    -New diagnostic tests ordered: N/A    -Any changes in medications:  MEDICATION CHANGES:   New Medications added: As per order sheet   Medication Dose changed: N/A   Medications deleted: N/A    -Placement issues: N/A    -Patient is clinically and hemodynamically stable    -To continue current management and supportive care    -Will follow patient closely    -Nothing new to add for right now    -Discharge planning issues: Patient should be able to go back home once ready for discharge      DIAGNOSES:      PRIMARY DIAGNOSES:          New onset Atrial Fib: On Inj Diltiazem and Inj Digoxin. Last pulse 84. TSH 2.660 and Free T4 1.48. Seen by Dr Draper. Note appreciated     Chronic anticoagulation: On Apixaban    CHF: New onset, most likely secondary to atrial fibrillation. On IV  "Furosemide. Echo has been ordered    Chest Pain: Atypical. Could be associated with \"Demand ischemia\" as Troponis are normal and EKG does not show any ST-T wave changes.     Leukocytosis: WBC 12.89 down from 14.30    HTN: Last /79. Will monitor    HLD: On Rosuvastatin    GERD: On Pantoprazole    COPD: On DuoNeb, Brovana and Budesonide via nebulizer. Shortness of breath has improved.     Radiographic Review:  CXR: From 2021  Findings most suggestive of CHF although pneumonia is not radiographically excluded.    EKG: From 2021  ABNORMAL ECG -  Atrial fibrillation  Ventricular premature complex    Anthropometric Analysis: BMI:  31.84 (Obesity Class I)    CODE Status: FULL CODE    Tobacco use: Former. Quit     Alcohol intake: N/A    Illegal Drug use: N/A    DVT Prophylaxis: Apixaban and SCDs          ?     SECONDARY DIAGNOSES:  ?     As above      SURGICAL DIAGNOSES:      As per Problem List      TODAY'S DISCHARGE:        N/A          Plan for disposition: Patient should be able to go back to Home once ready for discharge    Georges Jones MD  21  12:10 EDT            Georges Jones M.D.; MS; FACP; MPH; FRANCIE  Internal Medicine/ Hospitalist          Time: 30  minutes      Electronically signed by Georges Jones MD at 21 1503          Consult Notes (last 7 days) (Notes from 21 through 21)      Wilfred Draper MD at 21 0995      Consult Orders    1. Inpatient Cardiology Consult [[id number]] ordered by Gumaro Jarrell MD at 21 [phone]               Date of Hospital Visit: 21  Encounter Provider: Wilfred Draper MD  Place of Service: [facility] CARDIOLOGY  Patient Name: Claude Spencer  :1955  Referral Provider: Dr Jarrell    Chief complaint: SOA, CP    History of Present Illness    Mr. Spencer is a 65-year-old gentleman who looks much older than stated age who presents with several weeks of generalized malaise, " dyspnea, chest discomfort, and palpitations.  He saw his primary care physician several weeks ago and was given oral steroids, intramuscular antibiotics, oral antibiotics, and bronchodilators.  This helped for a few days but then he started to feel poorly again. He noted a rapid clearing heartbeat, exertional shortness of breath, increased thin yellow sputum production, chest discomfort in the left upper chest, orthopnea, and mild leg swelling.  He saw his primary care physician again, who recommended hospitalization, but the patient refused.  He finally agreed to come in yesterday after his symptoms became unbearable.  He has not had any fevers.  He cannot reliably tell me if his symptoms get worse with exertion.  He no longer smokes cigarettes but he does chew tobacco.  He does have COPD and uses oxygen at night, but has been having to use it during the day for the past several weeks as well.    He denies any history of heart problems and states that he had a heart work-up many many years ago that was unremarkable.    Past Medical History:   Diagnosis Date   • COPD (chronic obstructive pulmonary disease) (CMS/[facility])    • Gastroesophageal reflux disease 3/4/2016   • Hyperlipidemia    • Hypertension    • Osteopenia 3/4/2016    Description: Her bone density August 2015 secondary to steroid use   • Vitamin D deficiency 3/4/2016       Past Surgical History:   Procedure Laterality Date   • ANKLE SURGERY         Prior to Admission medications    Medication Sig Start Date End Date Taking? Authorizing Provider   albuterol (PROVENTIL HFA;VENTOLIN HFA) 108 (90 Base) MCG/ACT inhaler Inhale 2 puffs 2 (Two) Times a Day. 10/27/17  Yes Christal Harmon MD   cholecalciferol (VITAMIN D3) 1000 units tablet Take 1,000 Units by mouth Daily.   Yes Maricarmen Hayes MD   lisinopril-hydrochlorothiazide (PRINZIDE,ZESTORETIC) 20-25 MG per tablet TAKE ONE TABLET BY MOUTH DAILY 3/12/19  Yes Panfilo Castle MD   montelukast (SINGULAIR) 10  MG tablet TAKE ONE TABLET BY MOUTH ONCE NIGHTLY 3/12/19  Yes Christal Harmon MD   Omega-3 Fatty Acids (FISH OIL) 1000 MG capsule capsule Take  by mouth daily. 7/30/15  Yes Maricarmen Hayes MD   omeprazole (priLOSEC) 20 MG capsule TAKE ONE CAPSULE BY MOUTH DAILY 3/12/19  Yes Christal Harmon MD   rosuvastatin (CRESTOR) 10 MG tablet TAKE ONE TABLET BY MOUTH DAILY 19  Yes Panfilo Castle MD   SYMBICORT 160-4.5 MCG/ACT inhaler INHALE TWO PUFFS BY MOUTH TWICE A DAY 3/4/19  Yes Lidia Shannon MD       Social History     Socioeconomic History   • Marital status:      Spouse name: Not on file   • Number of children: Not on file   • Years of education: Not on file   • Highest education level: Not on file   Tobacco Use   • Smoking status: Former Smoker     Packs/day: 1.00     Years: 30.00     Pack years: 30.00     Quit date: 2000     Years since quittin.0   • Smokeless tobacco: Current User     Types: Chew   • Tobacco comment: quit 15 years ago, wife current smoker outside   Substance and Sexual Activity   • Alcohol use: No   • Drug use: No   • Sexual activity: Yes     Partners: Female       Family History   Problem Relation Age of Onset   • COPD Mother 78   • Seizures Father    • Vasculitis Father    • Prostate cancer Maternal Uncle        Review of Systems   Constitutional: Positive for fatigue.   Respiratory: Positive for cough, shortness of breath and wheezing.    Cardiovascular: Positive for chest pain and palpitations.   All other systems reviewed and are negative.       Objective:     Vitals:    21 0804 21 0900 21 0910 21 0915   BP:  93/60     BP Location:  Right arm     Patient Position:  Lying     Pulse: 90 106 93 100   Resp: 16 18 16 16   Temp:       TempSrc:       SpO2: 93% 92% 94%    Weight:       Height:         Body mass index is 31.84 kg/m².    Last Weight and Admission Weight        21  0500   Weight: 97.8 kg (215 lb 9.6 oz)     Flowsheet  "Rows      First Filed Value   Admission Height  175.3 cm (69\") Documented at 2021   Admission Weight  99.8 kg (220 lb) Documented at 2021            Intake/Output Summary (Last 24 hours) at 2021 0956  Last data filed at 2021 0513  Gross per 24 hour   Intake 69 ml   Output 3150 ml   Net -3081 ml         Physical Exam  Constitutional:       General: He is not in acute distress.     Appearance: Normal appearance.   HENT:      Head: Normocephalic.      Nose: Nose normal.      Mouth/Throat:      Pharynx: Oropharynx is clear.   Eyes:      Conjunctiva/sclera: Conjunctivae normal.   Cardiovascular:      Rate and Rhythm: Tachycardia present. Rhythm irregular.      Pulses: Normal pulses.      Heart sounds: Normal heart sounds.      Comments: + varicosities  Pulmonary:      Effort: Pulmonary effort is normal.      Breath sounds: Examination of the right-middle field reveals rales. Examination of the right-lower field reveals decreased breath sounds. Examination of the left-lower field reveals wheezing. Decreased breath sounds, wheezing and rales present.   Abdominal:      Palpations: Abdomen is soft.      Tenderness: There is no abdominal tenderness.   Musculoskeletal:      Right lower le+ Edema present.      Left lower le+ Edema present.   Skin:     General: Skin is warm and dry.   Neurological:      General: No focal deficit present.      Mental Status: He is alert.   Psychiatric:         Mood and Affect: Mood normal.                 Lab Review:                Results from last 7 days   Lab Units 21  0434   SODIUM mmol/L 139   POTASSIUM mmol/L 3.4*   CHLORIDE mmol/L 98   CO2 mmol/L 31.0*   BUN mg/dL 12   CREATININE mg/dL 0.71*   GLUCOSE mg/dL 103*   CALCIUM mg/dL 8.6     Results from last 7 days   Lab Units 21  2147   TROPONIN T ng/mL <0.010     Results from last 7 days   Lab Units 21  0434   WBC 10*3/mm3 12.89*   HEMOGLOBIN g/dL 15.5   HEMATOCRIT % 47.4   PLATELETS " 10*3/mm3 191             Results from last 7 days   Lab Units 04/16/21  2147   MAGNESIUM mg/dL 1.9           I personally viewed and interpreted the patient's EKG/Telemetry data -- AF, PVC, rapid rate, no ischemic changes    Assessment/Plan:     1. SOA  2. Chest discomfort  3. Acute CHF, suspect diastolic  4. COPD  5. Atrial fibrillation   6. HTN    Suspect that he has been in atrial fibrillation for a few weeks now and has developed congestive heart failure as result.  I suspect that it is diastolic, but we will need an echocardiogram to assess his left ventricular ejection fraction.  His chest discomfort is a bit vague and is not necessarily exertional.  He is certainly at high risk for coronary artery disease.  His troponin is normal and his EKG shows no ischemic changes, so he does not have acute coronary syndrome at this time.    He is actively wheezing on exam, so I am reticent to use a beta-blocker.  I am going to try to transition him to oral diltiazem.  I will give him a digoxin load.  He has been placed on apixaban so I will stop enoxaparin.  We will diurese him with intravenous furosemide and follow his electrolytes.  I will defer bronchodilators to the primary service.    Once his echo was performed on Monday, he will need some type of ischemic evaluation, but whether or not this is noninvasive or invasive will depend on the results of the echocardiogram.  He understands that he needs to stay through the weekend.                  Electronically signed by Wilfred Draper MD at 04/17/21 0959          Discharge Summary      Bj Gutierrez MD at 04/19/21 1953            Claude Spencer  1955  [id number]        Hospitalist discharge Summary    Date of Admission: 4/16/2021  Date of Discharge:  4/19/2021    Primary Discharge Diagnoses: Elevated troponin, abnormal stress test acute on chronic heart failure        PCP  Patient Care Team:  Jason King MD as PCP - General (Internal  Medicine)    Consults:   Consults       Date and Time Order Name Status Description    4/16/2021 11:09 PM Inpatient Cardiology Consult Completed             Operations and Procedures Performed:       Adult Transthoracic Echo Complete w/ Color, Spectral and Contrast if Necessary Per Protocol    Result Date: 4/19/2021  Narrative: · The left ventricular cavity is moderately dilated. · Left ventricular diastolic function was indeterminate. · Calculated left ventricular EF = 51.7% Estimated left ventricular EF was in agreement with the calculated left ventricular EF. Left ventricular systolic function is normal. · Left ventricular wall thickness is consistent with mild concentric hypertrophy.      XR Chest 1 View    Result Date: 4/16/2021  Narrative: CR Chest 1 Vw INDICATION: Shortness of air for 3 weeks. COMPARISON:  11/6/2019 FINDINGS: 2 portable AP view(s) of the chest.  Heart size is top normal. There is vascular congestion. There are infiltrates in both mid to lower lung zones certainly worrisome for pulmonary edema although pneumonia is not excluded. No pneumothorax is identified.     Impression: Findings most suggestive of CHF although pneumonia is not radiographically excluded. Signer Name: Jay Angel MD  Signed: 4/16/2021 10:40 PM  Workstation [facility]: [facility]  [facility]    Stress Test With Myocardial Perfusion One Day    Result Date: 4/19/2021  Narrative: · Left ventricular ejection fraction is moderately reduced. (Calculated EF = 34%). · Myocardial perfusion imaging indicates a medium-sized infarct located in the inferior wall with no significant ischemia noted.      XR Chest PA & Lateral    Result Date: 4/19/2021  Narrative: PA AND LATERAL CHEST, 4/19/2021 8:40 AM  HISTORY: chf; I48.91-Unspecified atrial fibrillation   short is very low oxygen saturation for 3 days. Normal chest x-ray 04/16/2021  COMPARISON: 04/16/2021  TECHNIQUE: PA and lateral upright chest series.  FINDINGS:  Heart size and pulmonary vascularity are normal. The lungs are expanded and clear. No visible pulmonary infiltrate or pleural effusion.       Impression: Lower lobe infiltrates noted previously have resolved. There is no active disease  This report was finalized on 4/19/2021 8:46 AM by Dr. Chase Allison MD.        Allergies:  has No Known Allergies.        Discharge Medications:    budesonide-formoterol, 2 puff, Inhalation, BID - RT  digoxin, 125 mcg, Oral, Daily  dilTIAZem CD, 240 mg, Oral, Q24H  furosemide, 40 mg, Oral, Daily  montelukast, 10 mg, Oral, Nightly  pantoprazole, 40 mg, Oral, QAM  potassium chloride, 10 mEq, Oral, Daily  potassium chloride, 40 mEq, Oral, See Admin Instructions  rosuvastatin, 10 mg, Oral, Daily  sodium chloride, 10 mL, Intravenous, Q12H        History of Present Illness:  See H&P    Hospital Course  Patient ultimately admitted, 2D echo and stress test were checked secondary to shortness of air with A. fib heart failure with preserved EF.  Patient had chest discomfort ultimately with abnormal stress test with moderate size inferior infarct, EF 34% by gated imaging.  2D echo had a discrepant EF which was 50 to 55% with mild LV enlargement.  EKG from 416 revealed A. fib RVR but no Q waves or abnormal ST-T wave findings.  Troponin was ultimately unremarkable although proBNP was elevated.  Patient will be transferred to [facility] for invasive ischemic evaluation given abnormal findings per cardiology team.  Presently hemodynamically and electrically stable.-Lipid studies reviewed LDL is 81 total cholesterol 142.  He is without chest pain or dizziness or palpitations on my encounter.  Diuresis ongoing normal creatinine less than 1.    Patient on Eliquis for stroke risk reduction with paroxysmal atrial fibrillation, will leave to cardiology to discontinue prior to left heart catheterization depending on timing.        Last Lab Results:   Lab Results (most recent)       Procedure  Component Value Units Date/Time    Respiratory Culture - Sputum, Cough [[id number]] Collected: 04/17/21 1732    Specimen: Sputum from Cough Updated: 04/19/21 1236     Respiratory Culture Light growth (2+) Normal Respiratory Kyara: NO S.aureus/MRSA or Pseudomonas aeruginosa     Gram Stain Few (2+) WBCs per low power field      Moderate (3+) Gram positive cocci in pairs    Comprehensive Metabolic Panel [[id number]]  (Abnormal) Collected: 04/19/21 0443    Specimen: Blood Updated: 04/19/21 0554     Glucose 98 mg/dL      BUN 17 mg/dL      Creatinine 0.74 mg/dL      Sodium 136 mmol/L      Potassium 3.7 mmol/L      Chloride 101 mmol/L      CO2 27.7 mmol/L      Calcium 8.2 mg/dL      Total Protein 6.1 g/dL      Albumin 3.60 g/dL      ALT (SGPT) 117 U/L      AST (SGOT) 32 U/L      Alkaline Phosphatase 71 U/L      Total Bilirubin 0.9 mg/dL      eGFR Non African Amer 107 mL/min/1.73      Globulin 2.5 gm/dL      A/G Ratio 1.4 g/dL      BUN/Creatinine Ratio 23.0     Anion Gap 7.3 mmol/L     Narrative:      GFR Normal >60  Chronic Kidney Disease <60  Kidney Failure <15      CBC & Differential [[id number]]  (Abnormal) Collected: 04/19/21 0443    Specimen: Blood Updated: 04/19/21 0539    Narrative:      The following orders were created for panel order CBC & Differential.  Procedure                               Abnormality         Status                     ---------                               -----------         ------                     CBC Auto Differential[[id number]]        Abnormal            Final result                 Please view results for these tests on the individual orders.    CBC Auto Differential [[id number]]  (Abnormal) Collected: 04/19/21 0443    Specimen: Blood Updated: 04/19/21 0539     WBC 11.92 10*3/mm3      RBC 4.89 10*6/mm3      Hemoglobin 16.1 g/dL      Hematocrit 48.4 %      MCV 99.0 fL      MCH 32.9 pg      MCHC 33.3 g/dL      RDW 13.2 %      RDW-SD 48.5 fl      MPV 10.4 fL      Platelets 202 10*3/mm3       Neutrophil % 74.6 %      Lymphocyte % 14.3 %      Monocyte % 8.1 %      Eosinophil % 1.9 %      Basophil % 0.3 %      Immature Grans % 0.8 %      Neutrophils, Absolute 8.90 10*3/mm3      Lymphocytes, Absolute 1.70 10*3/mm3      Monocytes, Absolute 0.97 10*3/mm3      Eosinophils, Absolute 0.23 10*3/mm3      Basophils, Absolute 0.03 10*3/mm3      Immature Grans, Absolute 0.09 10*3/mm3      nRBC 0.0 /100 WBC     Comprehensive Metabolic Panel [[id number]]  (Abnormal) Collected: 04/18/21 0558    Specimen: Blood Updated: 04/18/21 0731     Glucose 100 mg/dL      BUN 15 mg/dL      Creatinine 0.66 mg/dL      Sodium 135 mmol/L      Potassium 3.6 mmol/L      Comment: Slight hemolysis detected by analyzer. Results may be affected.        Chloride 100 mmol/L      CO2 24.0 mmol/L      Calcium 8.4 mg/dL      Total Protein 6.1 g/dL      Albumin 3.50 g/dL      ALT (SGPT) 144 U/L      AST (SGOT) 36 U/L      Alkaline Phosphatase 66 U/L      Total Bilirubin 1.2 mg/dL      eGFR Non African Amer 122 mL/min/1.73      Globulin 2.6 gm/dL      A/G Ratio 1.3 g/dL      BUN/Creatinine Ratio 22.7     Anion Gap 11.0 mmol/L     Narrative:      GFR Normal >60  Chronic Kidney Disease <60  Kidney Failure <15      Procalcitonin [[id number]]  (Normal) Collected: 04/18/21 0558    Specimen: Blood Updated: 04/18/21 0715     Procalcitonin 0.06 ng/mL     Narrative:      Results may be falsely decreased if patient taking Biotin.     Magnesium [[id number]]  (Normal) Collected: 04/18/21 0558    Specimen: Blood Updated: 04/18/21 0710     Magnesium 2.1 mg/dL     CBC & Differential [[id number]]  (Abnormal) Collected: 04/18/21 0558    Specimen: Blood Updated: 04/18/21 0609    Narrative:      The following orders were created for panel order CBC & Differential.  Procedure                               Abnormality         Status                     ---------                               -----------         ------                     CBC Auto Differential[[id number]]         Abnormal            Final result                 Please view results for these tests on the individual orders.    CBC Auto Differential [[id number]]  (Abnormal) Collected: 04/18/21 0558    Specimen: Blood Updated: 04/18/21 0609     WBC 9.87 10*3/mm3      RBC 4.86 10*6/mm3      Hemoglobin 15.7 g/dL      Hematocrit 48.2 %      MCV 99.2 fL      MCH 32.3 pg      MCHC 32.6 g/dL      RDW 13.4 %      RDW-SD 48.9 fl      MPV 10.0 fL      Platelets 173 10*3/mm3      Neutrophil % 73.9 %      Lymphocyte % 13.8 %      Monocyte % 10.0 %      Eosinophil % 1.7 %      Basophil % 0.1 %      Immature Grans % 0.5 %      Neutrophils, Absolute 7.29 10*3/mm3      Lymphocytes, Absolute 1.36 10*3/mm3      Monocytes, Absolute 0.99 10*3/mm3      Eosinophils, Absolute 0.17 10*3/mm3      Basophils, Absolute 0.01 10*3/mm3      Immature Grans, Absolute 0.05 10*3/mm3      nRBC 0.0 /100 WBC     Respiratory Panel, PCR (WITHOUT COVID) - Swab, Nasopharynx [[id number]]  (Normal) Collected: 04/17/21 1412    Specimen: Swab from Nasopharynx Updated: 04/17/21 1821     ADENOVIRUS, PCR Not Detected     Coronavirus 229E Not Detected     Coronavirus HKU1 Not Detected     Coronavirus NL63 Not Detected     Coronavirus OC43 Not Detected     Human Metapneumovirus Not Detected     Human Rhinovirus/Enterovirus Not Detected     Influenza B PCR Not Detected     Parainfluenza Virus 1 Not Detected     Parainfluenza Virus 2 Not Detected     Parainfluenza Virus 3 Not Detected     Parainfluenza Virus 4 Not Detected     Bordetella pertussis pcr Not Detected     Chlamydophila pneumoniae PCR Not Detected     Mycoplasma pneumo by PCR Not Detected     Influenza A PCR Not Detected     RSV, PCR Not Detected     Bordetella parapertussis PCR Not Detected    Narrative:      The coronavirus on the RVP is NOT COVID-19 and is NOT indicative of infection with COVID-19.     Basic Metabolic Panel [[id number]]  (Abnormal) Collected: 04/17/21 0434    Specimen: Blood Updated: 04/17/21 0615      Glucose 103 mg/dL      BUN 12 mg/dL      Creatinine 0.71 mg/dL      Sodium 139 mmol/L      Potassium 3.4 mmol/L      Chloride 98 mmol/L      CO2 31.0 mmol/L      Calcium 8.6 mg/dL      eGFR Non African Amer 112 mL/min/1.73      BUN/Creatinine Ratio 16.9     Anion Gap 10.0 mmol/L     Narrative:      GFR Normal >60  Chronic Kidney Disease <60  Kidney Failure <15      CBC (No Diff) [[id number]]  (Abnormal) Collected: 04/17/21 0434    Specimen: Blood Updated: 04/17/21 0527     WBC 12.89 10*3/mm3      RBC 4.81 10*6/mm3      Hemoglobin 15.5 g/dL      Hematocrit 47.4 %      MCV 98.5 fL      MCH 32.2 pg      MCHC 32.7 g/dL      RDW 13.6 %      RDW-SD 49.7 fl      MPV 10.6 fL      Platelets 191 10*3/mm3     COVID PRE-OP / PRE-PROCEDURE SCREENING ORDER (NO ISOLATION) - Swab, Nasal Cavity [[id number]]  (Normal) Collected: 04/16/21 2312    Specimen: Swab from Nasal Cavity Updated: 04/16/21 2350    Narrative:      The following orders were created for panel order COVID PRE-OP / PRE-PROCEDURE SCREENING ORDER (NO ISOLATION) - Swab, Nasal Cavity.  Procedure                               Abnormality         Status                     ---------                               -----------         ------                     COVID-19,[employer] IN-YI...[[id number]]  Normal              Final result                 Please view results for these tests on the individual orders.    COVID-19,[employer] IN-HOUSE,Nasal Swab No Transport Media 3-4 HR TAT - Swab, Nasal Cavity [[id number]]  (Normal) Collected: 04/16/21 2312    Specimen: Swab from Nasal Cavity Updated: 04/16/21 2350     COVID19 Not Detected    Narrative:      Fact sheet for providers: https://www.fda.gov/media/152151/download     Fact sheet for patients: https://www.fda.gov/media/251569/download    Test performed by PCR.    Consider negative results in combination with clinical observations, patient history, and epidemiological information.    T4, Free [[id number]]  (Normal) Collected:  04/16/21 2147    Specimen: Blood Updated: 04/16/21 2226     Free T4 1.48 ng/dL     Narrative:      Results may be falsely increased if patient taking Biotin.      TSH [[id number]]  (Normal) Collected: 04/16/21 2147    Specimen: Blood Updated: 04/16/21 2226     TSH 2.660 uIU/mL     Troponin [[id number]]  (Normal) Collected: 04/16/21 2147    Specimen: Blood Updated: 04/16/21 2222     Troponin T <0.010 ng/mL     Narrative:      Troponin T Reference Range:  <= 0.03 ng/mL-   Negative for AMI  >0.03 ng/mL-     Abnormal for myocardial necrosis.  Clinicians would have to utilize clinical acumen, EKG, Troponin and serial changes to determine if it is an Acute Myocardial Infarction or myocardial injury due to an underlying chronic condition.       Results may be falsely decreased if patient taking Biotin.      BNP [[id number]]  (Abnormal) Collected: 04/16/21 2147    Specimen: Blood Updated: 04/16/21 2220     proBNP 2,963.0 pg/mL     Narrative:      Among patients with dyspnea, NT-proBNP is highly sensitive for the detection of acute congestive heart failure. In addition NT-proBNP of <300 pg/ml effectively rules out acute congestive heart failure with 99% negative predictive value.    Results may be falsely decreased if patient taking Biotin.      Magnesium [[id number]]  (Normal) Collected: 04/16/21 2147    Specimen: Blood Updated: 04/16/21 2214     Magnesium 1.9 mg/dL           Imaging Results (Most Recent)       Procedure Component Value Units Date/Time    XR Chest PA & Lateral [[id number]] Collected: 04/19/21 0845     Updated: 04/19/21 0848    Narrative:      PA AND LATERAL CHEST, 4/19/2021 8:40 AM     HISTORY:  chf; I48.91-Unspecified atrial fibrillation   short is very low oxygen  saturation for 3 days. Normal chest x-ray 04/16/2021     COMPARISON:  04/16/2021     TECHNIQUE:  PA and lateral upright chest series.     FINDINGS:  Heart size and pulmonary vascularity are normal. The lungs are expanded  and clear. No visible  pulmonary infiltrate or pleural effusion.           Impression:      Lower lobe infiltrates noted previously have resolved. There is no  active disease     This report was finalized on 4/19/2021 8:46 AM by Dr. Chase Allison MD.       XR Chest 1 View [[id number]] Collected: 04/16/21 2240     Updated: 04/16/21 2243    Narrative:      CR Chest 1 Vw    INDICATION:   Shortness of air for 3 weeks.     COMPARISON:    11/6/2019    FINDINGS:  2 portable AP view(s) of the chest.  Heart size is top normal. There is vascular congestion. There are infiltrates in both mid to lower lung zones certainly worrisome for pulmonary edema although pneumonia is not excluded. No pneumothorax is identified.      Impression:      Findings most suggestive of CHF although pneumonia is not radiographically excluded.    Signer Name: Jay Angel MD   Signed: 4/16/2021 10:40 PM   Workstation Name: [facility]    [facility]            PROCEDURES      Condition on Discharge: Stable and ambulatory    Physical Exam at Discharge  Vital Signs  Temp:  [97.5 °F (36.4 °C)-98.9 °F (37.2 °C)] 97.5 °F (36.4 °C)  Heart Rate:  [] 100  Resp:  [18-22] 20  BP: (105-147)/() 121/101    Physical Exam:  Physical Exam   Constitutional: Patient appears well-developed and well-nourished and in no acute distress   HEENT:   Head: Normocephalic and atraumatic.   Eyes:  Pupils are equal, round, and reactive to light. EOM are intact. Sclerae are anicteric and noninjected.  Mouth and Throat: Patient has moist mucous membranes. Oropharynx is clear of any erythema or exudate.     Neck: Neck supple. No JVD present. No thyromegaly present. No lymphadenopathy present.  Cardiovascular:  irregular rhythm, S1 normal and S2 normal.  Exam reveals no gallop and no friction rub.  No murmur heard.  Pulmonary/Chest: Lungs are clear to auscultation bilaterally. No respiratory distress. No wheezes. No rhonchi. No rales.   Abdominal: Soft. Bowel sounds are  normal. No distension and no mass. There is no hepatosplenomegaly. There is no tenderness.   Musculoskeletal: Normal muscle tone  Extremities: No edema. Pulses are palpable in all 4 extremities.  Neurological: Patient is alert and oriented to person, place, and time. Cranial nerves II-XII are grossly intact with no focal deficits.  Skin: Skin is warm. No rash noted. Nails show no clubbing.  No cyanosis or erythema.    Discharge Disposition  Stable, discharge for readmission of [facility] for invasive ischemic evaluation    Discharge Diet:   Cardiac diet  Dietary Orders (From admission, onward)       Start     Ordered    04/20/21 0001  NPO Diet  Diet Effective Midnight      04/19/21 1503    04/19/21 1501  Diet Regular; Low Sodium; No Added Salt  Diet Effective Now     Question Answer Comment   Diet Texture / Consistency Regular    Common Modifiers Low Sodium    Low Sodium Restriction: No Added Salt        04/19/21 1503                    Activity at Discharge:  To be determined cardiology    Pre-discharge education  To be determined by cardiology and excepting hospitalist      Follow-up Appointments  Future Appointments   Date Time Provider Department Center   11/17/2021  2:30 PM  [facility] PFT/EEG ROOM  [facility]          Test Results Pending at Discharge        Bj Gutierrez MD  04/19/21  19:48 EDT                Electronically signed by Bj Gutierrez MD at 04/20/21 8441

## 2021-04-20 NOTE — NURSING NOTE
Pt transported to [facility] per [facility] @ 4/20/2021 0613

## 2021-04-20 NOTE — CASE MANAGEMENT/SOCIAL WORK
Continued Stay Note  ROXY Valdovinos     Patient Name: Claude Spencer  MRN: [id number]  Today's Date: 4/20/2021    Admit Date: 4/16/2021    Discharge Plan     Row Name 04/20/21 1053       Plan    Final Discharge Disposition Code  01 - home or self-care    Final Note  D/C home        Discharge Codes    No documentation.       Expected Discharge Date and Time     Expected Discharge Date Expected Discharge Time    Apr 20, 2021             Zohreh Jones RN

## 2021-04-20 NOTE — PLAN OF CARE
Goal Outcome Evaluation:  Plan of Care Reviewed With: patient  Progress: no change  Outcome Summary: pt rested well overnight; VSS; anticipate transfer to [facility] this am for cardiac cath. EMS to  pt for transport @ 0610.

## 2021-04-20 NOTE — CASE MANAGEMENT/SOCIAL WORK
Case Management Discharge Note      Final Note: [facility]    Provided Post Acute Provider List?: Refused  Refused Provider List Comment: offered community resources but patient declines the need for them at this time.    Selected Continued Care - Discharged on 4/20/2021 Admission date: 4/16/2021 - Discharge disposition: Short Term Hospital (DC - External)    Destination    No services have been selected for the patient.              Durable Medical Equipment    No services have been selected for the patient.              Dialysis/Infusion    No services have been selected for the patient.              Home Medical Care    No services have been selected for the patient.              Therapy    No services have been selected for the patient.              Community Resources    No services have been selected for the patient.                       Final Discharge Disposition Code: 02 - short term hospital for [facility]

## 2021-04-20 NOTE — DISCHARGE INSTRUCTIONS
[facility]  [address] [place] [address]    Coronary Angiogram (Radial/Ulnar Approach) After Care    Refer to this sheet in the next few weeks. These instructions provide you with information on caring for yourself after your procedure. Your caregiver may also give you more specific instructions. Your treatment has been planned according to current medical practices, but problems sometimes occur. Call your caregiver if you have any problems or questions after your procedure.    Home Care Instructions:  · You may shower the day after the procedure. Remove the bandage (dressing) and gently wash the site with plain soap and water. Gently pat the site dry. You may apply a band aid daily for 2 days if desired.    · Do not apply powder or lotion to the site.  · Do not submerge the affected site in water for 3 to 5 days or until the site is completely healed.   · Do not lift, push or pull anything over 5 pounds for 5 days after your procedure or as directed by your physician.  As a reference, a gallon of milk weighs 8 pounds.   · Inspect the site at least twice daily. You may notice some bruising at the site and it may be tender for 1 to 2 weeks.     · Increase your fluid intake for the next 2 days.    · Keep arm elevated for 24 hours. For the remainder of the day, keep your arm in “Pledge of Allegiance” position when up and about.     · You may drive 24 hours after the procedure unless otherwise instructed by your caregiver.  · Do not operate machinery or power tools for 24 hours.  · A responsible adult should be with you for the first 24 hours after you arrive home. Do not make any important legal decisions or sign legal papers for 24 hours.  Do not drink alcohol for 24 hours.    · Metformin or any medications containing Metformin should not be taken for 48 hours after your procedure.      Call Your Doctor if:   · You have unusual pain at the radial/ulnar (wrist) site.  · You have redness, warmth,  swelling, or pain at the radial/ulnar (wrist) site.  · You have drainage (other than a small amount of blood on the dressing).  · You have chills or a fever > 101.  · Your arm becomes pale or dark, cool, tingly, or numb.  · You have heavy bleeding from the site, hold pressure on the site for 20 minutes.  If the bleeding stops, apply a fresh bandage and call your cardiologist.  However, if you continue to have bleeding, call 911.

## 2021-04-22 NOTE — PAYOR COMM NOTE
"Annika Spencer (65 y.o. Male)     ATTN: NURSE REVIEWER   RE: DISCHARGE NOTIFICATION   REF# [id number]  PLS REPLY TO EDGAR RÍOS TEL [phone] FAX [phone]        Date of Birth Social Security Number Address Home Phone MRN    1955  [id number] [place] [address] [phone] [id number]    Jewish Marital Status          Other        Admission Date Admission Type Admitting Provider Attending Provider Department, Room/Bed    4/16/21 Emergency   [facility] ICU, ICU1/1    Discharge Date Discharge Disposition Discharge Destination        4/20/2021 [facility] ([place] - [facility]              Attending Provider: (none)   Allergies: No Known Allergies    Isolation: None   Infection: None   Code Status: Prior    Ht: 175.3 cm (69\")   Wt: 92 kg (202 lb 12.8 oz)    Admission Cmt: None   Principal Problem: None                Active Insurance as of 4/16/2021     Primary Coverage     Payor Plan Insurance Group Employer/Plan Group    ANTHEM BLUE CROSS ANTHEM BLUE CROSS BLUE SHIELD PPO [id number]     Payor Plan Address Payor Plan Phone Number Payor Plan Fax Number Effective Dates    [address] [phone]  1/1/2019 - None Entered    [facility] [id number]       Subscriber Name Subscriber Birth Date Member ID       ANTONIETA SPENCER 3/13/1962 [id number]           Secondary Coverage     Payor Plan Insurance Group Employer/Plan Group    MEDICARE MEDICARE A ONLY      Payor Plan Address Payor Plan Phone Number Payor Plan Fax Number Effective Dates    [address] [phone]  3/1/2015 - None Entered    [facility] [id number]       Subscriber Name Subscriber Birth Date Member ID       ANNIKA SPENCER 1955 [id number]                 Emergency Contacts      (Rel.) Home Phone Work Phone Mobile Phone    LEXIE SPENCER (Daughter) -- -- [phone]    Antonieta Spencer (Relative) [phone] -- --               Discharge Summary      Bj Gutierrez, " MD at 04/19/21 1953            Claude E Tj  1955  [id number]        Hospitalist discharge Summary    Date of Admission: 4/16/2021  Date of Discharge:  4/19/2021    Primary Discharge Diagnoses: Elevated troponin, abnormal stress test acute on chronic heart failure        PCP  Patient Care Team:  Jason King MD as PCP - General (Internal Medicine)    Consults:   Consults       Date and Time Order Name Status Description    4/16/2021 11:09 PM Inpatient Cardiology Consult Completed             Operations and Procedures Performed:       Adult Transthoracic Echo Complete w/ Color, Spectral and Contrast if Necessary Per Protocol    Result Date: 4/19/2021  Narrative: · The left ventricular cavity is moderately dilated. · Left ventricular diastolic function was indeterminate. · Calculated left ventricular EF = 51.7% Estimated left ventricular EF was in agreement with the calculated left ventricular EF. Left ventricular systolic function is normal. · Left ventricular wall thickness is consistent with mild concentric hypertrophy.      XR Chest 1 View    Result Date: 4/16/2021  Narrative: CR Chest 1 Vw INDICATION: Shortness of air for 3 weeks. COMPARISON:  11/6/2019 FINDINGS: 2 portable AP view(s) of the chest.  Heart size is top normal. There is vascular congestion. There are infiltrates in both mid to lower lung zones certainly worrisome for pulmonary edema although pneumonia is not excluded. No pneumothorax is identified.     Impression: Findings most suggestive of CHF although pneumonia is not radiographically excluded. Signer Name: Jay Angel MD  Signed: 4/16/2021 10:40 PM  Workstation Name: [facility]  [facility]    Stress Test With Myocardial Perfusion One Day    Result Date: 4/19/2021  Narrative: · Left ventricular ejection fraction is moderately reduced. (Calculated EF = 34%). · Myocardial perfusion imaging indicates a medium-sized infarct located in the inferior wall  with no significant ischemia noted.      XR Chest PA & Lateral    Result Date: 4/19/2021  Narrative: PA AND LATERAL CHEST, 4/19/2021 8:40 AM  HISTORY: chf; I48.91-Unspecified atrial fibrillation   short is very low oxygen saturation for 3 days. Normal chest x-ray 04/16/2021  COMPARISON: 04/16/2021  TECHNIQUE: PA and lateral upright chest series.  FINDINGS: Heart size and pulmonary vascularity are normal. The lungs are expanded and clear. No visible pulmonary infiltrate or pleural effusion.       Impression: Lower lobe infiltrates noted previously have resolved. There is no active disease  This report was finalized on 4/19/2021 8:46 AM by Dr. Chase Allison MD.        Allergies:  has No Known Allergies.        Discharge Medications:    budesonide-formoterol, 2 puff, Inhalation, BID - RT  digoxin, 125 mcg, Oral, Daily  dilTIAZem CD, 240 mg, Oral, Q24H  furosemide, 40 mg, Oral, Daily  montelukast, 10 mg, Oral, Nightly  pantoprazole, 40 mg, Oral, QAM  potassium chloride, 10 mEq, Oral, Daily  potassium chloride, 40 mEq, Oral, See Admin Instructions  rosuvastatin, 10 mg, Oral, Daily  sodium chloride, 10 mL, Intravenous, Q12H        History of Present Illness:  See H&P    Hospital Course  Patient ultimately admitted, 2D echo and stress test were checked secondary to shortness of air with A. fib heart failure with preserved EF.  Patient had chest discomfort ultimately with abnormal stress test with moderate size inferior infarct, EF 34% by gated imaging.  2D echo had a discrepant EF which was 50 to 55% with mild LV enlargement.  EKG from 416 revealed A. fib RVR but no Q waves or abnormal ST-T wave findings.  Troponin was ultimately unremarkable although proBNP was elevated.  Patient will be transferred to [facility] for invasive ischemic evaluation given abnormal findings per cardiology team.  Presently hemodynamically and electrically stable.-Lipid studies reviewed LDL is 81 total cholesterol 142.  He is without chest  pain or dizziness or palpitations on my encounter.  Diuresis ongoing normal creatinine less than 1.    Patient on Eliquis for stroke risk reduction with paroxysmal atrial fibrillation, will leave to cardiology to discontinue prior to left heart catheterization depending on timing.        Last Lab Results:   Lab Results (most recent)       Procedure Component Value Units Date/Time    Respiratory Culture - Sputum, Cough [[id number]] Collected: 04/17/21 1732    Specimen: Sputum from Cough Updated: 04/19/21 1236     Respiratory Culture Light growth (2+) Normal Respiratory Kyara: NO S.aureus/MRSA or Pseudomonas aeruginosa     Gram Stain Few (2+) WBCs per low power field      Moderate (3+) Gram positive cocci in pairs    Comprehensive Metabolic Panel [[id number]]  (Abnormal) Collected: 04/19/21 0443    Specimen: Blood Updated: 04/19/21 0554     Glucose 98 mg/dL      BUN 17 mg/dL      Creatinine 0.74 mg/dL      Sodium 136 mmol/L      Potassium 3.7 mmol/L      Chloride 101 mmol/L      CO2 27.7 mmol/L      Calcium 8.2 mg/dL      Total Protein 6.1 g/dL      Albumin 3.60 g/dL      ALT (SGPT) 117 U/L      AST (SGOT) 32 U/L      Alkaline Phosphatase 71 U/L      Total Bilirubin 0.9 mg/dL      eGFR Non African Amer 107 mL/min/1.73      Globulin 2.5 gm/dL      A/G Ratio 1.4 g/dL      BUN/Creatinine Ratio 23.0     Anion Gap 7.3 mmol/L     Narrative:      GFR Normal >60  Chronic Kidney Disease <60  Kidney Failure <15      CBC & Differential [[id number]]  (Abnormal) Collected: 04/19/21 0443    Specimen: Blood Updated: 04/19/21 0539    Narrative:      The following orders were created for panel order CBC & Differential.  Procedure                               Abnormality         Status                     ---------                               -----------         ------                     CBC Auto Differential[[id number]]        Abnormal            Final result                 Please view results for these tests on the individual  orders.    CBC Auto Differential [[id number]]  (Abnormal) Collected: 04/19/21 0443    Specimen: Blood Updated: 04/19/21 0539     WBC 11.92 10*3/mm3      RBC 4.89 10*6/mm3      Hemoglobin 16.1 g/dL      Hematocrit 48.4 %      MCV 99.0 fL      MCH 32.9 pg      MCHC 33.3 g/dL      RDW 13.2 %      RDW-SD 48.5 fl      MPV 10.4 fL      Platelets 202 10*3/mm3      Neutrophil % 74.6 %      Lymphocyte % 14.3 %      Monocyte % 8.1 %      Eosinophil % 1.9 %      Basophil % 0.3 %      Immature Grans % 0.8 %      Neutrophils, Absolute 8.90 10*3/mm3      Lymphocytes, Absolute 1.70 10*3/mm3      Monocytes, Absolute 0.97 10*3/mm3      Eosinophils, Absolute 0.23 10*3/mm3      Basophils, Absolute 0.03 10*3/mm3      Immature Grans, Absolute 0.09 10*3/mm3      nRBC 0.0 /100 WBC     Comprehensive Metabolic Panel [[id number]]  (Abnormal) Collected: 04/18/21 0558    Specimen: Blood Updated: 04/18/21 0731     Glucose 100 mg/dL      BUN 15 mg/dL      Creatinine 0.66 mg/dL      Sodium 135 mmol/L      Potassium 3.6 mmol/L      Comment: Slight hemolysis detected by analyzer. Results may be affected.        Chloride 100 mmol/L      CO2 24.0 mmol/L      Calcium 8.4 mg/dL      Total Protein 6.1 g/dL      Albumin 3.50 g/dL      ALT (SGPT) 144 U/L      AST (SGOT) 36 U/L      Alkaline Phosphatase 66 U/L      Total Bilirubin 1.2 mg/dL      eGFR Non African Amer 122 mL/min/1.73      Globulin 2.6 gm/dL      A/G Ratio 1.3 g/dL      BUN/Creatinine Ratio 22.7     Anion Gap 11.0 mmol/L     Narrative:      GFR Normal >60  Chronic Kidney Disease <60  Kidney Failure <15      Procalcitonin [[id number]]  (Normal) Collected: 04/18/21 0558    Specimen: Blood Updated: 04/18/21 0715     Procalcitonin 0.06 ng/mL     Narrative:      Results may be falsely decreased if patient taking Biotin.     Magnesium [[id number]]  (Normal) Collected: 04/18/21 0558    Specimen: Blood Updated: 04/18/21 0710     Magnesium 2.1 mg/dL     CBC & Differential [[id number]]  (Abnormal)  Collected: 04/18/21 0558    Specimen: Blood Updated: 04/18/21 0609    Narrative:      The following orders were created for panel order CBC & Differential.  Procedure                               Abnormality         Status                     ---------                               -----------         ------                     CBC Auto Differential[[id number]]        Abnormal            Final result                 Please view results for these tests on the individual orders.    CBC Auto Differential [[id number]]  (Abnormal) Collected: 04/18/21 0558    Specimen: Blood Updated: 04/18/21 0609     WBC 9.87 10*3/mm3      RBC 4.86 10*6/mm3      Hemoglobin 15.7 g/dL      Hematocrit 48.2 %      MCV 99.2 fL      MCH 32.3 pg      MCHC 32.6 g/dL      RDW 13.4 %      RDW-SD 48.9 fl      MPV 10.0 fL      Platelets 173 10*3/mm3      Neutrophil % 73.9 %      Lymphocyte % 13.8 %      Monocyte % 10.0 %      Eosinophil % 1.7 %      Basophil % 0.1 %      Immature Grans % 0.5 %      Neutrophils, Absolute 7.29 10*3/mm3      Lymphocytes, Absolute 1.36 10*3/mm3      Monocytes, Absolute 0.99 10*3/mm3      Eosinophils, Absolute 0.17 10*3/mm3      Basophils, Absolute 0.01 10*3/mm3      Immature Grans, Absolute 0.05 10*3/mm3      nRBC 0.0 /100 WBC     Respiratory Panel, PCR (WITHOUT COVID) - Swab, Nasopharynx [[id number]]  (Normal) Collected: 04/17/21 1412    Specimen: Swab from Nasopharynx Updated: 04/17/21 1821     ADENOVIRUS, PCR Not Detected     Coronavirus 229E Not Detected     Coronavirus HKU1 Not Detected     Coronavirus NL63 Not Detected     Coronavirus OC43 Not Detected     Human Metapneumovirus Not Detected     Human Rhinovirus/Enterovirus Not Detected     Influenza B PCR Not Detected     Parainfluenza Virus 1 Not Detected     Parainfluenza Virus 2 Not Detected     Parainfluenza Virus 3 Not Detected     Parainfluenza Virus 4 Not Detected     Bordetella pertussis pcr Not Detected     Chlamydophila pneumoniae PCR Not Detected      Mycoplasma pneumo by PCR Not Detected     Influenza A PCR Not Detected     RSV, PCR Not Detected     Bordetella parapertussis PCR Not Detected    Narrative:      The coronavirus on the RVP is NOT COVID-19 and is NOT indicative of infection with COVID-19.     Basic Metabolic Panel [[id number]]  (Abnormal) Collected: 04/17/21 0434    Specimen: Blood Updated: 04/17/21 0615     Glucose 103 mg/dL      BUN 12 mg/dL      Creatinine 0.71 mg/dL      Sodium 139 mmol/L      Potassium 3.4 mmol/L      Chloride 98 mmol/L      CO2 31.0 mmol/L      Calcium 8.6 mg/dL      eGFR Non African Amer 112 mL/min/1.73      BUN/Creatinine Ratio 16.9     Anion Gap 10.0 mmol/L     Narrative:      GFR Normal >60  Chronic Kidney Disease <60  Kidney Failure <15      CBC (No Diff) [[id number]]  (Abnormal) Collected: 04/17/21 0434    Specimen: Blood Updated: 04/17/21 0527     WBC 12.89 10*3/mm3      RBC 4.81 10*6/mm3      Hemoglobin 15.5 g/dL      Hematocrit 47.4 %      MCV 98.5 fL      MCH 32.2 pg      MCHC 32.7 g/dL      RDW 13.6 %      RDW-SD 49.7 fl      MPV 10.6 fL      Platelets 191 10*3/mm3     COVID PRE-OP / PRE-PROCEDURE SCREENING ORDER (NO ISOLATION) - Swab, Nasal Cavity [[id number]]  (Normal) Collected: 04/16/21 2312    Specimen: Swab from Nasal Cavity Updated: 04/16/21 2350    Narrative:      The following orders were created for panel order COVID PRE-OP / PRE-PROCEDURE SCREENING ORDER (NO ISOLATION) - Swab, Nasal Cavity.  Procedure                               Abnormality         Status                     ---------                               -----------         ------                     COVID-19,[facility] [employer] IN-YI...[[id number]]  Normal              Final result                 Please view results for these tests on the individual orders.    COVID-19,[employer] [employer] IN-HOUSE,Nasal Swab No Transport Media 3-4 HR TAT - Swab, Nasal Cavity [[id number]]  (Normal) Collected: 04/16/21 2312    Specimen: Swab from Nasal Cavity Updated: 04/16/21 2350      COVID19 Not Detected    Narrative:      Fact sheet for providers: https://www.fda.gov/media/788814/download     Fact sheet for patients: https://www.fda.gov/media/763073/download    Test performed by PCR.    Consider negative results in combination with clinical observations, patient history, and epidemiological information.    T4, Free [[id number]]  (Normal) Collected: 04/16/21 2147    Specimen: Blood Updated: 04/16/21 2226     Free T4 1.48 ng/dL     Narrative:      Results may be falsely increased if patient taking Biotin.      TSH [[id number]]  (Normal) Collected: 04/16/21 2147    Specimen: Blood Updated: 04/16/21 2226     TSH 2.660 uIU/mL     Troponin [[id number]]  (Normal) Collected: 04/16/21 2147    Specimen: Blood Updated: 04/16/21 2222     Troponin T <0.010 ng/mL     Narrative:      Troponin T Reference Range:  <= 0.03 ng/mL-   Negative for AMI  >0.03 ng/mL-     Abnormal for myocardial necrosis.  Clinicians would have to utilize clinical acumen, EKG, Troponin and serial changes to determine if it is an Acute Myocardial Infarction or myocardial injury due to an underlying chronic condition.       Results may be falsely decreased if patient taking Biotin.      BNP [[id number]]  (Abnormal) Collected: 04/16/21 2147    Specimen: Blood Updated: 04/16/21 2220     proBNP 2,963.0 pg/mL     Narrative:      Among patients with dyspnea, NT-proBNP is highly sensitive for the detection of acute congestive heart failure. In addition NT-proBNP of <300 pg/ml effectively rules out acute congestive heart failure with 99% negative predictive value.    Results may be falsely decreased if patient taking Biotin.      Magnesium [[id number]]  (Normal) Collected: 04/16/21 2147    Specimen: Blood Updated: 04/16/21 2214     Magnesium 1.9 mg/dL           Imaging Results (Most Recent)       Procedure Component Value Units Date/Time    XR Chest PA & Lateral [[id number]] Collected: 04/19/21 0845     Updated: 04/19/21 0848    Narrative:      PA  AND LATERAL CHEST, 4/19/2021 8:40 AM     HISTORY:  chf; I48.91-Unspecified atrial fibrillation   short is very low oxygen  saturation for 3 days. Normal chest x-ray 04/16/2021     COMPARISON:  04/16/2021     TECHNIQUE:  PA and lateral upright chest series.     FINDINGS:  Heart size and pulmonary vascularity are normal. The lungs are expanded  and clear. No visible pulmonary infiltrate or pleural effusion.           Impression:      Lower lobe infiltrates noted previously have resolved. There is no  active disease     This report was finalized on 4/19/2021 8:46 AM by Dr. Chase Allison MD.       XR Chest 1 View [[id number]] Collected: 04/16/21 2240     Updated: 04/16/21 2243    Narrative:      CR Chest 1 Vw    INDICATION:   Shortness of air for 3 weeks.     COMPARISON:    11/6/2019    FINDINGS:  2 portable AP view(s) of the chest.  Heart size is top normal. There is vascular congestion. There are infiltrates in both mid to lower lung zones certainly worrisome for pulmonary edema although pneumonia is not excluded. No pneumothorax is identified.      Impression:      Findings most suggestive of CHF although pneumonia is not radiographically excluded.    Signer Name: Jay Angel MD   Signed: 4/16/2021 10:40 PM   Workstation Name: [facility]    [facility] [place]            PROCEDURES      Condition on Discharge: Stable and ambulatory    Physical Exam at Discharge  Vital Signs  Temp:  [97.5 °F (36.4 °C)-98.9 °F (37.2 °C)] 97.5 °F (36.4 °C)  Heart Rate:  [] 100  Resp:  [18-22] 20  BP: (105-147)/() 121/101    Physical Exam:  Physical Exam   Constitutional: Patient appears well-developed and well-nourished and in no acute distress   HEENT:   Head: Normocephalic and atraumatic.   Eyes:  Pupils are equal, round, and reactive to light. EOM are intact. Sclerae are anicteric and noninjected.  Mouth and Throat: Patient has moist mucous membranes. Oropharynx is clear of any erythema or exudate.      Neck: Neck supple. No JVD present. No thyromegaly present. No lymphadenopathy present.  Cardiovascular:  irregular rhythm, S1 normal and S2 normal.  Exam reveals no gallop and no friction rub.  No murmur heard.  Pulmonary/Chest: Lungs are clear to auscultation bilaterally. No respiratory distress. No wheezes. No rhonchi. No rales.   Abdominal: Soft. Bowel sounds are normal. No distension and no mass. There is no hepatosplenomegaly. There is no tenderness.   Musculoskeletal: Normal muscle tone  Extremities: No edema. Pulses are palpable in all 4 extremities.  Neurological: Patient is alert and oriented to person, place, and time. Cranial nerves II-XII are grossly intact with no focal deficits.  Skin: Skin is warm. No rash noted. Nails show no clubbing.  No cyanosis or erythema.    Discharge Disposition  Stable, discharge for readmission of [facility] for invasive ischemic evaluation    Discharge Diet:   Cardiac diet  Dietary Orders (From admission, onward)       Start     Ordered    04/20/21 0001  NPO Diet  Diet Effective Midnight      04/19/21 1503    04/19/21 1501  Diet Regular; Low Sodium; No Added Salt  Diet Effective Now     Question Answer Comment   Diet Texture / Consistency Regular    Common Modifiers Low Sodium    Low Sodium Restriction: No Added Salt        04/19/21 1503                    Activity at Discharge:  To be determined cardiology    Pre-discharge education  To be determined by cardiology and excepting hospitalist      Follow-up Appointments  Future Appointments   Date Time Provider Department Center   11/17/2021  2:30 PM [facility] PFT/EEG ROOM [facility]          Test Results Pending at Discharge        Bj Gutierrez MD  04/19/21  19:48 EDT                Electronically signed by Bj Gutierrez MD at 04/20/21 2194

## 2021-04-27 ENCOUNTER — OFFICE VISIT (OUTPATIENT)
Dept: CARDIOLOGY | Facility: CLINIC | Age: 66
End: 2021-04-27

## 2021-04-27 VITALS
WEIGHT: 209 LBS | BODY MASS INDEX: 30.96 KG/M2 | RESPIRATION RATE: 18 BRPM | HEIGHT: 69 IN | OXYGEN SATURATION: 98 % | SYSTOLIC BLOOD PRESSURE: 136 MMHG | HEART RATE: 131 BPM | DIASTOLIC BLOOD PRESSURE: 82 MMHG

## 2021-04-27 DIAGNOSIS — I48.91 ATRIAL FIBRILLATION, UNSPECIFIED TYPE (HCC): ICD-10-CM

## 2021-04-27 DIAGNOSIS — I50.21 ACUTE SYSTOLIC CONGESTIVE HEART FAILURE (HCC): Primary | ICD-10-CM

## 2021-04-27 DIAGNOSIS — I10 ESSENTIAL HYPERTENSION: ICD-10-CM

## 2021-04-27 DIAGNOSIS — J43.8 OTHER EMPHYSEMA (HCC): ICD-10-CM

## 2021-04-27 DIAGNOSIS — E78.00 HYPERCHOLESTEROLEMIA: ICD-10-CM

## 2021-04-27 PROCEDURE — 99214 OFFICE O/P EST MOD 30 MIN: CPT | Performed by: NURSE PRACTITIONER

## 2021-04-27 PROCEDURE — 93000 ELECTROCARDIOGRAM COMPLETE: CPT | Performed by: NURSE PRACTITIONER

## 2021-04-27 RX ORDER — DILTIAZEM HYDROCHLORIDE 360 MG/1
360 CAPSULE, EXTENDED RELEASE ORAL DAILY
Qty: 30 CAPSULE | Refills: 3 | Status: SHIPPED | OUTPATIENT
Start: 2021-04-27 | End: 2021-09-01

## 2021-04-27 NOTE — PROGRESS NOTES
Date of Office Visit: 2021  Encounter Provider: KHADRA Candelaria  Place of Service: [facility] CARDIOLOGY  Patient Name: Claude Spencer  :1955  Primary Cardiologist: Dr. Draper    CC:  F/u cardiac cath    Dear Dr. King    HPI: Claude Spencer is a pleasant 65 y.o. male who presents 2021 for cardiac follow up. He is a new patient to me and I have reviewed his past medical records.  He is a patient of Dr. Draper.    On 2021 presented with several weeks of generalized malaise, dyspnea, chest discomfort, and palpitations to the ER at [facility].  He had seen his primary care physician several weeks prior and was given oral steroids, intramuscular antibiotics, oral antibiotics, and bronchodilators.  This helped for a few days but then he started to feel poorly again. He noted a rapid  heartbeat, exertional shortness of breath, increased thin yellow sputum production, chest discomfort in the left upper chest, orthopnea, and mild leg swelling.  He saw his primary care physician again, who recommended hospitalization, but the patient refused.  He finally agreed to come in after his symptoms became unbearable.  He had not had any fevers.  He could not reliably tell if his symptoms would get worse with exertion.  He no longer smokes cigarettes but he does chew tobacco.  He does have COPD and uses oxygen at night, but had been having to use it during the day for the past several weeks.     Patient ultimately admitted, 2D echo and stress test were checked secondary to shortness of air with A. fib heart failure with preserved EF.  Patient had chest discomfort ultimately with abnormal stress test with moderate size inferior infarct, EF 34% by gated imaging.  2D echo had a discrepant EF which was 50 to 55% with mild LV enlargement.  EKG from  revealed A. fib RVR but no Q waves or abnormal ST-T wave findings.  Troponin was ultimately unremarkable although proBNP  was elevated.  He was transferred to [facility] for invasive ischemic evaluation given abnormal findings per cardiology team.  He was on Eliquis for stroke risk reduction for paroxysmal atrial fibrillation. Lipid studies reviewed LDL is 81 total cholesterol 142.       4/19/2021 Echo - Interpretation Summary  · The left ventricular cavity is moderately dilated.  · Left ventricular diastolic function was indeterminate.  · Calculated left ventricular EF = 51.7% Estimated left ventricular EF was in agreement with the calculated left ventricular EF. Left ventricular systolic function is normal.  · Left ventricular wall thickness is consistent with mild concentric hypertrophy.        4/19/2021 stress test - Interpretation Summary  · Left ventricular ejection fraction is moderately reduced. (Calculated EF = 34%).  · Myocardial perfusion imaging indicates a medium-sized infarct located in the inferior wall with no significant ischemia noted.        4/20/2021 cardiac catheterization  Procedure findings:  Left main: Large caliber vessel that bifurcates to an LAD and circumflex. Normal.  LAD: Large caliber vessel that gives rise to a small caliber diagonal branch in the midsegment. The LAD is normal in the proximal segment. The mid LAD has a 10 to 20% stenosis.  LCX: Large caliber vessel that gives rise to a large caliber OM1 that bifurcates distally. The proximal circumflex has a discrete 40 to 50% stenosis.  RCA: Large-caliber, dominant vessel that is tortuous and actually does a complete turn on itself. 10 to 20% discrete mid to distal stenosis.     Hemodynamics:   LV: 91/6  AO: 91/65     Conclusions:   1. Left main: Normal  2. LAD: 10 to 20% mid vessel stenosis  3. LCX: Discrete 40 to 50% mid vessel stenosis  4. RCA: Tortuous. 10 to 20% distal stenosis  5. Normal left ventricular end-diastolic pressure.     He presents today in follow-up.  He states he feels a lot better than he did during his hospitalization.  He  will occasionally feel some palpitations.  He denies any shortness of breath, lower extremity edema, dizziness or lightheadedness.  He states he will occasionally has some chest discomfort but it does not last very long.  He denies any fatigue.  He continues to chew tobacco.  He does continue to use his oxygen at night at 2 L.  He is followed by Dr. Katherine gibbs for that.  He denies any snoring, PND, cough, orthopnea.  He was taking his medications as directed.  He remains on Eliquis and denies any unexplained bleeding, dark or tarry stools.  His right wrist site with strong radial pulse and quick capillary refills.  Past Medical History:   Diagnosis Date   • COPD (chronic obstructive pulmonary disease) (CMS/[facility])    • Gastroesophageal reflux disease 3/4/2016   • Hyperlipidemia    • Hypertension    • Osteopenia 3/4/2016    Description: Her bone density 2015 secondary to steroid use   • Vitamin D deficiency 3/4/2016       Past Surgical History:   Procedure Laterality Date   • ANKLE SURGERY     • CARDIAC CATHETERIZATION Left 2021    Procedure: Coronary Angiography;  Surgeon: Rao Del Cid MD;  Location: [facility] CATH INVASIVE LOCATION;  Service: Cardiovascular;  Laterality: Left;   • CARDIAC CATHETERIZATION N/A 2021    Procedure: Left Heart Cath;  Surgeon: Rao Del Cid MD;  Location:  [facility] CATH INVASIVE LOCATION;  Service: Cardiovascular;  Laterality: N/A;       Social History     Socioeconomic History   • Marital status:      Spouse name: Not on file   • Number of children: Not on file   • Years of education: Not on file   • Highest education level: Not on file   Tobacco Use   • Smoking status: Former Smoker     Packs/day: 1.00     Years: 30.00     Pack years: 30.00     Quit date: 2000     Years since quittin.0   • Smokeless tobacco: Current User     Types: Chew   • Tobacco comment: quit 15 years ago, wife current smoker outside   Substance and Sexual Activity   • Alcohol  use: No   • Drug use: No   • Sexual activity: Yes     Partners: Female       Family History   Problem Relation Age of Onset   • COPD Mother 78   • Seizures Father    • Vasculitis Father    • Prostate cancer Maternal Uncle    • Heart disease Brother        The following portion of the patient's history were reviewed and updated as appropriate: past medical history, past surgical history, past social history, past family history, allergies, current medications, and problem list.    Review of Systems   Constitutional: Negative for diaphoresis, fever and malaise/fatigue.   HENT: Negative for congestion, hearing loss, hoarse voice, nosebleeds and sore throat.    Eyes: Negative for photophobia, vision loss in left eye, vision loss in right eye and visual disturbance.   Cardiovascular: Positive for chest pain (occ), irregular heartbeat and palpitations. Negative for dyspnea on exertion, leg swelling, near-syncope, orthopnea, paroxysmal nocturnal dyspnea and syncope.   Respiratory: Negative for cough, hemoptysis, shortness of breath, sleep disturbances due to breathing, snoring, sputum production and wheezing.    Endocrine: Negative for cold intolerance, heat intolerance, polydipsia, polyphagia and polyuria.   Hematologic/Lymphatic: Negative for bleeding problem. Does not bruise/bleed easily.   Skin: Negative for color change, dry skin, poor wound healing, rash and suspicious lesions.   Musculoskeletal: Negative for arthritis, back pain, falls, gout, joint pain, joint swelling, muscle cramps, muscle weakness and myalgias.   Gastrointestinal: Negative for bloating, abdominal pain, constipation, diarrhea, dysphagia, melena, nausea and vomiting.   Neurological: Negative for excessive daytime sleepiness, dizziness, headaches, light-headedness, loss of balance, numbness, paresthesias, seizures, vertigo and weakness.   Psychiatric/Behavioral: Negative for depression, memory loss and substance abuse. The patient is not  "nervous/anxious.        No Known Allergies      Current Outpatient Medications:   •  apixaban (ELIQUIS) 5 MG tablet tablet, Take 1 tablet by mouth Every 12 (Twelve) Hours., Disp: 60 tablet, Rfl: 6  •  digoxin (LANOXIN) 125 MCG tablet, Take 1 tablet by mouth Daily., Disp: 30 tablet, Rfl: 3  •  furosemide (LASIX) 40 MG tablet, Take 1 tablet by mouth Daily., Disp: 30 tablet, Rfl: 11  •  montelukast (SINGULAIR) 10 MG tablet, TAKE ONE TABLET BY MOUTH ONCE NIGHTLY, Disp: 90 tablet, Rfl: 1  •  Omega-3 Fatty Acids (Fish Oil) 1200 MG capsule delayed-release, Take 1,200 mg by mouth Daily., Disp: , Rfl:   •  omeprazole (priLOSEC) 20 MG capsule, TAKE ONE CAPSULE BY MOUTH DAILY, Disp: 90 capsule, Rfl: 1  •  potassium chloride 10 MEQ CR tablet, Take 1 tablet by mouth Daily., Disp: 30 tablet, Rfl: 11  •  rosuvastatin (CRESTOR) 10 MG tablet, TAKE ONE TABLET BY MOUTH DAILY, Disp: 90 tablet, Rfl: 1  •  SYMBICORT 160-4.5 MCG/ACT inhaler, INHALE TWO PUFFS BY MOUTH TWICE A DAY, Disp: 10.2 g, Rfl: 6  •  vitamin D3 125 MCG (5000 UT) capsule capsule, Take 5,000 Units by mouth Daily., Disp: , Rfl:   •  dilTIAZem CD (CARDIZEM CD) 360 MG 24 hr capsule, Take 1 capsule by mouth Daily., Disp: 30 capsule, Rfl: 3        Objective:     Vitals:    04/27/21 0925   BP: 136/82   BP Location: Right arm   Patient Position: Sitting   Cuff Size: Adult   Pulse: (!) 131   Resp: 18   SpO2: 98%   Weight: 94.8 kg (209 lb)   Height: 175.3 cm (69\")     Body mass index is 30.86 kg/m².      Vitals reviewed.   Constitutional:       General: Not in acute distress.     Appearance: Healthy appearance. Well-developed, well-groomed and overweight.   Eyes:      General:         Right eye: No discharge.         Left eye: No discharge.      Conjunctiva/sclera: Conjunctivae normal.   HENT:      Head: Normocephalic and atraumatic.      Right Ear: External ear normal.      Left Ear: External ear normal.      Nose: Nose normal.   Neck:      Thyroid: No thyromegaly.      " Vascular: No JVD.      Trachea: No tracheal deviation.      Lymphadenopathy: No cervical adenopathy.   Pulmonary:      Effort: Pulmonary effort is normal. No respiratory distress.      Breath sounds: Normal breath sounds. No wheezing. No rales.   Chest:      Chest wall: Not tender to palpatation.   Cardiovascular:      Tachycardia present. Irregularly irregular rhythm.      No gallop.   Pulses:     Intact distal pulses.   Abdominal:      General: Bowel sounds are normal. There is no distension.      Palpations: Abdomen is soft.      Tenderness: There is no abdominal tenderness.   Musculoskeletal: Normal range of motion.         General: No tenderness or deformity.      Cervical back: Normal range of motion and neck supple. Skin:     General: Skin is warm and dry.      Findings: No erythema or rash.   Neurological:      Mental Status: Alert and oriented to person, place, and time.      Coordination: Coordination normal.   Psychiatric:         Attention and Perception: Attention normal.         Mood and Affect: Mood normal.         Speech: Speech normal.         Behavior: Behavior normal. Behavior is cooperative.         Thought Content: Thought content normal.         Cognition and Memory: Cognition normal.         Judgment: Judgment normal.               ECG 12 Lead    Date/Time: 4/27/2021 9:56 AM  Performed by: Staci Costa APRN  Authorized by: Staci Costa APRN   Comparison: compared with previous ECG from 4/16/2021  Similar to previous ECG  Rhythm: atrial fibrillation  Ectopy: unifocal PVCs  Rate: tachycardic  Conduction: conduction normal  ST Depression: II, III, aVF, V4, V5 and V6  T inversion: II, III, aVF and V4  QRS axis: normal    Clinical impression: abnormal EKG              Assessment:       Diagnosis Plan   1. Acute systolic congestive heart failure (CMS/HCC)     2. Atrial fibrillation, unspecified type (CMS/HCC)  Holter Monitor - 24 Hour   3. Essential hypertension     4. Hypercholesterolemia      5. Other emphysema (CMS/HCC)            Plan:       1. Acute sCHF - resolved, appears euvolemic.  Denies SOA.  2.  Afib with RVR - not controlled.  Will increase diltiazem to 360 mg daily and check Holter in 1 week.  3.  HTN - controlled  4.  Hypercholesterolemia - continue on lipid lowering therapy, currently on Crestor 10 mg daily  5.  COPD - uses oxygen 2 L at night  6.  Non obstructive CAD - aggressive risk reduction therapy    Increase diltiazem to 360 mg daily.  Check Holter in 1 week    As always, it has been a pleasure to participate in your patient's care. Thank you.       Sincerely,       KHADRA Candelaria      Current Outpatient Medications:   •  apixaban (ELIQUIS) 5 MG tablet tablet, Take 1 tablet by mouth Every 12 (Twelve) Hours., Disp: 60 tablet, Rfl: 6  •  digoxin (LANOXIN) 125 MCG tablet, Take 1 tablet by mouth Daily., Disp: 30 tablet, Rfl: 3  •  furosemide (LASIX) 40 MG tablet, Take 1 tablet by mouth Daily., Disp: 30 tablet, Rfl: 11  •  montelukast (SINGULAIR) 10 MG tablet, TAKE ONE TABLET BY MOUTH ONCE NIGHTLY, Disp: 90 tablet, Rfl: 1  •  Omega-3 Fatty Acids (Fish Oil) 1200 MG capsule delayed-release, Take 1,200 mg by mouth Daily., Disp: , Rfl:   •  omeprazole (priLOSEC) 20 MG capsule, TAKE ONE CAPSULE BY MOUTH DAILY, Disp: 90 capsule, Rfl: 1  •  potassium chloride 10 MEQ CR tablet, Take 1 tablet by mouth Daily., Disp: 30 tablet, Rfl: 11  •  rosuvastatin (CRESTOR) 10 MG tablet, TAKE ONE TABLET BY MOUTH DAILY, Disp: 90 tablet, Rfl: 1  •  SYMBICORT 160-4.5 MCG/ACT inhaler, INHALE TWO PUFFS BY MOUTH TWICE A DAY, Disp: 10.2 g, Rfl: 6  •  vitamin D3 125 MCG (5000 UT) capsule capsule, Take 5,000 Units by mouth Daily., Disp: , Rfl:   •  dilTIAZem CD (CARDIZEM CD) 360 MG 24 hr capsule, Take 1 capsule by mouth Daily., Disp: 30 capsule, Rfl: 3    Dictated utilizing Dragon dictation

## 2021-05-04 ENCOUNTER — HOSPITAL ENCOUNTER (OUTPATIENT)
Dept: CARDIOLOGY | Facility: HOSPITAL | Age: 66
Discharge: HOME | End: 2021-05-04
Admitting: NURSE PRACTITIONER

## 2021-05-04 DIAGNOSIS — I48.91 ATRIAL FIBRILLATION, UNSPECIFIED TYPE (HCC): ICD-10-CM

## 2021-05-04 PROCEDURE — 93226 XTRNL ECG REC<48 HR SCAN A/R: CPT

## 2021-05-04 PROCEDURE — 93225 XTRNL ECG REC<48 HRS REC: CPT

## 2021-05-07 LAB
MAXIMAL PREDICTED HEART RATE: 157 BPM
STRESS TARGET HR: 133 BPM

## 2021-05-07 PROCEDURE — 93227 XTRNL ECG REC<48 HR R&I: CPT | Performed by: INTERNAL MEDICINE

## 2021-05-11 ENCOUNTER — OFFICE VISIT (OUTPATIENT)
Dept: CARDIOLOGY | Facility: CLINIC | Age: 66
End: 2021-05-11

## 2021-05-11 VITALS
BODY MASS INDEX: 31.25 KG/M2 | SYSTOLIC BLOOD PRESSURE: 128 MMHG | WEIGHT: 211 LBS | DIASTOLIC BLOOD PRESSURE: 85 MMHG | HEIGHT: 69 IN | HEART RATE: 84 BPM | RESPIRATION RATE: 16 BRPM | OXYGEN SATURATION: 95 %

## 2021-05-11 DIAGNOSIS — E78.00 HYPERCHOLESTEROLEMIA: ICD-10-CM

## 2021-05-11 DIAGNOSIS — I50.21 ACUTE SYSTOLIC CONGESTIVE HEART FAILURE (HCC): Primary | ICD-10-CM

## 2021-05-11 DIAGNOSIS — J43.8 OTHER EMPHYSEMA (HCC): ICD-10-CM

## 2021-05-11 DIAGNOSIS — I48.91 ATRIAL FIBRILLATION, UNSPECIFIED TYPE (HCC): ICD-10-CM

## 2021-05-11 DIAGNOSIS — I10 ESSENTIAL HYPERTENSION: ICD-10-CM

## 2021-05-11 PROCEDURE — 93000 ELECTROCARDIOGRAM COMPLETE: CPT | Performed by: NURSE PRACTITIONER

## 2021-05-11 PROCEDURE — 99214 OFFICE O/P EST MOD 30 MIN: CPT | Performed by: NURSE PRACTITIONER

## 2021-05-11 NOTE — PROGRESS NOTES
Date of Office Visit: 2021  Encounter Provider: KHADRA Candelaria  Place of Service: [facility] CARDIOLOGY  Patient Name: Claude Spencer  :1955  Primary Cardiologist: Dr. Draper    CC:  2 week follow up    Dear Dr. King    HPI: Claude Spencer is a pleasant 65 y.o. male who presents 2021 for cardiac follow up. I have reviewed his past medical records including notes, labs and testing in preparation for today's visit.    On 2021 presented with several weeks of generalized malaise, dyspnea, chest discomfort, and palpitations to the ER at [facility].  He had seen his primary care physician several weeks prior and was given oral steroids, intramuscular antibiotics, oral antibiotics, and bronchodilators.  This helped for a few days but then he started to feel poorly again. He noted a rapid  heartbeat, exertional shortness of breath, increased thin yellow sputum production, chest discomfort in the left upper chest, orthopnea, and mild leg swelling.  He saw his primary care physician again, who recommended hospitalization, but the patient refused.  He finally agreed to come in after his symptoms became unbearable.  He had not had any fevers.  He could not reliably tell if his symptoms would get worse with exertion.  He no longer smokes cigarettes but he does chew tobacco.  He does have COPD and uses oxygen at night, but had been having to use it during the day for the past several weeks.     Patient ultimately admitted, 2D echo and stress test were checked secondary to shortness of air with A. fib heart failure with preserved EF.  Patient had chest discomfort ultimately with abnormal stress test with moderate size inferior infarct, EF 34% by gated imaging.  2D echo had a discrepant EF which was 50 to 55% with mild LV enlargement.  EKG from  revealed A. fib RVR but no Q waves or abnormal ST-T wave findings.  Troponin was ultimately unremarkable although  proBNP was elevated.  He was transferred to [facility] for invasive ischemic evaluation given abnormal findings per cardiology team.  He was on Eliquis for stroke risk reduction for paroxysmal atrial fibrillation. Lipid studies reviewed LDL is 81 total cholesterol 142.       4/19/2021 Echo - Interpretation Summary  · The left ventricular cavity is moderately dilated.  · Left ventricular diastolic function was indeterminate.  · Calculated left ventricular EF = 51.7% Estimated left ventricular EF was in agreement with the calculated left ventricular EF. Left ventricular systolic function is normal.  · Left ventricular wall thickness is consistent with mild concentric hypertrophy.         4/19/2021 stress test - Interpretation Summary  · Left ventricular ejection fraction is moderately reduced. (Calculated EF = 34%).  · Myocardial perfusion imaging indicates a medium-sized infarct located in the inferior wall with no significant ischemia noted.         4/20/2021 cardiac catheterization  Procedure findings:  Left main: Large caliber vessel that bifurcates to an LAD and circumflex. Normal.  LAD: Large caliber vessel that gives rise to a small caliber diagonal branch in the midsegment. The LAD is normal in the proximal segment. The mid LAD has a 10 to 20% stenosis.  LCX: Large caliber vessel that gives rise to a large caliber OM1 that bifurcates distally. The proximal circumflex has a discrete 40 to 50% stenosis.  RCA: Large-caliber, dominant vessel that is tortuous and actually does a complete turn on itself. 10 to 20% discrete mid to distal stenosis.     Hemodynamics:   LV: 91/6  AO: 91/65     Conclusions:   1. Left main: Normal  2. LAD: 10 to 20% mid vessel stenosis  3. LCX: Discrete 40 to 50% mid vessel stenosis  4. RCA: Tortuous. 10 to 20% distal stenosis  5. Normal left ventricular end-diastolic pressure.     I saw him on 4/27/2021.  He was feeling better except fatigued.  He was in rapid Afib and his  diltiazem was titrated up.      He continues to complain of fatigue but he states he just feels so much better.  He occasionally will feel his heart racing or skipping but does not seem to happen very often.  He does have shortness of breath with his chronic COPD.  He continues to use oxygen at night.  He follows with Dr. Lange.  He denies any lower extremity edema.  He has not had any episodes of dizziness, lightheadedness, syncope or presyncope.  He denies any chest pain or chest pressure.  He is taking his medications as directed.  His blood pressure is well controlled.  He denies any unexplained bleeding, dark or tarry stools.  Past Medical History:   Diagnosis Date   • COPD (chronic obstructive pulmonary disease) (CMS/[facility])    • Gastroesophageal reflux disease 3/4/2016   • Hyperlipidemia    • Hypertension    • Osteopenia 3/4/2016    Description: Her bone density 2015 secondary to steroid use   • Vitamin D deficiency 3/4/2016       Past Surgical History:   Procedure Laterality Date   • ANKLE SURGERY     • CARDIAC CATHETERIZATION Left 2021    Procedure: Coronary Angiography;  Surgeon: Rao Del Cid MD;  Location: [facility] INVASIVE LOCATION;  Service: Cardiovascular;  Laterality: Left;   • CARDIAC CATHETERIZATION N/A 2021    Procedure: Left Heart Cath;  Surgeon: Rao Del Cid MD;  Location: [facility] CATH INVASIVE LOCATION;  Service: Cardiovascular;  Laterality: N/A;       Social History     Socioeconomic History   • Marital status:      Spouse name: Not on file   • Number of children: Not on file   • Years of education: Not on file   • Highest education level: Not on file   Tobacco Use   • Smoking status: Former Smoker     Packs/day: 1.00     Years: 30.00     Pack years: 30.00     Quit date: 2000     Years since quittin.0   • Smokeless tobacco: Current User     Types: Chew   • Tobacco comment: quit 15 years ago, wife current smoker outside   Substance and Sexual  Activity   • Alcohol use: No   • Drug use: No   • Sexual activity: Yes     Partners: Female       Family History   Problem Relation Age of Onset   • COPD Mother 78   • Seizures Father    • Vasculitis Father    • Prostate cancer Maternal Uncle    • Heart disease Brother        The following portion of the patient's history were reviewed and updated as appropriate: past medical history, past surgical history, past social history, past family history, allergies, current medications, and problem list.    Review of Systems   Constitutional: Positive for malaise/fatigue. Negative for diaphoresis and fever.   HENT: Negative for congestion, hearing loss, hoarse voice, nosebleeds and sore throat.    Eyes: Negative for photophobia, vision loss in left eye, vision loss in right eye and visual disturbance.   Cardiovascular: Positive for palpitations. Negative for chest pain, dyspnea on exertion, irregular heartbeat, leg swelling, near-syncope, orthopnea, paroxysmal nocturnal dyspnea and syncope.   Respiratory: Positive for shortness of breath. Negative for cough, hemoptysis, sleep disturbances due to breathing, snoring, sputum production and wheezing.    Endocrine: Negative for cold intolerance, heat intolerance, polydipsia, polyphagia and polyuria.   Hematologic/Lymphatic: Negative for bleeding problem. Does not bruise/bleed easily.   Skin: Negative for color change, dry skin, poor wound healing, rash and suspicious lesions.   Musculoskeletal: Negative for arthritis, back pain, falls, gout, joint pain, joint swelling, muscle cramps, muscle weakness and myalgias.   Gastrointestinal: Negative for bloating, abdominal pain, constipation, diarrhea, dysphagia, melena, nausea and vomiting.   Neurological: Negative for excessive daytime sleepiness, dizziness, headaches, light-headedness, loss of balance, numbness, paresthesias, seizures, vertigo and weakness.   Psychiatric/Behavioral: Negative for depression, memory loss and substance  "abuse. The patient is not nervous/anxious.        No Known Allergies      Current Outpatient Medications:   •  apixaban (ELIQUIS) 5 MG tablet tablet, Take 1 tablet by mouth Every 12 (Twelve) Hours., Disp: 60 tablet, Rfl: 6  •  digoxin (LANOXIN) 125 MCG tablet, Take 1 tablet by mouth Daily., Disp: 30 tablet, Rfl: 3  •  dilTIAZem CD (CARDIZEM CD) 360 MG 24 hr capsule, Take 1 capsule by mouth Daily., Disp: 30 capsule, Rfl: 3  •  furosemide (LASIX) 40 MG tablet, Take 1 tablet by mouth Daily., Disp: 30 tablet, Rfl: 11  •  montelukast (SINGULAIR) 10 MG tablet, TAKE ONE TABLET BY MOUTH ONCE NIGHTLY, Disp: 90 tablet, Rfl: 1  •  Omega-3 Fatty Acids (Fish Oil) 1200 MG capsule delayed-release, Take 1,200 mg by mouth Daily., Disp: , Rfl:   •  omeprazole (priLOSEC) 20 MG capsule, TAKE ONE CAPSULE BY MOUTH DAILY, Disp: 90 capsule, Rfl: 1  •  potassium chloride 10 MEQ CR tablet, Take 1 tablet by mouth Daily., Disp: 30 tablet, Rfl: 11  •  rosuvastatin (CRESTOR) 10 MG tablet, TAKE ONE TABLET BY MOUTH DAILY, Disp: 90 tablet, Rfl: 1  •  SYMBICORT 160-4.5 MCG/ACT inhaler, INHALE TWO PUFFS BY MOUTH TWICE A DAY, Disp: 10.2 g, Rfl: 6  •  vitamin D3 125 MCG (5000 UT) capsule capsule, Take 5,000 Units by mouth Daily., Disp: , Rfl:         Objective:     Vitals:    05/11/21 0918   BP: 128/85   Pulse: 84   Resp: 16   SpO2: 95%   Weight: 95.7 kg (211 lb)   Height: 175.3 cm (69\")     Body mass index is 31.16 kg/m².      Vitals reviewed.   Constitutional:       General: Not in acute distress.     Appearance: Well-groomed.      Comments: Appears older than stated age.   Eyes:      General:         Right eye: No discharge.         Left eye: No discharge.      Conjunctiva/sclera: Conjunctivae normal.   HENT:      Head: Normocephalic and atraumatic.      Right Ear: External ear normal.      Left Ear: External ear normal.      Nose: Nose normal.   Neck:      Thyroid: No thyromegaly.      Vascular: No JVD.      Trachea: No tracheal deviation.      " Lymphadenopathy: No cervical adenopathy.   Pulmonary:      Effort: Pulmonary effort is normal. No respiratory distress.      Breath sounds: Normal breath sounds. No wheezing. No rales.   Chest:      Chest wall: Not tender to palpatation.   Cardiovascular:      Normal rate. Irregularly irregular rhythm.      No gallop.   Pulses:     Intact distal pulses.   Edema:     Peripheral edema absent.   Abdominal:      General: There is no distension.      Palpations: Abdomen is soft.      Tenderness: There is no abdominal tenderness.   Musculoskeletal: Normal range of motion.         General: No tenderness or deformity.      Cervical back: Normal range of motion and neck supple. Skin:     General: Skin is warm and dry.      Findings: No erythema or rash.   Neurological:      Mental Status: Alert and oriented to person, place, and time.      Coordination: Coordination normal.   Psychiatric:         Attention and Perception: Attention normal.         Mood and Affect: Mood normal.         Speech: Speech normal.         Behavior: Behavior normal. Behavior is cooperative.         Thought Content: Thought content normal.         Cognition and Memory: Cognition normal.         Judgment: Judgment normal.               ECG 12 Lead    Date/Time: 5/11/2021 9:24 AM  Performed by: Staci Costa APRN  Authorized by: Staci Costa APRN   Comparison: compared with previous ECG from 4/27/2021  Rhythm: atrial fibrillation  Rate: normal  Conduction: conduction normal  ST Segments: ST segments normal  T Waves: T waves normal  QRS axis: normal    Clinical impression: abnormal EKG              Assessment:       Diagnosis Plan   1. Acute systolic congestive heart failure (CMS/HCC)     2. Atrial fibrillation, unspecified type (CMS/HCC)     3. Essential hypertension     4. Hypercholesterolemia     5. Other emphysema (CMS/HCC)            Plan:       1. Acute sCHF -appears euvolemic.  Denies SOA except with exertion.  2.  Afib with RVR - remains in  atrial fibrillation, rate controlled on digoxin and Diltiazem.  He is on Eliquis.  3.  HTN - controlled  4.  Hypercholesterolemia - continue on lipid lowering therapy, currently on Crestor 10 mg daily  5.  COPD - uses oxygen 2 L at night  6.  Non obstructive CAD - aggressive risk reduction therapy     RTO in 3 months with JF and 6 months with RM    As always, it has been a pleasure to participate in your patient's care. Thank you.       Sincerely,       KHADRA Candelaria      Current Outpatient Medications:   •  apixaban (ELIQUIS) 5 MG tablet tablet, Take 1 tablet by mouth Every 12 (Twelve) Hours., Disp: 60 tablet, Rfl: 6  •  digoxin (LANOXIN) 125 MCG tablet, Take 1 tablet by mouth Daily., Disp: 30 tablet, Rfl: 3  •  dilTIAZem CD (CARDIZEM CD) 360 MG 24 hr capsule, Take 1 capsule by mouth Daily., Disp: 30 capsule, Rfl: 3  •  furosemide (LASIX) 40 MG tablet, Take 1 tablet by mouth Daily., Disp: 30 tablet, Rfl: 11  •  montelukast (SINGULAIR) 10 MG tablet, TAKE ONE TABLET BY MOUTH ONCE NIGHTLY, Disp: 90 tablet, Rfl: 1  •  Omega-3 Fatty Acids (Fish Oil) 1200 MG capsule delayed-release, Take 1,200 mg by mouth Daily., Disp: , Rfl:   •  omeprazole (priLOSEC) 20 MG capsule, TAKE ONE CAPSULE BY MOUTH DAILY, Disp: 90 capsule, Rfl: 1  •  potassium chloride 10 MEQ CR tablet, Take 1 tablet by mouth Daily., Disp: 30 tablet, Rfl: 11  •  rosuvastatin (CRESTOR) 10 MG tablet, TAKE ONE TABLET BY MOUTH DAILY, Disp: 90 tablet, Rfl: 1  •  SYMBICORT 160-4.5 MCG/ACT inhaler, INHALE TWO PUFFS BY MOUTH TWICE A DAY, Disp: 10.2 g, Rfl: 6  •  vitamin D3 125 MCG (5000 UT) capsule capsule, Take 5,000 Units by mouth Daily., Disp: , Rfl:     Dictated utilizing Dragon dictation

## 2021-05-25 ENCOUNTER — TELEPHONE (OUTPATIENT)
Dept: CARDIOLOGY | Facility: CLINIC | Age: 66
End: 2021-05-25

## 2021-05-25 NOTE — TELEPHONE ENCOUNTER
Patients wife called---he has had nosebleeds for 3 days ---Friday evening, Saturday evening and today.     He is on apixaban and they are concerned because the nosebleeds have been pretty severe.     He called the office--I think was transferred to [facility]---got  but did not leave a message.     Discussed measures to take for now to try and not have it bleed again, he is on home oxygen     I recommended continuing current meds and let them know I would send a message for someone to review and call him tomorrow.

## 2021-05-26 NOTE — TELEPHONE ENCOUNTER
Called and spoke with wife.  Recommendations passed on.  Wife concerned about pts weakness.  She said he is getting worse and he is sleeping more.  She is going to bring him into the ER for further evaluation.  Thanks  Jocy Marie RN  Triage nurse

## 2021-05-27 ENCOUNTER — APPOINTMENT (OUTPATIENT)
Dept: GENERAL RADIOLOGY | Facility: HOSPITAL | Age: 66
End: 2021-05-27

## 2021-05-27 ENCOUNTER — HOSPITAL ENCOUNTER (INPATIENT)
Facility: HOSPITAL | Age: 66
LOS: 6 days | Discharge: HOME | End: 2021-06-02
Attending: EMERGENCY MEDICINE | Admitting: HOSPITALIST

## 2021-05-27 DIAGNOSIS — J18.9 PNEUMONIA OF RIGHT LOWER LOBE DUE TO INFECTIOUS ORGANISM: ICD-10-CM

## 2021-05-27 DIAGNOSIS — R09.02 HYPOXIA: ICD-10-CM

## 2021-05-27 DIAGNOSIS — J40 BRONCHITIS: ICD-10-CM

## 2021-05-27 DIAGNOSIS — R04.0 FREQUENT EPISTAXIS: ICD-10-CM

## 2021-05-27 DIAGNOSIS — I48.91 ATRIAL FIBRILLATION WITH RVR (HCC): Primary | ICD-10-CM

## 2021-05-27 DIAGNOSIS — R50.9 FEVER AND CHILLS: ICD-10-CM

## 2021-05-27 LAB
ALBUMIN SERPL-MCNC: 4.1 G/DL (ref 3.5–5.2)
ALBUMIN/GLOB SERPL: 1.2 G/DL
ALP SERPL-CCNC: 70 U/L (ref 39–117)
ALT SERPL W P-5'-P-CCNC: 33 U/L (ref 1–41)
ANION GAP SERPL CALCULATED.3IONS-SCNC: 13.7 MMOL/L (ref 5–15)
APTT PPP: 29.2 SECONDS (ref 24.3–38.1)
AST SERPL-CCNC: 32 U/L (ref 1–40)
BASOPHILS # BLD AUTO: 0.03 10*3/MM3 (ref 0–0.2)
BASOPHILS NFR BLD AUTO: 0.3 % (ref 0–1.5)
BILIRUB SERPL-MCNC: 0.9 MG/DL (ref 0–1.2)
BUN SERPL-MCNC: 14 MG/DL (ref 8–23)
BUN/CREAT SERPL: 15.7 (ref 7–25)
CALCIUM SPEC-SCNC: 9.7 MG/DL (ref 8.6–10.5)
CHLORIDE SERPL-SCNC: 96 MMOL/L (ref 98–107)
CO2 SERPL-SCNC: 26.3 MMOL/L (ref 22–29)
CREAT SERPL-MCNC: 0.89 MG/DL (ref 0.76–1.27)
D-LACTATE SERPL-SCNC: 2 MMOL/L (ref 0.5–2)
DEPRECATED RDW RBC AUTO: 46.7 FL (ref 37–54)
DIGOXIN SERPL-MCNC: 0.35 NG/ML (ref 0.6–1.2)
EOSINOPHIL # BLD AUTO: 0.04 10*3/MM3 (ref 0–0.4)
EOSINOPHIL NFR BLD AUTO: 0.4 % (ref 0.3–6.2)
ERYTHROCYTE [DISTWIDTH] IN BLOOD BY AUTOMATED COUNT: 12.8 % (ref 12.3–15.4)
FERRITIN SERPL-MCNC: 663 NG/ML (ref 30–400)
FLUAV RNA RESP QL NAA+PROBE: NOT DETECTED
FLUBV RNA RESP QL NAA+PROBE: NOT DETECTED
GFR SERPL CREATININE-BSD FRML MDRD: 86 ML/MIN/1.73
GLOBULIN UR ELPH-MCNC: 3.5 GM/DL
GLUCOSE SERPL-MCNC: 109 MG/DL (ref 65–99)
HCT VFR BLD AUTO: 48.1 % (ref 37.5–51)
HGB BLD-MCNC: 16 G/DL (ref 13–17.7)
IMM GRANULOCYTES # BLD AUTO: 0.05 10*3/MM3 (ref 0–0.05)
IMM GRANULOCYTES NFR BLD AUTO: 0.5 % (ref 0–0.5)
INR PPP: 1.13 (ref 0.9–1.1)
LYMPHOCYTES # BLD AUTO: 1.51 10*3/MM3 (ref 0.7–3.1)
LYMPHOCYTES NFR BLD AUTO: 13.9 % (ref 19.6–45.3)
MCH RBC QN AUTO: 32.9 PG (ref 26.6–33)
MCHC RBC AUTO-ENTMCNC: 33.3 G/DL (ref 31.5–35.7)
MCV RBC AUTO: 98.8 FL (ref 79–97)
MONOCYTES # BLD AUTO: 0.96 10*3/MM3 (ref 0.1–0.9)
MONOCYTES NFR BLD AUTO: 8.8 % (ref 5–12)
NEUTROPHILS NFR BLD AUTO: 76.1 % (ref 42.7–76)
NEUTROPHILS NFR BLD AUTO: 8.29 10*3/MM3 (ref 1.7–7)
NRBC BLD AUTO-RTO: 0 /100 WBC (ref 0–0.2)
NT-PROBNP SERPL-MCNC: 635.1 PG/ML (ref 0–900)
PLATELET # BLD AUTO: 263 10*3/MM3 (ref 140–450)
PMV BLD AUTO: 10.1 FL (ref 6–12)
POTASSIUM SERPL-SCNC: 3.4 MMOL/L (ref 3.5–5.2)
PROCALCITONIN SERPL-MCNC: 0.08 NG/ML (ref 0–0.25)
PROT SERPL-MCNC: 7.6 G/DL (ref 6–8.5)
PROTHROMBIN TIME: 14.5 SECONDS (ref 12.1–15)
QT INTERVAL: 296 MS
RBC # BLD AUTO: 4.87 10*6/MM3 (ref 4.14–5.8)
SARS-COV-2 RNA RESP QL NAA+PROBE: NOT DETECTED
SODIUM SERPL-SCNC: 136 MMOL/L (ref 136–145)
TROPONIN T SERPL-MCNC: <0.01 NG/ML (ref 0–0.03)
TSH SERPL DL<=0.05 MIU/L-ACNC: 1.3 UIU/ML (ref 0.27–4.2)
WBC # BLD AUTO: 10.88 10*3/MM3 (ref 3.4–10.8)

## 2021-05-27 PROCEDURE — 87636 SARSCOV2 & INF A&B AMP PRB: CPT | Performed by: PHYSICIAN ASSISTANT

## 2021-05-27 PROCEDURE — G0378 HOSPITAL OBSERVATION PER HR: HCPCS

## 2021-05-27 PROCEDURE — 85730 THROMBOPLASTIN TIME PARTIAL: CPT | Performed by: PHYSICIAN ASSISTANT

## 2021-05-27 PROCEDURE — 25010000002 DIGOXIN PER 500 MCG: Performed by: PHYSICIAN ASSISTANT

## 2021-05-27 PROCEDURE — 93010 ELECTROCARDIOGRAM REPORT: CPT | Performed by: INTERNAL MEDICINE

## 2021-05-27 PROCEDURE — 80053 COMPREHEN METABOLIC PANEL: CPT | Performed by: EMERGENCY MEDICINE

## 2021-05-27 PROCEDURE — 85610 PROTHROMBIN TIME: CPT | Performed by: PHYSICIAN ASSISTANT

## 2021-05-27 PROCEDURE — 87040 BLOOD CULTURE FOR BACTERIA: CPT | Performed by: EMERGENCY MEDICINE

## 2021-05-27 PROCEDURE — 71045 X-RAY EXAM CHEST 1 VIEW: CPT

## 2021-05-27 PROCEDURE — 83605 ASSAY OF LACTIC ACID: CPT | Performed by: EMERGENCY MEDICINE

## 2021-05-27 PROCEDURE — 82728 ASSAY OF FERRITIN: CPT | Performed by: PHYSICIAN ASSISTANT

## 2021-05-27 PROCEDURE — 83615 LACTATE (LD) (LDH) ENZYME: CPT | Performed by: NURSE PRACTITIONER

## 2021-05-27 PROCEDURE — 93005 ELECTROCARDIOGRAM TRACING: CPT | Performed by: PHYSICIAN ASSISTANT

## 2021-05-27 PROCEDURE — 84484 ASSAY OF TROPONIN QUANT: CPT | Performed by: PHYSICIAN ASSISTANT

## 2021-05-27 PROCEDURE — 94761 N-INVAS EAR/PLS OXIMETRY MLT: CPT

## 2021-05-27 PROCEDURE — 85025 COMPLETE CBC W/AUTO DIFF WBC: CPT | Performed by: EMERGENCY MEDICINE

## 2021-05-27 PROCEDURE — 93005 ELECTROCARDIOGRAM TRACING: CPT | Performed by: EMERGENCY MEDICINE

## 2021-05-27 PROCEDURE — 85379 FIBRIN DEGRADATION QUANT: CPT | Performed by: NURSE PRACTITIONER

## 2021-05-27 PROCEDURE — 84145 PROCALCITONIN (PCT): CPT | Performed by: PHYSICIAN ASSISTANT

## 2021-05-27 PROCEDURE — 99285 EMERGENCY DEPT VISIT HI MDM: CPT | Performed by: PHYSICIAN ASSISTANT

## 2021-05-27 PROCEDURE — 80162 ASSAY OF DIGOXIN TOTAL: CPT | Performed by: PHYSICIAN ASSISTANT

## 2021-05-27 PROCEDURE — 84443 ASSAY THYROID STIM HORMONE: CPT | Performed by: PHYSICIAN ASSISTANT

## 2021-05-27 PROCEDURE — 94640 AIRWAY INHALATION TREATMENT: CPT

## 2021-05-27 PROCEDURE — 99284 EMERGENCY DEPT VISIT MOD MDM: CPT

## 2021-05-27 PROCEDURE — 83880 ASSAY OF NATRIURETIC PEPTIDE: CPT | Performed by: PHYSICIAN ASSISTANT

## 2021-05-27 PROCEDURE — 83735 ASSAY OF MAGNESIUM: CPT | Performed by: NURSE PRACTITIONER

## 2021-05-27 PROCEDURE — 83036 HEMOGLOBIN GLYCOSYLATED A1C: CPT | Performed by: NURSE PRACTITIONER

## 2021-05-27 PROCEDURE — 99223 1ST HOSP IP/OBS HIGH 75: CPT | Performed by: NURSE PRACTITIONER

## 2021-05-27 RX ORDER — DIGOXIN 0.25 MG/ML
250 INJECTION INTRAMUSCULAR; INTRAVENOUS ONCE
Status: COMPLETED | OUTPATIENT
Start: 2021-05-27 | End: 2021-05-27

## 2021-05-27 RX ORDER — ALBUTEROL SULFATE 90 UG/1
2 AEROSOL, METERED RESPIRATORY (INHALATION) ONCE
Status: COMPLETED | OUTPATIENT
Start: 2021-05-27 | End: 2021-05-27

## 2021-05-27 RX ORDER — SODIUM CHLORIDE 0.9 % (FLUSH) 0.9 %
10 SYRINGE (ML) INJECTION AS NEEDED
Status: DISCONTINUED | OUTPATIENT
Start: 2021-05-27 | End: 2021-06-02 | Stop reason: HOSPADM

## 2021-05-27 RX ORDER — ALBUTEROL SULFATE 2.5 MG/3ML
2.5 SOLUTION RESPIRATORY (INHALATION) ONCE
Status: DISCONTINUED | OUTPATIENT
Start: 2021-05-27 | End: 2021-05-27

## 2021-05-27 RX ORDER — ACETAMINOPHEN 500 MG
TABLET ORAL
Status: COMPLETED
Start: 2021-05-27 | End: 2021-05-27

## 2021-05-27 RX ADMIN — SODIUM CHLORIDE 1000 ML: 9 INJECTION, SOLUTION INTRAVENOUS at 20:42

## 2021-05-27 RX ADMIN — ALBUTEROL SULFATE 2 PUFF: 90 AEROSOL, METERED RESPIRATORY (INHALATION) at 22:46

## 2021-05-27 RX ADMIN — ACETAMINOPHEN 1000 MG: 500 TABLET ORAL at 20:44

## 2021-05-27 RX ADMIN — DIGOXIN 250 MCG: 0.25 INJECTION INTRAMUSCULAR; INTRAVENOUS at 22:14

## 2021-05-27 RX ADMIN — IPRATROPIUM BROMIDE 2 PUFF: 17 AEROSOL, METERED RESPIRATORY (INHALATION) at 22:46

## 2021-05-27 NOTE — TELEPHONE ENCOUNTER
Pt's daughter called back. Went over recommendations. She verbalized understanding and is going to try to get her dad to comply.    Thank you,    Ciara Brown, RN  Triage MG

## 2021-05-27 NOTE — TELEPHONE ENCOUNTER
"Pt called, returning our call, I explained I passed on recommendations to his wife.    He is currently having a nose bleed.  He has not tried any of the recommendations that were passed on yesterday (ns nose spray and Vaseline to nares, and holding of eliquis for 2 days)  He said \"he can't stop the eliquis\"  I tried to pass on recommendations and he asked that I call his daughter Kati with the recommendations.    I have called Kati and left a vm concerning this issue.  Thanks  Jocy Marie RN  Triage nurse    "

## 2021-05-28 ENCOUNTER — APPOINTMENT (OUTPATIENT)
Dept: GENERAL RADIOLOGY | Facility: HOSPITAL | Age: 66
End: 2021-05-28

## 2021-05-28 ENCOUNTER — APPOINTMENT (OUTPATIENT)
Dept: CT IMAGING | Facility: HOSPITAL | Age: 66
End: 2021-05-28

## 2021-05-28 LAB
ANION GAP SERPL CALCULATED.3IONS-SCNC: 8.9 MMOL/L (ref 5–15)
B PARAPERT DNA SPEC QL NAA+PROBE: NOT DETECTED
B PERT DNA SPEC QL NAA+PROBE: NOT DETECTED
BASOPHILS # BLD AUTO: 0.03 10*3/MM3 (ref 0–0.2)
BASOPHILS NFR BLD AUTO: 0.3 % (ref 0–1.5)
BUN SERPL-MCNC: 11 MG/DL (ref 8–23)
BUN/CREAT SERPL: 16.7 (ref 7–25)
C PNEUM DNA NPH QL NAA+NON-PROBE: NOT DETECTED
CALCIUM SPEC-SCNC: 9 MG/DL (ref 8.6–10.5)
CHLORIDE SERPL-SCNC: 101 MMOL/L (ref 98–107)
CO2 SERPL-SCNC: 27.1 MMOL/L (ref 22–29)
CREAT SERPL-MCNC: 0.66 MG/DL (ref 0.76–1.27)
D DIMER PPP FEU-MCNC: 0.31 MCGFEU/ML (ref 0–0.46)
DEPRECATED RDW RBC AUTO: 47.8 FL (ref 37–54)
EOSINOPHIL # BLD AUTO: 0.07 10*3/MM3 (ref 0–0.4)
EOSINOPHIL NFR BLD AUTO: 0.6 % (ref 0.3–6.2)
ERYTHROCYTE [DISTWIDTH] IN BLOOD BY AUTOMATED COUNT: 12.9 % (ref 12.3–15.4)
FLUAV SUBTYP SPEC NAA+PROBE: NOT DETECTED
FLUBV RNA ISLT QL NAA+PROBE: NOT DETECTED
GFR SERPL CREATININE-BSD FRML MDRD: 122 ML/MIN/1.73
GLUCOSE SERPL-MCNC: 129 MG/DL (ref 65–99)
HADV DNA SPEC NAA+PROBE: NOT DETECTED
HBA1C MFR BLD: 5.6 % (ref 4.8–5.6)
HCOV 229E RNA SPEC QL NAA+PROBE: NOT DETECTED
HCOV HKU1 RNA SPEC QL NAA+PROBE: NOT DETECTED
HCOV NL63 RNA SPEC QL NAA+PROBE: NOT DETECTED
HCOV OC43 RNA SPEC QL NAA+PROBE: NOT DETECTED
HCT VFR BLD AUTO: 43.1 % (ref 37.5–51)
HGB BLD-MCNC: 14 G/DL (ref 13–17.7)
HMPV RNA NPH QL NAA+NON-PROBE: NOT DETECTED
HPIV1 RNA SPEC QL NAA+PROBE: NOT DETECTED
HPIV2 RNA SPEC QL NAA+PROBE: NOT DETECTED
HPIV3 RNA NPH QL NAA+PROBE: NOT DETECTED
HPIV4 P GENE NPH QL NAA+PROBE: NOT DETECTED
IMM GRANULOCYTES # BLD AUTO: 0.07 10*3/MM3 (ref 0–0.05)
IMM GRANULOCYTES NFR BLD AUTO: 0.6 % (ref 0–0.5)
L PNEUMO1 AG UR QL IA: NEGATIVE
LDH SERPL-CCNC: 296 U/L (ref 135–225)
LYMPHOCYTES # BLD AUTO: 1.6 10*3/MM3 (ref 0.7–3.1)
LYMPHOCYTES NFR BLD AUTO: 13.9 % (ref 19.6–45.3)
M PNEUMO IGG SER IA-ACNC: NOT DETECTED
MAGNESIUM SERPL-MCNC: 1.9 MG/DL (ref 1.6–2.4)
MAGNESIUM SERPL-MCNC: 2 MG/DL (ref 1.6–2.4)
MCH RBC QN AUTO: 32.5 PG (ref 26.6–33)
MCHC RBC AUTO-ENTMCNC: 32.5 G/DL (ref 31.5–35.7)
MCV RBC AUTO: 100 FL (ref 79–97)
MONOCYTES # BLD AUTO: 1.02 10*3/MM3 (ref 0.1–0.9)
MONOCYTES NFR BLD AUTO: 8.9 % (ref 5–12)
NEUTROPHILS NFR BLD AUTO: 75.7 % (ref 42.7–76)
NEUTROPHILS NFR BLD AUTO: 8.73 10*3/MM3 (ref 1.7–7)
NRBC BLD AUTO-RTO: 0 /100 WBC (ref 0–0.2)
PLATELET # BLD AUTO: 210 10*3/MM3 (ref 140–450)
PMV BLD AUTO: 9.7 FL (ref 6–12)
POTASSIUM SERPL-SCNC: 3.4 MMOL/L (ref 3.5–5.2)
PROCALCITONIN SERPL-MCNC: 0.07 NG/ML (ref 0–0.25)
PROCALCITONIN SERPL-MCNC: 0.07 NG/ML (ref 0–0.25)
QT INTERVAL: 331 MS
RBC # BLD AUTO: 4.31 10*6/MM3 (ref 4.14–5.8)
RHINOVIRUS RNA SPEC NAA+PROBE: NOT DETECTED
RSV RNA NPH QL NAA+NON-PROBE: NOT DETECTED
S PNEUM AG SPEC QL LA: NEGATIVE
SARS-COV-2 RNA NPH QL NAA+NON-PROBE: NOT DETECTED
SODIUM SERPL-SCNC: 137 MMOL/L (ref 136–145)
WBC # BLD AUTO: 11.52 10*3/MM3 (ref 3.4–10.8)

## 2021-05-28 PROCEDURE — 71046 X-RAY EXAM CHEST 2 VIEWS: CPT

## 2021-05-28 PROCEDURE — 85025 COMPLETE CBC W/AUTO DIFF WBC: CPT | Performed by: NURSE PRACTITIONER

## 2021-05-28 PROCEDURE — 71250 CT THORAX DX C-: CPT

## 2021-05-28 PROCEDURE — 87186 SC STD MICRODIL/AGAR DIL: CPT | Performed by: NURSE PRACTITIONER

## 2021-05-28 PROCEDURE — 87070 CULTURE OTHR SPECIMN AEROBIC: CPT | Performed by: NURSE PRACTITIONER

## 2021-05-28 PROCEDURE — 87205 SMEAR GRAM STAIN: CPT | Performed by: NURSE PRACTITIONER

## 2021-05-28 PROCEDURE — 83735 ASSAY OF MAGNESIUM: CPT | Performed by: NURSE PRACTITIONER

## 2021-05-28 PROCEDURE — G0378 HOSPITAL OBSERVATION PER HR: HCPCS

## 2021-05-28 PROCEDURE — 80048 BASIC METABOLIC PNL TOTAL CA: CPT | Performed by: NURSE PRACTITIONER

## 2021-05-28 PROCEDURE — 94640 AIRWAY INHALATION TREATMENT: CPT

## 2021-05-28 PROCEDURE — 94799 UNLISTED PULMONARY SVC/PX: CPT

## 2021-05-28 PROCEDURE — 0202U NFCT DS 22 TRGT SARS-COV-2: CPT | Performed by: HOSPITALIST

## 2021-05-28 PROCEDURE — 84145 PROCALCITONIN (PCT): CPT | Performed by: NURSE PRACTITIONER

## 2021-05-28 PROCEDURE — 87899 AGENT NOS ASSAY W/OPTIC: CPT | Performed by: NURSE PRACTITIONER

## 2021-05-28 PROCEDURE — 25010000002 METHYLPREDNISOLONE PER 125 MG: Performed by: HOSPITALIST

## 2021-05-28 PROCEDURE — 84145 PROCALCITONIN (PCT): CPT | Performed by: HOSPITALIST

## 2021-05-28 PROCEDURE — 99232 SBSQ HOSP IP/OBS MODERATE 35: CPT | Performed by: HOSPITALIST

## 2021-05-28 RX ORDER — METHYLPREDNISOLONE SODIUM SUCCINATE 125 MG/2ML
60 INJECTION, POWDER, LYOPHILIZED, FOR SOLUTION INTRAMUSCULAR; INTRAVENOUS EVERY 6 HOURS
Status: DISCONTINUED | OUTPATIENT
Start: 2021-05-28 | End: 2021-05-31

## 2021-05-28 RX ORDER — SODIUM CHLORIDE 0.9 % (FLUSH) 0.9 %
10 SYRINGE (ML) INJECTION AS NEEDED
Status: DISCONTINUED | OUTPATIENT
Start: 2021-05-28 | End: 2021-06-02 | Stop reason: HOSPADM

## 2021-05-28 RX ORDER — SODIUM CHLORIDE 0.9 % (FLUSH) 0.9 %
10 SYRINGE (ML) INJECTION EVERY 12 HOURS SCHEDULED
Status: DISCONTINUED | OUTPATIENT
Start: 2021-05-28 | End: 2021-06-02 | Stop reason: HOSPADM

## 2021-05-28 RX ORDER — ROSUVASTATIN CALCIUM 5 MG/1
10 TABLET, COATED ORAL DAILY
Status: DISCONTINUED | OUTPATIENT
Start: 2021-05-28 | End: 2021-06-02 | Stop reason: HOSPADM

## 2021-05-28 RX ORDER — MELATONIN
5000 DAILY
Status: DISCONTINUED | OUTPATIENT
Start: 2021-05-28 | End: 2021-06-02 | Stop reason: HOSPADM

## 2021-05-28 RX ORDER — AMOXICILLIN AND CLAVULANATE POTASSIUM 875; 125 MG/1; MG/1
1 TABLET, FILM COATED ORAL EVERY 12 HOURS SCHEDULED
Status: DISCONTINUED | OUTPATIENT
Start: 2021-05-28 | End: 2021-06-02 | Stop reason: HOSPADM

## 2021-05-28 RX ORDER — BUDESONIDE 0.5 MG/2ML
0.5 INHALANT ORAL
Status: DISCONTINUED | OUTPATIENT
Start: 2021-05-28 | End: 2021-05-28

## 2021-05-28 RX ORDER — BUDESONIDE AND FORMOTEROL FUMARATE DIHYDRATE 160; 4.5 UG/1; UG/1
2 AEROSOL RESPIRATORY (INHALATION) 2 TIMES DAILY
Status: DISCONTINUED | OUTPATIENT
Start: 2021-05-28 | End: 2021-06-02 | Stop reason: HOSPADM

## 2021-05-28 RX ORDER — NITROGLYCERIN 0.4 MG/1
0.4 TABLET SUBLINGUAL
Status: DISCONTINUED | OUTPATIENT
Start: 2021-05-28 | End: 2021-06-02 | Stop reason: HOSPADM

## 2021-05-28 RX ORDER — BENZONATATE 100 MG/1
200 CAPSULE ORAL 3 TIMES DAILY PRN
Status: DISCONTINUED | OUTPATIENT
Start: 2021-05-28 | End: 2021-06-02 | Stop reason: HOSPADM

## 2021-05-28 RX ORDER — ONDANSETRON 4 MG/1
4 TABLET, FILM COATED ORAL EVERY 6 HOURS PRN
Status: DISCONTINUED | OUTPATIENT
Start: 2021-05-28 | End: 2021-06-02 | Stop reason: HOSPADM

## 2021-05-28 RX ORDER — POTASSIUM CHLORIDE 750 MG/1
10 TABLET, FILM COATED, EXTENDED RELEASE ORAL DAILY
Status: DISCONTINUED | OUTPATIENT
Start: 2021-05-28 | End: 2021-06-02 | Stop reason: HOSPADM

## 2021-05-28 RX ORDER — SODIUM CHLORIDE 9 MG/ML
40 INJECTION, SOLUTION INTRAVENOUS AS NEEDED
Status: DISCONTINUED | OUTPATIENT
Start: 2021-05-28 | End: 2021-06-02 | Stop reason: HOSPADM

## 2021-05-28 RX ORDER — FUROSEMIDE 40 MG/1
40 TABLET ORAL DAILY
Status: DISCONTINUED | OUTPATIENT
Start: 2021-05-28 | End: 2021-06-01

## 2021-05-28 RX ORDER — CHOLECALCIFEROL (VITAMIN D3) 125 MCG
5 CAPSULE ORAL NIGHTLY PRN
Status: DISCONTINUED | OUTPATIENT
Start: 2021-05-28 | End: 2021-06-02 | Stop reason: HOSPADM

## 2021-05-28 RX ORDER — PANTOPRAZOLE SODIUM 40 MG/1
40 TABLET, DELAYED RELEASE ORAL EVERY MORNING
Refills: 1 | Status: DISCONTINUED | OUTPATIENT
Start: 2021-05-28 | End: 2021-06-02 | Stop reason: HOSPADM

## 2021-05-28 RX ORDER — MONTELUKAST SODIUM 10 MG/1
10 TABLET ORAL NIGHTLY
Status: DISCONTINUED | OUTPATIENT
Start: 2021-05-28 | End: 2021-06-02 | Stop reason: HOSPADM

## 2021-05-28 RX ORDER — ACETAMINOPHEN 650 MG/1
650 SUPPOSITORY RECTAL EVERY 4 HOURS PRN
Status: DISCONTINUED | OUTPATIENT
Start: 2021-05-28 | End: 2021-06-02 | Stop reason: HOSPADM

## 2021-05-28 RX ORDER — DIGOXIN 125 MCG
125 TABLET ORAL
Status: DISCONTINUED | OUTPATIENT
Start: 2021-05-28 | End: 2021-05-31

## 2021-05-28 RX ORDER — DILTIAZEM HYDROCHLORIDE 180 MG/1
360 CAPSULE, COATED, EXTENDED RELEASE ORAL
Refills: 3 | Status: DISCONTINUED | OUTPATIENT
Start: 2021-05-28 | End: 2021-06-02 | Stop reason: HOSPADM

## 2021-05-28 RX ORDER — GUAIFENESIN 600 MG/1
1200 TABLET, EXTENDED RELEASE ORAL 2 TIMES DAILY
Status: DISCONTINUED | OUTPATIENT
Start: 2021-05-28 | End: 2021-06-02 | Stop reason: HOSPADM

## 2021-05-28 RX ORDER — IPRATROPIUM BROMIDE AND ALBUTEROL SULFATE 2.5; .5 MG/3ML; MG/3ML
3 SOLUTION RESPIRATORY (INHALATION)
Status: DISCONTINUED | OUTPATIENT
Start: 2021-05-28 | End: 2021-06-01

## 2021-05-28 RX ORDER — ACETAMINOPHEN 325 MG/1
650 TABLET ORAL EVERY 4 HOURS PRN
Status: DISCONTINUED | OUTPATIENT
Start: 2021-05-28 | End: 2021-06-02 | Stop reason: HOSPADM

## 2021-05-28 RX ORDER — ACETAMINOPHEN 160 MG/5ML
650 SOLUTION ORAL EVERY 4 HOURS PRN
Status: DISCONTINUED | OUTPATIENT
Start: 2021-05-28 | End: 2021-06-02 | Stop reason: HOSPADM

## 2021-05-28 RX ORDER — ONDANSETRON 2 MG/ML
4 INJECTION INTRAMUSCULAR; INTRAVENOUS EVERY 6 HOURS PRN
Status: DISCONTINUED | OUTPATIENT
Start: 2021-05-28 | End: 2021-06-02 | Stop reason: HOSPADM

## 2021-05-28 RX ADMIN — BUDESONIDE AND FORMOTEROL FUMARATE DIHYDRATE 2 PUFF: 160; 4.5 AEROSOL RESPIRATORY (INHALATION) at 07:38

## 2021-05-28 RX ADMIN — SODIUM CHLORIDE, PRESERVATIVE FREE 10 ML: 5 INJECTION INTRAVENOUS at 20:47

## 2021-05-28 RX ADMIN — IPRATROPIUM BROMIDE AND ALBUTEROL SULFATE 3 ML: .5; 3 SOLUTION RESPIRATORY (INHALATION) at 11:51

## 2021-05-28 RX ADMIN — AMOXICILLIN AND CLAVULANATE POTASSIUM 1 TABLET: 875; 125 TABLET, FILM COATED ORAL at 20:46

## 2021-05-28 RX ADMIN — GUAIFENESIN 1200 MG: 600 TABLET, EXTENDED RELEASE ORAL at 01:11

## 2021-05-28 RX ADMIN — MONTELUKAST SODIUM 10 MG: 10 TABLET, COATED ORAL at 20:48

## 2021-05-28 RX ADMIN — PANTOPRAZOLE SODIUM 40 MG: 40 TABLET, DELAYED RELEASE ORAL at 06:38

## 2021-05-28 RX ADMIN — BUDESONIDE AND FORMOTEROL FUMARATE DIHYDRATE 2 PUFF: 160; 4.5 AEROSOL RESPIRATORY (INHALATION) at 20:00

## 2021-05-28 RX ADMIN — DILTIAZEM HYDROCHLORIDE 360 MG: 180 CAPSULE, COATED, EXTENDED RELEASE ORAL at 09:12

## 2021-05-28 RX ADMIN — FUROSEMIDE 40 MG: 40 TABLET ORAL at 09:12

## 2021-05-28 RX ADMIN — GUAIFENESIN 1200 MG: 600 TABLET, EXTENDED RELEASE ORAL at 20:46

## 2021-05-28 RX ADMIN — SODIUM CHLORIDE, PRESERVATIVE FREE 10 ML: 5 INJECTION INTRAVENOUS at 09:13

## 2021-05-28 RX ADMIN — POTASSIUM CHLORIDE 10 MEQ: 750 TABLET, EXTENDED RELEASE ORAL at 09:12

## 2021-05-28 RX ADMIN — METHYLPREDNISOLONE SODIUM SUCCINATE 60 MG: 125 INJECTION, POWDER, FOR SOLUTION INTRAMUSCULAR; INTRAVENOUS at 18:42

## 2021-05-28 RX ADMIN — ROSUVASTATIN CALCIUM 10 MG: 5 TABLET, FILM COATED ORAL at 09:12

## 2021-05-28 RX ADMIN — CHOLECALCIFEROL (VITAMIN D3) 25 MCG (1,000 UNIT) TABLET 5000 UNITS: TABLET at 09:12

## 2021-05-28 RX ADMIN — IPRATROPIUM BROMIDE AND ALBUTEROL SULFATE 3 ML: .5; 3 SOLUTION RESPIRATORY (INHALATION) at 07:34

## 2021-05-28 RX ADMIN — MONTELUKAST SODIUM 10 MG: 10 TABLET, COATED ORAL at 01:12

## 2021-05-28 RX ADMIN — IPRATROPIUM BROMIDE AND ALBUTEROL SULFATE 3 ML: .5; 3 SOLUTION RESPIRATORY (INHALATION) at 19:56

## 2021-05-28 RX ADMIN — METHYLPREDNISOLONE SODIUM SUCCINATE 60 MG: 125 INJECTION, POWDER, FOR SOLUTION INTRAMUSCULAR; INTRAVENOUS at 23:33

## 2021-05-28 RX ADMIN — GUAIFENESIN 1200 MG: 600 TABLET, EXTENDED RELEASE ORAL at 13:08

## 2021-05-28 RX ADMIN — SODIUM CHLORIDE, PRESERVATIVE FREE 10 ML: 5 INJECTION INTRAVENOUS at 01:25

## 2021-05-28 RX ADMIN — IPRATROPIUM BROMIDE AND ALBUTEROL SULFATE 3 ML: .5; 3 SOLUTION RESPIRATORY (INHALATION) at 15:24

## 2021-05-28 RX ADMIN — DIGOXIN 125 MCG: 125 TABLET ORAL at 13:08

## 2021-05-29 LAB
ANION GAP SERPL CALCULATED.3IONS-SCNC: 10.3 MMOL/L (ref 5–15)
BASOPHILS # BLD AUTO: 0.01 10*3/MM3 (ref 0–0.2)
BASOPHILS NFR BLD AUTO: 0.1 % (ref 0–1.5)
BUN SERPL-MCNC: 10 MG/DL (ref 8–23)
BUN/CREAT SERPL: 17.9 (ref 7–25)
CALCIUM SPEC-SCNC: 9.2 MG/DL (ref 8.6–10.5)
CHLORIDE SERPL-SCNC: 99 MMOL/L (ref 98–107)
CO2 SERPL-SCNC: 26.7 MMOL/L (ref 22–29)
CREAT SERPL-MCNC: 0.56 MG/DL (ref 0.76–1.27)
DEPRECATED RDW RBC AUTO: 46.9 FL (ref 37–54)
EOSINOPHIL # BLD AUTO: 0 10*3/MM3 (ref 0–0.4)
EOSINOPHIL NFR BLD AUTO: 0 % (ref 0.3–6.2)
ERYTHROCYTE [DISTWIDTH] IN BLOOD BY AUTOMATED COUNT: 12.7 % (ref 12.3–15.4)
GFR SERPL CREATININE-BSD FRML MDRD: 147 ML/MIN/1.73
GLUCOSE SERPL-MCNC: 156 MG/DL (ref 65–99)
HCT VFR BLD AUTO: 46.2 % (ref 37.5–51)
HGB BLD-MCNC: 15.1 G/DL (ref 13–17.7)
IMM GRANULOCYTES # BLD AUTO: 0.07 10*3/MM3 (ref 0–0.05)
IMM GRANULOCYTES NFR BLD AUTO: 0.6 % (ref 0–0.5)
LYMPHOCYTES # BLD AUTO: 0.76 10*3/MM3 (ref 0.7–3.1)
LYMPHOCYTES NFR BLD AUTO: 6.4 % (ref 19.6–45.3)
MCH RBC QN AUTO: 32.6 PG (ref 26.6–33)
MCHC RBC AUTO-ENTMCNC: 32.7 G/DL (ref 31.5–35.7)
MCV RBC AUTO: 99.8 FL (ref 79–97)
MONOCYTES # BLD AUTO: 0.09 10*3/MM3 (ref 0.1–0.9)
MONOCYTES NFR BLD AUTO: 0.8 % (ref 5–12)
NEUTROPHILS NFR BLD AUTO: 10.87 10*3/MM3 (ref 1.7–7)
NEUTROPHILS NFR BLD AUTO: 92.1 % (ref 42.7–76)
NRBC BLD AUTO-RTO: 0 /100 WBC (ref 0–0.2)
PLATELET # BLD AUTO: 229 10*3/MM3 (ref 140–450)
PMV BLD AUTO: 9.6 FL (ref 6–12)
POTASSIUM SERPL-SCNC: 3.8 MMOL/L (ref 3.5–5.2)
RBC # BLD AUTO: 4.63 10*6/MM3 (ref 4.14–5.8)
SODIUM SERPL-SCNC: 136 MMOL/L (ref 136–145)
WBC # BLD AUTO: 11.8 10*3/MM3 (ref 3.4–10.8)

## 2021-05-29 PROCEDURE — 25010000002 METHYLPREDNISOLONE PER 125 MG: Performed by: HOSPITALIST

## 2021-05-29 PROCEDURE — 99232 SBSQ HOSP IP/OBS MODERATE 35: CPT | Performed by: INTERNAL MEDICINE

## 2021-05-29 PROCEDURE — 92610 EVALUATE SWALLOWING FUNCTION: CPT

## 2021-05-29 PROCEDURE — 85025 COMPLETE CBC W/AUTO DIFF WBC: CPT | Performed by: HOSPITALIST

## 2021-05-29 PROCEDURE — 87040 BLOOD CULTURE FOR BACTERIA: CPT | Performed by: INTERNAL MEDICINE

## 2021-05-29 PROCEDURE — 94799 UNLISTED PULMONARY SVC/PX: CPT

## 2021-05-29 PROCEDURE — 80048 BASIC METABOLIC PNL TOTAL CA: CPT | Performed by: HOSPITALIST

## 2021-05-29 RX ADMIN — GUAIFENESIN 1200 MG: 600 TABLET, EXTENDED RELEASE ORAL at 22:22

## 2021-05-29 RX ADMIN — IPRATROPIUM BROMIDE AND ALBUTEROL SULFATE 3 ML: .5; 3 SOLUTION RESPIRATORY (INHALATION) at 11:33

## 2021-05-29 RX ADMIN — METHYLPREDNISOLONE SODIUM SUCCINATE 60 MG: 125 INJECTION, POWDER, FOR SOLUTION INTRAMUSCULAR; INTRAVENOUS at 17:40

## 2021-05-29 RX ADMIN — AMOXICILLIN AND CLAVULANATE POTASSIUM 1 TABLET: 875; 125 TABLET, FILM COATED ORAL at 08:56

## 2021-05-29 RX ADMIN — POTASSIUM CHLORIDE 10 MEQ: 750 TABLET, EXTENDED RELEASE ORAL at 08:55

## 2021-05-29 RX ADMIN — DILTIAZEM HYDROCHLORIDE 360 MG: 180 CAPSULE, COATED, EXTENDED RELEASE ORAL at 08:55

## 2021-05-29 RX ADMIN — ROSUVASTATIN CALCIUM 10 MG: 5 TABLET, FILM COATED ORAL at 08:55

## 2021-05-29 RX ADMIN — AMOXICILLIN AND CLAVULANATE POTASSIUM 1 TABLET: 875; 125 TABLET, FILM COATED ORAL at 22:23

## 2021-05-29 RX ADMIN — BUDESONIDE AND FORMOTEROL FUMARATE DIHYDRATE 2 PUFF: 160; 4.5 AEROSOL RESPIRATORY (INHALATION) at 20:20

## 2021-05-29 RX ADMIN — MONTELUKAST SODIUM 10 MG: 10 TABLET, COATED ORAL at 22:22

## 2021-05-29 RX ADMIN — DIGOXIN 125 MCG: 125 TABLET ORAL at 12:26

## 2021-05-29 RX ADMIN — CHOLECALCIFEROL (VITAMIN D3) 25 MCG (1,000 UNIT) TABLET 5000 UNITS: TABLET at 08:57

## 2021-05-29 RX ADMIN — BUDESONIDE AND FORMOTEROL FUMARATE DIHYDRATE 2 PUFF: 160; 4.5 AEROSOL RESPIRATORY (INHALATION) at 07:44

## 2021-05-29 RX ADMIN — METHYLPREDNISOLONE SODIUM SUCCINATE 60 MG: 125 INJECTION, POWDER, FOR SOLUTION INTRAMUSCULAR; INTRAVENOUS at 05:46

## 2021-05-29 RX ADMIN — SODIUM CHLORIDE, PRESERVATIVE FREE 10 ML: 5 INJECTION INTRAVENOUS at 22:24

## 2021-05-29 RX ADMIN — IPRATROPIUM BROMIDE AND ALBUTEROL SULFATE 3 ML: .5; 3 SOLUTION RESPIRATORY (INHALATION) at 20:18

## 2021-05-29 RX ADMIN — IPRATROPIUM BROMIDE AND ALBUTEROL SULFATE 3 ML: .5; 3 SOLUTION RESPIRATORY (INHALATION) at 15:27

## 2021-05-29 RX ADMIN — SODIUM CHLORIDE, PRESERVATIVE FREE 10 ML: 5 INJECTION INTRAVENOUS at 09:06

## 2021-05-29 RX ADMIN — FUROSEMIDE 40 MG: 40 TABLET ORAL at 09:06

## 2021-05-29 RX ADMIN — BENZONATATE 200 MG: 100 CAPSULE ORAL at 03:31

## 2021-05-29 RX ADMIN — BENZONATATE 200 MG: 100 CAPSULE ORAL at 13:28

## 2021-05-29 RX ADMIN — METHYLPREDNISOLONE SODIUM SUCCINATE 60 MG: 125 INJECTION, POWDER, FOR SOLUTION INTRAMUSCULAR; INTRAVENOUS at 13:28

## 2021-05-29 RX ADMIN — PANTOPRAZOLE SODIUM 40 MG: 40 TABLET, DELAYED RELEASE ORAL at 06:50

## 2021-05-29 RX ADMIN — GUAIFENESIN 1200 MG: 600 TABLET, EXTENDED RELEASE ORAL at 09:05

## 2021-05-29 RX ADMIN — IPRATROPIUM BROMIDE AND ALBUTEROL SULFATE 3 ML: .5; 3 SOLUTION RESPIRATORY (INHALATION) at 07:36

## 2021-05-30 LAB
ALBUMIN SERPL-MCNC: 3.4 G/DL (ref 3.5–5.2)
ALBUMIN/GLOB SERPL: 0.9 G/DL
ALP SERPL-CCNC: 66 U/L (ref 39–117)
ALT SERPL W P-5'-P-CCNC: 37 U/L (ref 1–41)
ANION GAP SERPL CALCULATED.3IONS-SCNC: 11.8 MMOL/L (ref 5–15)
AST SERPL-CCNC: 32 U/L (ref 1–40)
BASOPHILS # BLD AUTO: 0.03 10*3/MM3 (ref 0–0.2)
BASOPHILS NFR BLD AUTO: 0.2 % (ref 0–1.5)
BILIRUB SERPL-MCNC: 0.3 MG/DL (ref 0–1.2)
BUN SERPL-MCNC: 14 MG/DL (ref 8–23)
BUN/CREAT SERPL: 22.6 (ref 7–25)
CALCIUM SPEC-SCNC: 9.9 MG/DL (ref 8.6–10.5)
CHLORIDE SERPL-SCNC: 99 MMOL/L (ref 98–107)
CO2 SERPL-SCNC: 25.2 MMOL/L (ref 22–29)
CREAT SERPL-MCNC: 0.62 MG/DL (ref 0.76–1.27)
DEPRECATED RDW RBC AUTO: 45.6 FL (ref 37–54)
DIGOXIN SERPL-MCNC: 0.44 NG/ML (ref 0.6–1.2)
EOSINOPHIL # BLD AUTO: 0 10*3/MM3 (ref 0–0.4)
EOSINOPHIL NFR BLD AUTO: 0 % (ref 0.3–6.2)
ERYTHROCYTE [DISTWIDTH] IN BLOOD BY AUTOMATED COUNT: 12.6 % (ref 12.3–15.4)
GFR SERPL CREATININE-BSD FRML MDRD: 131 ML/MIN/1.73
GLOBULIN UR ELPH-MCNC: 3.6 GM/DL
GLUCOSE SERPL-MCNC: 182 MG/DL (ref 65–99)
HCT VFR BLD AUTO: 43.3 % (ref 37.5–51)
HGB BLD-MCNC: 14.3 G/DL (ref 13–17.7)
IMM GRANULOCYTES # BLD AUTO: 0.2 10*3/MM3 (ref 0–0.05)
IMM GRANULOCYTES NFR BLD AUTO: 1 % (ref 0–0.5)
LYMPHOCYTES # BLD AUTO: 0.73 10*3/MM3 (ref 0.7–3.1)
LYMPHOCYTES NFR BLD AUTO: 3.7 % (ref 19.6–45.3)
MCH RBC QN AUTO: 32.6 PG (ref 26.6–33)
MCHC RBC AUTO-ENTMCNC: 33 G/DL (ref 31.5–35.7)
MCV RBC AUTO: 98.6 FL (ref 79–97)
MONOCYTES # BLD AUTO: 0.53 10*3/MM3 (ref 0.1–0.9)
MONOCYTES NFR BLD AUTO: 2.7 % (ref 5–12)
NEUTROPHILS NFR BLD AUTO: 18.39 10*3/MM3 (ref 1.7–7)
NEUTROPHILS NFR BLD AUTO: 92.4 % (ref 42.7–76)
NRBC BLD AUTO-RTO: 0 /100 WBC (ref 0–0.2)
PLATELET # BLD AUTO: 290 10*3/MM3 (ref 140–450)
PMV BLD AUTO: 9.5 FL (ref 6–12)
POTASSIUM SERPL-SCNC: 3.8 MMOL/L (ref 3.5–5.2)
PROCALCITONIN SERPL-MCNC: 0.05 NG/ML (ref 0–0.25)
PROT SERPL-MCNC: 7 G/DL (ref 6–8.5)
RBC # BLD AUTO: 4.39 10*6/MM3 (ref 4.14–5.8)
SODIUM SERPL-SCNC: 136 MMOL/L (ref 136–145)
WBC # BLD AUTO: 19.88 10*3/MM3 (ref 3.4–10.8)

## 2021-05-30 PROCEDURE — 94799 UNLISTED PULMONARY SVC/PX: CPT

## 2021-05-30 PROCEDURE — 99232 SBSQ HOSP IP/OBS MODERATE 35: CPT | Performed by: INTERNAL MEDICINE

## 2021-05-30 PROCEDURE — 80053 COMPREHEN METABOLIC PANEL: CPT | Performed by: INTERNAL MEDICINE

## 2021-05-30 PROCEDURE — 85025 COMPLETE CBC W/AUTO DIFF WBC: CPT | Performed by: INTERNAL MEDICINE

## 2021-05-30 PROCEDURE — 84145 PROCALCITONIN (PCT): CPT | Performed by: INTERNAL MEDICINE

## 2021-05-30 PROCEDURE — 80162 ASSAY OF DIGOXIN TOTAL: CPT | Performed by: INTERNAL MEDICINE

## 2021-05-30 PROCEDURE — 25010000002 METHYLPREDNISOLONE PER 125 MG: Performed by: HOSPITALIST

## 2021-05-30 RX ADMIN — SODIUM CHLORIDE, PRESERVATIVE FREE 10 ML: 5 INJECTION INTRAVENOUS at 21:01

## 2021-05-30 RX ADMIN — SODIUM CHLORIDE, PRESERVATIVE FREE 10 ML: 5 INJECTION INTRAVENOUS at 08:49

## 2021-05-30 RX ADMIN — ACETAMINOPHEN 650 MG: 325 TABLET, FILM COATED ORAL at 06:19

## 2021-05-30 RX ADMIN — POTASSIUM CHLORIDE 10 MEQ: 750 TABLET, EXTENDED RELEASE ORAL at 08:47

## 2021-05-30 RX ADMIN — METHYLPREDNISOLONE SODIUM SUCCINATE 60 MG: 125 INJECTION, POWDER, FOR SOLUTION INTRAMUSCULAR; INTRAVENOUS at 11:46

## 2021-05-30 RX ADMIN — APIXABAN 5 MG: 2.5 TABLET, FILM COATED ORAL at 21:01

## 2021-05-30 RX ADMIN — PANTOPRAZOLE SODIUM 40 MG: 40 TABLET, DELAYED RELEASE ORAL at 06:07

## 2021-05-30 RX ADMIN — BUDESONIDE AND FORMOTEROL FUMARATE DIHYDRATE 2 PUFF: 160; 4.5 AEROSOL RESPIRATORY (INHALATION) at 07:44

## 2021-05-30 RX ADMIN — IPRATROPIUM BROMIDE AND ALBUTEROL SULFATE 3 ML: .5; 3 SOLUTION RESPIRATORY (INHALATION) at 20:54

## 2021-05-30 RX ADMIN — FUROSEMIDE 40 MG: 40 TABLET ORAL at 08:48

## 2021-05-30 RX ADMIN — DILTIAZEM HYDROCHLORIDE 360 MG: 180 CAPSULE, COATED, EXTENDED RELEASE ORAL at 08:48

## 2021-05-30 RX ADMIN — BENZONATATE 200 MG: 100 CAPSULE ORAL at 06:19

## 2021-05-30 RX ADMIN — BUDESONIDE AND FORMOTEROL FUMARATE DIHYDRATE 2 PUFF: 160; 4.5 AEROSOL RESPIRATORY (INHALATION) at 20:55

## 2021-05-30 RX ADMIN — METHYLPREDNISOLONE SODIUM SUCCINATE 60 MG: 125 INJECTION, POWDER, FOR SOLUTION INTRAMUSCULAR; INTRAVENOUS at 01:21

## 2021-05-30 RX ADMIN — IPRATROPIUM BROMIDE AND ALBUTEROL SULFATE 3 ML: .5; 3 SOLUTION RESPIRATORY (INHALATION) at 07:37

## 2021-05-30 RX ADMIN — METHYLPREDNISOLONE SODIUM SUCCINATE 60 MG: 125 INJECTION, POWDER, FOR SOLUTION INTRAMUSCULAR; INTRAVENOUS at 06:07

## 2021-05-30 RX ADMIN — GUAIFENESIN 1200 MG: 600 TABLET, EXTENDED RELEASE ORAL at 21:01

## 2021-05-30 RX ADMIN — ROSUVASTATIN CALCIUM 10 MG: 5 TABLET, FILM COATED ORAL at 08:47

## 2021-05-30 RX ADMIN — METHYLPREDNISOLONE SODIUM SUCCINATE 60 MG: 125 INJECTION, POWDER, FOR SOLUTION INTRAMUSCULAR; INTRAVENOUS at 17:39

## 2021-05-30 RX ADMIN — CHOLECALCIFEROL (VITAMIN D3) 25 MCG (1,000 UNIT) TABLET 5000 UNITS: TABLET at 08:46

## 2021-05-30 RX ADMIN — APIXABAN 5 MG: 2.5 TABLET, FILM COATED ORAL at 13:56

## 2021-05-30 RX ADMIN — DIGOXIN 125 MCG: 125 TABLET ORAL at 11:46

## 2021-05-30 RX ADMIN — AMOXICILLIN AND CLAVULANATE POTASSIUM 1 TABLET: 875; 125 TABLET, FILM COATED ORAL at 08:47

## 2021-05-30 RX ADMIN — AMOXICILLIN AND CLAVULANATE POTASSIUM 1 TABLET: 875; 125 TABLET, FILM COATED ORAL at 21:01

## 2021-05-30 RX ADMIN — GUAIFENESIN 1200 MG: 600 TABLET, EXTENDED RELEASE ORAL at 08:47

## 2021-05-30 RX ADMIN — IPRATROPIUM BROMIDE AND ALBUTEROL SULFATE 3 ML: .5; 3 SOLUTION RESPIRATORY (INHALATION) at 12:20

## 2021-05-30 RX ADMIN — MONTELUKAST SODIUM 10 MG: 10 TABLET, COATED ORAL at 21:01

## 2021-05-30 RX ADMIN — IPRATROPIUM BROMIDE AND ALBUTEROL SULFATE 3 ML: .5; 3 SOLUTION RESPIRATORY (INHALATION) at 15:13

## 2021-05-31 LAB
BACTERIA SPEC RESP CULT: ABNORMAL
BACTERIA SPEC RESP CULT: ABNORMAL
GRAM STN SPEC: ABNORMAL

## 2021-05-31 PROCEDURE — 94760 N-INVAS EAR/PLS OXIMETRY 1: CPT

## 2021-05-31 PROCEDURE — 94762 N-INVAS EAR/PLS OXIMTRY CONT: CPT

## 2021-05-31 PROCEDURE — 94799 UNLISTED PULMONARY SVC/PX: CPT

## 2021-05-31 PROCEDURE — 25010000002 METHYLPREDNISOLONE PER 125 MG: Performed by: HOSPITALIST

## 2021-05-31 PROCEDURE — 99232 SBSQ HOSP IP/OBS MODERATE 35: CPT | Performed by: HOSPITALIST

## 2021-05-31 RX ORDER — ECHINACEA PURPUREA EXTRACT 125 MG
2 TABLET ORAL AS NEEDED
Status: DISCONTINUED | OUTPATIENT
Start: 2021-05-31 | End: 2021-06-02 | Stop reason: HOSPADM

## 2021-05-31 RX ORDER — METHYLPREDNISOLONE SODIUM SUCCINATE 125 MG/2ML
60 INJECTION, POWDER, LYOPHILIZED, FOR SOLUTION INTRAMUSCULAR; INTRAVENOUS EVERY 12 HOURS
Status: DISCONTINUED | OUTPATIENT
Start: 2021-05-31 | End: 2021-06-01

## 2021-05-31 RX ORDER — DIGOXIN 250 MCG
250 TABLET ORAL
Status: DISCONTINUED | OUTPATIENT
Start: 2021-05-31 | End: 2021-06-02 | Stop reason: HOSPADM

## 2021-05-31 RX ADMIN — FUROSEMIDE 40 MG: 40 TABLET ORAL at 09:17

## 2021-05-31 RX ADMIN — GUAIFENESIN 1200 MG: 600 TABLET, EXTENDED RELEASE ORAL at 09:17

## 2021-05-31 RX ADMIN — APIXABAN 5 MG: 2.5 TABLET, FILM COATED ORAL at 09:17

## 2021-05-31 RX ADMIN — ROSUVASTATIN CALCIUM 10 MG: 5 TABLET, FILM COATED ORAL at 09:18

## 2021-05-31 RX ADMIN — ACETAMINOPHEN 650 MG: 325 TABLET, FILM COATED ORAL at 20:42

## 2021-05-31 RX ADMIN — BUDESONIDE AND FORMOTEROL FUMARATE DIHYDRATE 2 PUFF: 160; 4.5 AEROSOL RESPIRATORY (INHALATION) at 08:00

## 2021-05-31 RX ADMIN — SODIUM CHLORIDE, PRESERVATIVE FREE 10 ML: 5 INJECTION INTRAVENOUS at 20:36

## 2021-05-31 RX ADMIN — METHYLPREDNISOLONE SODIUM SUCCINATE 60 MG: 125 INJECTION, POWDER, FOR SOLUTION INTRAMUSCULAR; INTRAVENOUS at 06:08

## 2021-05-31 RX ADMIN — DIGOXIN 250 MCG: 250 TABLET ORAL at 12:06

## 2021-05-31 RX ADMIN — IPRATROPIUM BROMIDE AND ALBUTEROL SULFATE 3 ML: .5; 3 SOLUTION RESPIRATORY (INHALATION) at 11:34

## 2021-05-31 RX ADMIN — AMOXICILLIN AND CLAVULANATE POTASSIUM 1 TABLET: 875; 125 TABLET, FILM COATED ORAL at 20:36

## 2021-05-31 RX ADMIN — ACETAMINOPHEN 650 MG: 325 TABLET, FILM COATED ORAL at 11:20

## 2021-05-31 RX ADMIN — SODIUM CHLORIDE, PRESERVATIVE FREE 10 ML: 5 INJECTION INTRAVENOUS at 09:18

## 2021-05-31 RX ADMIN — MONTELUKAST SODIUM 10 MG: 10 TABLET, COATED ORAL at 20:36

## 2021-05-31 RX ADMIN — DILTIAZEM HYDROCHLORIDE 360 MG: 180 CAPSULE, COATED, EXTENDED RELEASE ORAL at 09:17

## 2021-05-31 RX ADMIN — IPRATROPIUM BROMIDE AND ALBUTEROL SULFATE 3 ML: .5; 3 SOLUTION RESPIRATORY (INHALATION) at 15:34

## 2021-05-31 RX ADMIN — APIXABAN 5 MG: 2.5 TABLET, FILM COATED ORAL at 20:36

## 2021-05-31 RX ADMIN — SALINE NASAL SPRAY 2 SPRAY: 1.5 SOLUTION NASAL at 12:14

## 2021-05-31 RX ADMIN — GUAIFENESIN 1200 MG: 600 TABLET, EXTENDED RELEASE ORAL at 20:36

## 2021-05-31 RX ADMIN — BENZONATATE 200 MG: 100 CAPSULE ORAL at 20:42

## 2021-05-31 RX ADMIN — METHYLPREDNISOLONE SODIUM SUCCINATE 60 MG: 125 INJECTION, POWDER, FOR SOLUTION INTRAMUSCULAR; INTRAVENOUS at 00:39

## 2021-05-31 RX ADMIN — CHOLECALCIFEROL (VITAMIN D3) 25 MCG (1,000 UNIT) TABLET 5000 UNITS: TABLET at 09:17

## 2021-05-31 RX ADMIN — PANTOPRAZOLE SODIUM 40 MG: 40 TABLET, DELAYED RELEASE ORAL at 06:08

## 2021-05-31 RX ADMIN — IPRATROPIUM BROMIDE AND ALBUTEROL SULFATE 3 ML: .5; 3 SOLUTION RESPIRATORY (INHALATION) at 07:59

## 2021-05-31 RX ADMIN — BUDESONIDE AND FORMOTEROL FUMARATE DIHYDRATE 2 PUFF: 160; 4.5 AEROSOL RESPIRATORY (INHALATION) at 20:10

## 2021-05-31 RX ADMIN — IPRATROPIUM BROMIDE AND ALBUTEROL SULFATE 3 ML: .5; 3 SOLUTION RESPIRATORY (INHALATION) at 20:10

## 2021-05-31 RX ADMIN — POTASSIUM CHLORIDE 10 MEQ: 750 TABLET, EXTENDED RELEASE ORAL at 09:17

## 2021-05-31 RX ADMIN — METHYLPREDNISOLONE SODIUM SUCCINATE 60 MG: 125 INJECTION, POWDER, FOR SOLUTION INTRAMUSCULAR; INTRAVENOUS at 18:20

## 2021-05-31 RX ADMIN — AMOXICILLIN AND CLAVULANATE POTASSIUM 1 TABLET: 875; 125 TABLET, FILM COATED ORAL at 09:17

## 2021-06-01 LAB
ANION GAP SERPL CALCULATED.3IONS-SCNC: 9 MMOL/L (ref 5–15)
BACTERIA SPEC AEROBE CULT: NORMAL
BACTERIA SPEC AEROBE CULT: NORMAL
BUN SERPL-MCNC: 18 MG/DL (ref 8–23)
BUN/CREAT SERPL: 28.1 (ref 7–25)
CALCIUM SPEC-SCNC: 9 MG/DL (ref 8.6–10.5)
CHLORIDE SERPL-SCNC: 98 MMOL/L (ref 98–107)
CO2 SERPL-SCNC: 27 MMOL/L (ref 22–29)
CREAT SERPL-MCNC: 0.64 MG/DL (ref 0.76–1.27)
GFR SERPL CREATININE-BSD FRML MDRD: 126 ML/MIN/1.73
GLUCOSE SERPL-MCNC: 199 MG/DL (ref 65–99)
POTASSIUM SERPL-SCNC: 4.3 MMOL/L (ref 3.5–5.2)
SODIUM SERPL-SCNC: 134 MMOL/L (ref 136–145)

## 2021-06-01 PROCEDURE — 80048 BASIC METABOLIC PNL TOTAL CA: CPT | Performed by: HOSPITALIST

## 2021-06-01 PROCEDURE — 94799 UNLISTED PULMONARY SVC/PX: CPT

## 2021-06-01 PROCEDURE — 63710000001 PREDNISONE PER 1 MG: Performed by: HOSPITALIST

## 2021-06-01 PROCEDURE — 99232 SBSQ HOSP IP/OBS MODERATE 35: CPT | Performed by: HOSPITALIST

## 2021-06-01 PROCEDURE — 25010000002 METHYLPREDNISOLONE PER 125 MG: Performed by: HOSPITALIST

## 2021-06-01 RX ORDER — IPRATROPIUM BROMIDE AND ALBUTEROL SULFATE 2.5; .5 MG/3ML; MG/3ML
3 SOLUTION RESPIRATORY (INHALATION) EVERY 4 HOURS PRN
Status: DISCONTINUED | OUTPATIENT
Start: 2021-06-01 | End: 2021-06-02 | Stop reason: HOSPADM

## 2021-06-01 RX ORDER — PREDNISONE 20 MG/1
40 TABLET ORAL 2 TIMES DAILY WITH MEALS
Status: DISCONTINUED | OUTPATIENT
Start: 2021-06-01 | End: 2021-06-01

## 2021-06-01 RX ORDER — PREDNISONE 20 MG/1
40 TABLET ORAL 2 TIMES DAILY WITH MEALS
Status: DISCONTINUED | OUTPATIENT
Start: 2021-06-01 | End: 2021-06-02 | Stop reason: HOSPADM

## 2021-06-01 RX ADMIN — DILTIAZEM HYDROCHLORIDE 360 MG: 180 CAPSULE, COATED, EXTENDED RELEASE ORAL at 08:28

## 2021-06-01 RX ADMIN — PANTOPRAZOLE SODIUM 40 MG: 40 TABLET, DELAYED RELEASE ORAL at 06:12

## 2021-06-01 RX ADMIN — AMOXICILLIN AND CLAVULANATE POTASSIUM 1 TABLET: 875; 125 TABLET, FILM COATED ORAL at 21:28

## 2021-06-01 RX ADMIN — APIXABAN 5 MG: 2.5 TABLET, FILM COATED ORAL at 08:28

## 2021-06-01 RX ADMIN — FUROSEMIDE 40 MG: 40 TABLET ORAL at 08:29

## 2021-06-01 RX ADMIN — ROSUVASTATIN CALCIUM 10 MG: 5 TABLET, FILM COATED ORAL at 08:29

## 2021-06-01 RX ADMIN — SODIUM CHLORIDE, PRESERVATIVE FREE 10 ML: 5 INJECTION INTRAVENOUS at 08:29

## 2021-06-01 RX ADMIN — METHYLPREDNISOLONE SODIUM SUCCINATE 60 MG: 125 INJECTION, POWDER, FOR SOLUTION INTRAMUSCULAR; INTRAVENOUS at 06:13

## 2021-06-01 RX ADMIN — POTASSIUM CHLORIDE 10 MEQ: 750 TABLET, EXTENDED RELEASE ORAL at 08:28

## 2021-06-01 RX ADMIN — APIXABAN 5 MG: 2.5 TABLET, FILM COATED ORAL at 21:28

## 2021-06-01 RX ADMIN — GUAIFENESIN 1200 MG: 600 TABLET, EXTENDED RELEASE ORAL at 21:28

## 2021-06-01 RX ADMIN — SODIUM CHLORIDE, PRESERVATIVE FREE 10 ML: 5 INJECTION INTRAVENOUS at 21:30

## 2021-06-01 RX ADMIN — DIGOXIN 250 MCG: 250 TABLET ORAL at 11:59

## 2021-06-01 RX ADMIN — CHOLECALCIFEROL (VITAMIN D3) 25 MCG (1,000 UNIT) TABLET 5000 UNITS: TABLET at 08:29

## 2021-06-01 RX ADMIN — PREDNISONE 40 MG: 20 TABLET ORAL at 17:58

## 2021-06-01 RX ADMIN — AMOXICILLIN AND CLAVULANATE POTASSIUM 1 TABLET: 875; 125 TABLET, FILM COATED ORAL at 08:29

## 2021-06-01 RX ADMIN — GUAIFENESIN 1200 MG: 600 TABLET, EXTENDED RELEASE ORAL at 08:29

## 2021-06-01 RX ADMIN — BUDESONIDE AND FORMOTEROL FUMARATE DIHYDRATE 2 PUFF: 160; 4.5 AEROSOL RESPIRATORY (INHALATION) at 07:55

## 2021-06-01 RX ADMIN — BUDESONIDE AND FORMOTEROL FUMARATE DIHYDRATE 2 PUFF: 160; 4.5 AEROSOL RESPIRATORY (INHALATION) at 21:11

## 2021-06-01 RX ADMIN — MONTELUKAST SODIUM 10 MG: 10 TABLET, COATED ORAL at 21:28

## 2021-06-01 NOTE — PLAN OF CARE
Goal Outcome Evaluation:  Plan of Care Reviewed With: patient  Progress: no change  Outcome Summary: VSS - 2L oxygen - tylenol given for headache, with relief - HR flucuating, AFIB - no cardiology consult noted - sat in recliner and bed - rested well through night

## 2021-06-01 NOTE — PROGRESS NOTES
"Hospitalist Team      Patient Care Team:  Jason King MD as PCP - General (Internal Medicine)        Chief Complaint: Follow-up Pneumococcal Pneumonia    Subjective    Feels well, but complains of left chest wall pain from coughing and abdominal distension.  He does note some abdominal pain when coughing.  No further nose bleeds.  Tolerating PO.    Objective    Vital Signs  Temp:  [97.1 °F (36.2 °C)-98.4 °F (36.9 °C)] 97.6 °F (36.4 °C)  Heart Rate:  [] 70  Resp:  [16-18] 17  BP: (120-143)/(78-97) 120/79  Oxygen Therapy  SpO2: 93 %  Pulse Oximetry Type: Continuous  Device (Oxygen Therapy): room air  Flow (L/min): 2  Probe Placed On (Pulse Ox): Left:, finger}    Flowsheet Rows      First Filed Value   Admission Height  175.3 cm (69\") Documented at 05/27/2021 2020   Admission Weight  95.5 kg (210 lb 8 oz) Documented at 05/27/2021 2020        Physical Exam:    General: No acute distress.  Lungs: Breath sounds are diminished, but I appreciate air movement through all fields.  No wheeze or accessory use noted.  CV: IRR.  No murmur appreciated.  Radial pulses are 2+ and symmetric.  Abdomen: Soft and non-tender and non-distended.  MSK: No C/C/E.  No asymmetry.  Neuro: CN II-XII grossly intact.  Psych: Pleasant affect.  Ox3.    Results Review:     I reviewed the patient's new clinical results.    Lab Results (last 24 hours)     Procedure Component Value Units Date/Time    Blood Culture - Blood, Arm, Right [[id number]] Collected: 05/29/21 1529    Specimen: Blood from Arm, Right Updated: 06/01/21 1530     Blood Culture No growth at 3 days    Narrative:      Less than seven (7) mls of blood was collected.  Insufficient quantity may yield false negative results.    Blood Culture - Blood, Arm, Right [[id number]] Collected: 05/29/21 1519    Specimen: Blood from Arm, Right Updated: 06/01/21 1530     Blood Culture No growth at 3 days    Narrative:      Less than seven (7) mls of blood was collected.  Insufficient quantity " may yield false negative results.    Basic Metabolic Panel [[id number]]  (Abnormal) Collected: 06/01/21 0409    Specimen: Blood Updated: 06/01/21 0450     Glucose 199 mg/dL      BUN 18 mg/dL      Creatinine 0.64 mg/dL      Sodium 134 mmol/L      Potassium 4.3 mmol/L      Chloride 98 mmol/L      CO2 27.0 mmol/L      Calcium 9.0 mg/dL      eGFR Non African Amer 126 mL/min/1.73      BUN/Creatinine Ratio 28.1     Anion Gap 9.0 mmol/L     Narrative:      GFR Normal >60  Chronic Kidney Disease <60  Kidney Failure <15            Imaging Results (Last 24 Hours)     ** No results found for the last 24 hours. **          Medication Review:   I have reviewed the patient's current medication list    Current Facility-Administered Medications:   •  acetaminophen (TYLENOL) tablet 650 mg, 650 mg, Oral, Q4H PRN, 650 mg at 05/31/21 2042 **OR** acetaminophen (TYLENOL) 160 MG/5ML solution 650 mg, 650 mg, Oral, Q4H PRN **OR** acetaminophen (TYLENOL) suppository 650 mg, 650 mg, Rectal, Q4H PRN, Ching Campbell APRN  •  amoxicillin-clavulanate (AUGMENTIN) 875-125 MG per tablet 1 tablet, 1 tablet, Oral, Q12H, Glen Holman MD, 1 tablet at 06/01/21 0829  •  apixaban (ELIQUIS) tablet 5 mg, 5 mg, Oral, Q12H, Georges Jones MD, 5 mg at 06/01/21 0828  •  benzonatate (TESSALON) capsule 200 mg, 200 mg, Oral, TID PRN, Ching Campbell APRN, 200 mg at 05/31/21 2042  •  budesonide-formoterol (SYMBICORT) 160-4.5 MCG/ACT inhaler 2 puff, 2 puff, Inhalation, BID, Ching Campbell APRN, 2 puff at 06/01/21 0755  •  cholecalciferol (VITAMIN D3) tablet 5,000 Units, 5,000 Units, Oral, Daily, Ching Campbell APRN, 5,000 Units at 06/01/21 0829  •  digoxin (LANOXIN) tablet 250 mcg, 250 mcg, Oral, Daily, Glen Holman MD, 250 mcg at 06/01/21 1159  •  dilTIAZem CD (CARDIZEM CD) 24 hr capsule 360 mg, 360 mg, Oral, Q24H, Ching Campbell APRN, 360 mg at 06/01/21 0828  •  guaiFENesin (MUCINEX) 12 hr tablet 1,200 mg, 1,200 mg, Oral, BID, Adrian,  Ching HAN APRN, 1,200 mg at 06/01/21 0829  •  ipratropium-albuterol (DUO-NEB) nebulizer solution 3 mL, 3 mL, Nebulization, Q4H PRN, Glen Holman MD  •  melatonin tablet 5 mg, 5 mg, Oral, Nightly PRN, Ching Campbell APRN  •  montelukast (SINGULAIR) tablet 10 mg, 10 mg, Oral, Nightly, Ching Campbell, APRN, 10 mg at 05/31/21 2036  •  nitroglycerin (NITROSTAT) SL tablet 0.4 mg, 0.4 mg, Sublingual, Q5 Min PRN, Ching Campbell APRN  •  ondansetron (ZOFRAN) tablet 4 mg, 4 mg, Oral, Q6H PRN **OR** ondansetron (ZOFRAN) injection 4 mg, 4 mg, Intravenous, Q6H PRN, Ching Campbell APRDAVID  •  pantoprazole (PROTONIX) EC tablet 40 mg, 40 mg, Oral, QAM, Ching Campbell, APRN, 40 mg at 06/01/21 0612  •  potassium chloride (K-DUR,KLOR-CON) ER tablet 10 mEq, 10 mEq, Oral, Daily, Ching Campbell APRN, 10 mEq at 06/01/21 0828  •  predniSONE (DELTASONE) tablet 40 mg, 40 mg, Oral, BID With Meals, Glen Holman MD  •  rosuvastatin (CRESTOR) tablet 10 mg, 10 mg, Oral, Daily, Ching Campbell APRN, 10 mg at 06/01/21 0829  •  sodium chloride 0.9 % flush 10 mL, 10 mL, Intravenous, PRN, Jay Cruz MD  •  [COMPLETED] Insert peripheral IV, , , Once **AND** sodium chloride 0.9 % flush 10 mL, 10 mL, Intravenous, PRN, Lidia Mota PA-C  •  sodium chloride 0.9 % flush 10 mL, 10 mL, Intravenous, PRN, Ching Campbell APRN  •  sodium chloride 0.9 % flush 10 mL, 10 mL, Intravenous, Q12H, Ching Campbell, APRN, 10 mL at 06/01/21 0829  •  sodium chloride 0.9 % infusion 40 mL, 40 mL, Intravenous, PRN, Ching Campbell APRN  •  sodium chloride nasal spray 2 spray, 2 spray, Each Nare, PRN, Glen Holman MD, 2 spray at 05/31/21 1214      Assessment/Plan     1.  Pneumococcal Pneumonia: Continue Augmentin.    2.  AECOPD: w/ Hypoxia: Now, liberated to RA (although still wants to wear cannula?).  Decrease to PO steroids.  Continue Nebs.    3.  Afib w/ RVR: Rate still bumping w/ activity, but returns to  goal.  Check Dig level.  Continue Eliquis.     Plan for disposition: Home soon.    Glen Holman MD  06/01/21  17:46 EDT

## 2021-06-01 NOTE — PAYOR COMM NOTE
"Claude Spencer (65 y.o. Male)    ATTN: NURSE REVIEWER  RE: UPDATED CLINICALS   REF#  [id number]  PLS REPLY TO EDGAR RÍOS [phone] FAX# [phone]      Date of Birth Social Security Number Address Home Phone MRN    1955  [address] [place] [address] [phone] [id number]    Faith Marital Status          Other        Admission Date Admission Type Admitting Provider Attending Provider Department, Room/Bed    5/27/21 Emergency Glen Holman MD Ellis, Rusty T, MD [facility] MED SURG, 1421/1    Discharge Date Discharge Disposition Discharge Destination                       Attending Provider: Glen Holman MD    Allergies: No Known Allergies    Isolation: None   Infection: None   Code Status: CPR    Ht: 175.3 cm (69\")   Wt: 96.3 kg (212 lb 6.4 oz)    Admission Cmt: None   Principal Problem: None                Active Insurance as of 5/27/2021     Primary Coverage     Payor Plan Insurance Group Employer/Plan Group    [employer] PPO [id number]     Payor Plan Address Payor Plan Phone Number Payor Plan Fax Number Effective Dates    [id number] [phone]  1/1/2019 - None Entered    [place] [address]       Subscriber Name Subscriber Birth Date Member ID       ANTONIETA SPENCER [birth year]1964 [id number]                 Emergency Contacts      (Rel.) Home Phone Work Phone Mobile Phone    LEXIE SPENCER (Daughter) -- -- [phone]    Antonieta Spencer (Spouse) [phone] -- --            Vital Signs (last 2 days)     Date/Time   Temp   Temp src   Pulse   Resp   BP   Patient Position   SpO2    06/01/21 1143   98.3 (36.8)   Oral   94   17   134/97   Sitting   90    06/01/21 0800   --   --   88   18   --   --   92 06/01/21 0755   --   --   88   18   --   --   92    06/01/21 0559   98.4 (36.9)   Oral   87   18   143/92   Lying   93    05/31/21 2024   --   --   88   16   --   --   93    05/31/21 2011   --   --   " 94   16   --   --   93    05/31/21 1918   97.1 (36.2)   Oral   107   18   127/78   Sitting   94    05/31/21 1534   --   --   88   20   --   Sitting   95    05/31/21 1517   97.4 (36.3)   Oral   80   18   118/72   Lying   96    05/31/21 1206   --   --   107   --   --   --   --    05/31/21 1146   --   --   94   18   --   --   94    05/31/21 1134   --   --   83   16   --   --   93    05/31/21 1105   98 (36.7)   Oral   91   18   134/78   Sitting   93    05/31/21 0917   --   --   101   --   131/76   --   --    05/31/21 0800   --   --   82   20   --   --   92    05/31/21 0622   97.1 (36.2)   Oral   99   19   123/72   Sitting   95    05/30/21 2315   --   --   88   18   116/67   Lying   90    05/30/21 2104   --   --   94   22   --   --   --    05/30/21 2054   --   --   95   20   --   Lying   94    05/30/21 2005   97.8 (36.6)   Oral   94   19   129/80   Lying   94    05/30/21 1529   98.1 (36.7)   Oral   (!) 128   20   138/82   Sitting   94    05/30/21 1523   --   --   120   20   --   --   93    05/30/21 1513   --   --   109   20   --   --   94    05/30/21 1228   --   --   88   16   --   --   94    05/30/21 1220   --   --   88   16   --   --   94    05/30/21 1155   97.7 (36.5)   Oral   105   16   143/79   Lying   94    05/30/21 0750   --   --   102   16   --   --   93    05/30/21 0744   --   --   77   16   --   --   94    05/30/21 0737   --   --   85   16   --   --   94    05/30/21 0654   --   --   72   --   --   --   --    05/30/21 0616   96.6 (35.9)   Oral   77   18   127/82   Lying   95    05/30/21 0610   --   --   70   --   --   --   93            Lines, Drains & Airways    Active LDAs     Name:   Placement date:   Placement time:   Site:   Days:    Peripheral IV 05/27/21 2030 Left Antecubital   05/27/21 2030    Antecubital   4                  Current Facility-Administered Medications   Medication Dose Route Frequency Provider Last Rate Last Admin   • acetaminophen (TYLENOL) tablet 650 mg  650 mg Oral Q4H PRN Adrian  KHADRA Israel   650 mg at 05/31/21 2042    Or   • acetaminophen (TYLENOL) 160 MG/5ML solution 650 mg  650 mg Oral Q4H PRN Ching Campbell APRN        Or   • acetaminophen (TYLENOL) suppository 650 mg  650 mg Rectal Q4H PRN Ching Campbell APRN       • amoxicillin-clavulanate (AUGMENTIN) 875-125 MG per tablet 1 tablet  1 tablet Oral Q12H Glen Holman MD   1 tablet at 06/01/21 0829   • apixaban (ELIQUIS) tablet 5 mg  5 mg Oral Q12H Georges Jones MD   5 mg at 06/01/21 0828   • benzonatate (TESSALON) capsule 200 mg  200 mg Oral TID PRN Ching Campbell APRN   200 mg at 05/31/21 2042   • budesonide-formoterol (SYMBICORT) 160-4.5 MCG/ACT inhaler 2 puff  2 puff Inhalation BID Ching Campbell APRN   2 puff at 06/01/21 0755   • cholecalciferol (VITAMIN D3) tablet 5,000 Units  5,000 Units Oral Daily Ching Campbell APRN   5,000 Units at 06/01/21 0829   • digoxin (LANOXIN) tablet 250 mcg  250 mcg Oral Daily Glen Holman MD   250 mcg at 06/01/21 1159   • dilTIAZem CD (CARDIZEM CD) 24 hr capsule 360 mg  360 mg Oral Q24H Ching Campbell APRN   360 mg at 06/01/21 0828   • guaiFENesin (MUCINEX) 12 hr tablet 1,200 mg  1,200 mg Oral BID Ching Campbell APRN   1,200 mg at 06/01/21 0829   • ipratropium-albuterol (DUO-NEB) nebulizer solution 3 mL  3 mL Nebulization Q4H PRN Glen Holman MD       • melatonin tablet 5 mg  5 mg Oral Nightly PRN Ching Campbell APRN       • montelukast (SINGULAIR) tablet 10 mg  10 mg Oral Nightly Ching Campbell APRN   10 mg at 05/31/21 2036   • nitroglycerin (NITROSTAT) SL tablet 0.4 mg  0.4 mg Sublingual Q5 Min PRN Ching Campbell APRN       • ondansetron (ZOFRAN) tablet 4 mg  4 mg Oral Q6H PRN Ching Campbell APRN        Or   • ondansetron (ZOFRAN) injection 4 mg  4 mg Intravenous Q6H PRN Ching Campbell APRN       • pantoprazole (PROTONIX) EC tablet 40 mg  40 mg Oral QAM Ching Campbell APRN   40 mg at 06/01/21 0612   • potassium chloride  (K-DUR,KLOR-CON) ER tablet 10 mEq  10 mEq Oral Daily Adrian, Ching R, APRN   10 mEq at 06/01/21 0828   • predniSONE (DELTASONE) tablet 40 mg  40 mg Oral BID With Meals Glen Holman MD       • rosuvastatin (CRESTOR) tablet 10 mg  10 mg Oral Daily [place], Ching R, APRN   10 mg at 06/01/21 0829   • sodium chloride 0.9 % flush 10 mL  10 mL Intravenous PRN Jay Cruz MD       • sodium chloride 0.9 % flush 10 mL  10 mL Intravenous PRN Lidia Mota PA-C       • sodium chloride 0.9 % flush 10 mL  10 mL Intravenous PRN Adrian, Ching R, APRN       • sodium chloride 0.9 % flush 10 mL  10 mL Intravenous Q12H Adrian, Ching R, APRN   10 mL at 06/01/21 0829   • sodium chloride 0.9 % infusion 40 mL  40 mL Intravenous PRN Adrian, Ching R, APRN       • sodium chloride nasal spray 2 spray  2 spray Each Nare PRN Glen Holman MD   2 spray at 05/31/21 1214     Blood Administration Record (From admission, onward)    None        Lab Results (last 48 hours)     Procedure Component Value Units Date/Time    Blood Culture - Blood, Arm, Right [[id number]] Collected: 05/29/21 1529    Specimen: Blood from Arm, Right Updated: 06/01/21 1530     Blood Culture No growth at 3 days    Narrative:      Less than seven (7) mls of blood was collected.  Insufficient quantity may yield false negative results.    Blood Culture - Blood, Arm, Right [[id number]] Collected: 05/29/21 1519    Specimen: Blood from Arm, Right Updated: 06/01/21 1530     Blood Culture No growth at 3 days    Narrative:      Less than seven (7) mls of blood was collected.  Insufficient quantity may yield false negative results.    Basic Metabolic Panel [[id number]]  (Abnormal) Collected: 06/01/21 0409    Specimen: Blood Updated: 06/01/21 0450     Glucose 199 mg/dL      BUN 18 mg/dL      Creatinine 0.64 mg/dL      Sodium 134 mmol/L      Potassium 4.3 mmol/L      Chloride 98 mmol/L      CO2 27.0 mmol/L      Calcium 9.0 mg/dL      eGFR Non African Amer 126  mL/min/1.73      BUN/Creatinine Ratio 28.1     Anion Gap 9.0 mmol/L     Narrative:      GFR Normal >60  Chronic Kidney Disease <60  Kidney Failure <15      Blood Culture - Blood, Arm, Right [[id number]] Collected: 05/27/21 2104    Specimen: Blood from Arm, Right Updated: 05/31/21 2130     Blood Culture No growth at 4 days    Blood Culture - Blood, Arm, Left [[id number]] Collected: 05/27/21 2036    Specimen: Blood from Arm, Left Updated: 05/31/21 2100     Blood Culture No growth at 4 days    Respiratory Culture - Sputum, Cough [[id number]]  (Abnormal)  (Susceptibility) Collected: 05/28/21 0639    Specimen: Sputum from Cough Updated: 05/31/21 0745     Respiratory Culture Heavy growth (4+) Streptococcus pneumoniae      Light growth (2+) Normal Respiratory Kyara: NO S.aureus/MRSA or Pseudomonas aeruginosa     Gram Stain Moderate (3+) WBCs seen      Occasional Epithelial cells seen      Moderate (3+) Gram positive cocci in pairs and chains      Rare (1+) Gram positive bacilli    Susceptibility      Streptococcus pneumoniae      LANDEN      Ceftriaxone (Meningitis) Intermediate      Ceftriaxone (Non-meningitis) Susceptible      Levofloxacin Susceptible      Penicillin (Meningitis) Resistant      Penicillin (Non-Meningitis) Susceptible      Trimethoprim + Sulfamethoxazole Resistant               Linear View                         Imaging Results (Last 48 Hours)     ** No results found for the last 48 hours. **        ECG/EMG Results (last 48 hours)     ** No results found for the last 48 hours. **        Ventilator/Non-Invasive Ventilation Settings (From admission, onward) Comment    None        Operative/Procedure Notes (last 48 hours) (Notes from 05/30/21 1537 through 06/01/21 1537)    No notes of this type exist for this encounter.          Physician Progress Notes (last 48 hours) (Notes from 05/30/21 1537 through 06/01/21 1537)      Glen Holman MD at 05/31/21 1122          Hospitalist Team      Patient Care  "Team:  Jason King MD as PCP - General (Internal Medicine)        Chief Complaint: Follow-up Pneumococcal pneumonia    Subjective    Feels well today.  Breathing much better and cough becoming more productive.  He does report his chest is sore from coughing.  He denies overt chest pain.  He is tolerating PO.  Staff report heart rate jumps up when he ambulates.    Objective    Vital Signs  Temp:  [97.1 °F (36.2 °C)-98.1 °F (36.7 °C)] 98 °F (36.7 °C)  Heart Rate:  [] 91  Resp:  [16-22] 18  BP: (116-143)/(67-82) 134/78  Oxygen Therapy  SpO2: 93 %  Pulse Oximetry Type: Continuous  Device (Oxygen Therapy): nasal cannula  Flow (L/min): 2  Probe Placed On (Pulse Ox): Left:, finger}    Flowsheet Rows      First Filed Value   Admission Height  175.3 cm (69\") Documented at 05/27/2021 2020   Admission Weight  95.5 kg (210 lb 8 oz) Documented at 05/27/2021 2020        Physical Exam:    General: Appears stated age in NAD.  Lungs: Breath sounds are diminished, but air movement has improved.  I appreciate no wheeze.  Respirations are non-labored.  CV: IRR.  I appreciate no murmur.  Radial pulses are 2+ and symmetric.  Abdomen: Soft and non-tender w/ active bowel sounds.  MSK: No C/C/E.  No asymmetry.  Neuro: CN II-XII grossly intact.  Psych: Pleasant affect.  Ox3.      Results Review:     I reviewed the patient's new clinical results.    Lab Results (last 24 hours)     Procedure Component Value Units Date/Time    Respiratory Culture - Sputum, Cough [[id number]]  (Abnormal)  (Susceptibility) Collected: 05/28/21 0639    Specimen: Sputum from Cough Updated: 05/31/21 0745     Respiratory Culture Heavy growth (4+) Streptococcus pneumoniae      Light growth (2+) Normal Respiratory Kyara: NO S.aureus/MRSA or Pseudomonas aeruginosa     Gram Stain Moderate (3+) WBCs seen      Occasional Epithelial cells seen      Moderate (3+) Gram positive cocci in pairs and chains      Rare (1+) Gram positive bacilli    Susceptibility      " Streptococcus pneumoniae      LANDEN      Ceftriaxone (Meningitis) Intermediate      Ceftriaxone (Non-meningitis) Susceptible      Levofloxacin Susceptible      Penicillin (Meningitis) Resistant      Penicillin (Non-Meningitis) Susceptible      Trimethoprim + Sulfamethoxazole Resistant               Linear View                   Blood Culture - Blood, Arm, Right [[id number]] Collected: 05/27/21 2104    Specimen: Blood from Arm, Right Updated: 05/30/21 2131     Blood Culture No growth at 3 days    Blood Culture - Blood, Arm, Left [[id number]] Collected: 05/27/21 2036    Specimen: Blood from Arm, Left Updated: 05/30/21 2100     Blood Culture No growth at 3 days    Blood Culture - Blood, Arm, Right [[id number]] Collected: 05/29/21 1519    Specimen: Blood from Arm, Right Updated: 05/30/21 1530     Blood Culture No growth at 24 hours    Blood Culture - Blood, Arm, Right [[id number]] Collected: 05/29/21 1529    Specimen: Blood from Arm, Right Updated: 05/30/21 1530     Blood Culture No growth at 24 hours          Imaging Results (Last 24 Hours)     ** No results found for the last 24 hours. **          Medication Review:   I have reviewed the patient's current medication list    Current Facility-Administered Medications:   •  acetaminophen (TYLENOL) tablet 650 mg, 650 mg, Oral, Q4H PRN, 650 mg at 05/31/21 1120 **OR** acetaminophen (TYLENOL) 160 MG/5ML solution 650 mg, 650 mg, Oral, Q4H PRN **OR** acetaminophen (TYLENOL) suppository 650 mg, 650 mg, Rectal, Q4H PRN, Ching Campbell APRN  •  amoxicillin-clavulanate (AUGMENTIN) 875-125 MG per tablet 1 tablet, 1 tablet, Oral, Q12H, Glen Holman MD, 1 tablet at 05/31/21 0917  •  apixaban (ELIQUIS) tablet 5 mg, 5 mg, Oral, Q12H, Georges Jones MD, 5 mg at 05/31/21 0917  •  benzonatate (TESSALON) capsule 200 mg, 200 mg, Oral, TID PRN, Ching Campbell APRN, 200 mg at 05/30/21 0619  •  budesonide-formoterol (SYMBICORT) 160-4.5 MCG/ACT inhaler 2 puff, 2 puff, Inhalation, BID,  Ching Campbell APRN, 2 puff at 05/31/21 0800  •  cholecalciferol (VITAMIN D3) tablet 5,000 Units, 5,000 Units, Oral, Daily, Ching Campbell APRN, 5,000 Units at 05/31/21 0917  •  digoxin (LANOXIN) tablet 125 mcg, 125 mcg, Oral, Daily, Ching Campbell APRN, 125 mcg at 05/30/21 1146  •  dilTIAZem CD (CARDIZEM CD) 24 hr capsule 360 mg, 360 mg, Oral, Q24H, Ching Campbell APRN, 360 mg at 05/31/21 0917  •  furosemide (LASIX) tablet 40 mg, 40 mg, Oral, Daily, Ching Campbell APRN, 40 mg at 05/31/21 0917  •  guaiFENesin (MUCINEX) 12 hr tablet 1,200 mg, 1,200 mg, Oral, BID, Ching Campbell APRN, 1,200 mg at 05/31/21 0917  •  ipratropium-albuterol (DUO-NEB) nebulizer solution 3 mL, 3 mL, Nebulization, 4x Daily - RT, Ching Campbell APRN, 3 mL at 05/31/21 0759  •  melatonin tablet 5 mg, 5 mg, Oral, Nightly PRN, Ching Campbell APRN  •  methylPREDNISolone sodium succinate (SOLU-Medrol) injection 60 mg, 60 mg, Intravenous, Q6H, Glen Holman MD, 60 mg at 05/31/21 0608  •  montelukast (SINGULAIR) tablet 10 mg, 10 mg, Oral, Nightly, Ching Campbell APRN, 10 mg at 05/30/21 2101  •  nitroglycerin (NITROSTAT) SL tablet 0.4 mg, 0.4 mg, Sublingual, Q5 Min PRN, Ching Campbell APRN  •  ondansetron (ZOFRAN) tablet 4 mg, 4 mg, Oral, Q6H PRN **OR** ondansetron (ZOFRAN) injection 4 mg, 4 mg, Intravenous, Q6H PRN, Adrian, Ching R, APRN  •  pantoprazole (PROTONIX) EC tablet 40 mg, 40 mg, Oral, QAM, Ching Campbell, APRN, 40 mg at 05/31/21 0608  •  potassium chloride (K-DUR,KLOR-CON) ER tablet 10 mEq, 10 mEq, Oral, Daily, Ching Campbell, APRN, 10 mEq at 05/31/21 0917  •  rosuvastatin (CRESTOR) tablet 10 mg, 10 mg, Oral, Daily, Ching Campbell, APRN, 10 mg at 05/31/21 0918  •  sodium chloride 0.9 % flush 10 mL, 10 mL, Intravenous, PRN, Jay Cruz MD  •  [COMPLETED] Insert peripheral IV, , , Once **AND** sodium chloride 0.9 % flush 10 mL, 10 mL, Intravenous, PRN, Schewe, Lidia L,  FELICITY  •  sodium chloride 0.9 % flush 10 mL, 10 mL, Intravenous, PRN, Harriman, Ching R, APRN  •  sodium chloride 0.9 % flush 10 mL, 10 mL, Intravenous, Q12H, AdrianSrinivas royalana R, APRN, 10 mL at 05/31/21 0918  •  sodium chloride 0.9 % infusion 40 mL, 40 mL, Intravenous, PRN, Adrian, Ching R, APRN      Assessment/Plan     1.  Pneumococcal Pneumonia:  Antigen was negative, but sputum is positive.  Started Augmentin last week which will cover Strep.  No evidence of aspiration from review of SLP consult.    2.  AECOPD w/ Hypoxia: I'm going to decrease the frequency of his solumedrol dose.  Lung exam is a little better  Continue nebs and steroids.  Wean O2 as able.    3.  Afib w/ RVR: Rate controlled at rest, but jumps w/ activity.  Digoxin dose still subtherapeutic so I've increased his dose.  Eliquis resumed.    4.  Epistaxis: Resolved.  Add saline nasal spray.    Plan for disposition: Home when able.    Glen Holman MD  05/31/21  11:22 EDT    Electronically signed by Glen Holman MD at 05/31/21 1650       Consult Notes (last 48 hours) (Notes from 05/30/21 1537 through 06/01/21 1537)    No notes of this type exist for this encounter.

## 2021-06-01 NOTE — PLAN OF CARE
Goal Outcome Evaluation:  Plan of Care Reviewed With: patient  Progress: improving  Outcome Summary: Patient ambulated in room and sat in chair for most of the day. Telemetry notes afib. Maintaining sats on room air. Tolerating diet. Voiding without difficulty. No complaints of pain or nausea.

## 2021-06-02 VITALS
DIASTOLIC BLOOD PRESSURE: 79 MMHG | BODY MASS INDEX: 31.46 KG/M2 | RESPIRATION RATE: 16 BRPM | TEMPERATURE: 98 F | HEIGHT: 69 IN | OXYGEN SATURATION: 91 % | SYSTOLIC BLOOD PRESSURE: 135 MMHG | WEIGHT: 212.4 LBS | HEART RATE: 107 BPM

## 2021-06-02 LAB
ALBUMIN SERPL-MCNC: 3.3 G/DL (ref 3.5–5.2)
ANION GAP SERPL CALCULATED.3IONS-SCNC: 8.1 MMOL/L (ref 5–15)
BUN SERPL-MCNC: 18 MG/DL (ref 8–23)
BUN/CREAT SERPL: 32.1 (ref 7–25)
CALCIUM SPEC-SCNC: 9.2 MG/DL (ref 8.6–10.5)
CHLORIDE SERPL-SCNC: 99 MMOL/L (ref 98–107)
CO2 SERPL-SCNC: 27.9 MMOL/L (ref 22–29)
CREAT SERPL-MCNC: 0.56 MG/DL (ref 0.76–1.27)
DIGOXIN SERPL-MCNC: 0.77 NG/ML (ref 0.6–1.2)
GFR SERPL CREATININE-BSD FRML MDRD: 147 ML/MIN/1.73
GLUCOSE SERPL-MCNC: 139 MG/DL (ref 65–99)
PHOSPHATE SERPL-MCNC: 3.4 MG/DL (ref 2.5–4.5)
POTASSIUM SERPL-SCNC: 4.7 MMOL/L (ref 3.5–5.2)
SODIUM SERPL-SCNC: 135 MMOL/L (ref 136–145)

## 2021-06-02 PROCEDURE — 80162 ASSAY OF DIGOXIN TOTAL: CPT | Performed by: HOSPITALIST

## 2021-06-02 PROCEDURE — 94799 UNLISTED PULMONARY SVC/PX: CPT

## 2021-06-02 PROCEDURE — 80069 RENAL FUNCTION PANEL: CPT | Performed by: HOSPITALIST

## 2021-06-02 PROCEDURE — 63710000001 PREDNISONE PER 1 MG: Performed by: HOSPITALIST

## 2021-06-02 PROCEDURE — 94618 PULMONARY STRESS TESTING: CPT

## 2021-06-02 PROCEDURE — 99238 HOSP IP/OBS DSCHRG MGMT 30/<: CPT | Performed by: HOSPITALIST

## 2021-06-02 RX ORDER — ECHINACEA PURPUREA EXTRACT 125 MG
2 TABLET ORAL AS NEEDED
Refills: 12
Start: 2021-06-02 | End: 2021-07-21

## 2021-06-02 RX ORDER — AMOXICILLIN AND CLAVULANATE POTASSIUM 875; 125 MG/1; MG/1
1 TABLET, FILM COATED ORAL EVERY 12 HOURS SCHEDULED
Qty: 8 TABLET | Refills: 0 | Status: SHIPPED | OUTPATIENT
Start: 2021-06-02 | End: 2021-06-06

## 2021-06-02 RX ORDER — PREDNISONE 20 MG/1
40 TABLET ORAL DAILY
Qty: 14 TABLET | Refills: 0 | Status: SHIPPED | OUTPATIENT
Start: 2021-06-02 | End: 2021-06-09

## 2021-06-02 RX ADMIN — PREDNISONE 40 MG: 20 TABLET ORAL at 08:04

## 2021-06-02 RX ADMIN — GUAIFENESIN 1200 MG: 600 TABLET, EXTENDED RELEASE ORAL at 08:04

## 2021-06-02 RX ADMIN — APIXABAN 5 MG: 2.5 TABLET, FILM COATED ORAL at 08:04

## 2021-06-02 RX ADMIN — DILTIAZEM HYDROCHLORIDE 360 MG: 180 CAPSULE, COATED, EXTENDED RELEASE ORAL at 08:04

## 2021-06-02 RX ADMIN — CHOLECALCIFEROL (VITAMIN D3) 25 MCG (1,000 UNIT) TABLET 5000 UNITS: TABLET at 08:03

## 2021-06-02 RX ADMIN — SODIUM CHLORIDE, PRESERVATIVE FREE 10 ML: 5 INJECTION INTRAVENOUS at 08:04

## 2021-06-02 RX ADMIN — ROSUVASTATIN CALCIUM 10 MG: 5 TABLET, FILM COATED ORAL at 08:04

## 2021-06-02 RX ADMIN — AMOXICILLIN AND CLAVULANATE POTASSIUM 1 TABLET: 875; 125 TABLET, FILM COATED ORAL at 08:04

## 2021-06-02 RX ADMIN — PANTOPRAZOLE SODIUM 40 MG: 40 TABLET, DELAYED RELEASE ORAL at 06:05

## 2021-06-02 RX ADMIN — BUDESONIDE AND FORMOTEROL FUMARATE DIHYDRATE 2 PUFF: 160; 4.5 AEROSOL RESPIRATORY (INHALATION) at 07:41

## 2021-06-02 RX ADMIN — DIGOXIN 250 MCG: 250 TABLET ORAL at 11:52

## 2021-06-02 RX ADMIN — POTASSIUM CHLORIDE 10 MEQ: 750 TABLET, EXTENDED RELEASE ORAL at 08:04

## 2021-06-02 NOTE — PLAN OF CARE
Goal Outcome Evaluation:     Progress: improving  Outcome Summary: Rested during night.  Denies pain.  Night O2 at 2 L sat s 93 to 96.  Alert and oriented

## 2021-06-02 NOTE — DISCHARGE INSTR - APPOINTMENTS
Patient needs to call and make a follow up with Dr. Lange within 1-2 weeks of discharge. [phone].  Patient need to make a hospital follow up with Dr. King within 1-2  [phone]

## 2021-06-02 NOTE — CASE MANAGEMENT/SOCIAL WORK
"Continued Stay Note  [place]     Patient Name: Claude Spencer  MRN: [id number]  Today's Date: 6/2/2021    Admit Date: 5/27/2021    Discharge Plan     Row Name 06/02/21 1332       Plan    Plan  Home with wife    Patient/Family in Agreement with Plan  yes    Plan Comments  Followed up with patient today regarding need for oxygen with exertion at discharge. Patient is sitting up in the chair eating lunch with no complaints. Patient states he already uses oxygen at home but only at night. CM explained that he will need oxygen 2L with exertion at discharge and that I had already faxed the new order to [facility] and that they will send a  out to the hospital with a portable tank at discharge. Patient states \"well I don't need one just to get home I already have a tank and concentrator at home\". CM encouraged patient to wait until the tank is here and he states \"my wife is leaving work to come get me and I'm not waiting\". CM called Delmy with [facility]'s and updated her of such. Patient will discharge home with wife today and he is agreeable to this discharge plan.        Discharge Codes    No documentation.       Expected Discharge Date and Time     Expected Discharge Date Expected Discharge Time    Jun 2, 2021             Sussy Beltran RN    "

## 2021-06-02 NOTE — PROCEDURES
Exercise Oximetry    Patient Name:Claude Spencer   MRN: [id number]   Date: 06/02/21             ROOM AIR BASELINE   SpO2% 92   Heart Rate 105   Blood Pressure      EXERCISE ON ROOM AIR SpO2% EXERCISE ON O2 @ 2 LPM SpO2%   1 MINUTE 90 1 MINUTE    2 MINUTES 89 2 MINUTES    3 MINUTES  3 MINUTES placed on 2lpm 87   4 MINUTES  4 MINUTES 91   5 MINUTES  5 MINUTES 93   6 MINUTES  6 MINUTES 94              Distance Walked   Distance Walked 450ft   Dyspnea (Kristin Scale)   Dyspnea (Kristin Scale) post 8   Fatigue (Kristin Scale)   Fatigue (Kristin Scale)   SpO2% Post Exercise   SpO2% Post Exercise 93   HR Post Exercise   HR Post Exercise 93   Time to Recovery   Time to Recovery 2 minutes to room air baseline     Comments:

## 2021-06-02 NOTE — CASE MANAGEMENT/SOCIAL WORK
Case Management Discharge Note      Final Note: Discharged home.    Provided Post Acute Provider List?: Refused  Refused Provider List Comment: Pt declined  Provided Post Acute Provider Quality & Resource List?: Refused  Refused Quality and Resource List Comment: Pt declined    Selected Continued Care - Discharged on 6/2/2021 Admission date: 5/27/2021 - Discharge disposition: Home or Self Care    Destination    No services have been selected for the patient.              Durable Medical Equipment Coordination complete    Service Provider Selected Services Address Phone Fax Patient Preferred    [facility]  Durable Medical Equipment [address] [place] [address] [phone] --          Dialysis/Infusion    No services have been selected for the patient.              Home Medical Care    No services have been selected for the patient.              Therapy    No services have been selected for the patient.              Community Resources    No services have been selected for the patient.                       Final Discharge Disposition Code: 01 - home or self-care

## 2021-06-02 NOTE — NURSING NOTE
Patient refused to wait for his oxygen tank to be delivered before discharge. He stated he did not need the oxygen.

## 2021-06-02 NOTE — PAYOR COMM NOTE
"Annika Spencer (65 y.o. Male)     ATTN: NURSE REVIEWER  RE: UPDATED CLINICALS  REF#[id number]  PLS REPLY TO EDGAR RÍOS [phone] FAX# [phone]      Date of Birth Social Security Number Address Home Phone MRN    1955  [address] [facility] [address] [phone] [id number]    [facility] Marital Status          Other        Admission Date Admission Type Admitting Provider Attending Provider Department, Room/Bed    5/27/21 Emergency Glen Holman MD Ellis, Rusty T, MD [facility] SURG, 1421/1    Discharge Date Discharge Disposition Discharge Destination         Home or Self Care              Attending Provider: Glen Holman MD    Allergies: No Known Allergies    Isolation: None   Infection: None   Code Status: CPR    Ht: 175.3 cm (69\")   Wt: 96.3 kg (212 lb 6.4 oz)    Admission Cmt: None   Principal Problem: None                Active Insurance as of 5/27/2021     Primary Coverage     Payor Plan Insurance Group Employer/Plan Group    ANTHEM BLUE CROSS ANTHEM BLUE CROSS BLUE SHIELD PPO [id number]     Payor Plan Address Payor Plan Phone Number Payor Plan Fax Number Effective Dates    [address] [phone]  1/1/2019 - None Entered    [facility] [id number]       Subscriber Name Subscriber Birth Date Member ID       ANTONIETA SPENCER 3/13/1962 [id number]           Secondary Coverage     Payor Plan Insurance Group Employer/Plan Group    MEDICARE MEDICARE A ONLY      Payor Plan Address Payor Plan Phone Number Payor Plan Fax Number Effective Dates    [address] [phone]  3/1/2015 - None Entered    [facility] [id number]       Subscriber Name Subscriber Birth Date Member ID       ANNIKA SPENCER 1955 [id number]                 Emergency Contacts      (Rel.) Home Phone Work Phone Mobile Phone    LEXIE SPENCER (Daughter) -- -- [phone]    Antonieta Spencer (Spouse) [phone] -- --              Current Facility-Administered Medications "   Medication Dose Route Frequency Provider Last Rate Last Admin   • acetaminophen (TYLENOL) tablet 650 mg  650 mg Oral Q4H PRN Ching Campbell APRN   650 mg at 05/31/21 2042    Or   • acetaminophen (TYLENOL) 160 MG/5ML solution 650 mg  650 mg Oral Q4H PRN Ching Campbell APRN        Or   • acetaminophen (TYLENOL) suppository 650 mg  650 mg Rectal Q4H PRN Ching Campbell APRN       • amoxicillin-clavulanate (AUGMENTIN) 875-125 MG per tablet 1 tablet  1 tablet Oral Q12H Glen Holman MD   1 tablet at 06/02/21 0804   • apixaban (ELIQUIS) tablet 5 mg  5 mg Oral Q12H Georges Jones MD   5 mg at 06/02/21 0804   • benzonatate (TESSALON) capsule 200 mg  200 mg Oral TID PRN Ching Campbell APRN   200 mg at 05/31/21 2042   • budesonide-formoterol (SYMBICORT) 160-4.5 MCG/ACT inhaler 2 puff  2 puff Inhalation BID Ching Campbell APRN   2 puff at 06/02/21 0741   • cholecalciferol (VITAMIN D3) tablet 5,000 Units  5,000 Units Oral Daily Ching Campbell APRN   5,000 Units at 06/02/21 0803   • digoxin (LANOXIN) tablet 250 mcg  250 mcg Oral Daily Glen Holman MD   250 mcg at 06/02/21 1152   • dilTIAZem CD (CARDIZEM CD) 24 hr capsule 360 mg  360 mg Oral Q24H Ching Campbell APRN   360 mg at 06/02/21 0804   • guaiFENesin (MUCINEX) 12 hr tablet 1,200 mg  1,200 mg Oral BID Ching Campbell APRN   1,200 mg at 06/02/21 0804   • ipratropium-albuterol (DUO-NEB) nebulizer solution 3 mL  3 mL Nebulization Q4H PRN Glen Holman MD       • melatonin tablet 5 mg  5 mg Oral Nightly PRN Ching Campbell APRN       • montelukast (SINGULAIR) tablet 10 mg  10 mg Oral Nightly Ching Campbell APRN   10 mg at 06/01/21 2128   • nitroglycerin (NITROSTAT) SL tablet 0.4 mg  0.4 mg Sublingual Q5 Min PRN Ching Campbell, KHADRA       • ondansetron (ZOFRAN) tablet 4 mg  4 mg Oral Q6H PRN Ching Campbell APRN        Or   • ondansetron (ZOFRAN) injection 4 mg  4 mg Intravenous Q6H PRN Ching Campbell, APRN        • pantoprazole (PROTONIX) EC tablet 40 mg  40 mg Oral QAM Ching Campbell, APRN   40 mg at 06/02/21 0605   • potassium chloride (K-DUR,KLOR-CON) ER tablet 10 mEq  10 mEq Oral Daily Ching Campbell R, APRN   10 mEq at 06/02/21 0804   • predniSONE (DELTASONE) tablet 40 mg  40 mg Oral BID With Meals Glen Holman MD   40 mg at 06/02/21 0804   • rosuvastatin (CRESTOR) tablet 10 mg  10 mg Oral Daily Ching Campbell, APRN   10 mg at 06/02/21 0804   • sodium chloride 0.9 % flush 10 mL  10 mL Intravenous PRN Jay Cruz MD       • sodium chloride 0.9 % flush 10 mL  10 mL Intravenous PRN Lidia Mota PA-C       • sodium chloride 0.9 % flush 10 mL  10 mL Intravenous PRN Ching Campbell, APRN       • sodium chloride 0.9 % flush 10 mL  10 mL Intravenous Q12H Ching Campbell R, APRN   10 mL at 06/02/21 0804   • sodium chloride 0.9 % infusion 40 mL  40 mL Intravenous PRN Ching Campbell, APRN       • sodium chloride nasal spray 2 spray  2 spray Each Nare PRN Glen Holman MD   2 spray at 05/31/21 1214     Blood Administration Record (From admission, onward)    None        Lab Results (last 48 hours)     Procedure Component Value Units Date/Time    Renal Function Panel [[id number]]  (Abnormal) Collected: 06/02/21 0417    Specimen: Blood Updated: 06/02/21 0533     Glucose 139 mg/dL      BUN 18 mg/dL      Creatinine 0.56 mg/dL      Sodium 135 mmol/L      Potassium 4.7 mmol/L      Comment: Slight hemolysis detected by analyzer. Results may be affected.        Chloride 99 mmol/L      CO2 27.9 mmol/L      Calcium 9.2 mg/dL      Albumin 3.30 g/dL      Phosphorus 3.4 mg/dL      Anion Gap 8.1 mmol/L      BUN/Creatinine Ratio 32.1     eGFR Non African Amer 147 mL/min/1.73     Narrative:      GFR Normal >60  Chronic Kidney Disease <60  Kidney Failure <15      Digoxin Level [[id number]]  (Normal) Collected: 06/02/21 0417    Specimen: Blood Updated: 06/02/21 0510     Digoxin 0.77 ng/mL     Blood Culture -  Blood, Arm, Right [[id number]] Collected: 05/27/21 2104    Specimen: Blood from Arm, Right Updated: 06/01/21 2131     Blood Culture No growth at 5 days    Blood Culture - Blood, Arm, Left [[id number]] Collected: 05/27/21 2036    Specimen: Blood from Arm, Left Updated: 06/01/21 2100     Blood Culture No growth at 5 days    Blood Culture - Blood, Arm, Right [[id number]] Collected: 05/29/21 1529    Specimen: Blood from Arm, Right Updated: 06/01/21 1530     Blood Culture No growth at 3 days    Narrative:      Less than seven (7) mls of blood was collected.  Insufficient quantity may yield false negative results.    Blood Culture - Blood, Arm, Right [[id number]] Collected: 05/29/21 1519    Specimen: Blood from Arm, Right Updated: 06/01/21 1530     Blood Culture No growth at 3 days    Narrative:      Less than seven (7) mls of blood was collected.  Insufficient quantity may yield false negative results.    Basic Metabolic Panel [[id number]]  (Abnormal) Collected: 06/01/21 0409    Specimen: Blood Updated: 06/01/21 0450     Glucose 199 mg/dL      BUN 18 mg/dL      Creatinine 0.64 mg/dL      Sodium 134 mmol/L      Potassium 4.3 mmol/L      Chloride 98 mmol/L      CO2 27.0 mmol/L      Calcium 9.0 mg/dL      eGFR Non African Amer 126 mL/min/1.73      BUN/Creatinine Ratio 28.1     Anion Gap 9.0 mmol/L     Narrative:      GFR Normal >60  Chronic Kidney Disease <60  Kidney Failure <15            Imaging Results (Last 48 Hours)     ** No results found for the last 48 hours. **        ECG/EMG Results (last 48 hours)     ** No results found for the last 48 hours. **           Physician Progress Notes (last 48 hours) (Notes from 05/31/21 1229 through 06/02/21 1229)      Glen Holman MD at 06/01/21 1018          Hospitalist Team      Patient Care Team:  Jason King MD as PCP - General (Internal Medicine)        Chief Complaint: Follow-up Pneumococcal Pneumonia    Subjective    Feels well, but complains of left chest wall pain  "from coughing and abdominal distension.  He does note some abdominal pain when coughing.  No further nose bleeds.  Tolerating PO.    Objective    Vital Signs  Temp:  [97.1 °F (36.2 °C)-98.4 °F (36.9 °C)] 97.6 °F (36.4 °C)  Heart Rate:  [] 70  Resp:  [16-18] 17  BP: (120-143)/(78-97) 120/79  Oxygen Therapy  SpO2: 93 %  Pulse Oximetry Type: Continuous  Device (Oxygen Therapy): room air  Flow (L/min): 2  Probe Placed On (Pulse Ox): Left:, finger}    Flowsheet Rows      First Filed Value   Admission Height  175.3 cm (69\") Documented at 05/27/2021 2020   Admission Weight  95.5 kg (210 lb 8 oz) Documented at 05/27/2021 2020        Physical Exam:    General: No acute distress.  Lungs: Breath sounds are diminished, but I appreciate air movement through all fields.  No wheeze or accessory use noted.  CV: IRR.  No murmur appreciated.  Radial pulses are 2+ and symmetric.  Abdomen: Soft and non-tender and non-distended.  MSK: No C/C/E.  No asymmetry.  Neuro: CN II-XII grossly intact.  Psych: Pleasant affect.  Ox3.    Results Review:     I reviewed the patient's new clinical results.    Lab Results (last 24 hours)     Procedure Component Value Units Date/Time    Blood Culture - Blood, Arm, Right [[id number]] Collected: 05/29/21 1529    Specimen: Blood from Arm, Right Updated: 06/01/21 1530     Blood Culture No growth at 3 days    Narrative:      Less than seven (7) mls of blood was collected.  Insufficient quantity may yield false negative results.    Blood Culture - Blood, Arm, Right [[id number]] Collected: 05/29/21 1519    Specimen: Blood from Arm, Right Updated: 06/01/21 1530     Blood Culture No growth at 3 days    Narrative:      Less than seven (7) mls of blood was collected.  Insufficient quantity may yield false negative results.    Basic Metabolic Panel [[id number]]  (Abnormal) Collected: 06/01/21 0409    Specimen: Blood Updated: 06/01/21 0450     Glucose 199 mg/dL      BUN 18 mg/dL      Creatinine 0.64 mg/dL      " Sodium 134 mmol/L      Potassium 4.3 mmol/L      Chloride 98 mmol/L      CO2 27.0 mmol/L      Calcium 9.0 mg/dL      eGFR Non African Amer 126 mL/min/1.73      BUN/Creatinine Ratio 28.1     Anion Gap 9.0 mmol/L     Narrative:      GFR Normal >60  Chronic Kidney Disease <60  Kidney Failure <15            Imaging Results (Last 24 Hours)     ** No results found for the last 24 hours. **          Medication Review:   I have reviewed the patient's current medication list    Current Facility-Administered Medications:   •  acetaminophen (TYLENOL) tablet 650 mg, 650 mg, Oral, Q4H PRN, 650 mg at 05/31/21 2042 **OR** acetaminophen (TYLENOL) 160 MG/5ML solution 650 mg, 650 mg, Oral, Q4H PRN **OR** acetaminophen (TYLENOL) suppository 650 mg, 650 mg, Rectal, Q4H PRN, Ching Campbell APRN  •  amoxicillin-clavulanate (AUGMENTIN) 875-125 MG per tablet 1 tablet, 1 tablet, Oral, Q12H, Glen Holman MD, 1 tablet at 06/01/21 0829  •  apixaban (ELIQUIS) tablet 5 mg, 5 mg, Oral, Q12H, Georges Jones MD, 5 mg at 06/01/21 0828  •  benzonatate (TESSALON) capsule 200 mg, 200 mg, Oral, TID PRN, Ching Campbell APRN, 200 mg at 05/31/21 2042  •  budesonide-formoterol (SYMBICORT) 160-4.5 MCG/ACT inhaler 2 puff, 2 puff, Inhalation, BID, Ching Campbell APRN, 2 puff at 06/01/21 0755  •  cholecalciferol (VITAMIN D3) tablet 5,000 Units, 5,000 Units, Oral, Daily, Ching Campbell APRN, 5,000 Units at 06/01/21 0829  •  digoxin (LANOXIN) tablet 250 mcg, 250 mcg, Oral, Daily, Glen Holman MD, 250 mcg at 06/01/21 1159  •  dilTIAZem CD (CARDIZEM CD) 24 hr capsule 360 mg, 360 mg, Oral, Q24H, Ching Campbell APRN, 360 mg at 06/01/21 0828  •  guaiFENesin (MUCINEX) 12 hr tablet 1,200 mg, 1,200 mg, Oral, BID, Ching Campbell APRN, 1,200 mg at 06/01/21 0829  •  ipratropium-albuterol (DUO-NEB) nebulizer solution 3 mL, 3 mL, Nebulization, Q4H PRN, Glen Holman MD  •  melatonin tablet 5 mg, 5 mg, Oral, Nightly PRN, Ching Campbell  R, APRN  •  montelukast (SINGULAIR) tablet 10 mg, 10 mg, Oral, Nightly, VincentChing royal R, APRN, 10 mg at 05/31/21 2036  •  nitroglycerin (NITROSTAT) SL tablet 0.4 mg, 0.4 mg, Sublingual, Q5 Min PRN, Ching Campbell R, APRN  •  ondansetron (ZOFRAN) tablet 4 mg, 4 mg, Oral, Q6H PRN **OR** ondansetron (ZOFRAN) injection 4 mg, 4 mg, Intravenous, Q6H PRN, Ching Campbell R, APRN  •  pantoprazole (PROTONIX) EC tablet 40 mg, 40 mg, Oral, QAM, Ching Campbell, APRN, 40 mg at 06/01/21 0612  •  potassium chloride (K-DUR,KLOR-CON) ER tablet 10 mEq, 10 mEq, Oral, Daily, Ching Campbell, APRN, 10 mEq at 06/01/21 0828  •  predniSONE (DELTASONE) tablet 40 mg, 40 mg, Oral, BID With Meals, Glen Holman MD  •  rosuvastatin (CRESTOR) tablet 10 mg, 10 mg, Oral, Daily, Ching Campbell R, APRN, 10 mg at 06/01/21 0829  •  sodium chloride 0.9 % flush 10 mL, 10 mL, Intravenous, PRN, Jay Cruz MD  •  [COMPLETED] Insert peripheral IV, , , Once **AND** sodium chloride 0.9 % flush 10 mL, 10 mL, Intravenous, PRN, Lidia Mota PA-C  •  sodium chloride 0.9 % flush 10 mL, 10 mL, Intravenous, PRN, AdrianTonia royalshana R, APRN  •  sodium chloride 0.9 % flush 10 mL, 10 mL, Intravenous, Q12H, Srinivas Campbellana R, APRN, 10 mL at 06/01/21 0829  •  sodium chloride 0.9 % infusion 40 mL, 40 mL, Intravenous, PRN, Adrian, Ching R, APRN  •  sodium chloride nasal spray 2 spray, 2 spray, Each Nare, PRN, Glen Holman MD, 2 spray at 05/31/21 1214      Assessment/Plan     1.  Pneumococcal Pneumonia: Continue Augmentin.    2.  AECOPD: w/ Hypoxia: Now, liberated to RA (although still wants to wear cannula?).  Decrease to PO steroids.  Continue Nebs.    3.  Afib w/ RVR: Rate still bumping w/ activity, but returns to goal.  Check Dig level.  Continue Eliquis.     Plan for disposition: Home soon.    Glen TUCKER. MD [place]  06/01/21  17:46 EDT    Electronically signed by Glen Holman MD at 06/01/21 1546       Consult Notes (last 48 hours)  (Notes from 05/31/21 1229 through 06/02/21 1229)    No notes of this type exist for this encounter.       Discharge Summary    No notes of this type exist for this encounter.

## 2021-06-02 NOTE — PROGRESS NOTES
Adult Nutrition  Assessment/PES    Patient Name:  Claude Spencer  YOB: 1955  MRN: [id number]  Admit Date:  5/27/2021    Assessment Date:  6/2/2021    Comments:  Pt tolerating diet with good intake.    Reason for Assessment     Row Name 06/02/21 1325          Reason for Assessment    Reason For Assessment  follow-up protocol     Diagnosis  -- pneumonia, acute exacerbation of COPD recent CHF           Anthropometrics     Row Name 06/02/21 1330          Anthropometrics    Weight  -- 212.4# 5.30.21         Labs/Tests/Procedures/Meds     Row Name 06/02/21 1331          Labs/Procedures/Meds    Lab Results Reviewed  reviewed     Lab Results Comments  glucose 139, 199, 182        Medications    Pertinent Medications Reviewed  reviewed     Pertinent Medications Comments  prednisone             Nutrition Prescription Ordered     Row Name 06/02/21 1332          Nutrition Prescription PO    Common Modifiers  Cardiac         Evaluation of Received Nutrient/Fluid Intake     Row Name 06/02/21 1332          PO Evaluation    Number of Meals  6     % PO Intake  100%               Problem/Interventions:  Problem 1     Row Name 06/02/21 1335          Nutrition Diagnoses Problem 1    Problem 1  Knowledge Deficit     Etiology (related to)  MNT for Treatment/Condition     Signs/Symptoms (evidenced by)  Report/Observation     Resolved?  Yes                     Education/Evaluation     Row Name 06/02/21 1342          Education    Education  Education offered and refused made pt aware blood glucose elevated/may be related to steroids, deferred diet education for this.  No further questions regarding low sodium for CHF.           Electronically signed by:  Jocy Rosales RD  06/02/21 13:44 EDT

## 2021-06-03 ENCOUNTER — READMISSION MANAGEMENT (OUTPATIENT)
Dept: CALL CENTER | Facility: HOSPITAL | Age: 66
End: 2021-06-03

## 2021-06-03 LAB
BACTERIA SPEC AEROBE CULT: NORMAL
BACTERIA SPEC AEROBE CULT: NORMAL

## 2021-06-03 NOTE — OUTREACH NOTE
Prep Survey      Responses   [facility] patient discharged from?  [place]   Is LACE score < 7 ?  No   Emergency Room discharge w/ pulse ox?  No   Eligibility  Readm Mgmt   Discharge diagnosis   Nosebleed  A. fib RVRRule out RML PNAProbable AE COPD:   Does the patient have one of the following disease processes/diagnoses(primary or secondary)?  COPD/Pneumonia   Does the patient have Home health ordered?  No   Is there a DME ordered?  Yes   Comments regarding appointments  [facility] portable tank O2   Prep survey completed?  Yes          Annabelle Figueroa RN

## 2021-06-08 NOTE — DISCHARGE SUMMARY
"Claude Spencer  1955  [id number]    Hospitalists Discharge Summary    Date of Admission: 5/27/2021  Date of Discharge:  6/2/2021    Primary Discharge Diagnoses:  1.  Pneumococcal pneumonia  2.  AECOPD w/ Hypoxia  3.  Afib w/ RVR  4.  Epistaxis    Secondary Discharge Diagnoses:  1.  Smokeless tobacco abuse  2.  GERD  3.  Essential Hypertension  4.  Dyslipidemia  5.  Chronic Nocturnal Dyspnea      History of Present Illness (taken from H&P):  The patient is a 65-year-old male that presented to the emergency department secondary to 1.5 weeks of nasal congestion with nose bleeding.  He reportedly called cardiology several days ago about nosebleeding, and was told to hold Eliquis for several days.  He reports last dose was yesterday morning and has only missed 2 doses.  He states \"my wife got on that [employer], and [employer] is pretty smart, and they say that Eliquis can make your nosebleed\".  While he was being evaluated for nosebleed in ER, he was noted to be in A. fib RVR, skin was hot and temp was 102.9.  He was given digoxin IV with resolution of heart rate.      At time of interview he complains of SOA, palpitations, chest pain, incessant cough cough productive of yellow sputum x2 days, stabbing sensation intermittently left chest and left rib area not associated with cough.  He reports that he has had to sleep sitting up due to heavy nasal congestion.  He has been taking NyQuil nighttime honey cold medicine that he reports is helping his sleep and his cough.  He reports loose stools over the past 2 to 5 days.       He has a known history of atrial fibrillation on Eliquis, COPD, nocturnal hypoxia, GERD, HTN, HLD, vitamin D deficiency.     He denies headache/rhinorrhea/lightheadedness/syncopal sensation/n/v/d/chest pain/abdominal pain/recent illness/sick exposures/change in bladder habits/no weight change/bloody emesis or dark/bloody stools/change in medications or any other new concerns.    Hospital Course:  Mr." Tj was admitted to the Med/Surg unit and continued on supplemental O2 and nebs.  I added IV steroids.  Dig level was subtherapeutic, but on repeat was therapeutic after resuming home therapy.  His epistaxis resolved, and AC was resumed after a few days.  His supplemental O2 was weaned to off, and walking oximetry did demonstrate a need for exertional O2 at 2L.  Sputum culture grew pneumococcus.  Augmentin was continued.  SLP found no evidence of aspiration.  He was encouraged to cease smokeless tobacco,    PCP  Patient Care Team:  Jason King MD as PCP - General (Internal Medicine)    Consults:   Consults     No orders found from 4/28/2021 to 5/28/2021.          Operations and Procedures Performed:       Walking Oximetry    Result Date: 6/2/2021  Narrative: Taya Mansfield, CRT     6/2/2021 11:10 AM Exercise Oximetry Patient Name:Claude Spencer MRN: [id number] Date: 06/02/21         ROOM AIR BASELINE SpO2% 92 Heart Rate 105 Blood Pressure  EXERCISE ON ROOM AIR SpO2% EXERCISE ON O2 @ 2 LPM SpO2% 1 MINUTE 90 1 MINUTE  2 MINUTES 89 2 MINUTES  3 MINUTES  3 MINUTES placed on 2lpm 87 4 MINUTES  4 MINUTES 91 5 MINUTES  5 MINUTES 93 6 MINUTES  6 MINUTES 94          Distance Walked   Distance Walked 450ft Dyspnea (Kristin Scale)   Dyspnea (Kristin Scale) post 8 Fatigue (Kristin Scale)   Fatigue (Kristin Scale) SpO2% Post Exercise   SpO2% Post Exercise 93 HR Post Exercise   HR Post Exercise 93 Time to Recovery   Time to Recovery 2 minutes to room air baseline Comments:     CT Chest Without Contrast Diagnostic    Result Date: 5/28/2021  Narrative: CT Chest WO INDICATION: Respiratory distress TECHNIQUE: CT of the thorax without IV contrast. Coronal and sagittal reconstructions were obtained.  Radiation dose reduction techniques included automated exposure control or exposure modulation based on body size. Count of known CT and cardiac nuc med studies performed in previous 12 months: 0. COMPARISON: Chest x-ray from  earlier same day FINDINGS: There are bilateral emphysematous changes. There is an area of consolidation in the right posterior upper lobe as well as bibasilar consolidation. There is also some right perihilar scarring or consolidation. Mediastinum is unremarkable. There is coronary artery calcification. Upper abdominal structures are unremarkable. No acute osseous abnormality.     Impression: Scattered areas of consolidation are noted most prominent at the lung bases consistent with pneumonia on a background of emphysema/COPD. It is uncertain if this consolidation may be secondary to potential aspiration. Signer Name: Jocy Clancy MD  Signed: 5/28/2021 6:11 PM  Workstation Name: [id number]  [facility]    XR Chest 1 View    Result Date: 5/27/2021  Narrative: CR Chest 1 Vw INDICATION: Shortness of breath, fever COMPARISON:  Chest x-ray from 4/19/2021 FINDINGS: Single portable AP view(s) of the chest.  There are chronic changes. It is uncertain if there may be some increased density at the medial right lung base. No pneumothorax, effusion or acute osseous abnormality.     Impression: Chronic changes are again noted. It is uncertain if there may be some slightly increased density at the medial right lung base that may represent developing infiltrate/pneumonia. Signer Name: Jocy Clancy MD  Signed: 5/27/2021 9:33 PM  Workstation Name: [id number]  [facility]    XR Chest PA & Lateral    Result Date: 5/28/2021  Narrative: CHEST X-RAY, 05/28/2021     HISTORY: 65-year-old male hospital inpatient with shortness of breath, congestion, nosebleed. History of COPD.  TECHNIQUE: PA and lateral upright chest series.  COMPARISON: *  Chest x-ray, 5/27/2021 and 4/19/2021. CT chest, 01/04/2013.  FINDINGS: Background pulmonary emphysema and bandlike basilar scarring.  On the lateral image, patchy posterior lung base airspace infiltrate is visible, suggesting active pneumonia. Mid and upper  lungs are clear. No visible pleural effusion. Heart size and pulmonary vascularity are normal.      Impression: 1. Posterior basilar airspace infiltrate. 2. Pulmonary emphysema. Basilar scarring.  This report was finalized on 5/28/2021 9:21 AM by Dr. Anival Freedman MD.        Allergies:  has No Known Allergies.    Jayro  not reviewed    Discharge Medications:     Discharge Medications      New Medications      Instructions Start Date   apixaban 5 MG tablet tablet  Commonly known as: ELIQUIS   5 mg, Oral, Every 12 Hours Scheduled      predniSONE 20 MG tablet  Commonly known as: DELTASONE   40 mg, Oral, Daily      sodium chloride 0.65 % nasal spray   2 sprays, Nasal, As Needed         Continue These Medications      Instructions Start Date   digoxin 125 MCG tablet  Commonly known as: LANOXIN   125 mcg, Oral, Daily Digoxin      dilTIAZem  MG 24 hr capsule  Commonly known as: CARDIZEM CD   360 mg, Oral, Daily      Fish Oil 1200 MG capsule delayed-release capsule   1,200 mg, Oral, Daily      furosemide 40 MG tablet  Commonly known as: LASIX   40 mg, Oral, Daily      montelukast 10 MG tablet  Commonly known as: SINGULAIR   TAKE ONE TABLET BY MOUTH ONCE NIGHTLY      omeprazole 20 MG capsule  Commonly known as: priLOSEC   TAKE ONE CAPSULE BY MOUTH DAILY      potassium chloride 10 MEQ CR tablet   10 mEq, Oral, Daily      rosuvastatin 10 MG tablet  Commonly known as: CRESTOR   TAKE ONE TABLET BY MOUTH DAILY      Symbicort 160-4.5 MCG/ACT inhaler  Generic drug: budesonide-formoterol   INHALE TWO PUFFS BY MOUTH TWICE A DAY      vitamin D3 125 MCG (5000 UT) capsule capsule   5,000 Units, Oral, Daily         ASK your doctor about these medications      Instructions Start Date   amoxicillin-clavulanate 875-125 MG per tablet  Commonly known as: AUGMENTIN  Ask about: Should I take this medication?   1 tablet, Oral, Every 12 Hours Scheduled             Last Lab Results:   Lab Results (most recent)     Procedure Component  Value Units Date/Time    Blood Culture - Blood, Arm, Right [[id number]] Collected: 05/29/21 1519    Specimen: Blood from Arm, Right Updated: 06/03/21 1530     Blood Culture No growth at 5 days    Narrative:      Less than seven (7) mls of blood was collected.  Insufficient quantity may yield false negative results.    Blood Culture - Blood, Arm, Right [[id number]] Collected: 05/29/21 1529    Specimen: Blood from Arm, Right Updated: 06/03/21 1530     Blood Culture No growth at 5 days    Narrative:      Less than seven (7) mls of blood was collected.  Insufficient quantity may yield false negative results.    Renal Function Panel [[id number]]  (Abnormal) Collected: 06/02/21 0417    Specimen: Blood Updated: 06/02/21 0533     Glucose 139 mg/dL      BUN 18 mg/dL      Creatinine 0.56 mg/dL      Sodium 135 mmol/L      Potassium 4.7 mmol/L      Comment: Slight hemolysis detected by analyzer. Results may be affected.        Chloride 99 mmol/L      CO2 27.9 mmol/L      Calcium 9.2 mg/dL      Albumin 3.30 g/dL      Phosphorus 3.4 mg/dL      Anion Gap 8.1 mmol/L      BUN/Creatinine Ratio 32.1     eGFR Non African Amer 147 mL/min/1.73     Narrative:      GFR Normal >60  Chronic Kidney Disease <60  Kidney Failure <15      Digoxin Level [[id number]]  (Normal) Collected: 06/02/21 0417    Specimen: Blood Updated: 06/02/21 0510     Digoxin 0.77 ng/mL     Basic Metabolic Panel [[id number]]  (Abnormal) Collected: 06/01/21 0409    Specimen: Blood Updated: 06/01/21 0450     Glucose 199 mg/dL      BUN 18 mg/dL      Creatinine 0.64 mg/dL      Sodium 134 mmol/L      Potassium 4.3 mmol/L      Chloride 98 mmol/L      CO2 27.0 mmol/L      Calcium 9.0 mg/dL      eGFR Non African Amer 126 mL/min/1.73      BUN/Creatinine Ratio 28.1     Anion Gap 9.0 mmol/L     Narrative:      GFR Normal >60  Chronic Kidney Disease <60  Kidney Failure <15      Respiratory Culture - Sputum, Cough [[id number]]  (Abnormal)  (Susceptibility) Collected: 05/28/21 0639     Specimen: Sputum from Cough Updated: 05/31/21 0745     Respiratory Culture Heavy growth (4+) Streptococcus pneumoniae      Light growth (2+) Normal Respiratory Kyara: NO S.aureus/MRSA or Pseudomonas aeruginosa     Gram Stain Moderate (3+) WBCs seen      Occasional Epithelial cells seen      Moderate (3+) Gram positive cocci in pairs and chains      Rare (1+) Gram positive bacilli    Susceptibility      Streptococcus pneumoniae      LANDEN      Ceftriaxone (Meningitis) Intermediate      Ceftriaxone (Non-meningitis) Susceptible      Levofloxacin Susceptible      Penicillin (Meningitis) Resistant      Penicillin (Non-Meningitis) Susceptible      Trimethoprim + Sulfamethoxazole Resistant               Linear View                   Digoxin Level [[id number]]  (Abnormal) Collected: 05/30/21 0632    Specimen: Blood Updated: 05/30/21 0744     Digoxin 0.44 ng/mL     Comprehensive Metabolic Panel [[id number]]  (Abnormal) Collected: 05/30/21 0632    Specimen: Blood Updated: 05/30/21 0702     Glucose 182 mg/dL      BUN 14 mg/dL      Creatinine 0.62 mg/dL      Sodium 136 mmol/L      Potassium 3.8 mmol/L      Chloride 99 mmol/L      CO2 25.2 mmol/L      Calcium 9.9 mg/dL      Total Protein 7.0 g/dL      Albumin 3.40 g/dL      ALT (SGPT) 37 U/L      AST (SGOT) 32 U/L      Alkaline Phosphatase 66 U/L      Total Bilirubin 0.3 mg/dL      eGFR Non African Amer 131 mL/min/1.73      Globulin 3.6 gm/dL      A/G Ratio 0.9 g/dL      BUN/Creatinine Ratio 22.6     Anion Gap 11.8 mmol/L     Narrative:      GFR Normal >60  Chronic Kidney Disease <60  Kidney Failure <15      Procalcitonin [[id number]]  (Normal) Collected: 05/30/21 0632    Specimen: Blood Updated: 05/30/21 0659     Procalcitonin 0.05 ng/mL     Narrative:      Results may be falsely decreased if patient taking Biotin.     CBC & Differential [[id number]]  (Abnormal) Collected: 05/30/21 0623    Specimen: Blood Updated: 05/30/21 0638    Narrative:      The following orders were created for  panel order CBC & Differential.  Procedure                               Abnormality         Status                     ---------                               -----------         ------                     CBC Auto Differential[[id number]]        Abnormal            Final result                 Please view results for these tests on the individual orders.    CBC Auto Differential [[id number]]  (Abnormal) Collected: 05/30/21 0623    Specimen: Blood Updated: 05/30/21 0638     WBC 19.88 10*3/mm3      RBC 4.39 10*6/mm3      Hemoglobin 14.3 g/dL      Hematocrit 43.3 %      MCV 98.6 fL      MCH 32.6 pg      MCHC 33.0 g/dL      RDW 12.6 %      RDW-SD 45.6 fl      MPV 9.5 fL      Platelets 290 10*3/mm3      Neutrophil % 92.4 %      Lymphocyte % 3.7 %      Monocyte % 2.7 %      Eosinophil % 0.0 %      Basophil % 0.2 %      Immature Grans % 1.0 %      Neutrophils, Absolute 18.39 10*3/mm3      Lymphocytes, Absolute 0.73 10*3/mm3      Monocytes, Absolute 0.53 10*3/mm3      Eosinophils, Absolute 0.00 10*3/mm3      Basophils, Absolute 0.03 10*3/mm3      Immature Grans, Absolute 0.20 10*3/mm3      nRBC 0.0 /100 WBC     Basic Metabolic Panel [[id number]]  (Abnormal) Collected: 05/29/21 0436    Specimen: Blood Updated: 05/29/21 0512     Glucose 156 mg/dL      BUN 10 mg/dL      Creatinine 0.56 mg/dL      Sodium 136 mmol/L      Potassium 3.8 mmol/L      Chloride 99 mmol/L      CO2 26.7 mmol/L      Calcium 9.2 mg/dL      eGFR Non African Amer 147 mL/min/1.73      BUN/Creatinine Ratio 17.9     Anion Gap 10.3 mmol/L     Narrative:      GFR Normal >60  Chronic Kidney Disease <60  Kidney Failure <15      CBC & Differential [[id number]]  (Abnormal) Collected: 05/29/21 0436    Specimen: Blood Updated: 05/29/21 0452    Narrative:      The following orders were created for panel order CBC & Differential.  Procedure                               Abnormality         Status                     ---------                               -----------          ------                     CBC Auto Differential[[id number]]        Abnormal            Final result                 Please view results for these tests on the individual orders.    CBC Auto Differential [[id number]]  (Abnormal) Collected: 05/29/21 0436    Specimen: Blood Updated: 05/29/21 0452     WBC 11.80 10*3/mm3      RBC 4.63 10*6/mm3      Hemoglobin 15.1 g/dL      Hematocrit 46.2 %      MCV 99.8 fL      MCH 32.6 pg      MCHC 32.7 g/dL      RDW 12.7 %      RDW-SD 46.9 fl      MPV 9.6 fL      Platelets 229 10*3/mm3      Neutrophil % 92.1 %      Lymphocyte % 6.4 %      Monocyte % 0.8 %      Eosinophil % 0.0 %      Basophil % 0.1 %      Immature Grans % 0.6 %      Neutrophils, Absolute 10.87 10*3/mm3      Lymphocytes, Absolute 0.76 10*3/mm3      Monocytes, Absolute 0.09 10*3/mm3      Eosinophils, Absolute 0.00 10*3/mm3      Basophils, Absolute 0.01 10*3/mm3      Immature Grans, Absolute 0.07 10*3/mm3      nRBC 0.0 /100 WBC     Procalcitonin [[id number]]  (Normal) Collected: 05/28/21 1740    Specimen: Blood Updated: 05/28/21 1811     Procalcitonin 0.07 ng/mL     Narrative:      Results may be falsely decreased if patient taking Biotin.     Respiratory Panel PCR w/COVID-19(SARS-CoV-2) KAMINI/CAROLYNE/TYSON/PAD/COR/MAD/RICO In-House, NP Swab in UTM/VTM, 3-4 HR TAT - Swab, Nasopharynx [[id number]]  (Normal) Collected: 05/28/21 0109    Specimen: Swab from Nasopharynx Updated: 05/28/21 1139     ADENOVIRUS, PCR Not Detected     Coronavirus 229E Not Detected     Coronavirus HKU1 Not Detected     Coronavirus NL63 Not Detected     Coronavirus OC43 Not Detected     COVID19 Not Detected     Human Metapneumovirus Not Detected     Human Rhinovirus/Enterovirus Not Detected     Influenza A PCR Not Detected     Influenza B PCR Not Detected     Parainfluenza Virus 1 Not Detected     Parainfluenza Virus 2 Not Detected     Parainfluenza Virus 3 Not Detected     Parainfluenza Virus 4 Not Detected     RSV, PCR Not Detected     Bordetella pertussis  pcr Not Detected     Bordetella parapertussis PCR Not Detected     Chlamydophila pneumoniae PCR Not Detected     Mycoplasma pneumo by PCR Not Detected    Narrative:      In the setting of a positive respiratory panel with a viral infection PLUS a negative procalcitonin without other underlying concern for bacterial infection, consider observing off antibiotics or discontinuation of antibiotics and continue supportive care. If the respiratory panel is positive for atypical bacterial infection (Bordetella pertussis, Chlamydophila pneumoniae, or Mycoplasma pneumoniae), consider antibiotic de-escalation to target atypical bacterial infection.    Lactate Dehydrogenase [[id number]]  (Abnormal) Collected: 05/27/21 2036    Specimen: Blood Updated: 05/28/21 0801      U/L     Magnesium [[id number]]  (Normal) Collected: 05/28/21 0352    Specimen: Blood Updated: 05/28/21 0555     Magnesium 2.0 mg/dL     S. Pneumo Ag Urine or CSF - Urine, Urine, Clean Catch [[id number]]  (Normal) Collected: 05/28/21 0121    Specimen: Urine, Clean Catch Updated: 05/28/21 0151     Strep Pneumo Ag Negative    Legionella Antigen, Urine - Urine, Urine, Clean Catch [[id number]]  (Normal) Collected: 05/28/21 0121    Specimen: Urine, Clean Catch Updated: 05/28/21 0151     LEGIONELLA ANTIGEN, URINE Negative    Magnesium [[id number]]  (Normal) Collected: 05/27/21 2036    Specimen: Blood Updated: 05/28/21 0052     Magnesium 1.9 mg/dL     Hemoglobin A1c [[id number]]  (Normal) Collected: 05/27/21 2036    Specimen: Blood Updated: 05/28/21 0049     Hemoglobin A1C 5.60 %     Narrative:      Hemoglobin A1C Ranges:    Increased Risk for Diabetes  5.7% to 6.4%  Diabetes                     >= 6.5%  Diabetic Goal                < 7.0%    D-dimer, Quantitative [[id number]]  (Normal) Collected: 05/27/21 2040    Specimen: Blood Updated: 05/28/21 0044     D-Dimer, Quantitative 0.31 MCGFEU/mL     Narrative:      Can be elevated in, but is not diagnostic for deep vein  thrombosis (DVT) or pulmonary embolis (PE).  It is also elevated in other medical conditions.  Clinical correlation is required.  The negative cut-off value for the D-Dimer is 0.50 mcg FEU/mL for DVT and PE.      BNP [[id number]]  (Normal) Collected: 05/27/21 2040    Specimen: Blood Updated: 05/27/21 2133     proBNP 635.1 pg/mL     Narrative:      Among patients with dyspnea, NT-proBNP is highly sensitive for the detection of acute congestive heart failure. In addition NT-proBNP of <300 pg/ml effectively rules out acute congestive heart failure with 99% negative predictive value.    Results may be falsely decreased if patient taking Biotin.      TSH [[id number]]  (Normal) Collected: 05/27/21 2040    Specimen: Blood Updated: 05/27/21 2133     TSH 1.300 uIU/mL     Troponin [[id number]]  (Normal) Collected: 05/27/21 2040    Specimen: Blood Updated: 05/27/21 2127     Troponin T <0.010 ng/mL     Narrative:      Troponin T Reference Range:  <= 0.03 ng/mL-   Negative for AMI  >0.03 ng/mL-     Abnormal for myocardial necrosis.  Clinicians would have to utilize clinical acumen, EKG, Troponin and serial changes to determine if it is an Acute Myocardial Infarction or myocardial injury due to an underlying chronic condition.       Results may be falsely decreased if patient taking Biotin.      Comprehensive Metabolic Panel [[id number]]  (Abnormal) Collected: 05/27/21 2036    Specimen: Blood Updated: 05/27/21 2120     Glucose 109 mg/dL      BUN 14 mg/dL      Creatinine 0.89 mg/dL      Sodium 136 mmol/L      Potassium 3.4 mmol/L      Chloride 96 mmol/L      CO2 26.3 mmol/L      Calcium 9.7 mg/dL      Total Protein 7.6 g/dL      Albumin 4.10 g/dL      ALT (SGPT) 33 U/L      AST (SGOT) 32 U/L      Alkaline Phosphatase 70 U/L      Total Bilirubin 0.9 mg/dL      eGFR Non African Amer 86 mL/min/1.73      Globulin 3.5 gm/dL      A/G Ratio 1.2 g/dL      BUN/Creatinine Ratio 15.7     Anion Gap 13.7 mmol/L     Narrative:      GFR Normal  >60  Chronic Kidney Disease <60  Kidney Failure <15      Ferritin [[id number]]  (Abnormal) Collected: 05/27/21 2036    Specimen: Blood Updated: 05/27/21 2120     Ferritin 663.00 ng/mL     Narrative:      Results may be falsely decreased if patient taking Biotin.      COVID-19 and FLU A/B PCR - Swab, Nasopharynx [[id number]]  (Normal) Collected: 05/27/21 2040    Specimen: Swab from Nasopharynx Updated: 05/27/21 2119     COVID19 Not Detected     Influenza A PCR Not Detected     Influenza B PCR Not Detected    Narrative:      Fact sheet for providers: https://www.fda.gov/media/250955/download    Fact sheet for patients: https://www.fda.gov/media/557747/download    Test performed by PCR.    Lactic Acid, Plasma [[id number]]  (Normal) Collected: 05/27/21 2036    Specimen: Blood Updated: 05/27/21 2116     Lactate 2.0 mmol/L     Protime-INR [[id number]]  (Abnormal) Collected: 05/27/21 2040    Specimen: Blood Updated: 05/27/21 2109     Protime 14.5 Seconds      INR 1.13    Narrative:      Therapeutic Ranges for INR: 2.0-3.0 (PT 20-30)                              2.5-3.5 (PT 25-34)    aPTT [[id number]]  (Normal) Collected: 05/27/21 2040    Specimen: Blood Updated: 05/27/21 2109     PTT 29.2 seconds     Narrative:      PTT = The equivalent PTT values for the therapeutic range of heparin levels at 0.1 to 0.7 U/ml are 53 to 110 seconds.          Imaging Results (Most Recent)     Procedure Component Value Units Date/Time    CT Chest Without Contrast Diagnostic [[id number]] Collected: 05/28/21 1811     Updated: 05/28/21 1813    Narrative:      CT Chest WO    INDICATION:   Respiratory distress    TECHNIQUE:   CT of the thorax without IV contrast. Coronal and sagittal reconstructions were obtained.  Radiation dose reduction techniques included automated exposure control or exposure modulation based on body size. Count of known CT and cardiac nuc med studies  performed in previous 12 months: 0.     COMPARISON:   Chest x-ray from earlier  same day    FINDINGS:  There are bilateral emphysematous changes. There is an area of consolidation in the right posterior upper lobe as well as bibasilar consolidation. There is also some right perihilar scarring or consolidation. Mediastinum is unremarkable. There is  coronary artery calcification. Upper abdominal structures are unremarkable. No acute osseous abnormality.      Impression:      Scattered areas of consolidation are noted most prominent at the lung bases consistent with pneumonia on a background of emphysema/COPD. It is uncertain if this consolidation may be secondary to potential aspiration.    Signer Name: Jocy Clancy MD   Signed: 5/28/2021 6:11 PM   Workstation Name: [id number]    [facility]    XR Chest PA & Lateral [[id number]] Collected: 05/28/21 0919     Updated: 05/28/21 0923    Narrative:      CHEST X-RAY, 05/28/2021         HISTORY:  65-year-old male hospital inpatient with shortness of breath,  congestion, nosebleed. History of COPD.     TECHNIQUE:  PA and lateral upright chest series.     COMPARISON:  *  Chest x-ray, 5/27/2021 and 4/19/2021. CT chest, 01/04/2013.     FINDINGS:  Background pulmonary emphysema and bandlike basilar scarring.     On the lateral image, patchy posterior lung base airspace infiltrate is  visible, suggesting active pneumonia. Mid and upper lungs are clear. No  visible pleural effusion. Heart size and pulmonary vascularity are  normal.       Impression:      1. Posterior basilar airspace infiltrate.  2. Pulmonary emphysema. Basilar scarring.     This report was finalized on 5/28/2021 9:21 AM by Dr. Anival Freedman MD.       XR Chest 1 View [[id number]] Collected: 05/27/21 2133     Updated: 05/27/21 2135    Narrative:      CR Chest 1 Vw    INDICATION:   Shortness of breath, fever    COMPARISON:    Chest x-ray from 4/19/2021    FINDINGS:  Single portable AP view(s) of the chest.  There are chronic changes. It is uncertain if there may be some  increased density at the medial right lung base. No pneumothorax, effusion or acute osseous abnormality.      Impression:      Chronic changes are again noted. It is uncertain if there may be some slightly increased density at the medial right lung base that may represent developing infiltrate/pneumonia.    Signer Name: Jocy Clancy MD   Signed: 5/27/2021 9:33 PM   Workstation Name: [id number]    [facility]          PROCEDURES      Condition on Discharge:  Stable    Physical Exam at Discharge  Vital Signs       Physical Exam:  Physical Exam   Constitutional: Patient appears older than stated age and in no acute distress   HEENT:   Mouth and Throat: Patient has moist mucous membranes. No lesions identified.    Neck: No lymphadenopathy present.  Cardiovascular: Irregularly, irregular rate and rhythm, S1 normal and S2 normal.  No murmur heard.  Pulmonary/Chest: Lungs are diminished to auscultation bilaterally. No respiratory distress. No wheezes. No rhonchi. No rales.   Abdominal: Soft. Bowel sounds are normal. There is no tenderness.   Musculoskeletal: Normal Muscle tone  Extremities: No edema. No asymmetry.  Neurological: Cranial nerves II-XII are grossly intact with no focal deficits.    Discharge Disposition  Home    Visiting Nurse:    No     Home PT/OT:  No     Home Safety Evaluation:  No     DME  None new    Discharge Diet:    Cardiac    Activity at Discharge:  As tolerated    Follow-up Appointments  Future Appointments   Date Time Provider Department Center   8/10/2021  9:00 AM Staci Costa APRN [facility]   11/5/2021 12:40 PM Krystal Eng MD [facility]   11/17/2021  2:30 PM [facility] PFT/EEG ROOM [facility]     Additional Instructions for the Follow-ups that You Need to Schedule     Discharge Follow-up with PCP   As directed       Currently Documented PCP:    Jason King MD    PCP Phone Number:    [phone]     Follow Up Details: 1-2 weeks          Discharge Follow-up with Specified Provider: Dr. Lange; 2 Weeks   As directed      To: Dr. Lange    Follow Up: 2 Weeks               Test Results Pending at Discharge  None     Glen Holman MD  06/08/21  14:20 EDT    Time: <30 minutes

## 2021-06-09 ENCOUNTER — READMISSION MANAGEMENT (OUTPATIENT)
Dept: CALL CENTER | Facility: HOSPITAL | Age: 66
End: 2021-06-09

## 2021-06-09 NOTE — OUTREACH NOTE
COPD/PN Week 1 Survey      Responses   [facility] [place] patient discharged from?  [place]   Does the patient have one of the following disease processes/diagnoses(primary or secondary)?  COPD/Pneumonia   Was the primary reason for admission:  COPD exacerbation   Week 1 attempt successful?  No   Unsuccessful attempts  Attempt 1          Ana Rosa Porras RN

## 2021-06-14 ENCOUNTER — READMISSION MANAGEMENT (OUTPATIENT)
Dept: CALL CENTER | Facility: HOSPITAL | Age: 66
End: 2021-06-14

## 2021-06-14 NOTE — OUTREACH NOTE
COPD/PN Week 1 Survey      Responses   [facility] patient discharged from?  [place]   Does the patient have one of the following disease processes/diagnoses(primary or secondary)?  COPD/Pneumonia   Was the primary reason for admission:  COPD exacerbation   Week 1 attempt successful?  Yes   Call start time  1321   Call end time  1338   Discharge diagnosis   Nosebleed  A. fib RVRRule out RML PNAProbable AE COPD:   Meds reviewed with patient/caregiver?  Yes   Is the patient having any side effects they believe may be caused by any medication additions or changes?  Yes   Side effects comments   Nosebleeds from Eliquis   Does the patient have all medications ordered at discharge?  Yes   Is the patient taking all medications as directed (includes completed medication regime)?  Yes   Does the patient have a primary care provider?   Yes   Does the patient have an appointment with their PCP or specialist within 7 days of discharge?  Yes   Has the patient kept scheduled appointments due by today?  N/A   Comments  PCP appt on 6/24/21   Has home health visited the patient within 72 hours of discharge?  N/A   Pulse Ox monitoring  None   Psychosocial issues?  No   Did the patient receive a copy of their discharge instructions?  Yes   Nursing interventions  Reviewed instructions with patient   What is the patient's perception of their health status since discharge?  Improving   Nursing Interventions  Nurse provided patient education   Is the patient/caregiver able to teach back the hierarchy of who to call/visit for symptoms/problems? PCP, Specialist, Home health nurse, Urgent Care, ED, 911  Yes   Additional teach back comments  Pt sattes he is having nosebleeds--had one this AM. Enc him to use saline nasal spray, ask oxygen co. for humidification and speak to cards about eliquis. I also asked him to speak to Cardiologist about s/s of hypotension and that he is dizzy. Pt states he takes HTN meds at night but is susally  "dizzy and b/p in 90's. I gave pt phone number for Dr Eng's office.    Is the patient able to teach back COPD zones?  No [Did not discuss with pt. States he is breathing \"about the same\"]   Week 1 call completed?  Yes          Isela Lu RN  "

## 2021-06-16 ENCOUNTER — TELEPHONE (OUTPATIENT)
Dept: CARDIOLOGY | Facility: CLINIC | Age: 66
End: 2021-06-16

## 2021-06-16 NOTE — TELEPHONE ENCOUNTER
Pt called and reported that since starting the Apixaban 5 mg bid.  Pt doesn't want to continue with the medication.  I have tried to reach pt back, but had to leave a vm.

## 2021-06-23 ENCOUNTER — READMISSION MANAGEMENT (OUTPATIENT)
Dept: CALL CENTER | Facility: HOSPITAL | Age: 66
End: 2021-06-23

## 2021-06-23 DIAGNOSIS — R04.0 EPISTAXIS, RECURRENT: Primary | ICD-10-CM

## 2021-06-23 NOTE — OUTREACH NOTE
COPD/PN Week 2 Survey      Responses   [facility] patient discharged from?  [place]   Does the patient have one of the following disease processes/diagnoses(primary or secondary)?  COPD/Pneumonia   Was the primary reason for admission:  COPD exacerbation   Week 2 attempt successful?  Yes   Call start time  1112   Call end time  1118   Discharge diagnosis   Nosebleed  A. fib RVRRule out RML PNAProbable AE COPD:   Meds reviewed with patient/caregiver?  Yes   Is the patient having any side effects they believe may be caused by any medication additions or changes?  Yes   Side effects comments   PATIENT IS COMPLAINING OF NOSEBLEEDS FROM ELIQUIS AND DOESN'T UNDERSTAND WHY HE HAS TO TAKE THIS INSTEAD OF 81MG ASA. THIS CALL NOTE TO BE ROUTED TO CARDIOLOGY OFFICE.    Does the patient have all medications ordered at discharge?  Yes   Is the patient taking all medications as directed (includes completed medication regime)?  Yes   Does the patient have a primary care provider?   Yes   Does the patient have an appointment with their PCP or specialist within 7 days of discharge?  Greater than 7 days   Comments regarding PCP  PCP APPOINTMENT IS TOMORROW 6/24   What is preventing the patient from scheduling follow up appointments within 7 days of discharge?  Earlier appointment not available   Nursing Interventions  Verified appointment date/time/provider   Has the patient kept scheduled appointments due by today?  N/A   Has home health visited the patient within 72 hours of discharge?  N/A   Pulse Ox monitoring  None   Psychosocial issues?  No   Did the patient receive a copy of their discharge instructions?  Yes   Nursing interventions  Reviewed instructions with patient   What is the patient's perception of their health status since discharge?  Improving   Nursing Interventions  Nurse provided patient education   Is the patient/caregiver able to teach back the hierarchy of who to call/visit for symptoms/problems? PCP,  Specialist, Home health nurse, Urgent Care, ED, 911  Yes   Is the patient/caregiver able to teach back signs and symptoms of worsening condition:  Fever/chills, Shortness of breath, Chest pain   Is the patient/caregiver able to teach back importance of completing antibiotic course of treatment?  Yes   Week 2 call completed?  Yes          Hortensia Quach LPN

## 2021-06-23 NOTE — PROGRESS NOTES
I spoke to patient and wife about reason for Eliquis vs ASA for the prevention of clot formation and risk of stroke with atrial fibrillation.  I suggested he see an ENT for the nose bleeds that if he had an irritate vein or polyp in his nose, the Eliquis would make the bleeding last longer but would not be the cause of the bleeding.  Both voiced understanding. Agreeable to seen ENT.    Stephanie I have made a referral for ENT, please schedule at your convenience.

## 2021-07-01 ENCOUNTER — READMISSION MANAGEMENT (OUTPATIENT)
Dept: CALL CENTER | Facility: HOSPITAL | Age: 66
End: 2021-07-01

## 2021-07-01 NOTE — OUTREACH NOTE
COPD/PN Week 3 Survey      Responses   [facility] patient discharged from?  [place]   Does the patient have one of the following disease processes/diagnoses(primary or secondary)?  COPD/Pneumonia   Was the primary reason for admission:  COPD exacerbation   Week 3 attempt successful?  No   Unsuccessful attempts  Attempt 1          Ana Rosa Porras RN

## 2021-07-02 ENCOUNTER — READMISSION MANAGEMENT (OUTPATIENT)
Dept: CALL CENTER | Facility: HOSPITAL | Age: 66
End: 2021-07-02

## 2021-07-02 NOTE — OUTREACH NOTE
COPD/PN Week 3 Survey      Responses   [facility] patient discharged from?  [place]   Does the patient have one of the following disease processes/diagnoses(primary or secondary)?  COPD/Pneumonia   Was the primary reason for admission:  COPD exacerbation   Week 3 attempt successful?  No   Unsuccessful attempts  Attempt 2   Rescheduled  Revoked          Brandee Duarte RN

## 2021-07-21 ENCOUNTER — HOSPITAL ENCOUNTER (EMERGENCY)
Facility: HOSPITAL | Age: 66
Discharge: HOME | End: 2021-07-21
Attending: EMERGENCY MEDICINE | Admitting: EMERGENCY MEDICINE

## 2021-07-21 VITALS
SYSTOLIC BLOOD PRESSURE: 137 MMHG | HEIGHT: 69 IN | DIASTOLIC BLOOD PRESSURE: 92 MMHG | OXYGEN SATURATION: 94 % | TEMPERATURE: 97.7 F | BODY MASS INDEX: 30.21 KG/M2 | WEIGHT: 204 LBS | HEART RATE: 70 BPM | RESPIRATION RATE: 18 BRPM

## 2021-07-21 DIAGNOSIS — R04.0 EPISTAXIS: Primary | ICD-10-CM

## 2021-07-21 LAB
ALBUMIN SERPL-MCNC: 4.3 G/DL (ref 3.5–5.2)
ALBUMIN/GLOB SERPL: 1.2 G/DL
ALP SERPL-CCNC: 62 U/L (ref 39–117)
ALT SERPL W P-5'-P-CCNC: 37 U/L (ref 1–41)
ANION GAP SERPL CALCULATED.3IONS-SCNC: 6.7 MMOL/L (ref 5–15)
APTT PPP: 34 SECONDS (ref 24.3–38.1)
AST SERPL-CCNC: 41 U/L (ref 1–40)
BASOPHILS # BLD AUTO: 0.03 10*3/MM3 (ref 0–0.2)
BASOPHILS NFR BLD AUTO: 0.4 % (ref 0–1.5)
BILIRUB SERPL-MCNC: 0.6 MG/DL (ref 0–1.2)
BUN SERPL-MCNC: 10 MG/DL (ref 8–23)
BUN/CREAT SERPL: 12.5 (ref 7–25)
CALCIUM SPEC-SCNC: 9.5 MG/DL (ref 8.6–10.5)
CHLORIDE SERPL-SCNC: 96 MMOL/L (ref 98–107)
CO2 SERPL-SCNC: 28.3 MMOL/L (ref 22–29)
CREAT SERPL-MCNC: 0.8 MG/DL (ref 0.76–1.27)
DEPRECATED RDW RBC AUTO: 48.6 FL (ref 37–54)
EOSINOPHIL # BLD AUTO: 0.17 10*3/MM3 (ref 0–0.4)
EOSINOPHIL NFR BLD AUTO: 2 % (ref 0.3–6.2)
ERYTHROCYTE [DISTWIDTH] IN BLOOD BY AUTOMATED COUNT: 12.9 % (ref 12.3–15.4)
GFR SERPL CREATININE-BSD FRML MDRD: 98 ML/MIN/1.73
GLOBULIN UR ELPH-MCNC: 3.6 GM/DL
GLUCOSE SERPL-MCNC: 109 MG/DL (ref 65–99)
HCT VFR BLD AUTO: 48.2 % (ref 37.5–51)
HGB BLD-MCNC: 15.6 G/DL (ref 13–17.7)
IMM GRANULOCYTES # BLD AUTO: 0.04 10*3/MM3 (ref 0–0.05)
IMM GRANULOCYTES NFR BLD AUTO: 0.5 % (ref 0–0.5)
INR PPP: 1.17 (ref 0.9–1.1)
LYMPHOCYTES # BLD AUTO: 2.08 10*3/MM3 (ref 0.7–3.1)
LYMPHOCYTES NFR BLD AUTO: 24.8 % (ref 19.6–45.3)
MCH RBC QN AUTO: 32.7 PG (ref 26.6–33)
MCHC RBC AUTO-ENTMCNC: 32.4 G/DL (ref 31.5–35.7)
MCV RBC AUTO: 101 FL (ref 79–97)
MONOCYTES # BLD AUTO: 0.64 10*3/MM3 (ref 0.1–0.9)
MONOCYTES NFR BLD AUTO: 7.6 % (ref 5–12)
NEUTROPHILS NFR BLD AUTO: 5.43 10*3/MM3 (ref 1.7–7)
NEUTROPHILS NFR BLD AUTO: 64.7 % (ref 42.7–76)
PLATELET # BLD AUTO: 242 10*3/MM3 (ref 140–450)
PMV BLD AUTO: 9.2 FL (ref 6–12)
POTASSIUM SERPL-SCNC: 3.4 MMOL/L (ref 3.5–5.2)
PROT SERPL-MCNC: 7.9 G/DL (ref 6–8.5)
PROTHROMBIN TIME: 14.9 SECONDS (ref 12.1–15)
RBC # BLD AUTO: 4.77 10*6/MM3 (ref 4.14–5.8)
SODIUM SERPL-SCNC: 131 MMOL/L (ref 136–145)
WBC # BLD AUTO: 8.39 10*3/MM3 (ref 3.4–10.8)

## 2021-07-21 PROCEDURE — 80053 COMPREHEN METABOLIC PANEL: CPT | Performed by: EMERGENCY MEDICINE

## 2021-07-21 PROCEDURE — 99284 EMERGENCY DEPT VISIT MOD MDM: CPT

## 2021-07-21 PROCEDURE — 30903 CONTROL OF NOSEBLEED: CPT | Performed by: EMERGENCY MEDICINE

## 2021-07-21 PROCEDURE — 85730 THROMBOPLASTIN TIME PARTIAL: CPT | Performed by: EMERGENCY MEDICINE

## 2021-07-21 PROCEDURE — 85610 PROTHROMBIN TIME: CPT | Performed by: EMERGENCY MEDICINE

## 2021-07-21 PROCEDURE — 85025 COMPLETE CBC W/AUTO DIFF WBC: CPT | Performed by: EMERGENCY MEDICINE

## 2021-07-21 PROCEDURE — 99283 EMERGENCY DEPT VISIT LOW MDM: CPT

## 2021-07-21 RX ORDER — SODIUM CHLORIDE 0.9 % (FLUSH) 0.9 %
10 SYRINGE (ML) INJECTION AS NEEDED
Status: DISCONTINUED | OUTPATIENT
Start: 2021-07-21 | End: 2021-07-21 | Stop reason: HOSPADM

## 2021-07-21 RX ORDER — OXYMETAZOLINE HYDROCHLORIDE 0.05 G/100ML
1 SPRAY NASAL ONCE
Status: COMPLETED | OUTPATIENT
Start: 2021-07-21 | End: 2021-07-21

## 2021-07-21 RX ORDER — LISINOPRIL AND HYDROCHLOROTHIAZIDE 25; 20 MG/1; MG/1
1 TABLET ORAL DAILY
COMMUNITY

## 2021-07-21 RX ORDER — ALBUTEROL SULFATE 90 UG/1
2 AEROSOL, METERED RESPIRATORY (INHALATION) EVERY 4 HOURS PRN
COMMUNITY

## 2021-07-21 RX ORDER — AMOXICILLIN 500 MG/1
500 CAPSULE ORAL 3 TIMES DAILY
Qty: 21 CAPSULE | Refills: 0 | Status: SHIPPED | OUTPATIENT
Start: 2021-07-21 | End: 2021-07-28

## 2021-07-21 RX ADMIN — Medication 1 SPRAY: at 09:00

## 2021-07-21 RX ADMIN — TRANEXAMIC ACID 500 MG: 100 INJECTION, SOLUTION INTRAVENOUS at 09:00

## 2021-07-21 NOTE — ED NOTES
Registration called from waiting room and said pt's nose is still bleeding.  Observed that pt's nose had dripped on his shirt and on the floor at the triage desk.  Brought pt back to exam room and informed MD Velez, Karla Xie RN  07/21/21 8367

## 2021-07-21 NOTE — ED NOTES
Out call to Lorena Card.  Spoke to Wilfred, rounding nurse who said she will page MD Velez, Karla Xie, RN  07/21/21 4174

## 2021-07-21 NOTE — DISCHARGE INSTRUCTIONS
The Rhino Rocket nasal packing must be removed by an ENT doctor as we discussed.  You will need to call to make an appointment with them.  You also need to follow-up with your cardiology doctor as soon as possible to review your medications and discuss the plan if further blood thinners are needed.  Please return to the emergency room for any worsening pain, bleeding, weakness, dizziness or any other concerns.

## 2021-07-21 NOTE — ED NOTES
Pt stated he did not know his medications, he left his list at home and his wife is at work.  [facility]     Rosie, Karla Xie RN  07/21/21 0916

## 2021-07-21 NOTE — ED NOTES
Pt did not seem to be a reliable historian r/t medication and medical history.  Called pharmacy to get current med list.  Made a medicine list for pt to put in his wallet and instructed him to bring it with him every time he sees MD Velez, Karla Xie, RN  07/21/21 1016

## 2021-07-21 NOTE — ED PROVIDER NOTES
Subjective   History of Present Illness  History of Present Illness    Chief complaint: Nosebleed    Location: Started on the right side but came out of both sides today    Quality/Severity: Moderate bleeding    Timing/Duration: Intermittently bleeding from the right side for several days.    Modifying Factors: Worse when he coughs    Narrative: This patient presents for evaluation of a worsening nosebleed.  Patient was recently started on Eliquis anticoagulation because of a history of A. fib.  He says shortly after he started that medication he began having some intermittent nosebleeds out of the right nostril.  These initial nosebleeds were fairly easy to control with some symptomatic management at home.  Patient wears nasal cannula oxygen at home as well.  However, today, he began having a lot more heavy bleeding and it seemed like it was coming out of both sides.  Patient complains of some overall fatigue and lightheadedness as well.    Associated Symptoms: As above    Review of Systems   Constitutional: Positive for fatigue. Negative for activity change and fever.   HENT: Positive for nosebleeds. Negative for mouth sores.    Eyes: Negative for pain and visual disturbance.   Respiratory: Positive for cough. Negative for stridor.    Cardiovascular: Negative for chest pain.   Gastrointestinal: Negative for abdominal pain.   Skin: Negative for color change.   Neurological: Positive for light-headedness. Negative for syncope and headaches.   Psychiatric/Behavioral: The patient is nervous/anxious.    All other systems reviewed and are negative.      Past Medical History:   Diagnosis Date   • COPD (chronic obstructive pulmonary disease) (CMS/[facility])    • Gastroesophageal reflux disease 3/4/2016   • Hyperlipidemia    • Hypertension    • Nosebleed    • Osteopenia 3/4/2016    Description: Her bone density August 2015 secondary to steroid use   • Vitamin D deficiency 3/4/2016       No Known Allergies    Past Surgical  History:   Procedure Laterality Date   • ANKLE SURGERY     • CARDIAC CATHETERIZATION Left 2021    Procedure: Coronary Angiography;  Surgeon: Rao Del Cid MD;  Location:  [facility];  Service: Cardiovascular;  Laterality: Left;   • CARDIAC CATHETERIZATION N/A 2021    Procedure: Left Heart Cath;  Surgeon: Rao Del Cdi MD;  Location:  [facility];  Service: Cardiovascular;  Laterality: N/A;       Family History   Problem Relation Age of Onset   • COPD Mother 78   • Seizures Father    • Vasculitis Father    • Prostate cancer Maternal Uncle    • Heart disease Brother        Social History     Socioeconomic History   • Marital status:      Spouse name: Not on file   • Number of children: Not on file   • Years of education: Not on file   • Highest education level: Not on file   Tobacco Use   • Smoking status: Former Smoker     Packs/day: 1.00     Years: 30.00     Pack years: 30.00     Quit date: 2000     Years since quittin.2   • Smokeless tobacco: Current User     Types: Chew   • Tobacco comment: quit 15 years ago, wife current smoker outside   Substance and Sexual Activity   • Alcohol use: No   • Drug use: No   • Sexual activity: Yes     Partners: Female     ED Triage Vitals   Temp Heart Rate Resp BP SpO2   21 1037 21 0832 21 0832 21 0832 21 0832   96.8 °F (36 °C) 70 18 (!) 149/112 95 %      Temp src Heart Rate Source Patient Position BP Location FiO2 (%)   21 1037 21 0832 21 0832 21 0832 --   Temporal Monitor Sitting Right arm      Objective   Physical Exam  Vitals and nursing note reviewed.   Constitutional:       Appearance: He is well-developed. He is not toxic-appearing or diaphoretic.   HENT:      Head: Normocephalic and atraumatic.      Right Ear: Ear canal and external ear normal.      Left Ear: Ear canal and external ear normal.      Nose:      Comments: Moderate fresh blood  with some clots noted in the right nostril.  No significant active bleeding noted at the time of arrival.  Scant amount of fresh blood noted in the left nostril.     Mouth/Throat:      Mouth: Mucous membranes are moist.   Eyes:      General:         Right eye: No discharge.         Left eye: No discharge.      Pupils: Pupils are equal, round, and reactive to light.   Cardiovascular:      Rate and Rhythm: Normal rate and regular rhythm.   Pulmonary:      Effort: Pulmonary effort is normal. No respiratory distress.      Breath sounds: No stridor.   Musculoskeletal:         General: No deformity or signs of injury. Normal range of motion.      Cervical back: Normal range of motion and neck supple.   Skin:     General: Skin is warm and dry.      Findings: No erythema or rash.   Neurological:      General: No focal deficit present.      Mental Status: He is alert and oriented to person, place, and time.   Psychiatric:         Behavior: Behavior normal.         Thought Content: Thought content normal.         Judgment: Judgment normal.       Results for orders placed or performed during the hospital encounter of 07/21/21   Comprehensive Metabolic Panel    Specimen: Blood   Result Value Ref Range    Glucose 109 (H) 65 - 99 mg/dL    BUN 10 8 - 23 mg/dL    Creatinine 0.80 0.76 - 1.27 mg/dL    Sodium 131 (L) 136 - 145 mmol/L    Potassium 3.4 (L) 3.5 - 5.2 mmol/L    Chloride 96 (L) 98 - 107 mmol/L    CO2 28.3 22.0 - 29.0 mmol/L    Calcium 9.5 8.6 - 10.5 mg/dL    Total Protein 7.9 6.0 - 8.5 g/dL    Albumin 4.30 3.50 - 5.20 g/dL    ALT (SGPT) 37 1 - 41 U/L    AST (SGOT) 41 (H) 1 - 40 U/L    Alkaline Phosphatase 62 39 - 117 U/L    Total Bilirubin 0.6 0.0 - 1.2 mg/dL    eGFR Non African Amer 98 >60 mL/min/1.73    Globulin 3.6 gm/dL    A/G Ratio 1.2 g/dL    BUN/Creatinine Ratio 12.5 7.0 - 25.0    Anion Gap 6.7 5.0 - 15.0 mmol/L   Protime-INR    Specimen: Blood   Result Value Ref Range    Protime 14.9 12.1 - 15.0 Seconds    INR 1.17  (H) 0.90 - 1.10   aPTT    Specimen: Blood   Result Value Ref Range    PTT 34.0 24.3 - 38.1 seconds   CBC Auto Differential    Specimen: Blood   Result Value Ref Range    WBC 8.39 3.40 - 10.80 10*3/mm3    RBC 4.77 4.14 - 5.80 10*6/mm3    Hemoglobin 15.6 13.0 - 17.7 g/dL    Hematocrit 48.2 37.5 - 51.0 %    .0 (H) 79.0 - 97.0 fL    MCH 32.7 26.6 - 33.0 pg    MCHC 32.4 31.5 - 35.7 g/dL    RDW 12.9 12.3 - 15.4 %    RDW-SD 48.6 37.0 - 54.0 fl    MPV 9.2 6.0 - 12.0 fL    Platelets 242 140 - 450 10*3/mm3    Neutrophil % 64.7 42.7 - 76.0 %    Lymphocyte % 24.8 19.6 - 45.3 %    Monocyte % 7.6 5.0 - 12.0 %    Eosinophil % 2.0 0.3 - 6.2 %    Basophil % 0.4 0.0 - 1.5 %    Immature Grans % 0.5 0.0 - 0.5 %    Neutrophils, Absolute 5.43 1.70 - 7.00 10*3/mm3    Lymphocytes, Absolute 2.08 0.70 - 3.10 10*3/mm3    Monocytes, Absolute 0.64 0.10 - 0.90 10*3/mm3    Eosinophils, Absolute 0.17 0.00 - 0.40 10*3/mm3    Basophils, Absolute 0.03 0.00 - 0.20 10*3/mm3    Immature Grans, Absolute 0.04 0.00 - 0.05 10*3/mm3         Epistaxis Management    Date/Time: 7/21/2021 6:07 PM  Performed by: José Miguel Farley MD  Authorized by: José Miguel Farley MD     Consent:     Consent obtained:  Verbal    Consent given by:  Patient    Risks discussed:  Bleeding and infection  Procedure details:     Treatment site:  R anterior    Treatment method:  Nasal balloon (5.5 mm Rhino Rocket)    Treatment episode: recurring    Post-procedure details:     Assessment:  Bleeding stopped    Patient tolerance of procedure:  Tolerated well, no immediate complications  Comments:      Sprayed several sprays of Afrin as well as TXA administered to bilateral nostrils               ED Course  ED Course as of Jul 21 1810   Wed Jul 21, 2021 1808 I reviewed the labs from today's visit.  Patient's vital signs were acceptable and his H&H are acceptable as well.  Initially, the patient did not have any active bleeding on arrival.  I administered several sprays of Afrin  bilaterally.  Unfortunately, several minutes later, he began having some more bleeding out of the right side.  His anticoagulation medication I think certainly is complicating the matter.  Therefore I elected to put a Rhino Rocket in him.  At 1st I placed a 4.5 mm Rhino Rocket and we watched for a while.  However, ultimately that seem to be inadequate and he was still having some bleeding around the Rhino Rocket.  So I removed that initial Rhino Rocket and placed a larger, 5.5 mm Rhino Rocket and insufflated the balloon.  This time, it did seem to achieve good hemostasis.  I had also administered TXA prior to that 2nd Rhino Rocket.  We observe the patient for another long period of time after the 2nd Rhino Rocket and he did very well.  He was discharged home in stable condition with prescription for amoxicillin and instructions to follow-up at ENT office.  I also spoke with his cardiology office and they said it is okay to stop the Eliquis medication for now until they see him in the office again.    [KEYSHAWN]      ED Course User Index  [KEYSHAWN] José Miguel Farley MD                                           MDM  Number of Diagnoses or Management Options     Amount and/or Complexity of Data Reviewed  Clinical lab tests: reviewed and ordered  Decide to obtain previous medical records or to obtain history from someone other than the patient: yes  Review and summarize past medical records: yes  Discuss the patient with other providers: yes (Dr. Eng)    Risk of Complications, Morbidity, and/or Mortality  Presenting problems: moderate  Diagnostic procedures: moderate  Management options: moderate        Final diagnoses:   Epistaxis       ED Disposition  ED Disposition     ED Disposition Condition Comment    Discharge Stable           Ryan Dominguez MD  [address] [facility] [address] [phone]    Call today  Call the ear nose and throat clinic today to schedule an appointment for Thursday or Friday to be  seen    Staci Costa, APRN  [address]  [address]  [place] [address] [phone]    Schedule an appointment as soon as possible for a visit in 2 days  Follow-up with your cardiology group this week for review of your medications         Medication List      New Prescriptions    amoxicillin 500 MG capsule  Commonly known as: AMOXIL  Take 1 capsule by mouth 3 (Three) Times a Day for 7 days.           Where to Get Your Medications      These medications were sent to [facility] [address] [place] [address] - [address] [address] - [phone] [facility] [phone]   [address] [address]    Phone: [phone]   · amoxicillin 500 MG capsule          José Miguel Farley MD  07/21/21 3368

## 2021-07-29 ENCOUNTER — TELEPHONE (OUTPATIENT)
Dept: CARDIOLOGY | Facility: CLINIC | Age: 66
End: 2021-07-29

## 2021-07-29 NOTE — TELEPHONE ENCOUNTER
Dr. Madsen Pt:  Out of office: Vacation  [phone]  Please advise.    Pt's daughter called and would like to make sure that pt's risk of a stroke is not high being off of the Eliquis.  Pt has an upcoming appt on 08/10.  Pt was in the ED due to epistaxis.

## 2021-07-30 NOTE — TELEPHONE ENCOUNTER
Attempted to return call.  Had to leave a message but did state the RM was aware he is off the Eliquis as the ER  Spoke to her.

## 2021-08-05 ENCOUNTER — DOCUMENTATION (OUTPATIENT)
Dept: CARDIOLOGY | Facility: CLINIC | Age: 66
End: 2021-08-05

## 2021-08-05 NOTE — PROGRESS NOTES
" I spoke to patient and his wife.  The patient states he has not been to take the Eliquis.  He states that the ENT told him the reason he was bleeding was because of the Eliquis.  The patient states that he did not have any ruptured nose vessels that caused the bleeding.  Try to educate him about the Eliquis does not automatically cause a bleed but if there was an opening or break in the skin that he would bleed more easily.  His wife then stated \"I have bigger pardon but it says right here on the instructions that the first side effect is causes nosebleeds\".  I told her I understand exactly what she is saying but again there has to be an opening for the bleeding in the first place and then yes he would bleed longer and easier when being on the blood thinner.  He then stated he would take a baby aspirin and I told him that that would not protect him against a stroke and it was not a therapeutic indication for stroke with atrial fibrillation.     He states he was not to take the Eliquis I confirmed his statement.  I told him 3 separate times that I understood his position however that he is at greater risk for stroke by not taking the Eliquis.  He stated he was not good to keep any appointments with us.  I told him that that was certainly his prerogative but should he need us we are here at any time.  I then asked if he wanted us to cancel both of his upcoming appointments and he said yes.  We have done that and again told him where here if he needs us but I just wanted him to understand he is at high risk of stroke without taking any anticoagulation.  He said \"okay\".  "

## 2021-09-01 RX ORDER — DILTIAZEM HYDROCHLORIDE 360 MG/1
CAPSULE, EXTENDED RELEASE ORAL
Qty: 30 CAPSULE | Refills: 3 | Status: SHIPPED | OUTPATIENT
Start: 2021-09-01 | End: 2022-06-28

## 2021-09-01 NOTE — TELEPHONE ENCOUNTER
Rx Refill Note  Requested Prescriptions     Pending Prescriptions Disp Refills   • dilTIAZem CD (CARDIZEM CD) 360 MG 24 hr capsule [Pharmacy Med Name: DILTIAZEM 24H ER(CD) 360 MG CP] 30 capsule 3     Sig: TAKE ONE CAPSULE BY MOUTH DAILY      Last office visit with prescribing clinician: 5/11/2021      Next office visit with prescribing clinician: Visit date not found            Maribeth Francis MA  09/01/21, 15:54 EDT

## 2021-09-24 RX ORDER — DIGOXIN 125 MCG
TABLET ORAL
Qty: 90 TABLET | Refills: 3 | Status: SHIPPED | OUTPATIENT
Start: 2021-09-24

## 2021-11-11 ENCOUNTER — TRANSCRIBE ORDERS (OUTPATIENT)
Dept: PULMONOLOGY | Facility: HOSPITAL | Age: 66
End: 2021-11-11

## 2021-11-11 DIAGNOSIS — Z01.818 OTHER SPECIFIED PRE-OPERATIVE EXAMINATION: Primary | ICD-10-CM

## 2021-11-16 ENCOUNTER — LAB (OUTPATIENT)
Dept: LAB | Facility: HOSPITAL | Age: 66
End: 2021-11-16

## 2021-11-16 DIAGNOSIS — Z01.818 OTHER SPECIFIED PRE-OPERATIVE EXAMINATION: ICD-10-CM

## 2021-11-16 LAB — SARS-COV-2 RNA PNL SPEC NAA+PROBE: NOT DETECTED

## 2021-11-16 PROCEDURE — C9803 HOPD COVID-19 SPEC COLLECT: HCPCS

## 2021-11-16 PROCEDURE — 87635 SARS-COV-2 COVID-19 AMP PRB: CPT | Performed by: INTERNAL MEDICINE

## 2021-11-17 ENCOUNTER — HOSPITAL ENCOUNTER (OUTPATIENT)
Dept: PULMONOLOGY | Facility: HOSPITAL | Age: 66
Discharge: HOME | End: 2021-11-17
Admitting: INTERNAL MEDICINE

## 2021-11-17 DIAGNOSIS — J44.9 CHRONIC OBSTRUCTIVE PULMONARY DISEASE, UNSPECIFIED COPD TYPE (HCC): ICD-10-CM

## 2021-11-17 LAB
BDY SITE: ABNORMAL
HGB BLDA-MCNC: 18.3 G/DL (ref 14–18)
Lab: ABNORMAL
SAO2 % BLDCOA: 93.1 % (ref 94–99)

## 2021-11-17 PROCEDURE — 94726 PLETHYSMOGRAPHY LUNG VOLUMES: CPT

## 2021-11-17 PROCEDURE — 82820 HEMOGLOBIN-OXYGEN AFFINITY: CPT

## 2021-11-17 PROCEDURE — 94729 DIFFUSING CAPACITY: CPT

## 2021-11-17 PROCEDURE — 63710000001 ALBUTEROL SULFATE HFA 108 (90 BASE) MCG/ACT AEROSOL SOLUTION 8 G INHALER: Performed by: INTERNAL MEDICINE

## 2021-11-17 PROCEDURE — A9270 NON-COVERED ITEM OR SERVICE: HCPCS | Performed by: INTERNAL MEDICINE

## 2021-11-17 PROCEDURE — 94060 EVALUATION OF WHEEZING: CPT

## 2021-11-17 RX ORDER — ALBUTEROL SULFATE 90 UG/1
4 AEROSOL, METERED RESPIRATORY (INHALATION) ONCE
Status: COMPLETED | OUTPATIENT
Start: 2021-11-17 | End: 2021-11-17

## 2021-11-17 RX ADMIN — ALBUTEROL SULFATE 4 PUFF: 90 AEROSOL, METERED RESPIRATORY (INHALATION) at 14:57

## 2022-06-28 RX ORDER — DILTIAZEM HYDROCHLORIDE 360 MG/1
CAPSULE, EXTENDED RELEASE ORAL
Qty: 30 CAPSULE | Refills: 2 | Status: SHIPPED | OUTPATIENT
Start: 2022-06-28

## 2022-10-25 RX ORDER — DIGOXIN 125 MCG
TABLET ORAL
Qty: 30 TABLET | OUTPATIENT
Start: 2022-10-25

## 2022-10-26 RX ORDER — DILTIAZEM HYDROCHLORIDE 360 MG/1
CAPSULE, EXTENDED RELEASE ORAL
Qty: 30 CAPSULE | Refills: 2 | OUTPATIENT
Start: 2022-10-26

## 2022-10-26 NOTE — TELEPHONE ENCOUNTER
Rx Refill Note  Requested Prescriptions     Pending Prescriptions Disp Refills   • dilTIAZem CD (CARDIZEM CD) 360 MG 24 hr capsule [Pharmacy Med Name: DILTIAZEM 24H ER(CD) 360 MG CP] 30 capsule 2     Sig: TAKE ONE CAPSULE BY MOUTH DAILY      Last office visit with prescribing clinician: 5/11/2021      Next office visit with prescribing clinician: Visit date not found     Pt follows Dr. Cook now       Maribeth Francis MA  10/26/22, 08:58 EDT

## 2022-10-27 RX ORDER — DIGOXIN 125 MCG
TABLET ORAL
Qty: 30 TABLET | OUTPATIENT
Start: 2022-10-27

## 2022-11-01 NOTE — TELEPHONE ENCOUNTER
I have attempted to contact pt again, but had to leave another message.     Patient has given consent to record this visit for documentation in their clinical record.

## 2023-06-26 PROBLEM — J43.9 PULMONARY EMPHYSEMA, UNSPECIFIED EMPHYSEMA TYPE: Status: ACTIVE | Noted: 2023-06-26

## 2023-06-27 PROBLEM — I48.91 ATRIAL FIBRILLATION WITH RVR: Status: RESOLVED | Noted: 2021-05-27 | Resolved: 2023-06-27

## 2023-06-27 PROBLEM — R06.02 SHORTNESS OF BREATH: Status: RESOLVED | Noted: 2021-04-17 | Resolved: 2023-06-27

## 2023-06-30 PROBLEM — I50.23 ACUTE ON CHRONIC SYSTOLIC HEART FAILURE: Status: ACTIVE | Noted: 2023-06-30

## 2023-06-30 PROBLEM — R10.31 RIGHT GROIN PAIN: Status: ACTIVE | Noted: 2023-06-26

## 2023-07-17 PROBLEM — R10.31 RIGHT GROIN PAIN: Status: RESOLVED | Noted: 2023-06-26 | Resolved: 2023-07-17

## 2023-07-28 ENCOUNTER — OFFICE VISIT (OUTPATIENT)
Dept: CARDIOLOGY | Facility: CLINIC | Age: 68
End: 2023-07-28
Payer: COMMERCIAL

## 2023-07-28 VITALS
HEIGHT: 69 IN | SYSTOLIC BLOOD PRESSURE: 116 MMHG | WEIGHT: 185 LBS | DIASTOLIC BLOOD PRESSURE: 82 MMHG | BODY MASS INDEX: 27.4 KG/M2 | HEART RATE: 121 BPM

## 2023-07-28 DIAGNOSIS — I48.21 PERMANENT ATRIAL FIBRILLATION: ICD-10-CM

## 2023-07-28 DIAGNOSIS — I27.20 PULMONARY HYPERTENSION: ICD-10-CM

## 2023-07-28 DIAGNOSIS — J40 BRONCHITIS: ICD-10-CM

## 2023-07-28 DIAGNOSIS — E78.00 HYPERCHOLESTEROLEMIA: ICD-10-CM

## 2023-07-28 DIAGNOSIS — I34.0 NONRHEUMATIC MITRAL VALVE REGURGITATION: ICD-10-CM

## 2023-07-28 DIAGNOSIS — I50.23 ACUTE ON CHRONIC SYSTOLIC HEART FAILURE: Primary | ICD-10-CM

## 2023-07-28 DIAGNOSIS — I36.1 NONRHEUMATIC TRICUSPID VALVE REGURGITATION: ICD-10-CM

## 2023-07-28 DIAGNOSIS — I10 PRIMARY HYPERTENSION: ICD-10-CM

## 2023-07-28 DIAGNOSIS — J43.9 PULMONARY EMPHYSEMA, UNSPECIFIED EMPHYSEMA TYPE: ICD-10-CM

## 2023-07-28 RX ORDER — MONTELUKAST SODIUM 10 MG/1
10 TABLET ORAL NIGHTLY
Qty: 60 TABLET | Refills: 0
Start: 2023-07-28

## 2023-07-28 RX ORDER — OMEPRAZOLE 20 MG/1
20 CAPSULE, DELAYED RELEASE ORAL DAILY
Qty: 30 CAPSULE | Refills: 0
Start: 2023-07-28

## 2023-07-28 RX ORDER — BUDESONIDE AND FORMOTEROL FUMARATE DIHYDRATE 160; 4.5 UG/1; UG/1
2 AEROSOL RESPIRATORY (INHALATION) 2 TIMES DAILY
Qty: 10.2 G | Refills: 6
Start: 2023-07-28

## 2023-07-28 RX ORDER — ROSUVASTATIN CALCIUM 20 MG/1
20 TABLET, COATED ORAL NIGHTLY
Qty: 90 TABLET | Refills: 3 | Status: SHIPPED | OUTPATIENT
Start: 2023-07-28

## 2023-07-28 NOTE — PROGRESS NOTES
"      [facility] CARDIOLOGY  [address] [facility] [address]  [address]  [place] [address]  Phone: [phone]  Fax: [phone]  Patient Name: Claude Spencer  :1955  Age: 67 y.o.  Primary Cardiologist: Wilfred Draper MD  Encounter Provider:  KHADRA Varghese    History of Present Illness     Claude Spencer is a 67 y.o.  male whose medical history includes hypertension, hyper lipidemia, emphysema.  He is followed in our office by Dr. Draper for HFrEF and persistent atrial fibrillation. I have reviewed the past medical records in preparation of today's visit.     23 Follow-up:  He is here for hospital follow-up and I am seeing him for the first time today.  2023 he was hospitalized at [facility] for pneumonia, COPD exacerbation, and acute on chronic CHF.  He was noted to have EF 35% and severe mitral valve regurgitation.  He was discharged home on Toprol, Entresto, and Jardiance.  He could not afford Entresto RN Jardiance and was switched to losartan and spironolactone.  2023 he presented to the ER with acute on chronic HFrEF and chest x-ray showing mild pulmonary edema.  He was also having A-fib with RVR. He was diuresed and then started on 12.5 mg spironolactone daily and metoprolol succinate was increased to 50 mg twice daily.  He was discharged home on 80 mg twice daily.    He is here for follow-up today and his heart rate is still 120; he is asymptomatic with this and states that he feels quite good.  He is only been taking the metoprolol once daily.  He is taking his other medications as prescribed.  He states that he has no leg swelling and his breathing is comfortable and less he is out in the heat.  He denies chest pain, lightheadedness, worsening dyspnea, or orthopnea.    Data Review     The following data was reviewed by KHADRA Varghese on 23:    Vital Signs:   /82   Pulse (!) 121   Ht 175.3 cm (69\")   " Wt 83.9 kg (185 lb)   BMI 27.32 kg/m²       Weight:  Wt Readings from Last 3 Encounters:   07/28/23 83.9 kg (185 lb)   07/01/23 81.7 kg (180 lb 3.2 oz)   07/21/21 92.5 kg (204 lb)     Body mass index is 27.32 kg/m².    Below is a summary of pertinent cardiology findings:  April 2021 he presented to the emergency room with chest discomfort and found to be in COPD exacerbation and A-fib with RVR.  He has adamantly refused any anticoagulation.  His heart rate was controlled with diltiazem.  April 2021 echocardiogram showed EF 52%, indeterminate LV diastolic function, moderately dilated LV cavity, and mild concentric LVH.  A pharmacological myocardial perfusion stress study showed a medium size infarct in the inferior wall with no ischemia and EF 34%.  April 2021 cardiac catheterization showed normal left main coronary artery, normal LAD in the proximal segment but with 10 to 20% mid stenosis, small diagonal branch, 40 to 50% proximal left circumflex stenosis, large OM1, large and dominant but tortuous RCA with 10 to 20% discrete mid to distal stenosis.  May 2021 48-hour Holter monitor showed A-fib with average heart rate 85 bpm.  April 2023 he was hospitalized at [facility] for pneumonia, COPD exacerbation, and acute on chronic CHF.  He was noted to have EF 35% and severe mitral valve regurgitation.  He was discharged home on Toprol, Entresto, and Jardiance.  He could not afford Entresto RN Jardiance and was switched to losartan and spironolactone.    June 2023 he presented to the ER with acute on chronic HFrEF and chest x-ray showing mild pulmonary edema.  He was also having A-fib with RVR.  He was diuresed and then started on 12.5 mg spironolactone daily and metoprolol succinate was increased to 50 mg twice daily.  He was discharged home on 80 mg twice daily.  June 2023 echocardiogram shows EF 26 to 30%, severe global LV hypokinesis, moderately dilated LV cavity, mildly dilated RV cavity with mildly  reduced RV SF, severe biatrial enlargement, mitral valve leaflets appear to be tethered and secondary to LV remodeling, severe mitral valve regurgitation, moderate tricuspid valve regurgitation, RVSP 49 mmHg, dilated IVC, and pericardial effusion.    Labs:  06/27/2023:  cr 1.0, K 3.9, otherwise unremarkable CMP, HgbA1c 5.40, TSH 3.110, HgB 15.8, Plt 260      ECG 12 Lead    Date/Time: 7/28/2023 9:40 AM  Performed by: Jacy Thomas APRN  Authorized by: Jacy Thomas APRN   Comparison: compared with previous ECG from 6/26/2023  Similar to previous ECG  Rhythm: atrial fibrillation  Ectopy: unifocal PVCs  Rate: tachycardic  BPM: 121    Clinical impression: abnormal EKG        Medications     Allergies as of 07/28/2023 - Reviewed 07/28/2023   Allergen Reaction Noted    Apixaban Other (See Comments) 05/17/2023       Current Outpatient Medications   Medication Instructions    albuterol sulfate  (90 Base) MCG/ACT inhaler 2 puffs, Inhalation, Every 4 Hours PRN    cholecalciferol (VITAMIN D3) 1,000 Units, Oral, Daily    furosemide (LASIX) 80 mg, Oral, 2 Times Daily    ipratropium-albuterol (DUO-NEB) 0.5-2.5 mg/3 ml nebulizer 3 mL, Nebulization, Every 4 Hours PRN    losartan (COZAAR) 25 mg, Oral, 2 Times Daily    metoprolol succinate XL (TOPROL-XL) 50 mg, Oral, Every 12 Hours Scheduled    montelukast (SINGULAIR) 10 MG tablet TAKE ONE TABLET BY MOUTH ONCE NIGHTLY    omeprazole (priLOSEC) 20 MG capsule TAKE ONE CAPSULE BY MOUTH DAILY    rosuvastatin (CRESTOR) 10 MG tablet TAKE ONE TABLET BY MOUTH DAILY    spironolactone (ALDACTONE) 12.5 mg, Oral, Daily    SYMBICORT 160-4.5 MCG/ACT inhaler INHALE TWO PUFFS BY MOUTH TWICE A DAY        Past History, Review of Systems, Exam     Past Medical History:   Diagnosis Date    Atrial fibrillation 04/17/2021    COPD (chronic obstructive pulmonary disease)     Gastroesophageal reflux disease 03/04/2016    Hyperlipidemia     Hypertension     Nosebleed      Osteopenia 2016    Vitamin D deficiency 2016       Past Surgical History:   has a past surgical history that includes Ankle surgery; Cardiac catheterization (Left, 2021); Cardiac catheterization (N/A, 2021); Inguinal Hernia Repair (Bilateral, 2023); and Umbilical hernia repair (N/A, 2023).     Social History     Socioeconomic History    Marital status:    Tobacco Use    Smoking status: Former     Packs/day: 1.00     Years: 30.00     Pack years: 30.00     Types: Cigarettes     Quit date: 2000     Years since quittin.2     Passive exposure: Past    Smokeless tobacco: Current     Types: Chew    Tobacco comments:     quit 15 years ago, wife current smoker outside   Vaping Use    Vaping Use: Never used   Substance and Sexual Activity    Alcohol use: No    Drug use: No     Types: Marijuana    Sexual activity: Yes     Partners: Female       Review of Systems   Cardiovascular:  Positive for dyspnea on exertion. Negative for chest pain, claudication, cyanosis, irregular heartbeat, leg swelling, near-syncope, orthopnea, palpitations, paroxysmal nocturnal dyspnea and syncope.     Vitals reviewed.   Constitutional:       Appearance: Not in distress.   Eyes:      Conjunctiva/sclera: Conjunctivae normal.      Pupils: Pupils are equal, round, and reactive to light.   HENT:      Head: Normocephalic.      Nose: Nose normal.    Mouth/Throat:      Pharynx: Oropharynx is clear.   Neck:      Vascular: JVD normal.   Pulmonary:      Effort: Pulmonary effort is normal.      Breath sounds: Normal breath sounds. No wheezing. No rhonchi. No rales.   Cardiovascular:      Tachycardia present. Irregularly irregular rhythm. Normal S1. Normal S2.       Murmurs: There is a grade 2/6 systolic murmur.   Pulses:     Intact distal pulses.   Edema:     Peripheral edema absent.   Abdominal:      General: Bowel sounds are normal. There is no distension.      Palpations: Abdomen is soft.      Tenderness: There  is no abdominal tenderness.   Musculoskeletal: Normal range of motion.      Cervical back: Normal range of motion and neck supple. Skin:     General: Skin is warm and dry.   Neurological:      Mental Status: Alert and oriented to person, place and time.   Psychiatric:         Attention and Perception: Attention normal.         Mood and Affect: Mood normal.         Speech: Speech normal.         Behavior: Behavior is cooperative.        Assessment and Plan     Assessment:  1. Acute on chronic systolic heart failure    2. Bronchitis    3. Permanent atrial fibrillation    4. Primary hypertension    5. Hypercholesterolemia    6. Pulmonary emphysema, unspecified emphysema type    7. Nonrheumatic mitral valve regurgitation    8. Nonrheumatic tricuspid valve regurgitation    9. Pulmonary hypertension         Acute on chronic systolic heart failure: Hospitalized in April 2023 with pneumonia and EF noted to be 35%; EF previously 52% in April 2021.  He was then hospitalized in June 2023 with acute on chronic HFrEF and EF was noted to be 26 to 30% with severe global LV hypokinesis, moderately dilated LV cavity, mildly dilated RV cavity and mildly reduced RV SF.  He was not able to afford Entresto or Jardiance.  His medical therapy currently includes metoprolol succinate, losartan, and spironolactone.  He is compensated on 80 mg Lasix twice daily despite elevated heart rate.  Permanent atrial fibrillation: Episodes of A-fib with RVR are not on usual with episodes of acute heart failure and COPD exacerbation.  Heart rate today is 120s on 50 mg metoprolol succinate daily; he was not aware he should be taking it twice daily.  He is asymptomatic.  His CHADS2 Vascor is 3 but he is adamantly opposed to taking anticoagulants.  Mitral valve insufficiency: This was noted to be severe on June 2023 echocardiogram and had progressed from April 2021 echocardiogram.  Tricuspid valve insufficiency: This was noted to be moderate on June 2023  echocardiogram.  Pulmonary hypertension: RVSP 49 mmHg on June 2023 echocardiogram.  He does have COPD.  Hypertension: Low normal but adequate given his heart failure.  Hyperlipidemia: No recent lipids to review but he is on 20 mg rosuvastatin daily.  Emphysema: He does have COPD but there are no signs of exacerbation on exam today.      Mr. Betts is a patient who has been evaluated by Dr. Draper for permanent atrial fibrillation that has been difficult to control when in heart failure or COPD exacerbation.  He also has newly diagnosed acute HFrEF with EF noted to be 26 to 30% in June 2023 (it was 52% in April 2021).  He cannot afford Entresto or Jardiance.  His medical therapy currently includes metoprolol succinate, losartan, and spironolactone.    His heart rate is quite fast today but he is only been taking metoprolol once daily; I am going to have him take 50 mg twice daily and come back in 2 weeks for EKG.  I am also going to schedule him to see Dr. Draper in 3 months.    Return in about 3 months (around 10/28/2023) for EKG in 2 weeks; 3 months with Dr. Draper.  Orders Placed This Encounter   Procedures    ECG 12 Lead      No orders of the defined types were placed in this encounter.        Thank you for the opportunity to participate in this patient's care.    KHADRA Holloway    This office note has been dictated.

## 2023-08-01 ENCOUNTER — TELEPHONE (OUTPATIENT)
Dept: CARDIOLOGY | Facility: CLINIC | Age: 68
End: 2023-08-01

## 2023-08-21 ENCOUNTER — APPOINTMENT (OUTPATIENT)
Dept: GENERAL RADIOLOGY | Facility: HOSPITAL | Age: 68
End: 2023-08-21
Payer: COMMERCIAL

## 2023-08-21 ENCOUNTER — HOSPITAL ENCOUNTER (EMERGENCY)
Facility: HOSPITAL | Age: 68
Discharge: HOME | End: 2023-08-21
Attending: EMERGENCY MEDICINE
Payer: COMMERCIAL

## 2023-08-21 VITALS
BODY MASS INDEX: 27.25 KG/M2 | HEART RATE: 125 BPM | DIASTOLIC BLOOD PRESSURE: 88 MMHG | HEIGHT: 69 IN | SYSTOLIC BLOOD PRESSURE: 109 MMHG | WEIGHT: 184 LBS | OXYGEN SATURATION: 93 % | RESPIRATION RATE: 18 BRPM | TEMPERATURE: 97.9 F

## 2023-08-21 DIAGNOSIS — R60.9 PERIPHERAL EDEMA: Primary | ICD-10-CM

## 2023-08-21 LAB
ALBUMIN SERPL-MCNC: 4 G/DL (ref 3.5–5.2)
ALBUMIN/GLOB SERPL: 1.7 G/DL
ALP SERPL-CCNC: 90 U/L (ref 39–117)
ALT SERPL W P-5'-P-CCNC: 100 U/L (ref 1–41)
ANION GAP SERPL CALCULATED.3IONS-SCNC: 9.7 MMOL/L (ref 5–15)
AST SERPL-CCNC: 55 U/L (ref 1–40)
BASOPHILS # BLD AUTO: 0.04 10*3/MM3 (ref 0–0.2)
BASOPHILS NFR BLD AUTO: 0.3 % (ref 0–1.5)
BILIRUB SERPL-MCNC: 0.8 MG/DL (ref 0–1.2)
BUN SERPL-MCNC: 36 MG/DL (ref 8–23)
BUN/CREAT SERPL: 50.7 (ref 7–25)
CALCIUM SPEC-SCNC: 8.7 MG/DL (ref 8.6–10.5)
CHLORIDE SERPL-SCNC: 94 MMOL/L (ref 98–107)
CO2 SERPL-SCNC: 31.3 MMOL/L (ref 22–29)
CREAT SERPL-MCNC: 0.71 MG/DL (ref 0.76–1.27)
DEPRECATED RDW RBC AUTO: 48.5 FL (ref 37–54)
DIGOXIN SERPL-MCNC: 0.53 NG/ML (ref 0.6–1.2)
EGFRCR SERPLBLD CKD-EPI 2021: 101.2 ML/MIN/1.73
EOSINOPHIL # BLD AUTO: 0.03 10*3/MM3 (ref 0–0.4)
EOSINOPHIL NFR BLD AUTO: 0.2 % (ref 0.3–6.2)
ERYTHROCYTE [DISTWIDTH] IN BLOOD BY AUTOMATED COUNT: 13.2 % (ref 12.3–15.4)
GLOBULIN UR ELPH-MCNC: 2.3 GM/DL
GLUCOSE SERPL-MCNC: 102 MG/DL (ref 65–99)
HCT VFR BLD AUTO: 43.6 % (ref 37.5–51)
HGB BLD-MCNC: 14.3 G/DL (ref 13–17.7)
IMM GRANULOCYTES # BLD AUTO: 0.16 10*3/MM3 (ref 0–0.05)
IMM GRANULOCYTES NFR BLD AUTO: 1.3 % (ref 0–0.5)
LYMPHOCYTES # BLD AUTO: 1.62 10*3/MM3 (ref 0.7–3.1)
LYMPHOCYTES NFR BLD AUTO: 13.1 % (ref 19.6–45.3)
MAGNESIUM SERPL-MCNC: 2.1 MG/DL (ref 1.6–2.4)
MCH RBC QN AUTO: 32.6 PG (ref 26.6–33)
MCHC RBC AUTO-ENTMCNC: 32.8 G/DL (ref 31.5–35.7)
MCV RBC AUTO: 99.3 FL (ref 79–97)
MONOCYTES # BLD AUTO: 0.98 10*3/MM3 (ref 0.1–0.9)
MONOCYTES NFR BLD AUTO: 7.9 % (ref 5–12)
NEUTROPHILS NFR BLD AUTO: 77.2 % (ref 42.7–76)
NEUTROPHILS NFR BLD AUTO: 9.5 10*3/MM3 (ref 1.7–7)
NRBC BLD AUTO-RTO: 0 /100 WBC (ref 0–0.2)
NT-PROBNP SERPL-MCNC: 8491 PG/ML (ref 0–900)
PHOSPHATE SERPL-MCNC: 3.9 MG/DL (ref 2.5–4.5)
PLATELET # BLD AUTO: 213 10*3/MM3 (ref 140–450)
PMV BLD AUTO: 10.9 FL (ref 6–12)
POTASSIUM SERPL-SCNC: 4 MMOL/L (ref 3.5–5.2)
PROT SERPL-MCNC: 6.3 G/DL (ref 6–8.5)
RBC # BLD AUTO: 4.39 10*6/MM3 (ref 4.14–5.8)
SODIUM SERPL-SCNC: 135 MMOL/L (ref 136–145)
TROPONIN T SERPL HS-MCNC: 28 NG/L
TROPONIN T SERPL HS-MCNC: 29 NG/L
WBC NRBC COR # BLD: 12.33 10*3/MM3 (ref 3.4–10.8)

## 2023-08-21 PROCEDURE — 83735 ASSAY OF MAGNESIUM: CPT | Performed by: EMERGENCY MEDICINE

## 2023-08-21 PROCEDURE — 71045 X-RAY EXAM CHEST 1 VIEW: CPT

## 2023-08-21 PROCEDURE — 85025 COMPLETE CBC W/AUTO DIFF WBC: CPT | Performed by: EMERGENCY MEDICINE

## 2023-08-21 PROCEDURE — 36415 COLL VENOUS BLD VENIPUNCTURE: CPT

## 2023-08-21 PROCEDURE — 96374 THER/PROPH/DIAG INJ IV PUSH: CPT

## 2023-08-21 PROCEDURE — 93005 ELECTROCARDIOGRAM TRACING: CPT | Performed by: EMERGENCY MEDICINE

## 2023-08-21 PROCEDURE — 84484 ASSAY OF TROPONIN QUANT: CPT | Performed by: EMERGENCY MEDICINE

## 2023-08-21 PROCEDURE — 84100 ASSAY OF PHOSPHORUS: CPT | Performed by: EMERGENCY MEDICINE

## 2023-08-21 PROCEDURE — 25010000002 FUROSEMIDE PER 20 MG: Performed by: EMERGENCY MEDICINE

## 2023-08-21 PROCEDURE — 83880 ASSAY OF NATRIURETIC PEPTIDE: CPT | Performed by: EMERGENCY MEDICINE

## 2023-08-21 PROCEDURE — 99284 EMERGENCY DEPT VISIT MOD MDM: CPT

## 2023-08-21 PROCEDURE — 80053 COMPREHEN METABOLIC PANEL: CPT | Performed by: EMERGENCY MEDICINE

## 2023-08-21 PROCEDURE — 93010 ELECTROCARDIOGRAM REPORT: CPT | Performed by: INTERNAL MEDICINE

## 2023-08-21 PROCEDURE — 80162 ASSAY OF DIGOXIN TOTAL: CPT | Performed by: EMERGENCY MEDICINE

## 2023-08-21 RX ORDER — FUROSEMIDE 10 MG/ML
80 INJECTION INTRAMUSCULAR; INTRAVENOUS ONCE
Status: COMPLETED | OUTPATIENT
Start: 2023-08-21 | End: 2023-08-21

## 2023-08-21 RX ORDER — SODIUM CHLORIDE 0.9 % (FLUSH) 0.9 %
10 SYRINGE (ML) INJECTION AS NEEDED
Status: DISCONTINUED | OUTPATIENT
Start: 2023-08-21 | End: 2023-08-21 | Stop reason: HOSPADM

## 2023-08-21 RX ORDER — NITROGLYCERIN 0.4 MG/1
0.4 TABLET SUBLINGUAL
Status: DISCONTINUED | OUTPATIENT
Start: 2023-08-21 | End: 2023-08-21 | Stop reason: HOSPADM

## 2023-08-21 RX ORDER — DIGOXIN 125 MCG
125 TABLET ORAL
COMMUNITY
Start: 2023-08-18

## 2023-08-21 RX ADMIN — FUROSEMIDE 80 MG: 10 INJECTION, SOLUTION INTRAMUSCULAR; INTRAVENOUS at 05:01

## 2023-08-21 NOTE — ED PROVIDER NOTES
Subjective   History of Present Illness    Review of Systems    Past Medical History:   Diagnosis Date    Atrial fibrillation 2021    COPD (chronic obstructive pulmonary disease)     Gastroesophageal reflux disease 2016    Hyperlipidemia     Hypertension     Nosebleed     Osteopenia 2016    Description: Her bone density 2015 secondary to steroid use    Vitamin D deficiency 2016       Allergies   Allergen Reactions    Apixaban Other (See Comments)     Nose bleed       Past Surgical History:   Procedure Laterality Date    ANKLE SURGERY      CARDIAC CATHETERIZATION Left 2021    Procedure: Coronary Angiography;  Surgeon: Rao Del Cid MD;  Location:  [facility];  Service: Cardiovascular;  Laterality: Left;    CARDIAC CATHETERIZATION N/A 2021    Procedure: Left Heart Cath;  Surgeon: Rao Del Cid MD;  Location:  [facility];  Service: Cardiovascular;  Laterality: N/A;    INGUINAL HERNIA REPAIR Bilateral 2023    Procedure: INGUINAL HERNIA REPAIR LAPAROSCOPIC;  Surgeon: Ayanna Moreira DO;  Location:  [facility] OR;  Service: General;  Laterality: Bilateral;    UMBILICAL HERNIA REPAIR N/A 2023    Procedure: UMBILICAL HERNIA REPAIR;  Surgeon: Ayanna Moreira DO;  Location:  [facility] OR;  Service: General;  Laterality: N/A;       Family History   Problem Relation Age of Onset    COPD Mother 78    Seizures Father     Vasculitis Father     Prostate cancer Maternal Uncle     Heart disease Brother        Social History     Socioeconomic History    Marital status:    Tobacco Use    Smoking status: Former     Packs/day: 1.00     Years: 30.00     Pack years: 30.00     Types: Cigarettes     Quit date: 2000     Years since quittin.3     Passive exposure: Past    Smokeless tobacco: Current     Types: Chew    Tobacco comments:     quit 15 years ago, wife current smoker outside   Vaping Use    Vaping Use: Never used    Substance and Sexual Activity    Alcohol use: No    Drug use: No     Types: Marijuana    Sexual activity: Yes     Partners: Female           Objective   Physical Exam    Procedures           ED Course  ED Course as of 08/21/23 0904   Mon Aug 21, 2023   0719 07:30 care assumed from Dr. Telles.  He diagnosed the patient simply with peripheral edema and wanted me to recheck the patient's delta troponin.  He stated if the delta troponin was unchanged the patient would be stable for discharge as the patient had no chest pain or cardiac complaints. [TP]   0752 The patient's delta troponin was 28 which is essentially unchanged from the initial at 29.  Review of his [facility] in [facility] show that the patient's baseline high sensitive troponin run between 29 and 40. [TP]   0753 Discharge instructions per Dr. Telles. [TP]      ED Course User Index  [TP] West Lopez MD                                           Medical Decision Making  Problems Addressed:  Peripheral edema: complicated acute illness or injury    Amount and/or Complexity of Data Reviewed  Labs: ordered.  Radiology: ordered.  ECG/medicine tests: ordered and independent interpretation performed.    Risk  Prescription drug management.        Final diagnoses:   Peripheral edema       ED Disposition  ED Disposition       ED Disposition   Discharge    Condition   Stable    Comment   --               Wilfred Draper MD  [address] [address]  [phone]    In 1 day           Medication List      No changes were made to your prescriptions during this visit.           Labs Reviewed   COMPREHENSIVE METABOLIC PANEL - Abnormal; Notable for the following components:       Result Value    Glucose 102 (*)     BUN 36 (*)     Creatinine 0.71 (*)     Sodium 135 (*)     Chloride 94 (*)     CO2 31.3 (*)     ALT (SGPT) 100 (*)     AST (SGOT) 55 (*)     BUN/Creatinine Ratio 50.7 (*)     All other components within normal limits    Narrative:     GFR  Normal >60  Chronic Kidney Disease <60  Kidney Failure <15     DIGOXIN LEVEL - Abnormal; Notable for the following components:    Digoxin 0.53 (*)     All other components within normal limits   BNP (IN-HOUSE) - Abnormal; Notable for the following components:    proBNP 8,491.0 (*)     All other components within normal limits    Narrative:     Among patients with dyspnea, NT-proBNP is highly sensitive for the detection of acute congestive heart failure. In addition NT-proBNP of <300 pg/ml effectively rules out acute congestive heart failure with 99% negative predictive value.     SINGLE HSTROPONIN T - Abnormal; Notable for the following components:    HS Troponin T 29 (*)     All other components within normal limits    Narrative:     High Sensitive Troponin T Reference Range:  <10.0 ng/L- Negative Female for AMI  <15.0 ng/L- Negative Male for AMI  >=10 - Abnormal Female indicating possible myocardial injury.  >=15 - Abnormal Male indicating possible myocardial injury.   Clinicians would have to utilize clinical acumen, EKG, Troponin, and serial changes to determine if it is an Acute Myocardial Infarction or myocardial injury due to an underlying chronic condition.        CBC WITH AUTO DIFFERENTIAL - Abnormal; Notable for the following components:    WBC 12.33 (*)     MCV 99.3 (*)     Neutrophil % 77.2 (*)     Lymphocyte % 13.1 (*)     Eosinophil % 0.2 (*)     Immature Grans % 1.3 (*)     Neutrophils, Absolute 9.50 (*)     Monocytes, Absolute 0.98 (*)     Immature Grans, Absolute 0.16 (*)     All other components within normal limits   SINGLE HSTROPONIN T - Abnormal; Notable for the following components:    HS Troponin T 28 (*)     All other components within normal limits    Narrative:     High Sensitive Troponin T Reference Range:  <10.0 ng/L- Negative Female for AMI  <15.0 ng/L- Negative Male for AMI  >=10 - Abnormal Female indicating possible myocardial injury.  >=15 - Abnormal Male indicating possible  myocardial injury.   Clinicians would have to utilize clinical acumen, EKG, Troponin, and serial changes to determine if it is an Acute Myocardial Infarction or myocardial injury due to an underlying chronic condition.        PHOSPHORUS - Normal   MAGNESIUM - Normal   CBC AND DIFFERENTIAL    Narrative:     The following orders were created for panel order CBC & Differential.  Procedure                               Abnormality         Status                     ---------                               -----------         ------                     CBC Auto Differential[[id number]]        Abnormal            Final result                 Please view results for these tests on the individual orders.   We  .tread       Medication List      No changes were made to your prescriptions during this visit.              West Lopez MD  08/21/23 0993

## 2023-08-21 NOTE — ED PROVIDER NOTES
"Subjective   History of Present Illness  Patient presents complaining of leg swelling.  This has been a recurrent thing for the patient for the last couple of months.  In April patient was admitted to [facility] for COPD and heart failure.  After discharge he was admitted near the end of June again for shortness of breath secondary to CHF and COPD.  He followed up on July 28 with Dr. Thomas of cardiology.  Patient was then supposed to come back on 8/11 to follow-up with cardiology for reevaluation but there is an issue with his insurance and it would not cover an EKG so they canceled that appointment.  3 days later patient then went back into [facility] and got admitted again for congestive heart failure.  Patient's wife said he was weighing about 199 pounds and they had to diurese a lot of fluid off of him.  They discharged him on 8/18 but the patient said that his leg swelling has not gone down since they discharged him.  Patient's now still saying he has leg swelling and is starting to make him feel short of breath.  Patient does not know if it is his heart failure his COPD.  Patient denies any cough or fever.  No chest pain or syncope.    \"07/28/23 Follow-up:  He is here for hospital follow-up and I am seeing him for the first time today.  April 2023 he was hospitalized at [facility] for pneumonia, COPD exacerbation, and acute on chronic CHF.  He was noted to have EF 35% and severe mitral valve regurgitation.  He was discharged home on Toprol, Entresto, and Jardiance.  He could not afford Entresto RN Jardiance and was switched to losartan and spironolactone.  June 2023 he presented to the ER with acute on chronic HFrEF and chest x-ray showing mild pulmonary edema.  He was also having A-fib with RVR. He was diuresed and then started on 12.5 mg spironolactone daily and metoprolol succinate was increased to 50 mg twice daily.  He was discharged home on 80 mg twice daily.     He is here for " "follow-up today and his heart rate is still 120; he is asymptomatic with this and states that he feels quite good.  He is only been taking the metoprolol once daily.  He is taking his other medications as prescribed.  He states that he has no leg swelling and his breathing is comfortable and less he is out in the heat.  He denies chest pain, lightheadedness, worsening dyspnea, or orthopnea.\"      \"Hospitalists Discharge Summary     Date of Admission: 6/26/2023  Date of Discharge:  7/1/2023     History of Present Illness from [facility] on admit:   \"The patient is a 67-year-old male that presented to the emergency department secondary to gradual increase in shortness of air, difficulty ambulating due to lower extremity edema, over the past 2 to 3 weeks.  He reports that he is compliant with all medications prescribed by Dr. Enriquez, despite medication bottles that do not match that provider's last note of 5/17/2023.  He reports he has increased his oxygen to 3 L from baseline of 2 L without much relief.  He notes shortness of air with any exertion, denies chest pain, lightheadedness, palpitations but does feel that he has some wheezing on and off.  He notes no cough therefore no sputum production.  He feels that he has dropped weight rather than gaining weight however he does not check his weight daily.     He notes occasional testicular discomfort when attempting to urinate, relieved with pressure. He also notes constipation and increased SOA when trying to have a BM.     He denies alcohol or tobacco use but notes that he does smoke marijuana, last use 1 week ago.     Diagnostics in ER showed proBNP 13,331.0, HS troponin 40>38, ALT/AST 53/55, T-bili 1.4, THC+ UDS. CXR showed mild interstitial edema, see full report in EPIC     He was given solumedrol 125 mg, lasix 60 mg, neb treatment and admission was requested     He has a H/O permanent a-fib, HTN, HLD, oxygen dependent COPD (2L), GERD, vitamin D deficiency     He " "otherwise denies f/c/headache/rhinorrhea/nasal congestion/lightheadedness/syncopal sensation/cough/n/v/d/chest pain/abdominal pain/recent illness/sick exposures/change in bowel or bladder habits/no weight change/bloody emesis or bloody stools/change in medications or any other new concerns.\"     Primary Discharge diagnoses:  A/C s/d CHF  Mild AE Oxygen dependent COPD/chronic hypoxic respiratory failure  Hematuria/right groin pain, r/o renal cell carcinoma  Cirrhosis of the Liver     Secondary Discharge Diagnoses:    Permanent a-fib with RVR   Severe MR  HTN   HLD    Transaminitis   GERD   Illicit drug use   Vitamin D Deficiency   Constipation     Hospital Course Summary:   The patient the patient was followed in consultation by cardiology, urology.      Regarding a/c s/d CHF:  Cardiology followed, IV then oral diuresed over 11 L  Prior cardiologist had started him on Entresto and Jardiance however he was unable to afford these medicines and therefore changed back to prior medication regimen  Home today on: Furosemide 80 mg twice daily, losartan 25 mg twice daily, metoprolol succinate 50 mg twice daily, spironolactone 25 mg daily and Crestor 10 mg daily  Aspirin was discontinued per Dr. Draper  He wants to change to [facility], Dr. Wilfred Draper and therefore will f/u with her in 2 weeks     Regarding AECOPD:  He received IV then oral steroid, home on 3 more days prednisone.  Duonebs/nebulizer were also added as needed  Routine oxygen regimen continued     Regarding hematuria/right groin pain, r/o left renal cell carcinoma:  Urology followed  PSA was done and was normal  CT scan with renal mass protocol was done, see full report  Follow up outpatient with Dr. Mendez in 1 week to plan cystoscopy     Regarding other incidental findings on CT:   Diverticulosis  Cirrhosis  Trace bilateral pleural effusions  Small left inguinal hernia  Benign left and right renal cysts  -Follow up PCP for further management direction     Regarding " "hernia's:  He underwent successful Lap repair by Dr. Moreira.  She will see him in follow-up.     Follow-up PCP 1 week  Follow-up cardiology 2 weeks  Follow-up urology 1 week  Home today with home health requested\"      Review of Systems   All other systems reviewed and are negative.    Past Medical History:   Diagnosis Date    Atrial fibrillation 04/17/2021    COPD (chronic obstructive pulmonary disease)     Gastroesophageal reflux disease 03/04/2016    Hyperlipidemia     Hypertension     Nosebleed     Osteopenia 03/04/2016    Description: Her bone density August 2015 secondary to steroid use    Vitamin D deficiency 03/04/2016       Allergies   Allergen Reactions    Apixaban Other (See Comments)     Nose bleed       Past Surgical History:   Procedure Laterality Date    ANKLE SURGERY      CARDIAC CATHETERIZATION Left 4/20/2021    Procedure: Coronary Angiography;  Surgeon: Rao Del Cid MD;  Location:  [facility];  Service: Cardiovascular;  Laterality: Left;    CARDIAC CATHETERIZATION N/A 4/20/2021    Procedure: Left Heart Cath;  Surgeon: Rao Del Cid MD;  Location:  [facility];  Service: Cardiovascular;  Laterality: N/A;    INGUINAL HERNIA REPAIR Bilateral 7/1/2023    Procedure: INGUINAL HERNIA REPAIR LAPAROSCOPIC;  Surgeon: Ayanna Moreira DO;  Location:  [facility];  Service: General;  Laterality: Bilateral;    UMBILICAL HERNIA REPAIR N/A 7/1/2023    Procedure: UMBILICAL HERNIA REPAIR;  Surgeon: Ayanna Moreira DO;  Location:  [facility] OR;  Service: General;  Laterality: N/A;       Family History   Problem Relation Age of Onset    COPD Mother 78    Seizures Father     Vasculitis Father     Prostate cancer Maternal Uncle     Heart disease Brother        Social History     Socioeconomic History    Marital status:    Tobacco Use    Smoking status: Former     Packs/day: 1.00     Years: 30.00     Pack years: 30.00     Types: Cigarettes     Quit date: " 2000     Years since quittin.3     Passive exposure: Past    Smokeless tobacco: Current     Types: Chew    Tobacco comments:     quit 15 years ago, wife current smoker outside   Vaping Use    Vaping Use: Never used   Substance and Sexual Activity    Alcohol use: No    Drug use: No     Types: Marijuana    Sexual activity: Yes     Partners: Female           Objective   Physical Exam  Vitals and nursing note reviewed.   Constitutional:       Comments: Patient sitting in bed in no obvious distress.  Patient is pleasant and talkative.  Patient speaking in 4-5 word sentences.   HENT:      Head: Normocephalic.      Mouth/Throat:      Mouth: Mucous membranes are moist.   Eyes:      Conjunctiva/sclera: Conjunctivae normal.   Cardiovascular:      Rate and Rhythm: Tachycardia present. Rhythm irregularly irregular.      Pulses:           Radial pulses are 2+ on the right side and 2+ on the left side.      Heart sounds: Normal heart sounds. No murmur heard.  Pulmonary:      Effort: Pulmonary effort is normal. No respiratory distress.      Breath sounds: Normal breath sounds. No wheezing, rhonchi or rales.   Abdominal:      General: Abdomen is flat.      Palpations: Abdomen is soft.   Musculoskeletal:      Cervical back: Neck supple.      Right lower le+ Pitting Edema present.      Left lower le+ Pitting Edema present.   Skin:     General: Skin is warm and dry.      Capillary Refill: Capillary refill takes 2 to 3 seconds.   Neurological:      Mental Status: He is alert and oriented to person, place, and time.   Psychiatric:         Mood and Affect: Mood normal.         Behavior: Behavior normal.       ECG 12 Lead      Date/Time: 2023 5:18 AM  Performed by: Ceasar Telles MD  Authorized by: Ceasar Telles MD   Interpreted by physician  Comparison: compared with previous ECG from 2021  Similar to previous ECG  Comments: Rate of 90, A-fib, normal axis, nonspecific ST and T wave changes seen  diffusely.             ED Course  ED Course as of 08/27/23 1111   Mon Aug 21, 2023   0700 Care treated to Dr. Lopez.  Patient here with leg swelling.  Patient says he is felt a little short of breath but no signs of respiratory distress currently.  Chest x-ray shows no signs of pulmonary edema.  Patient currently has a repeat troponin.  Assuming there is no increase in troponin and no change in patient's status/vitals, he should be stable for discharge and follow-up with cardiology.  [AW]   0719 07:30 care assumed from Dr. Telles.  He diagnosed the patient simply with peripheral edema and wanted me to recheck the patient's delta troponin.  He stated if the delta troponin was unchanged the patient would be stable for discharge as the patient had no chest pain or cardiac complaints. [TP]   0752 The patient's delta troponin was 28 which is essentially unchanged from the initial at 29.  Review of his records in [facility] show that the patient's baseline high sensitive troponin run between 29 and 40. [TP]   0753 Discharge instructions per Dr. Telles. [TP]      ED Course User Index  [AW] Ceasar Telles MD  [TP] West Lopez MD                                           Medical Decision Making  Ddx ACS, pneumonia, COPD exacerbation, pleural effusion, pneumothorax, heart failure    XR Chest 1 View    Result Date: 8/21/2023  No acute cardiopulmonary findings.  This report was finalized on 8/21/2023 5:14 AM by Dr. Jay Angel MD.      Labs Reviewed  COMPREHENSIVE METABOLIC PANEL - Abnormal; Notable for the following components:     Glucose                       102 (*)                BUN                           36 (*)                 Creatinine                    0.71 (*)               Sodium                        135 (*)                Chloride                      94 (*)                 CO2                           31.3 (*)               ALT (SGPT)                    100 (*)                AST (SGOT)                     55 (*)                 BUN/Creatinine Ratio          50.7 (*)            All other components within normal limits         Narrative: GFR Normal >60                  Chronic Kidney Disease <60                  Kidney Failure <15                    DIGOXIN LEVEL - Abnormal; Notable for the following components:     Digoxin                       0.53 (*)            All other components within normal limits  BNP (IN-HOUSE) - Abnormal; Notable for the following components:     proBNP                        8,491.0 (*)            All other components within normal limits         Narrative: Among patients with dyspnea, NT-proBNP is highly sensitive for the detection of acute congestive heart failure. In addition NT-proBNP of <300 pg/ml effectively rules out acute congestive heart failure with 99% negative predictive value.                    SINGLE HSTROPONIN T - Abnormal; Notable for the following components:     HS Troponin T                 29 (*)              All other components within normal limits         Narrative: High Sensitive Troponin T Reference Range:                  <10.0 ng/L- Negative Female for AMI                  <15.0 ng/L- Negative Male for AMI                  >=10 - Abnormal Female indicating possible myocardial injury.                  >=15 - Abnormal Male indicating possible myocardial injury.                   Clinicians would have to utilize clinical acumen, EKG, Troponin, and serial changes to determine if it is an Acute Myocardial Infarction or myocardial injury due to an underlying chronic condition.                                       CBC WITH AUTO DIFFERENTIAL - Abnormal; Notable for the following components:     WBC                           12.33 (*)               MCV                           99.3 (*)               Neutrophil %                  77.2 (*)               Lymphocyte %                  13.1 (*)               Eosinophil %                  0.2 (*)                 Immature Grans %              1.3 (*)                Neutrophils, Absolute         9.50 (*)               Monocytes, Absolute           0.98 (*)               Immature Grans, Absolute      0.16 (*)            All other components within normal limits  SINGLE HSTROPONIN T - Abnormal; Notable for the following components:     HS Troponin T                 28 (*)              All other components within normal limits         Narrative: High Sensitive Troponin T Reference Range:                  <10.0 ng/L- Negative Female for AMI                  <15.0 ng/L- Negative Male for AMI                  >=10 - Abnormal Female indicating possible myocardial injury.                  >=15 - Abnormal Male indicating possible myocardial injury.                   Clinicians would have to utilize clinical acumen, EKG, Troponin, and serial changes to determine if it is an Acute Myocardial Infarction or myocardial injury due to an underlying chronic condition.                                       PHOSPHORUS - Normal  MAGNESIUM - Normal  CBC AND DIFFERENTIAL            Problems Addressed:  Peripheral edema: complicated acute illness or injury    Amount and/or Complexity of Data Reviewed  Labs: ordered.  Radiology: ordered.  ECG/medicine tests: ordered and independent interpretation performed.    Risk  Prescription drug management.        Final diagnoses:   Peripheral edema       ED Disposition  ED Disposition       ED Disposition   Discharge    Condition   Stable    Comment   --               Wilfred Draper MD  [address] [id number] [phone]    In 1 day           Medication List      No changes were made to your prescriptions during this visit.            Ceasar Telles MD  08/21/23 0641       Ceasar Telles MD  08/21/23 0709       Ceasar Telles MD  08/27/23 1111

## 2023-08-22 ENCOUNTER — OFFICE VISIT (OUTPATIENT)
Dept: CARDIOLOGY | Facility: CLINIC | Age: 68
End: 2023-08-22
Payer: COMMERCIAL

## 2023-08-22 ENCOUNTER — HOSPITAL ENCOUNTER (EMERGENCY)
Facility: HOSPITAL | Age: 68
Discharge: ANOTHER HOSPITAL | End: 2023-08-22
Attending: STUDENT IN AN ORGANIZED HEALTH CARE EDUCATION/TRAINING PROGRAM | Admitting: STUDENT IN AN ORGANIZED HEALTH CARE EDUCATION/TRAINING PROGRAM
Payer: COMMERCIAL

## 2023-08-22 ENCOUNTER — HOSPITAL ENCOUNTER (INPATIENT)
Facility: HOSPITAL | Age: 68
LOS: 8 days | Discharge: HOME | DRG: 292 | End: 2023-08-30
Attending: INTERNAL MEDICINE | Admitting: HOSPITALIST
Payer: MEDICARE

## 2023-08-22 ENCOUNTER — APPOINTMENT (OUTPATIENT)
Dept: GENERAL RADIOLOGY | Facility: HOSPITAL | Age: 68
End: 2023-08-22
Payer: COMMERCIAL

## 2023-08-22 VITALS
BODY MASS INDEX: 26.31 KG/M2 | WEIGHT: 177.6 LBS | DIASTOLIC BLOOD PRESSURE: 78 MMHG | HEART RATE: 135 BPM | SYSTOLIC BLOOD PRESSURE: 106 MMHG | HEIGHT: 69 IN

## 2023-08-22 VITALS
TEMPERATURE: 98.1 F | RESPIRATION RATE: 18 BRPM | BODY MASS INDEX: 26.31 KG/M2 | SYSTOLIC BLOOD PRESSURE: 104 MMHG | HEART RATE: 88 BPM | DIASTOLIC BLOOD PRESSURE: 89 MMHG | OXYGEN SATURATION: 94 % | WEIGHT: 177.6 LBS | HEIGHT: 69 IN

## 2023-08-22 DIAGNOSIS — N28.89 RENAL MASS: ICD-10-CM

## 2023-08-22 DIAGNOSIS — I48.91 ATRIAL FIBRILLATION, UNSPECIFIED TYPE: ICD-10-CM

## 2023-08-22 DIAGNOSIS — I50.22 CHRONIC SYSTOLIC HEART FAILURE: ICD-10-CM

## 2023-08-22 DIAGNOSIS — I36.1 NONRHEUMATIC TRICUSPID VALVE REGURGITATION: ICD-10-CM

## 2023-08-22 DIAGNOSIS — I34.0 NONRHEUMATIC MITRAL VALVE REGURGITATION: ICD-10-CM

## 2023-08-22 DIAGNOSIS — I42.8 NICM (NONISCHEMIC CARDIOMYOPATHY): ICD-10-CM

## 2023-08-22 DIAGNOSIS — I48.21 PERMANENT ATRIAL FIBRILLATION: Primary | ICD-10-CM

## 2023-08-22 DIAGNOSIS — I50.9 ACUTE ON CHRONIC HEART FAILURE, UNSPECIFIED HEART FAILURE TYPE: Primary | ICD-10-CM

## 2023-08-22 DIAGNOSIS — I27.20 PULMONARY HYPERTENSION: ICD-10-CM

## 2023-08-22 DIAGNOSIS — I48.19 ATRIAL FIBRILLATION, PERSISTENT: Primary | ICD-10-CM

## 2023-08-22 LAB
ALBUMIN SERPL-MCNC: 4.1 G/DL (ref 3.5–5.2)
ALBUMIN/GLOB SERPL: 1.8 G/DL
ALP SERPL-CCNC: 131 U/L (ref 39–117)
ALT SERPL W P-5'-P-CCNC: 103 U/L (ref 1–41)
ANION GAP SERPL CALCULATED.3IONS-SCNC: 11.9 MMOL/L (ref 5–15)
AST SERPL-CCNC: 68 U/L (ref 1–40)
BASOPHILS # BLD AUTO: 0.01 10*3/MM3 (ref 0–0.2)
BASOPHILS NFR BLD AUTO: 0.1 % (ref 0–1.5)
BILIRUB SERPL-MCNC: 0.8 MG/DL (ref 0–1.2)
BUN SERPL-MCNC: 27 MG/DL (ref 8–23)
BUN/CREAT SERPL: 33.3 (ref 7–25)
CALCIUM SPEC-SCNC: 8.9 MG/DL (ref 8.6–10.5)
CHLORIDE SERPL-SCNC: 96 MMOL/L (ref 98–107)
CO2 SERPL-SCNC: 30.1 MMOL/L (ref 22–29)
CREAT SERPL-MCNC: 0.81 MG/DL (ref 0.76–1.27)
DEPRECATED RDW RBC AUTO: 48.6 FL (ref 37–54)
DIGOXIN SERPL-MCNC: 0.52 NG/ML (ref 0.6–1.2)
EGFRCR SERPLBLD CKD-EPI 2021: 97.2 ML/MIN/1.73
EOSINOPHIL # BLD AUTO: 0.08 10*3/MM3 (ref 0–0.4)
EOSINOPHIL NFR BLD AUTO: 0.5 % (ref 0.3–6.2)
ERYTHROCYTE [DISTWIDTH] IN BLOOD BY AUTOMATED COUNT: 13 % (ref 12.3–15.4)
GEN 5 2HR TROPONIN T REFLEX: 33 NG/L
GLOBULIN UR ELPH-MCNC: 2.3 GM/DL
GLUCOSE SERPL-MCNC: 137 MG/DL (ref 65–99)
HCT VFR BLD AUTO: 44.5 % (ref 37.5–51)
HGB BLD-MCNC: 14.2 G/DL (ref 13–17.7)
IMM GRANULOCYTES # BLD AUTO: 0.33 10*3/MM3 (ref 0–0.05)
IMM GRANULOCYTES NFR BLD AUTO: 2.2 % (ref 0–0.5)
INR PPP: 1.09 (ref 0.9–1.1)
LYMPHOCYTES # BLD AUTO: 1.96 10*3/MM3 (ref 0.7–3.1)
LYMPHOCYTES NFR BLD AUTO: 13.3 % (ref 19.6–45.3)
MCH RBC QN AUTO: 32 PG (ref 26.6–33)
MCHC RBC AUTO-ENTMCNC: 31.9 G/DL (ref 31.5–35.7)
MCV RBC AUTO: 100.2 FL (ref 79–97)
MONOCYTES # BLD AUTO: 1.41 10*3/MM3 (ref 0.1–0.9)
MONOCYTES NFR BLD AUTO: 9.6 % (ref 5–12)
NEUTROPHILS NFR BLD AUTO: 10.97 10*3/MM3 (ref 1.7–7)
NEUTROPHILS NFR BLD AUTO: 74.3 % (ref 42.7–76)
NT-PROBNP SERPL-MCNC: 5556 PG/ML (ref 0–900)
PLATELET # BLD AUTO: 280 10*3/MM3 (ref 140–450)
PMV BLD AUTO: 10.6 FL (ref 6–12)
POTASSIUM SERPL-SCNC: 3.6 MMOL/L (ref 3.5–5.2)
PROT SERPL-MCNC: 6.4 G/DL (ref 6–8.5)
PROTHROMBIN TIME: 14.1 SECONDS (ref 12.1–15)
QT INTERVAL: 324 MS
QT INTERVAL: 335 MS
RBC # BLD AUTO: 4.44 10*6/MM3 (ref 4.14–5.8)
SODIUM SERPL-SCNC: 138 MMOL/L (ref 136–145)
TROPONIN T DELTA: 4 NG/L
TROPONIN T SERPL HS-MCNC: 29 NG/L
WBC NRBC COR # BLD: 14.76 10*3/MM3 (ref 3.4–10.8)

## 2023-08-22 PROCEDURE — 85025 COMPLETE CBC W/AUTO DIFF WBC: CPT | Performed by: STUDENT IN AN ORGANIZED HEALTH CARE EDUCATION/TRAINING PROGRAM

## 2023-08-22 PROCEDURE — 25010000002 FUROSEMIDE PER 20 MG: Performed by: INTERNAL MEDICINE

## 2023-08-22 PROCEDURE — 80053 COMPREHEN METABOLIC PANEL: CPT | Performed by: STUDENT IN AN ORGANIZED HEALTH CARE EDUCATION/TRAINING PROGRAM

## 2023-08-22 PROCEDURE — 93010 ELECTROCARDIOGRAM REPORT: CPT | Performed by: INTERNAL MEDICINE

## 2023-08-22 PROCEDURE — 84484 ASSAY OF TROPONIN QUANT: CPT | Performed by: STUDENT IN AN ORGANIZED HEALTH CARE EDUCATION/TRAINING PROGRAM

## 2023-08-22 PROCEDURE — 94799 UNLISTED PULMONARY SVC/PX: CPT

## 2023-08-22 PROCEDURE — 80162 ASSAY OF DIGOXIN TOTAL: CPT | Performed by: STUDENT IN AN ORGANIZED HEALTH CARE EDUCATION/TRAINING PROGRAM

## 2023-08-22 PROCEDURE — 85610 PROTHROMBIN TIME: CPT | Performed by: STUDENT IN AN ORGANIZED HEALTH CARE EDUCATION/TRAINING PROGRAM

## 2023-08-22 PROCEDURE — 96376 TX/PRO/DX INJ SAME DRUG ADON: CPT

## 2023-08-22 PROCEDURE — A9270 NON-COVERED ITEM OR SERVICE: HCPCS | Performed by: INTERNAL MEDICINE

## 2023-08-22 PROCEDURE — 93005 ELECTROCARDIOGRAM TRACING: CPT | Performed by: STUDENT IN AN ORGANIZED HEALTH CARE EDUCATION/TRAINING PROGRAM

## 2023-08-22 PROCEDURE — 36415 COLL VENOUS BLD VENIPUNCTURE: CPT

## 2023-08-22 PROCEDURE — 94640 AIRWAY INHALATION TREATMENT: CPT

## 2023-08-22 PROCEDURE — 96374 THER/PROPH/DIAG INJ IV PUSH: CPT

## 2023-08-22 PROCEDURE — 25010000002 FUROSEMIDE PER 20 MG: Performed by: STUDENT IN AN ORGANIZED HEALTH CARE EDUCATION/TRAINING PROGRAM

## 2023-08-22 PROCEDURE — 83880 ASSAY OF NATRIURETIC PEPTIDE: CPT | Performed by: STUDENT IN AN ORGANIZED HEALTH CARE EDUCATION/TRAINING PROGRAM

## 2023-08-22 PROCEDURE — 63710000001 ROSUVASTATIN 20 MG TABLET: Performed by: INTERNAL MEDICINE

## 2023-08-22 PROCEDURE — 63710000001 LOSARTAN 25 MG TABLET: Performed by: INTERNAL MEDICINE

## 2023-08-22 PROCEDURE — 94761 N-INVAS EAR/PLS OXIMETRY MLT: CPT

## 2023-08-22 PROCEDURE — 99284 EMERGENCY DEPT VISIT MOD MDM: CPT

## 2023-08-22 PROCEDURE — 63710000001 METOPROLOL SUCCINATE XL 50 MG TABLET SUSTAINED-RELEASE 24 HOUR: Performed by: INTERNAL MEDICINE

## 2023-08-22 PROCEDURE — 63710000001 MONTELUKAST 10 MG TABLET: Performed by: INTERNAL MEDICINE

## 2023-08-22 PROCEDURE — 71046 X-RAY EXAM CHEST 2 VIEWS: CPT

## 2023-08-22 RX ORDER — ACETAMINOPHEN 325 MG/1
650 TABLET ORAL EVERY 4 HOURS PRN
Status: DISCONTINUED | OUTPATIENT
Start: 2023-08-22 | End: 2023-08-30 | Stop reason: HOSPADM

## 2023-08-22 RX ORDER — UREA 10 %
3 LOTION (ML) TOPICAL NIGHTLY PRN
Status: DISCONTINUED | OUTPATIENT
Start: 2023-08-22 | End: 2023-08-30 | Stop reason: HOSPADM

## 2023-08-22 RX ORDER — DIGOXIN 125 MCG
125 TABLET ORAL
Status: DISCONTINUED | OUTPATIENT
Start: 2023-08-23 | End: 2023-08-30 | Stop reason: HOSPADM

## 2023-08-22 RX ORDER — IPRATROPIUM BROMIDE AND ALBUTEROL SULFATE 2.5; .5 MG/3ML; MG/3ML
3 SOLUTION RESPIRATORY (INHALATION)
Status: DISCONTINUED | OUTPATIENT
Start: 2023-08-22 | End: 2023-08-30 | Stop reason: HOSPADM

## 2023-08-22 RX ORDER — ROSUVASTATIN CALCIUM 20 MG/1
20 TABLET, COATED ORAL NIGHTLY
Status: DISCONTINUED | OUTPATIENT
Start: 2023-08-22 | End: 2023-08-30 | Stop reason: HOSPADM

## 2023-08-22 RX ORDER — MELATONIN
1000 DAILY
Status: DISCONTINUED | OUTPATIENT
Start: 2023-08-22 | End: 2023-08-30 | Stop reason: HOSPADM

## 2023-08-22 RX ORDER — PANTOPRAZOLE SODIUM 40 MG/1
40 TABLET, DELAYED RELEASE ORAL
Status: DISCONTINUED | OUTPATIENT
Start: 2023-08-23 | End: 2023-08-30 | Stop reason: HOSPADM

## 2023-08-22 RX ORDER — METHYLPREDNISOLONE 4 MG/1
TABLET ORAL DAILY
Status: ON HOLD | COMMUNITY
Start: 2023-08-18

## 2023-08-22 RX ORDER — FUROSEMIDE 10 MG/ML
40 INJECTION INTRAMUSCULAR; INTRAVENOUS EVERY 12 HOURS
Status: DISCONTINUED | OUTPATIENT
Start: 2023-08-22 | End: 2023-08-24

## 2023-08-22 RX ORDER — BISACODYL 5 MG/1
5 TABLET, DELAYED RELEASE ORAL DAILY PRN
Status: DISCONTINUED | OUTPATIENT
Start: 2023-08-22 | End: 2023-08-27

## 2023-08-22 RX ORDER — ALBUTEROL SULFATE 2.5 MG/3ML
2.5 SOLUTION RESPIRATORY (INHALATION) EVERY 6 HOURS PRN
Status: DISCONTINUED | OUTPATIENT
Start: 2023-08-22 | End: 2023-08-30 | Stop reason: HOSPADM

## 2023-08-22 RX ORDER — ONDANSETRON 2 MG/ML
4 INJECTION INTRAMUSCULAR; INTRAVENOUS EVERY 6 HOURS PRN
Status: DISCONTINUED | OUTPATIENT
Start: 2023-08-22 | End: 2023-08-30 | Stop reason: HOSPADM

## 2023-08-22 RX ORDER — METOPROLOL SUCCINATE 50 MG/1
50 TABLET, EXTENDED RELEASE ORAL EVERY 12 HOURS SCHEDULED
Status: DISCONTINUED | OUTPATIENT
Start: 2023-08-22 | End: 2023-08-30 | Stop reason: HOSPADM

## 2023-08-22 RX ORDER — FUROSEMIDE 10 MG/ML
80 INJECTION INTRAMUSCULAR; INTRAVENOUS ONCE
Status: COMPLETED | OUTPATIENT
Start: 2023-08-22 | End: 2023-08-22

## 2023-08-22 RX ORDER — LOSARTAN POTASSIUM 25 MG/1
25 TABLET ORAL 2 TIMES DAILY
Status: DISCONTINUED | OUTPATIENT
Start: 2023-08-22 | End: 2023-08-23

## 2023-08-22 RX ORDER — BUDESONIDE AND FORMOTEROL FUMARATE DIHYDRATE 160; 4.5 UG/1; UG/1
2 AEROSOL RESPIRATORY (INHALATION) 2 TIMES DAILY
Status: DISCONTINUED | OUTPATIENT
Start: 2023-08-22 | End: 2023-08-30 | Stop reason: HOSPADM

## 2023-08-22 RX ORDER — ONDANSETRON 4 MG/1
4 TABLET, FILM COATED ORAL EVERY 6 HOURS PRN
Status: DISCONTINUED | OUTPATIENT
Start: 2023-08-22 | End: 2023-08-30 | Stop reason: HOSPADM

## 2023-08-22 RX ORDER — BISACODYL 10 MG
10 SUPPOSITORY, RECTAL RECTAL DAILY PRN
Status: DISCONTINUED | OUTPATIENT
Start: 2023-08-22 | End: 2023-08-27

## 2023-08-22 RX ORDER — POLYETHYLENE GLYCOL 3350 17 G/17G
17 POWDER, FOR SOLUTION ORAL DAILY PRN
Status: DISCONTINUED | OUTPATIENT
Start: 2023-08-22 | End: 2023-08-27

## 2023-08-22 RX ORDER — MONTELUKAST SODIUM 10 MG/1
10 TABLET ORAL NIGHTLY
Status: DISCONTINUED | OUTPATIENT
Start: 2023-08-22 | End: 2023-08-30 | Stop reason: HOSPADM

## 2023-08-22 RX ORDER — AMOXICILLIN 250 MG
2 CAPSULE ORAL 2 TIMES DAILY
Status: DISCONTINUED | OUTPATIENT
Start: 2023-08-22 | End: 2023-08-27

## 2023-08-22 RX ADMIN — METOPROLOL SUCCINATE 50 MG: 50 TABLET, FILM COATED, EXTENDED RELEASE ORAL at 22:02

## 2023-08-22 RX ADMIN — ROSUVASTATIN CALCIUM 20 MG: 20 TABLET, FILM COATED ORAL at 22:02

## 2023-08-22 RX ADMIN — MONTELUKAST SODIUM 10 MG: 10 TABLET, FILM COATED ORAL at 22:02

## 2023-08-22 RX ADMIN — FUROSEMIDE 40 MG: 10 INJECTION, SOLUTION INTRAMUSCULAR; INTRAVENOUS at 14:01

## 2023-08-22 RX ADMIN — LOSARTAN POTASSIUM 25 MG: 25 TABLET, FILM COATED ORAL at 22:02

## 2023-08-22 RX ADMIN — IPRATROPIUM BROMIDE AND ALBUTEROL SULFATE 3 ML: 2.5; .5 SOLUTION RESPIRATORY (INHALATION) at 21:17

## 2023-08-22 RX ADMIN — FUROSEMIDE 40 MG: 10 INJECTION, SOLUTION INTRAMUSCULAR; INTRAVENOUS at 13:35

## 2023-08-22 RX ADMIN — FUROSEMIDE 40 MG: 10 INJECTION, SOLUTION INTRAMUSCULAR; INTRAVENOUS at 22:02

## 2023-08-22 NOTE — ED NOTES
Nursing report ED to floor  Claude Spencer  68 y.o.  male    HPI :   Chief Complaint   Patient presents with    Rapid Heart Rate    Shortness of Breath       Admitting doctor:   No admitting provider for patient encounter.    Admitting diagnosis:   The primary encounter diagnosis was Acute on chronic heart failure, unspecified heart failure type. A diagnosis of Atrial fibrillation, unspecified type was also pertinent to this visit.    Code status:   Current Code Status       Date Active Code Status Order ID Comments User Context       Prior            Allergies:   Apixaban    Isolation:   No active isolations    Intake and Output    Intake/Output Summary (Last 24 hours) at 8/22/2023 1611  Last data filed at 8/22/2023 1453  Gross per 24 hour   Intake --   Output 1000 ml   Net -1000 ml       Weight:       08/22/23  1216   Weight: 80.6 kg (177 lb 9.6 oz)       Most recent vitals:   Vitals:    08/22/23 1431 08/22/23 1446 08/22/23 1531 08/22/23 1546   BP: 115/86 104/58 115/88 109/88   Pulse: 114 80 101 86   Resp:       Temp:       SpO2: 92% 93% 93% 93%   Weight:       Height:           Active LDAs/IV Access:   Lines, Drains & Airways       Active LDAs       Name Placement date Placement time Site Days    Peripheral IV 08/22/23 1224 Anterior;Right Forearm 08/22/23  1224  Forearm  less than 1                    Labs (abnormal labs have a star):   Labs Reviewed   COMPREHENSIVE METABOLIC PANEL - Abnormal; Notable for the following components:       Result Value    Glucose 137 (*)     BUN 27 (*)     Chloride 96 (*)     CO2 30.1 (*)     ALT (SGPT) 103 (*)     AST (SGOT) 68 (*)     Alkaline Phosphatase 131 (*)     BUN/Creatinine Ratio 33.3 (*)     All other components within normal limits    Narrative:     GFR Normal >60  Chronic Kidney Disease <60  Kidney Failure <15     BNP (IN-HOUSE) - Abnormal; Notable for the following components:    proBNP 5,556.0 (*)     All other components within normal limits    Narrative:      Among patients with dyspnea, NT-proBNP is highly sensitive for the detection of acute congestive heart failure. In addition NT-proBNP of <300 pg/ml effectively rules out acute congestive heart failure with 99% negative predictive value.     TROPONIN - Abnormal; Notable for the following components:    HS Troponin T 29 (*)     All other components within normal limits    Narrative:     High Sensitive Troponin T Reference Range:  <10.0 ng/L- Negative Female for AMI  <15.0 ng/L- Negative Male for AMI  >=10 - Abnormal Female indicating possible myocardial injury.  >=15 - Abnormal Male indicating possible myocardial injury.   Clinicians would have to utilize clinical acumen, EKG, Troponin, and serial changes to determine if it is an Acute Myocardial Infarction or myocardial injury due to an underlying chronic condition.        DIGOXIN LEVEL - Abnormal; Notable for the following components:    Digoxin 0.52 (*)     All other components within normal limits   CBC WITH AUTO DIFFERENTIAL - Abnormal; Notable for the following components:    WBC 14.76 (*)     .2 (*)     Lymphocyte % 13.3 (*)     Immature Grans % 2.2 (*)     Neutrophils, Absolute 10.97 (*)     Monocytes, Absolute 1.41 (*)     Immature Grans, Absolute 0.33 (*)     All other components within normal limits   HIGH SENSITIVITIY TROPONIN T 2HR - Abnormal; Notable for the following components:    HS Troponin T 33 (*)     Troponin T Delta 4 (*)     All other components within normal limits    Narrative:     High Sensitive Troponin T Reference Range:  <10.0 ng/L- Negative Female for AMI  <15.0 ng/L- Negative Male for AMI  >=10 - Abnormal Female indicating possible myocardial injury.  >=15 - Abnormal Male indicating possible myocardial injury.   Clinicians would have to utilize clinical acumen, EKG, Troponin, and serial changes to determine if it is an Acute Myocardial Infarction or myocardial injury due to an underlying chronic condition.        PROTIME-INR -  Normal    Narrative:     Therapeutic Ranges for INR: 2.0-3.0 (PT 20-30)                              2.5-3.5 (PT 25-34)   CBC AND DIFFERENTIAL    Narrative:     The following orders were created for panel order CBC & Differential.  Procedure                               Abnormality         Status                     ---------                               -----------         ------                     CBC Auto Differential[[id number]]        Abnormal            Final result                 Please view results for these tests on the individual orders.       EKG:   ECG 12 Lead Tachycardia   Final Result   HEART RATE= 98  bpm   RR Interval= 612  ms   AZ Interval=   ms   P Horizontal Axis=   deg   P Front Axis=   deg   QRSD Interval= 111  ms   QT Interval= 335  ms   QRS Axis= 65  deg   T Wave Axis= 236  deg   - ABNORMAL ECG -   Atrial fibrillation   Ventricular premature complex - new   LVH with secondary repolarization abnormality   Electronically Signed By: Lisa Olmedo ([facility]) 22-Aug-2023 16:08:46   Date and Time of Study: 2023 12:28:41      ECG 12 Lead Dyspnea    (Results Pending)       Meds given in ED:   Medications   furosemide (LASIX) injection 80 mg (40 mg Intravenous Given 23 1401)       Imaging results:  XR Chest 2 View    Result Date: 2023  Negative chest.  This report was finalized on 2023 3:12 PM by Dr. Chase Allison MD.      XR Chest 1 View    Result Date: 2023  No acute cardiopulmonary findings.  This report was finalized on 2023 5:14 AM by Dr. Jay Angel MD.       Ambulatory status:   - up w/ assist    Social issues:   Social History     Socioeconomic History    Marital status:    Tobacco Use    Smoking status: Former     Packs/day: 1.00     Years: 30.00     Pack years: 30.00     Types: Cigarettes     Quit date: 2000     Years since quittin.3     Passive exposure: Past    Smokeless tobacco: Current     Types: Chew    Tobacco comments:     quit 15 years  ago, wife current smoker outside   Vaping Use    Vaping Use: Never used   Substance and Sexual Activity    Alcohol use: No    Drug use: No     Types: Marijuana    Sexual activity: Yes     Partners: Female       NIH Stroke Scale:       Lynn Llanes RN  08/22/23 16:11 EDT

## 2023-08-22 NOTE — ED PROVIDER NOTES
Subjective   History of Present Illness  Pt is a 68 y.o. male with PMH as listed who presents for   Chief Complaint   Patient presents with    Rapid Heart Rate    Shortness of Breath       Patient states that he was in the ED yesterday and received Lasix and follow-up with his cardiologist today for further evaluation management of his known heart failure.  He however was referred to the ED by his cardiologist Dr. Draper regarding his worsening heart failure, on her assessment he had increased volume status and was in need of further IV diuretics.  Patient gets short of breath during conversation and with any movement.  He also has poorly controlled A-fib, currently in the mid 90s during my initial assessment.  Denies any new chest pain, fever, abdominal pain.  No other new complaints.    Review of Systems    Past Medical History:   Diagnosis Date    Atrial fibrillation 04/17/2021    COPD (chronic obstructive pulmonary disease)     Gastroesophageal reflux disease 03/04/2016    Hyperlipidemia     Hypertension     Nosebleed     Osteopenia 03/04/2016    Description: Her bone density August 2015 secondary to steroid use    Vitamin D deficiency 03/04/2016       Allergies   Allergen Reactions    Apixaban Other (See Comments)     Nose bleed       Past Surgical History:   Procedure Laterality Date    ANKLE SURGERY      CARDIAC CATHETERIZATION Left 4/20/2021    Procedure: Coronary Angiography;  Surgeon: Rao Del Cid MD;  Location: [facility] CATH INVASIVE LOCATION;  Service: Cardiovascular;  Laterality: Left;    CARDIAC CATHETERIZATION N/A 4/20/2021    Procedure: Left Heart Cath;  Surgeon: Rao Del Cid MD;  Location: [facility] CATH INVASIVE LOCATION;  Service: Cardiovascular;  Laterality: N/A;    INGUINAL HERNIA REPAIR Bilateral 7/1/2023    Procedure: INGUINAL HERNIA REPAIR LAPAROSCOPIC;  Surgeon: Ayanna Moreira DO;  Location: [facility] OR;  Service: General;  Laterality: Bilateral;    UMBILICAL HERNIA REPAIR  N/A 2023    Procedure: UMBILICAL HERNIA REPAIR;  Surgeon: Ayanna Moreira DO;  Location: [facility] OR;  Service: General;  Laterality: N/A;       Family History   Problem Relation Age of Onset    COPD Mother 78    Seizures Father     Vasculitis Father     Prostate cancer Maternal Uncle     Heart disease Brother        Social History     Socioeconomic History    Marital status:    Tobacco Use    Smoking status: Former     Packs/day: 1.00     Years: 30.00     Pack years: 30.00     Types: Cigarettes     Quit date: 2000     Years since quittin.3     Passive exposure: Past    Smokeless tobacco: Current     Types: Chew    Tobacco comments:     quit 15 years ago, wife current smoker outside   Vaping Use    Vaping Use: Never used   Substance and Sexual Activity    Alcohol use: No    Drug use: No     Types: Marijuana    Sexual activity: Yes     Partners: Female           Objective   Physical Exam  Constitutional:       Appearance: Normal appearance.   HENT:      Head: Normocephalic and atraumatic.      Mouth/Throat:      Mouth: Mucous membranes are moist.      Pharynx: Oropharynx is clear.   Eyes:      Conjunctiva/sclera: Conjunctivae normal.   Cardiovascular:      Rate and Rhythm: Normal rate. Rhythm irregular.   Pulmonary:      Effort: Pulmonary effort is normal.      Breath sounds: Decreased breath sounds present. No wheezing.   Abdominal:      General: Abdomen is flat.   Musculoskeletal:      Cervical back: Neck supple.   Skin:     General: Skin is warm and dry.   Neurological:      Mental Status: He is alert.   Psychiatric:         Mood and Affect: Mood normal.       Procedures           ED Course                                           Medical Decision Making  My differential diagnosis for dyspnea includes but is not limited to:  Asthma, COPD, pneumonia, pulmonary embolus, acute respiratory distress syndrome, pneumothorax, pleural effusion, pulmonary fibrosis, congestive heart failure,  myocardial infarction, DKA, uremia, acidosis, sepsis, anemia, drug related, hyperventilation, CNS disease      Amount and/or Complexity of Data Reviewed  Labs: ordered.     Details: Labs show known elevated BNP and troponin, fairly stable.  Radiology: ordered.     Details: Chest x-ray pending at time of transfer  ECG/medicine tests: ordered.  Discussion of management or test interpretation with external provider(s): Spoke with Dr. Draper with cardiology regarding patient's complaints and exam.  She states that she referred patient to the ED from clinic due to need for inpatient admission at [facility] and possible pacemaker and ablation.  She recommends patient be admitted to hospitalist due to his multiple other chronic medical conditions and cardiology will consult at [facility].  Will place call to [facility] for transfer of patient.    Spoke with Dr. Brown with internal medicine at [facility], he agrees to accept patient for transfer for further management of his worsening heart failure.    Risk  Prescription drug management.      Spoke with patient and patient's wife regarding results of labs and consult with Dr. Draper and Dr. Brown and plan for transfer to [facility] for further management of his A-fib and heart failure.  They understand and agree with plan of care for transfer.  All questions answered prior to admission and transfer    Final diagnoses:   Acute on chronic heart failure, unspecified heart failure type   Atrial fibrillation, unspecified type       ED Disposition  ED Disposition       ED Disposition   Transfer to Another Facility     Condition   --    Comment   --               No follow-up provider specified.       Medication List      No changes were made to your prescriptions during this visit.            Ashok Whitt MD  08/22/23 [id number]

## 2023-08-22 NOTE — PROGRESS NOTES
RM:________     PCP: Jason King MD    : 1955  AGE: 68 y.o.  EST PATIENT   REASON FOR VISIT/  CC:    Wt Readings from Last 3 Encounters:   23 83.5 kg (184 lb)   23 83.9 kg (185 lb)   23 81.7 kg (180 lb 3.2 oz)      BP Readings from Last 3 Encounters:   23 109/88   23 116/82   23 115/89      WT: ____________ BP: __________L __________R HR______    CHEST PAIN: _____________    SOA: _____________PALPS: _______________     LIGHTHEADED: ___________FATIGUE: ________________ EDEMA __________    ALLERGIES:Apixaban SMOKING HISTORY:  Social History     Tobacco Use    Smoking status: Former     Packs/day: 1.00     Years: 30.00     Pack years: 30.00     Types: Cigarettes     Quit date: 2000     Years since quittin.3     Passive exposure: Past    Smokeless tobacco: Current     Types: Chew    Tobacco comments:     quit 15 years ago, wife current smoker outside   Vaping Use    Vaping Use: Never used   Substance Use Topics    Alcohol use: No    Drug use: No     Types: Marijuana     CAFFEINE USE_________________  ALCOHOL ______________________    Below is the patient's most recent value for Albumin, ALT, AST, BUN, Calcium, Chloride, Cholesterol, CO2, Creatinine, GFR, Glucose, HDL, Hematocrit, Hemoglobin, Hemoglobin A1C, LDL, Magnesium, Phosphorus, Platelets, Potassium, PSA, Sodium, Triglycerides, TSH and WBC.   Lab Results   Component Value Date    ALBUMIN 4.0 2023     (H) 2023    AST 55 (H) 2023    BUN 36 (H) 2023    CALCIUM 8.7 2023    CL 94 (L) 2023    CO2 31.3 (H) 2023    CREATININE 0.71 (L) 2023    HDL 42 2018    HCT 43.6 2023    HGB 14.3 2023    HGBA1C 5.40 2023    LDL 81 2018    MG 2.1 2023    PHOS 3.9 2023     2023    K 4.0 2023    PSA 0.731 2023     (L) 2023    TRIG 94 2018    TSH 3.110 2023    WBC 12.33 (H) 2023           NEW DIAGNOSIS/ SURGERY/ HOSP OR ED VISITS: ______________________    __________________________________________________________________      RECENT LABS OR DIAGNOSTIC TESTING:  _____________________________    __________________________________________________________________      ASSESSMENT/ PLAN: _______________________________________________    __________________________________________________________________

## 2023-08-22 NOTE — ED TRIAGE NOTES
Pt via PV sent by cardiologist and was told he needs a pacemaker. Pt was told his heart beats too fast   Pt has no complaints at this time

## 2023-08-22 NOTE — H&P
HISTORY AND PHYSICAL   [facility]        Date of Admission: 2023  Patient Identification:  Name: Claude Spencer  Age: 68 y.o.  Sex: male  :  1955  MRN: [id number]                     Primary Care Physician: Jason King MD    Chief Complaint:  68 year old gentleman presented to the emergency room at [place] with worsening chf and a fib with has not been rate controlled; he was seen by dr benavidez earlier today and advised to go to the ED; she felt that he needs diuresis and possibly an ablation and pacemaker; he denies fever or chills; he reports brisk urine output with lasix given prior to his transfer; no chest pain; no fever or chills    History of Present Illness:   As above    Past Medical History:  Past Medical History:   Diagnosis Date    Atrial fibrillation 2021    COPD (chronic obstructive pulmonary disease)     Gastroesophageal reflux disease 2016    Hyperlipidemia     Hypertension     Nosebleed     Osteopenia 2016    Description: Her bone density 2015 secondary to steroid use    Vitamin D deficiency 2016     Past Surgical History:  Past Surgical History:   Procedure Laterality Date    ANKLE SURGERY      CARDIAC CATHETERIZATION Left 2021    Procedure: Coronary Angiography;  Surgeon: Rao Del Cid MD;  Location:  [facility] CATH INVASIVE LOCATION;  Service: Cardiovascular;  Laterality: Left;    CARDIAC CATHETERIZATION N/A 2021    Procedure: Left Heart Cath;  Surgeon: Rao Del Cid MD;  Location:  [facility] CATH INVASIVE LOCATION;  Service: Cardiovascular;  Laterality: N/A;    INGUINAL HERNIA REPAIR Bilateral 2023    Procedure: INGUINAL HERNIA REPAIR LAPAROSCOPIC;  Surgeon: Ayanna Moreira DO;  Location: [facility] OR;  Service: General;  Laterality: Bilateral;    UMBILICAL HERNIA REPAIR N/A 2023    Procedure: UMBILICAL HERNIA REPAIR;  Surgeon: Ayanna Moreira DO;  Location:  [facility] OR;  Service: General;   Laterality: N/A;      Home Meds:  Medications Prior to Admission   Medication Sig Dispense Refill Last Dose    albuterol sulfate  (90 Base) MCG/ACT inhaler Inhale 2 puffs Every 4 (Four) Hours As Needed for Wheezing.       digoxin (LANOXIN) 125 MCG tablet Take 1 tablet by mouth Daily.       furosemide (LASIX) 80 MG tablet Take 1 tablet by mouth 2 (Two) Times a Day for 30 days. 60 tablet 0     losartan (COZAAR) 25 MG tablet Take 1 tablet by mouth 2 (Two) Times a Day for 30 days. 60 tablet 0     methylPREDNISolone (MEDROL) 4 MG dose pack Daily.   8/21/2023    budesonide-formoterol (Symbicort) 160-4.5 MCG/ACT inhaler Inhale 2 puffs 2 (Two) Times a Day. 10.2 g 6     cholecalciferol (VITAMIN D3) 25 MCG (1000 UT) tablet Take 1 tablet by mouth Daily.       ipratropium-albuterol (DUO-NEB) 0.5-2.5 mg/3 ml nebulizer Take 3 mL by nebulization Every 4 (Four) Hours As Needed for Wheezing or Shortness of Air. 360 mL 1     metoprolol succinate XL (TOPROL-XL) 50 MG 24 hr tablet Take 1 tablet by mouth Every 12 (Twelve) Hours. 60 tablet 0     montelukast (SINGULAIR) 10 MG tablet Take 1 tablet by mouth Every Night. 60 tablet 0     omeprazole (priLOSEC) 20 MG capsule Take 1 capsule by mouth Daily. 30 capsule 0     rosuvastatin (CRESTOR) 20 MG tablet Take 1 tablet by mouth Every Night. 90 tablet 3     spironolactone (ALDACTONE) 25 MG tablet Take 0.5 tablets by mouth Daily.          Allergies:  Allergies   Allergen Reactions    Apixaban Other (See Comments)     Nose bleed     Immunizations:  Immunization History   Administered Date(s) Administered    DTP 05/09/1963, 07/03/1963, 08/17/1963    Flublok 18+yrs 10/22/2021    Fluzone High-Dose 65+yrs 09/29/2022    Measles 06/22/1970, 08/24/1970    Pneumococcal Conjugate 13-Valent (PCV13) 09/27/2016    Polio, Unspecified 05/09/1963, 08/03/1963, 08/30/1963, 07/27/1970    Smallpox 08/01/1965    Td, Unspecified 07/30/1965, 07/27/1970, 05/19/1972     Social History:   Social History      Social History Narrative     - third marriage    Disabled from COPD    One daughter      Social History     Socioeconomic History    Marital status:    Tobacco Use    Smoking status: Former     Packs/day: 1.00     Years: 30.00     Pack years: 30.00     Types: Cigarettes     Quit date: 2000     Years since quittin.3     Passive exposure: Past    Smokeless tobacco: Current     Types: Chew    Tobacco comments:     quit 15 years ago, wife current smoker outside   Vaping Use    Vaping Use: Never used   Substance and Sexual Activity    Alcohol use: No    Drug use: No     Types: Marijuana    Sexual activity: Yes     Partners: Female       Family History:  Family History   Problem Relation Age of Onset    COPD Mother 78    Seizures Father     Vasculitis Father     Prostate cancer Maternal Uncle     Heart disease Brother         Review of Systems  See history of present illness and past medical history.  Patient denies headache, dizziness, syncope, falls, trauma, change in vision, change in hearing, change in taste, changes in weight, changes in appetite, focal weakness, numbness, or paresthesia.  Patient denies chest pain, palpitations cough, sinus pressure, rhinorrhea, epistaxis, hemoptysis, nausea, vomiting,hematemesis, diarrhea, constipation or hematochezia.  Denies cold or heat intolerance, polydipsia, polyuria, polyphagia. Denies hematuria, pyuria, dysuria, hesitancy, frequency or urgency. Denies consumption of raw and under cooked meats foods or change in water source.  Denies fever, chills, sweats, night sweats.       Objective:  T Max 24 hrs: Temp (24hrs), Av.4 °F (36.9 °C), Min:98.1 °F (36.7 °C), Max:98.8 °F (37.1 °C)    Vitals Ranges:   Temp:  [98.1 °F (36.7 °C)-98.8 °F (37.1 °C)] 98.2 °F (36.8 °C)  Heart Rate:  [] 114  Resp:  [18-22] 18  BP: (100-125)/(58-95) 124/89      Exam:  /89 (BP Location: Right arm, Patient Position: Lying)   Pulse 114   Temp 98.2 °F (36.8 °C)  (Oral)   Resp 18   SpO2 97%     General Appearance:    Alert, cooperative, no distress, appears stated age; chronically ill appearing   Head:    Normocephalic, without obvious abnormality, atraumatic   Eyes:    PERRL, conjunctivae/corneas clear, EOM's intact, both eyes   Ears:    Normal external ear canals, both ears   Nose:   Nares normal, septum midline, mucosa normal, no drainage    or sinus tenderness   Throat:   Lips, mucosa, and tongue normal   Neck:   Supple, symmetrical, trachea midline, no adenopathy;     thyroid:  no enlargement/tenderness/nodules; no carotid    bruit or JVD   Back:     Symmetric, no curvature, ROM normal, no CVA tenderness   Lungs:     Clear to auscultation bilaterally, respirations unlabored   Chest Wall:    No tenderness or deformity    Heart:    Regular rate and rhythm, S1 and S2 normal, no murmur, rub   or gallop   Abdomen:     Soft, nontender, bowel sounds active all four quadrants,     no masses, no hepatomegaly, no splenomegaly   Extremities:   Extremities normal, atraumatic,  2 plus edema                       .    Data Review:  Labs in chart were reviewed.  WBC   Date Value Ref Range Status   08/22/2023 14.76 (H) 3.40 - 10.80 10*3/mm3 Final     Hemoglobin   Date Value Ref Range Status   08/22/2023 14.2 13.0 - 17.7 g/dL Final     Hematocrit   Date Value Ref Range Status   08/22/2023 44.5 37.5 - 51.0 % Final     Platelets   Date Value Ref Range Status   08/22/2023 280 140 - 450 10*3/mm3 Final     Sodium   Date Value Ref Range Status   08/22/2023 138 136 - 145 mmol/L Final     Potassium   Date Value Ref Range Status   08/22/2023 3.6 3.5 - 5.2 mmol/L Final     Chloride   Date Value Ref Range Status   08/22/2023 96 (L) 98 - 107 mmol/L Final     CO2   Date Value Ref Range Status   08/22/2023 30.1 (H) 22.0 - 29.0 mmol/L Final     BUN   Date Value Ref Range Status   08/22/2023 27 (H) 8 - 23 mg/dL Final     Creatinine   Date Value Ref Range Status   08/22/2023 0.81 0.76 - 1.27 mg/dL  Final     Glucose   Date Value Ref Range Status   08/22/2023 137 (H) 65 - 99 mg/dL Final     Calcium   Date Value Ref Range Status   08/22/2023 8.9 8.6 - 10.5 mg/dL Final     Magnesium   Date Value Ref Range Status   08/21/2023 2.1 1.6 - 2.4 mg/dL Final     Phosphorus   Date Value Ref Range Status   08/21/2023 3.9 2.5 - 4.5 mg/dL Final                Imaging Results (All)       None              Assessment:  Acute on chronic diastolic chf  A fib  Copd  Hypertension  Hyperglycemia  Renal mass  Pulmonary hypertension      Plan:  Continue diuresis  Monitor on telemetry  Ask ep and cardiology to see him  Trend labs  Ask urology to see him  Dw patient and ed provider  Jeni Troy MD  8/22/2023  19:22 EDT

## 2023-08-22 NOTE — PROGRESS NOTES
Date of Office Visit: 23  Encounter Provider: Wilfred Draper MD  Place of Service: [facility]  Patient Name: Claude Spencer  :1955    Chief Complaint   Patient presents with    Congestive Heart Failure   :     HPI:     Mr. Spencer is 68 y.o. and presents today in follow up. I have reviewed prior notes and there are no changes except for any new updates described below. I have also reviewed any information entered into the medical record by the patient or by ancillary staff.     We initially evaluated him in ; he presented with atrial fibrillation and diastolic heart failure. His EF was 55%. He underwent coronary angiography, which was essentially normal.    Since then, he has been lost to follow up. He has had issues with medication adherence, cost, and access to care. He has trouble travelling outside of this area. He was hospitalized here in ; his EF is now 25-30%. There is severe MR, moderate TR, and moderate PHTN.    He was recently hospitalized in [place] for volume overload, and he was in the ED here last week. He continues to have abdominal bloating, lower extreme edema, and orthopnea.  He is chronically short of breath.  His heart rate at home is consistently in the 130s and 140s, and his systolic blood pressure is frequently low, with systolic values of 100 mmHg.    During his last hospitalization, he suffered an incarcerated inguinal hernia.  He underwent surgery and actually there were no cardiac issues perioperatively.  He was incidentally noted to have a renal mass concerning for renal cell cancer, but has not been able to arrange follow-up with urology as an outpatient.    Past Medical History:   Diagnosis Date    Atrial fibrillation 2021    COPD (chronic obstructive pulmonary disease)     Gastroesophageal reflux disease 2016    Hyperlipidemia     Hypertension     Nosebleed     Osteopenia 2016    Description: Her  bone density 2015 secondary to steroid use    Vitamin D deficiency 2016       Past Surgical History:   Procedure Laterality Date    ANKLE SURGERY      CARDIAC CATHETERIZATION Left 2021    Procedure: Coronary Angiography;  Surgeon: Rao Del Cid MD;  Location:  [facility];  Service: Cardiovascular;  Laterality: Left;    CARDIAC CATHETERIZATION N/A 2021    Procedure: Left Heart Cath;  Surgeon: aRo Del Cid MD;  Location:  [facility];  Service: Cardiovascular;  Laterality: N/A;    INGUINAL HERNIA REPAIR Bilateral 2023    Procedure: INGUINAL HERNIA REPAIR LAPAROSCOPIC;  Surgeon: Ayanna Moreira DO;  Location:  [facility];  Service: General;  Laterality: Bilateral;    UMBILICAL HERNIA REPAIR N/A 2023    Procedure: UMBILICAL HERNIA REPAIR;  Surgeon: Ayanna Moreira DO;  Location:  [facility] OR;  Service: General;  Laterality: N/A;       Social History     Socioeconomic History    Marital status:    Tobacco Use    Smoking status: Former     Packs/day: 1.00     Years: 30.00     Pack years: 30.00     Types: Cigarettes     Quit date: 2000     Years since quittin.3     Passive exposure: Past    Smokeless tobacco: Current     Types: Chew    Tobacco comments:     quit 15 years ago, wife current smoker outside   Vaping Use    Vaping Use: Never used   Substance and Sexual Activity    Alcohol use: No    Drug use: No     Types: Marijuana    Sexual activity: Yes     Partners: Female       Family History   Problem Relation Age of Onset    COPD Mother 78    Seizures Father     Vasculitis Father     Prostate cancer Maternal Uncle     Heart disease Brother        Review of Systems   Constitutional: Positive for malaise/fatigue.   Cardiovascular:  Positive for dyspnea on exertion, leg swelling and orthopnea.   Respiratory:  Positive for shortness of breath.    Gastrointestinal:  Positive for bloating.     Allergies   Allergen  "Reactions    Apixaban Other (See Comments)     Nose bleed         Current Outpatient Medications:     albuterol sulfate  (90 Base) MCG/ACT inhaler, Inhale 2 puffs Every 4 (Four) Hours As Needed for Wheezing., Disp: , Rfl:     budesonide-formoterol (Symbicort) 160-4.5 MCG/ACT inhaler, Inhale 2 puffs 2 (Two) Times a Day., Disp: 10.2 g, Rfl: 6    cholecalciferol (VITAMIN D3) 25 MCG (1000 UT) tablet, Take 1 tablet by mouth Daily., Disp: , Rfl:     digoxin (LANOXIN) 125 MCG tablet, Take 1 tablet by mouth Daily., Disp: , Rfl:     ipratropium-albuterol (DUO-NEB) 0.5-2.5 mg/3 ml nebulizer, Take 3 mL by nebulization Every 4 (Four) Hours As Needed for Wheezing or Shortness of Air., Disp: 360 mL, Rfl: 1    methylPREDNISolone (MEDROL) 4 MG dose pack, Daily., Disp: , Rfl:     metoprolol succinate XL (TOPROL-XL) 50 MG 24 hr tablet, Take 1 tablet by mouth Every 12 (Twelve) Hours., Disp: 60 tablet, Rfl: 0    montelukast (SINGULAIR) 10 MG tablet, Take 1 tablet by mouth Every Night., Disp: 60 tablet, Rfl: 0    omeprazole (priLOSEC) 20 MG capsule, Take 1 capsule by mouth Daily., Disp: 30 capsule, Rfl: 0    rosuvastatin (CRESTOR) 20 MG tablet, Take 1 tablet by mouth Every Night., Disp: 90 tablet, Rfl: 3    spironolactone (ALDACTONE) 25 MG tablet, Take 0.5 tablets by mouth Daily., Disp: , Rfl:     furosemide (LASIX) 80 MG tablet, Take 1 tablet by mouth 2 (Two) Times a Day for 30 days., Disp: 60 tablet, Rfl: 0    losartan (COZAAR) 25 MG tablet, Take 1 tablet by mouth 2 (Two) Times a Day for 30 days., Disp: 60 tablet, Rfl: 0      Objective:     Vitals:    08/22/23 1102   BP: 106/78   BP Location: Left arm   Pulse: (!) 135   Weight: 80.6 kg (177 lb 9.6 oz)   Height: 175.3 cm (69\")     Body mass index is 26.23 kg/mý.    Vitals reviewed.   Constitutional:       Appearance: Well-developed.      Comments: He is disheveled appearing and appears short of breath with walking and speaking   Eyes:      Conjunctiva/sclera: Conjunctivae " normal.   HENT:      Head: Normocephalic.      Nose: Nose normal.         Comments: Edentulous  Neck:      Vascular: No JVD. JVD elevated.      Lymphadenopathy: No cervical adenopathy.   Pulmonary:      Effort: Pulmonary effort is normal.      Breath sounds: Normal breath sounds.   Cardiovascular:      Tachycardia present. Irregularly irregular rhythm.      Murmurs: There is a grade 3/6 systolic murmur.      Comments: He has 2+ edema to the knees.  He is wearing compression stockings which have evidence of serous fluid leaking.  Abdominal:      General: There is distension.      Palpations: Abdomen is soft.      Tenderness: There is no abdominal tenderness.   Musculoskeletal: Normal range of motion.      Cervical back: Normal range of motion. Skin:     General: Skin is warm and dry.      Findings: No rash.   Neurological:      General: No focal deficit present.      Mental Status: Alert and oriented to person, place, and time.      Cranial Nerves: No cranial nerve deficit.   Psychiatric:         Behavior: Behavior normal.         Thought Content: Thought content normal.         Judgment: Judgment normal.         ECG 12 Lead    Date/Time: 8/22/2023 12:24 PM  Performed by: Wilfred Draper MD  Authorized by: Wilfred Draper MD   Comparison: compared with previous ECG   Similar to previous ECG  Rhythm: atrial fibrillation  Ectopy: unifocal PVCs  Rate: tachycardic  Conduction: non-specific intraventricular conduction delay  QRS axis: normal  Other findings: non-specific ST-T wave changes    Clinical impression: abnormal EKG          Assessment:       Diagnosis Plan   1. Permanent atrial fibrillation  ECG 12 Lead      2. Chronic systolic heart failure        3. NICM (nonischemic cardiomyopathy)        4. Nonrheumatic mitral valve regurgitation        5. Nonrheumatic tricuspid valve regurgitation        6. Pulmonary hypertension        7. Renal mass               Plan:       Mr Spencer has progressively worsening  nonischemic cardiomyopathy which is likely driven by severe mitral regurgitation and uncontrolled atrial fibrillation.  We do not have any room in his blood pressure to further increase his medications.  He is clearly volume overloaded, with abdominal distention, leg swelling, and skin weeping.    I do not feel that this patient can be managed as an outpatient with this type of heart rate.  He has extreme difficulties traveling outside of the [facility].  I suspect that he is ultimately going to require a pace and ablate strategy for treatment of his atrial fibrillation.  I suspect that this much regurgitation is functional, and that if his ejection fraction improves, then so we will his MR.     He has been unable to arrange follow-up for the renal mass because of travel issues.    I have recommended that he be admitted to [facility] for EP evaluation but he and the patient's wife say that there is no way that they could independently provide transportation until next week.  I do not think he can wait that long so of asked him to go to the ER here and we can at least get him diuresed while we arrange transfer.       Sincerely,       Wilfred Draper MD

## 2023-08-23 PROBLEM — I50.43 ACUTE ON CHRONIC COMBINED SYSTOLIC AND DIASTOLIC CHF (CONGESTIVE HEART FAILURE): Status: ACTIVE | Noted: 2023-08-23

## 2023-08-23 LAB
ANION GAP SERPL CALCULATED.3IONS-SCNC: 11 MMOL/L (ref 5–15)
BUN SERPL-MCNC: 32 MG/DL (ref 8–23)
BUN/CREAT SERPL: 41 (ref 7–25)
CALCIUM SPEC-SCNC: 8.5 MG/DL (ref 8.6–10.5)
CHLORIDE SERPL-SCNC: 94 MMOL/L (ref 98–107)
CO2 SERPL-SCNC: 34 MMOL/L (ref 22–29)
CREAT SERPL-MCNC: 0.78 MG/DL (ref 0.76–1.27)
DEPRECATED RDW RBC AUTO: 43.9 FL (ref 37–54)
EGFRCR SERPLBLD CKD-EPI 2021: 98.4 ML/MIN/1.73
ERYTHROCYTE [DISTWIDTH] IN BLOOD BY AUTOMATED COUNT: 12.5 % (ref 12.3–15.4)
GLUCOSE SERPL-MCNC: 106 MG/DL (ref 65–99)
HCT VFR BLD AUTO: 41.5 % (ref 37.5–51)
HGB BLD-MCNC: 14.1 G/DL (ref 13–17.7)
MCH RBC QN AUTO: 32.9 PG (ref 26.6–33)
MCHC RBC AUTO-ENTMCNC: 34 G/DL (ref 31.5–35.7)
MCV RBC AUTO: 96.7 FL (ref 79–97)
PLATELET # BLD AUTO: 261 10*3/MM3 (ref 140–450)
PMV BLD AUTO: 10.2 FL (ref 6–12)
POTASSIUM SERPL-SCNC: 3.5 MMOL/L (ref 3.5–5.2)
RBC # BLD AUTO: 4.29 10*6/MM3 (ref 4.14–5.8)
SODIUM SERPL-SCNC: 139 MMOL/L (ref 136–145)
WBC NRBC COR # BLD: 13.54 10*3/MM3 (ref 3.4–10.8)

## 2023-08-23 PROCEDURE — 94799 UNLISTED PULMONARY SVC/PX: CPT

## 2023-08-23 PROCEDURE — A9270 NON-COVERED ITEM OR SERVICE: HCPCS | Performed by: INTERNAL MEDICINE

## 2023-08-23 PROCEDURE — 63710000001 SPIRONOLACTONE 12.5 MG TABLET: Performed by: INTERNAL MEDICINE

## 2023-08-23 PROCEDURE — 85027 COMPLETE CBC AUTOMATED: CPT | Performed by: INTERNAL MEDICINE

## 2023-08-23 PROCEDURE — 99232 SBSQ HOSP IP/OBS MODERATE 35: CPT | Performed by: INTERNAL MEDICINE

## 2023-08-23 PROCEDURE — 63710000001 METOPROLOL SUCCINATE XL 50 MG TABLET SUSTAINED-RELEASE 24 HOUR: Performed by: INTERNAL MEDICINE

## 2023-08-23 PROCEDURE — 94760 N-INVAS EAR/PLS OXIMETRY 1: CPT

## 2023-08-23 PROCEDURE — 63710000001 ROSUVASTATIN 20 MG TABLET: Performed by: INTERNAL MEDICINE

## 2023-08-23 PROCEDURE — 63710000001 BUDESONIDE-FORMOTEROL 160-4.5 MCG/ACT AEROSOL 6 G INHALER: Performed by: INTERNAL MEDICINE

## 2023-08-23 PROCEDURE — 63710000001 PANTOPRAZOLE 40 MG TABLET DELAYED-RELEASE: Performed by: INTERNAL MEDICINE

## 2023-08-23 PROCEDURE — A9270 NON-COVERED ITEM OR SERVICE: HCPCS | Performed by: HOSPITALIST

## 2023-08-23 PROCEDURE — 80048 BASIC METABOLIC PNL TOTAL CA: CPT | Performed by: INTERNAL MEDICINE

## 2023-08-23 PROCEDURE — 63710000001 CHOLECALCIFEROL 25 MCG (1000 UT) TABLET: Performed by: INTERNAL MEDICINE

## 2023-08-23 PROCEDURE — 63710000001 POTASSIUM CHLORIDE 10 MEQ TABLET CONTROLLED-RELEASE: Performed by: HOSPITALIST

## 2023-08-23 PROCEDURE — 63710000001 DIGOXIN 125 MCG TABLET: Performed by: INTERNAL MEDICINE

## 2023-08-23 PROCEDURE — 25010000002 FUROSEMIDE PER 20 MG: Performed by: INTERNAL MEDICINE

## 2023-08-23 PROCEDURE — 63710000001 LOSARTAN 25 MG TABLET: Performed by: INTERNAL MEDICINE

## 2023-08-23 PROCEDURE — 63710000001 MONTELUKAST 10 MG TABLET: Performed by: INTERNAL MEDICINE

## 2023-08-23 PROCEDURE — 94761 N-INVAS EAR/PLS OXIMETRY MLT: CPT

## 2023-08-23 RX ORDER — POTASSIUM CHLORIDE 750 MG/1
40 TABLET, FILM COATED, EXTENDED RELEASE ORAL EVERY 4 HOURS
Status: COMPLETED | OUTPATIENT
Start: 2023-08-23 | End: 2023-08-23

## 2023-08-23 RX ADMIN — LOSARTAN POTASSIUM 25 MG: 25 TABLET, FILM COATED ORAL at 08:27

## 2023-08-23 RX ADMIN — IPRATROPIUM BROMIDE AND ALBUTEROL SULFATE 3 ML: 2.5; .5 SOLUTION RESPIRATORY (INHALATION) at 07:18

## 2023-08-23 RX ADMIN — MONTELUKAST SODIUM 10 MG: 10 TABLET, FILM COATED ORAL at 21:01

## 2023-08-23 RX ADMIN — BUDESONIDE AND FORMOTEROL FUMARATE DIHYDRATE 2 PUFF: 160; 4.5 AEROSOL RESPIRATORY (INHALATION) at 07:20

## 2023-08-23 RX ADMIN — PANTOPRAZOLE SODIUM 40 MG: 40 TABLET, DELAYED RELEASE ORAL at 06:43

## 2023-08-23 RX ADMIN — Medication 12.5 MG: at 08:29

## 2023-08-23 RX ADMIN — POTASSIUM CHLORIDE 40 MEQ: 750 TABLET, EXTENDED RELEASE ORAL at 13:37

## 2023-08-23 RX ADMIN — METOPROLOL SUCCINATE 50 MG: 50 TABLET, FILM COATED, EXTENDED RELEASE ORAL at 21:01

## 2023-08-23 RX ADMIN — ROSUVASTATIN CALCIUM 20 MG: 20 TABLET, FILM COATED ORAL at 21:01

## 2023-08-23 RX ADMIN — IPRATROPIUM BROMIDE AND ALBUTEROL SULFATE 3 ML: 2.5; .5 SOLUTION RESPIRATORY (INHALATION) at 21:26

## 2023-08-23 RX ADMIN — FUROSEMIDE 40 MG: 10 INJECTION, SOLUTION INTRAMUSCULAR; INTRAVENOUS at 21:00

## 2023-08-23 RX ADMIN — DIGOXIN 125 MCG: 125 TABLET ORAL at 11:43

## 2023-08-23 RX ADMIN — FUROSEMIDE 40 MG: 10 INJECTION, SOLUTION INTRAMUSCULAR; INTRAVENOUS at 08:27

## 2023-08-23 RX ADMIN — IPRATROPIUM BROMIDE AND ALBUTEROL SULFATE 3 ML: 2.5; .5 SOLUTION RESPIRATORY (INHALATION) at 15:45

## 2023-08-23 RX ADMIN — Medication 1000 UNITS: at 08:28

## 2023-08-23 RX ADMIN — METOPROLOL SUCCINATE 50 MG: 50 TABLET, FILM COATED, EXTENDED RELEASE ORAL at 08:27

## 2023-08-23 RX ADMIN — BUDESONIDE AND FORMOTEROL FUMARATE DIHYDRATE 2 PUFF: 160; 4.5 AEROSOL RESPIRATORY (INHALATION) at 21:26

## 2023-08-23 RX ADMIN — POTASSIUM CHLORIDE 40 MEQ: 750 TABLET, EXTENDED RELEASE ORAL at 18:08

## 2023-08-23 NOTE — PROGRESS NOTES
[facility]  Progress note: 2023    Patient Identification:  Name:Claude Spencer  Age:68 y.o.  Sex: male  :  1955  MRN: [id number]           CC:  Congestive heart failure, atrial fibrillation.    Interval history:  Modest diuresis overnight.  Vital stable.  Heart rate improved earlier but slightly tachycardic this afternoon..  Patient states that dyspnea has improved significantly.    Vital Signs:   Temp:  [98 °F (36.7 °C)-98.8 °F (37.1 °C)] 98.3 °F (36.8 °C)  Heart Rate:  [] 83  Resp:  [18-22] 18  BP: (100-125)/(58-95) 122/79    Intake/Output Summary (Last 24 hours) at 2023 0808  Last data filed at 2023 2300  Gross per 24 hour   Intake --   Output 1300 ml   Net -1300 ml       Physical Examination:    General Appearance No acute distress   Neck Trachea midline, no adenopathy   Lungs Decreased breath sounds at bases,respirations regular, even and unlabored   Heart irregular rhythm and normal rate, normal S1 and S2, 2/6 murmur, no gallop, no rub, no click   Chest wall No abnormalities observed   Abdomen Normal bowel sounds, no masses, no hepatomegaly, soft   Extremities Moves all extremities well, no edema, no cyanosis, no redness   Neurological Alert and oriented x 3     Lab Review:  Personally reviewed the labs, radiology imaging and other cardiac procedures.   Results from last 7 days   Lab Units 23  0545 23  1247   SODIUM mmol/L 139 138   POTASSIUM mmol/L 3.5 3.6   CHLORIDE mmol/L 94* 96*   CO2 mmol/L 34.0* 30.1*   BUN mg/dL 32* 27*   CREATININE mg/dL 0.78 0.81   CALCIUM mg/dL 8.5* 8.9   BILIRUBIN mg/dL  --  0.8   ALK PHOS U/L  --  131*   ALT (SGPT) U/L  --  103*   AST (SGOT) U/L  --  68*   GLUCOSE mg/dL 106* 137*     Results from last 7 days   Lab Units 23  1446 23  1247 23  0713   HSTROP T ng/L 33* 29* 28*     Results from last 7 days   Lab Units 23  0545 23  1247 23  0454   WBC 10*3/mm3 13.54* 14.76* 12.33*   HEMOGLOBIN  g/dL 14.1 14.2 14.3   HEMATOCRIT % 41.5 44.5 43.6   PLATELETS 10*3/mm3 261 280 213     Results from last 7 days   Lab Units 08/22/23  1247   INR  1.09     Medication Review:   Meds reviewed  Scheduled Meds:budesonide-formoterol, 2 puff, Inhalation, BID  cholecalciferol, 1,000 Units, Oral, Daily  digoxin, 125 mcg, Oral, Daily  furosemide, 40 mg, Intravenous, Q12H  ipratropium-albuterol, 3 mL, Nebulization, Q6H While Awake - RT  losartan, 25 mg, Oral, BID  metoprolol succinate XL, 50 mg, Oral, Q12H  montelukast, 10 mg, Oral, Nightly  pantoprazole, 40 mg, Oral, Q AM  rosuvastatin, 20 mg, Oral, Nightly  senna-docusate sodium, 2 tablet, Oral, BID  spironolactone, 12.5 mg, Oral, Daily        I personally viewed and interpreted the patient's EKG/Telemetry data    Assessment and Plan  1.  Congestive heart failure, acute on chronic HFrEF.  Echo 6/2023 with global hypokinesis, EF 26 to 30%, severe biatrial enlargement, severe mitral regurgitation moderate tricuspid regurgitation with RVSP 49 mmHg.  Care complicated by hypotension and medical costs and travel limitations.  Currently on IV Lasix twice daily.  Seems to have tolerated losartan, spironolactone and metoprolol from a blood pressure standpoint.  Depending on blood pressure response over the next 48 hours would also add Jardiance.  Patient states that he had been taking all of his and coming meds further has been quite concerned that he had not been able to afford these.  The fact that he is responding nicely to his home regimen suggests he likely was not taking meds correctly at home.  2.  Permanent atrial fibrillation, rates previously poorly controlled.  Rates now improved significantly on metoprolol.  Given response to medical therapy I do not think ablation will be needed.  I previously discussed with Dr. Draper and no clear contraindication for anticoagulation other than affordability and subsequent compliance.  Urology consult pending for renal mass.  Once  cleared, will start Lovenox while CCP addresses affordability for Xarelto.  3.  Mitral regurgitation, severe.  Hopefully will improve with treatment of tachycardia   4.  Renal mass, possible renal cell carcinoma, evaluation incomplete and consult has been requested.    5.  Mild to moderate coronary artery disease by cath 4/2021  6.  Pulmonary hypertension, modest  7.  Elevated transaminases.  Recheck tomorrow with further diuresis  8.  Poor medication coverage.  We will add CCP to review  For candidacy for assistance       Aida Meyer  8/23/202308:08 EDT  25min spent in reviewing records, discussion and examination of the patient and discussion with other members of the patient's medical team.     Dictated utilizing Dragon dictation

## 2023-08-23 NOTE — PROGRESS NOTES
"Nutrition Services    Patient Name:  Claude Spencer  YOB: 1955  MRN: [id number]  Admit Date:  8/22/2023    Assessment Date:  08/23/23    NUTRITION SCREENING      Reason for Encounter MST score of 2 (2-13# wt loss and decreased appetite).  RD met with pt at bedside. Pt consumed 100% of his lunch and reports no issues with his appetite. Pt reports weight loss d/t fluid loss, has hx of CHF. Pt has no questions about his diet, states he no nutrition related needs at this time.    Diagnosis/Problem Acute on chronic CHF       PO Diet Diet: Cardiac Diets; Healthy Heart (2-3 Na+); Texture: Regular Texture (IDDSI 7); Fluid Consistency: Thin (IDDSI 0)   PO Supplements/Snacks    PO Intake % 100%       Labs  Listed below, reviewed   Physical Findings Alert, oriented, room air, missing teeth   GI Function Last BM 8/23   Skin Status Intact        Height:  Weight:  BMI:   Category  Weight Trend Height: 175.3 cm (69.02\")  Weight: 81 kg (178 lb 9.2 oz) (08/23/23 0600)  Body mass index is 26.36 kg/m².  Normal/Healthy (18.4 - 24.9)  Loss d/t fluid       Intervention/Plan No nutrition intervention warranted at this time. RD will follow up PRN.          Results from last 7 days   Lab Units 08/23/23  0545 08/22/23  1247 08/21/23  0454   SODIUM mmol/L 139 138 135*   POTASSIUM mmol/L 3.5 3.6 4.0   CHLORIDE mmol/L 94* 96* 94*   CO2 mmol/L 34.0* 30.1* 31.3*   BUN mg/dL 32* 27* 36*   CREATININE mg/dL 0.78 0.81 0.71*   CALCIUM mg/dL 8.5* 8.9 8.7   BILIRUBIN mg/dL  --  0.8 0.8   ALK PHOS U/L  --  131* 90   ALT (SGPT) U/L  --  103* 100*   AST (SGOT) U/L  --  68* 55*   GLUCOSE mg/dL 106* 137* 102*     Results from last 7 days   Lab Units 08/23/23  0545 08/22/23  1247 08/21/23  0454   MAGNESIUM mg/dL  --   --  2.1   PHOSPHORUS mg/dL  --   --  3.9   HEMOGLOBIN g/dL 14.1   < > 14.3   HEMATOCRIT % 41.5   < > 43.6    < > = values in this interval not displayed.     COVID19   Date Value Ref Range Status   11/16/2021 Not Detected Not " Detected - Ref. Range Final     Lab Results   Component Value Date    HGBA1C 5.40 06/27/2023       RD to follow up per protocol.    Electronically signed by:  Michelle Lua RD  08/23/23 13:37 EDT

## 2023-08-23 NOTE — CONSULTS
FIRST [facility]OLOGY CONSULT      Patient Identification:  NAME:  Claude Spencer  Age:  68 y.o.   Sex:  male   :  1955   MRN:  [id number]       Chief complaint: Kidney mass    History of present illness: This a very pleasant 68-year-old gentleman seen on his last admission for hematuria and worked up.  Found to have a mass in his left kidney.  I saw him in consultation about a month ago.  We have been having trouble getting a hold of him to get him scheduled for surgery.  He is now back in with cardiac issues.  He has had no further hematuria.  He is voiding well.  No flank pain.  The mass is associated with a large cyst in the upper pole left kidney and one of our plan is to do a partial nephrectomy.      Past medical history:  Past Medical History:   Diagnosis Date    Atrial fibrillation 2021    COPD (chronic obstructive pulmonary disease)     Gastroesophageal reflux disease 2016    Hyperlipidemia     Hypertension     Nosebleed     Osteopenia 2016    Description: Her bone density 2015 secondary to steroid use    Vitamin D deficiency 2016       Past surgical history:  Past Surgical History:   Procedure Laterality Date    ANKLE SURGERY      CARDIAC CATHETERIZATION Left 2021    Procedure: Coronary Angiography;  Surgeon: Rao Del Cid MD;  Location: [facility] CATH INVASIVE LOCATION;  Service: Cardiovascular;  Laterality: Left;    CARDIAC CATHETERIZATION N/A 2021    Procedure: Left Heart Cath;  Surgeon: Rao Del Cid MD;  Location: [facility] CATH INVASIVE LOCATION;  Service: Cardiovascular;  Laterality: N/A;    INGUINAL HERNIA REPAIR Bilateral 2023    Procedure: INGUINAL HERNIA REPAIR LAPAROSCOPIC;  Surgeon: Ayanna Moreira DO;  Location:  [facility] OR;  Service: General;  Laterality: Bilateral;    UMBILICAL HERNIA REPAIR N/A 2023    Procedure: UMBILICAL HERNIA REPAIR;  Surgeon: Ayanna Moreira DO;  Location:  [facility] OR;  Service: General;   Laterality: N/A;       Allergies:  Apixaban    Home medications:  Medications Prior to Admission   Medication Sig Dispense Refill Last Dose    albuterol sulfate  (90 Base) MCG/ACT inhaler Inhale 2 puffs Every 4 (Four) Hours As Needed for Wheezing.       digoxin (LANOXIN) 125 MCG tablet Take 1 tablet by mouth Daily.       furosemide (LASIX) 80 MG tablet Take 1 tablet by mouth 2 (Two) Times a Day for 30 days. 60 tablet 0     losartan (COZAAR) 25 MG tablet Take 1 tablet by mouth 2 (Two) Times a Day for 30 days. 60 tablet 0     methylPREDNISolone (MEDROL) 4 MG dose pack Daily.   8/21/2023    budesonide-formoterol (Symbicort) 160-4.5 MCG/ACT inhaler Inhale 2 puffs 2 (Two) Times a Day. 10.2 g 6     cholecalciferol (VITAMIN D3) 25 MCG (1000 UT) tablet Take 1 tablet by mouth Daily.       ipratropium-albuterol (DUO-NEB) 0.5-2.5 mg/3 ml nebulizer Take 3 mL by nebulization Every 4 (Four) Hours As Needed for Wheezing or Shortness of Air. 360 mL 1     metoprolol succinate XL (TOPROL-XL) 50 MG 24 hr tablet Take 1 tablet by mouth Every 12 (Twelve) Hours. 60 tablet 0     montelukast (SINGULAIR) 10 MG tablet Take 1 tablet by mouth Every Night. 60 tablet 0     omeprazole (priLOSEC) 20 MG capsule Take 1 capsule by mouth Daily. 30 capsule 0     rosuvastatin (CRESTOR) 20 MG tablet Take 1 tablet by mouth Every Night. 90 tablet 3     spironolactone (ALDACTONE) 25 MG tablet Take 0.5 tablets by mouth Daily.           Hospital medications:  budesonide-formoterol, 2 puff, Inhalation, BID  cholecalciferol, 1,000 Units, Oral, Daily  digoxin, 125 mcg, Oral, Daily  furosemide, 40 mg, Intravenous, Q12H  ipratropium-albuterol, 3 mL, Nebulization, Q6H While Awake - RT  metoprolol succinate XL, 50 mg, Oral, Q12H  montelukast, 10 mg, Oral, Nightly  pantoprazole, 40 mg, Oral, Q AM  potassium chloride, 40 mEq, Oral, Q4H  rosuvastatin, 20 mg, Oral, Nightly  senna-docusate sodium, 2 tablet, Oral, BID  spironolactone, 12.5 mg, Oral, Daily            acetaminophen    albuterol    senna-docusate sodium **AND** polyethylene glycol **AND** bisacodyl **AND** bisacodyl    melatonin    ondansetron **OR** ondansetron    Family history:  Family History   Problem Relation Age of Onset    COPD Mother 78    Seizures Father     Vasculitis Father     Prostate cancer Maternal Uncle     Heart disease Brother        Social history:  Social History     Tobacco Use    Smoking status: Former     Packs/day: 1.00     Years: 30.00     Pack years: 30.00     Types: Cigarettes     Quit date: 2000     Years since quittin.3     Passive exposure: Past    Smokeless tobacco: Current     Types: Chew    Tobacco comments:     quit 15 years ago, wife current smoker outside   Vaping Use    Vaping Use: Never used   Substance Use Topics    Alcohol use: No    Drug use: No     Types: Marijuana       Review of systems:    Negative 12-system ROS except for the following: No voiding complaints at this time      Objective:  TMax 24 hours:   Temp (24hrs), Av.1 °F (36.7 °C), Min:97.9 °F (36.6 °C), Max:98.3 °F (36.8 °C)      Vitals Ranges:   Temp:  [97.9 °F (36.6 °C)-98.3 °F (36.8 °C)] 97.9 °F (36.6 °C)  Heart Rate:  [] 124  Resp:  [16-18] 16  BP: ()/(58-89) 99/63    Intake/Output Last 3 shifts:  I/O last 3 completed shifts:  In: -   Out: 1300 [Urine:1300]     Physical Exam:       General Appearance:    Alert, cooperative, in no acute distress   Head:    Normocephalic, without obvious abnormality, atraumatic   Eyes:          PERRL, conjunctivae and corneas clear   Ears:    Normal external inspection   Throat:   No oral lesions, oral mucosa moist   Neck:   Supple, no LAD, trachea midline   Back:     No CVA tenderness   Lungs:     Respirations unlabored, symmetric excursion    Heart:    RRR, intact peripheral pulses   Abdomen:   Soft benign   :  Penis normal testes normal   WILMER:  Extremities: Deferred.  No edema, no deformity   Skin:   No bleeding, bruising or rashes   Neuro/Psych:    Orientation intact, mood/affect pleasant, no focal findings       Results review:   I reviewed the patient's new clinical results.    Data review:  Lab Results (last 24 hours)       Procedure Component Value Units Date/Time    Basic Metabolic Panel [[id number]]  (Abnormal) Collected: 08/23/23 0545    Specimen: Blood Updated: 08/23/23 0643     Glucose 106 mg/dL      BUN 32 mg/dL      Creatinine 0.78 mg/dL      Sodium 139 mmol/L      Potassium 3.5 mmol/L      Chloride 94 mmol/L      CO2 34.0 mmol/L      Calcium 8.5 mg/dL      BUN/Creatinine Ratio 41.0     Anion Gap 11.0 mmol/L      eGFR 98.4 mL/min/1.73     Narrative:      GFR Normal >60  Chronic Kidney Disease <60  Kidney Failure <15      CBC (No Diff) [[id number]]  (Abnormal) Collected: 08/23/23 0545    Specimen: Blood Updated: 08/23/23 0633     WBC 13.54 10*3/mm3      RBC 4.29 10*6/mm3      Hemoglobin 14.1 g/dL      Hematocrit 41.5 %      MCV 96.7 fL      MCH 32.9 pg      MCHC 34.0 g/dL      RDW 12.5 %      RDW-SD 43.9 fl      MPV 10.2 fL      Platelets 261 10*3/mm3              Imaging:  Imaging Results (Last 24 Hours)       ** No results found for the last 24 hours. **               Assessment:       Acute on chronic combined systolic and diastolic CHF (congestive heart failure)    Gastroesophageal reflux disease    Hypercholesterolemia    Hypertension    Atrial fibrillation    Pulmonary emphysema, unspecified emphysema type    Pulmonary hypertension    Left upper pole renal mass    Plan:     We will work on getting him scheduled for his laparoscopic partial nephrectomy.  I have confirmed his cell phone number.  It will not certainly be in the next week or 2 but he will have to be obviously cleared by cardiology to have this done but were looking probably in another month or so before I could get him scheduled.    Rao Martinez MD  08/23/23  13:44 EDT

## 2023-08-23 NOTE — PLAN OF CARE
Problem: Adult Inpatient Plan of Care  Goal: Plan of Care Review  Outcome: Ongoing, Progressing  Flowsheets (Taken 8/23/2023 0555)  Progress: no change  Plan of Care Reviewed With: patient  Outcome Evaluation: Pt admitted for uncontrolled A.fib. No complaints of pain or discomfort. Pt did express some aggitation at not jet knowing the time of his pacemaker placement that his home doctor said he would get. RN explained that Cardiology will come see him and then discuss the plan of care with him including any pacemaker placement needed. He stated that he understood. No other issues or complaints at this time.     Problem: Adult Inpatient Plan of Care  Goal: Optimal Comfort and Wellbeing  Intervention: Provide Person-Centered Care  Recent Flowsheet Documentation  Taken 8/23/2023 0000 by Ramsey Guallpa RN  Trust Relationship/Rapport:   care explained   choices provided   questions answered   questions encouraged  Taken 8/22/2023 2000 by Ramsey Guallpa RN  Trust Relationship/Rapport:   care explained   choices provided   questions answered   questions encouraged   Goal Outcome Evaluation:  Plan of Care Reviewed With: patient        Progress: no change  Outcome Evaluation: Pt admitted for uncontrolled A.fib. No complaints of pain or discomfort. Pt did express some aggitation at not jet knowing the time of his pacemaker placement that his home doctor said he would get. RN explained that Cardiology will come see him and then discuss the plan of care with him including any pacemaker placement needed. He stated that he understood. No other issues or complaints at this time.

## 2023-08-23 NOTE — PROGRESS NOTES
"    DAILY PROGRESS NOTE  [facility]    Patient Identification:  Name: Claude Spencer  Age: 68 y.o.  Sex: male  :  1955  MRN: [id number]         Primary Care Physician: Jason King MD    Subjective:  Interval History: States he feels much better.  He is requesting a shower.  Denies any chest pain and states his swelling is good for him.  Denies any nausea vomiting altered mentation or fever.  Claims Dr. Patel is his long-term cardiologist    Objective: Sitting up in bedside chair conversational and pleasant and certainly nontoxic.    Scheduled Meds:budesonide-formoterol, 2 puff, Inhalation, BID  cholecalciferol, 1,000 Units, Oral, Daily  digoxin, 125 mcg, Oral, Daily  furosemide, 40 mg, Intravenous, Q12H  ipratropium-albuterol, 3 mL, Nebulization, Q6H While Awake - RT  losartan, 25 mg, Oral, BID  metoprolol succinate XL, 50 mg, Oral, Q12H  montelukast, 10 mg, Oral, Nightly  pantoprazole, 40 mg, Oral, Q AM  rosuvastatin, 20 mg, Oral, Nightly  senna-docusate sodium, 2 tablet, Oral, BID  spironolactone, 12.5 mg, Oral, Daily      Continuous Infusions:     Vital signs in last 24 hours:  Temp:  [97.9 °F (36.6 °C)-98.3 °F (36.8 °C)] 97.9 °F (36.6 °C)  Heart Rate:  [] 124  Resp:  [16-18] 16  BP: ()/(58-89) 99/63    Intake/Output:    Intake/Output Summary (Last 24 hours) at 2023 1254  Last data filed at 2023 0817  Gross per 24 hour   Intake --   Output 1900 ml   Net -1900 ml       Exam:  BP 99/63 (BP Location: Left arm, Patient Position: Sitting)   Pulse (!) 124   Temp 97.9 °F (36.6 °C) (Oral)   Resp 16   Ht 175.3 cm (69.02\")   Wt 81 kg (178 lb 9.2 oz)   SpO2 94%   BMI 26.36 kg/m²     General Appearance:    Alert, cooperative, AAOx3                         Throat:   Oral mucosa pink and moist                           Neck:   No JVD                         Lungs:    Clear to auscultation bilaterally, respirations unlabored                 Chest Wall:    No " tenderness or deformity                          Heart:  Irregularly irregular rate and rhythm, S1 and S2 normal, +murmur                  Abdomen:     Soft, nontender, bowel sounds active, no body wall edema                 Extremities: Teds are in place and he is got 1+ edema above those teds but nothing that is really tracking up into his thighs today                        Pulses:   Pulses palpable in lower extremities                            Skin:   Skin is warm and dry                  Neurologic:   CNII-XII intact       Data Review:  Labs in chart were reviewed.    Assessment:  Active Hospital Problems    Diagnosis  POA    **Acute on chronic combined systolic and diastolic CHF (congestive heart failure) [I50.43]  Unknown    Pulmonary hypertension [I27.20]  Yes    Pulmonary emphysema, unspecified emphysema type [J43.9]  Yes    Atrial fibrillation [I48.91]  Yes    Gastroesophageal reflux disease [K21.9]  Yes    Hypertension [I10]  Yes    Hypercholesterolemia [E78.00]  Yes      Resolved Hospital Problems   No resolved problems to display.       Plan:    Acute on chronic both systolic and diastolic CHF/nonischemic cardiomyopathy compounded by severe mitral regurgitation and uncontrolled A-fib/RVR compounded by pulmonary hypertension   -Cardiology consulted -EP evaluation pending   -On Lasix 40 mg IV every 12 -give additional K x2 is low normal with IV diuresis   -Metoprolol/digoxin noted for A-fib   -6/6/2023 echo reviewed -EF of 26 to 30% compounded by severe hypokinesis with severely dilated left atrium and severe mitral valve regurgitation with moderate tricuspid valve regurgitation    COPD without acute exacerbation    HTN stable/hypotensive -parameters written and we will go ahead and hold ARB since diuresing     Abnormal LFTs likely secondary to passive congestion -okay with statin for now    Outpatient follow-up of renal mass suggested though I see urology consult previously placed and will appreciate  their input but would otherwise anticipate the bulk of which would be ascertained outpatient        Disposition -awaiting cardiology/EP input otherwise was clinically way better than I was anticipating.  Patient hopeful to discharge home tomorrow and ultimately everything hinges on cardiology.    Gerry Damon MD  8/23/2023  12:54 EDT

## 2023-08-23 NOTE — PLAN OF CARE
Goal Outcome Evaluation:     Pt has no c/o pain, pt continues to be in Afib. Possible pacemaker/ablation? Pt up in chair, shower completed. K+ replacement given per order.

## 2023-08-24 ENCOUNTER — TELEPHONE (OUTPATIENT)
Dept: CARDIOLOGY | Facility: CLINIC | Age: 68
End: 2023-08-24

## 2023-08-24 LAB
ALBUMIN SERPL-MCNC: 3.7 G/DL (ref 3.5–5.2)
ALBUMIN/GLOB SERPL: 1.8 G/DL
ALP SERPL-CCNC: 81 U/L (ref 39–117)
ALT SERPL W P-5'-P-CCNC: 78 U/L (ref 1–41)
ANION GAP SERPL CALCULATED.3IONS-SCNC: 8 MMOL/L (ref 5–15)
AST SERPL-CCNC: 48 U/L (ref 1–40)
BILIRUB SERPL-MCNC: 1 MG/DL (ref 0–1.2)
BUN SERPL-MCNC: 30 MG/DL (ref 8–23)
BUN/CREAT SERPL: 33.3 (ref 7–25)
CALCIUM SPEC-SCNC: 8.7 MG/DL (ref 8.6–10.5)
CHLORIDE SERPL-SCNC: 98 MMOL/L (ref 98–107)
CO2 SERPL-SCNC: 28 MMOL/L (ref 22–29)
CREAT SERPL-MCNC: 0.9 MG/DL (ref 0.76–1.27)
DEPRECATED RDW RBC AUTO: 44.4 FL (ref 37–54)
EGFRCR SERPLBLD CKD-EPI 2021: 94.2 ML/MIN/1.73
ERYTHROCYTE [DISTWIDTH] IN BLOOD BY AUTOMATED COUNT: 12.6 % (ref 12.3–15.4)
GLOBULIN UR ELPH-MCNC: 2.1 GM/DL
GLUCOSE SERPL-MCNC: 78 MG/DL (ref 65–99)
HCT VFR BLD AUTO: 42.2 % (ref 37.5–51)
HGB BLD-MCNC: 14.2 G/DL (ref 13–17.7)
MAGNESIUM SERPL-MCNC: 2.8 MG/DL (ref 1.6–2.4)
MCH RBC QN AUTO: 32.8 PG (ref 26.6–33)
MCHC RBC AUTO-ENTMCNC: 33.6 G/DL (ref 31.5–35.7)
MCV RBC AUTO: 97.5 FL (ref 79–97)
PLATELET # BLD AUTO: 268 10*3/MM3 (ref 140–450)
PMV BLD AUTO: 10.2 FL (ref 6–12)
POTASSIUM SERPL-SCNC: 4.2 MMOL/L (ref 3.5–5.2)
PROT SERPL-MCNC: 5.8 G/DL (ref 6–8.5)
RBC # BLD AUTO: 4.33 10*6/MM3 (ref 4.14–5.8)
SODIUM SERPL-SCNC: 134 MMOL/L (ref 136–145)
WBC NRBC COR # BLD: 15.3 10*3/MM3 (ref 3.4–10.8)

## 2023-08-24 PROCEDURE — 63710000001 RIVAROXABAN 15 MG TABLET: Performed by: HOSPITALIST

## 2023-08-24 PROCEDURE — 85027 COMPLETE CBC AUTOMATED: CPT | Performed by: HOSPITALIST

## 2023-08-24 PROCEDURE — A9270 NON-COVERED ITEM OR SERVICE: HCPCS | Performed by: INTERNAL MEDICINE

## 2023-08-24 PROCEDURE — 63710000001 ROSUVASTATIN 20 MG TABLET: Performed by: INTERNAL MEDICINE

## 2023-08-24 PROCEDURE — 25010000002 FUROSEMIDE PER 20 MG: Performed by: INTERNAL MEDICINE

## 2023-08-24 PROCEDURE — 63710000001 DIGOXIN 125 MCG TABLET: Performed by: INTERNAL MEDICINE

## 2023-08-24 PROCEDURE — 63710000001 METOPROLOL SUCCINATE XL 50 MG TABLET SUSTAINED-RELEASE 24 HOUR: Performed by: INTERNAL MEDICINE

## 2023-08-24 PROCEDURE — 94760 N-INVAS EAR/PLS OXIMETRY 1: CPT

## 2023-08-24 PROCEDURE — 94664 DEMO&/EVAL PT USE INHALER: CPT

## 2023-08-24 PROCEDURE — A9270 NON-COVERED ITEM OR SERVICE: HCPCS | Performed by: HOSPITALIST

## 2023-08-24 PROCEDURE — 63710000001 SPIRONOLACTONE 12.5 MG TABLET: Performed by: INTERNAL MEDICINE

## 2023-08-24 PROCEDURE — 94799 UNLISTED PULMONARY SVC/PX: CPT

## 2023-08-24 PROCEDURE — 83735 ASSAY OF MAGNESIUM: CPT | Performed by: HOSPITALIST

## 2023-08-24 PROCEDURE — 63710000001 PANTOPRAZOLE 40 MG TABLET DELAYED-RELEASE: Performed by: INTERNAL MEDICINE

## 2023-08-24 PROCEDURE — 99232 SBSQ HOSP IP/OBS MODERATE 35: CPT | Performed by: NURSE PRACTITIONER

## 2023-08-24 PROCEDURE — 80053 COMPREHEN METABOLIC PANEL: CPT | Performed by: HOSPITALIST

## 2023-08-24 PROCEDURE — 94761 N-INVAS EAR/PLS OXIMETRY MLT: CPT

## 2023-08-24 PROCEDURE — 63710000001 MONTELUKAST 10 MG TABLET: Performed by: INTERNAL MEDICINE

## 2023-08-24 PROCEDURE — 63710000001 CHOLECALCIFEROL 25 MCG (1000 UT) TABLET: Performed by: INTERNAL MEDICINE

## 2023-08-24 RX ORDER — FUROSEMIDE 40 MG/1
40 TABLET ORAL DAILY
Status: DISCONTINUED | OUTPATIENT
Start: 2023-08-24 | End: 2023-08-30 | Stop reason: HOSPADM

## 2023-08-24 RX ADMIN — IPRATROPIUM BROMIDE AND ALBUTEROL SULFATE 3 ML: 2.5; .5 SOLUTION RESPIRATORY (INHALATION) at 07:33

## 2023-08-24 RX ADMIN — IPRATROPIUM BROMIDE AND ALBUTEROL SULFATE 3 ML: 2.5; .5 SOLUTION RESPIRATORY (INHALATION) at 18:56

## 2023-08-24 RX ADMIN — Medication 1000 UNITS: at 08:24

## 2023-08-24 RX ADMIN — METOPROLOL SUCCINATE 50 MG: 50 TABLET, FILM COATED, EXTENDED RELEASE ORAL at 20:51

## 2023-08-24 RX ADMIN — ROSUVASTATIN CALCIUM 20 MG: 20 TABLET, FILM COATED ORAL at 20:51

## 2023-08-24 RX ADMIN — FUROSEMIDE 40 MG: 10 INJECTION, SOLUTION INTRAMUSCULAR; INTRAVENOUS at 08:24

## 2023-08-24 RX ADMIN — PANTOPRAZOLE SODIUM 40 MG: 40 TABLET, DELAYED RELEASE ORAL at 08:24

## 2023-08-24 RX ADMIN — IPRATROPIUM BROMIDE AND ALBUTEROL SULFATE 3 ML: 2.5; .5 SOLUTION RESPIRATORY (INHALATION) at 14:38

## 2023-08-24 RX ADMIN — MONTELUKAST SODIUM 10 MG: 10 TABLET, FILM COATED ORAL at 20:51

## 2023-08-24 RX ADMIN — BUDESONIDE AND FORMOTEROL FUMARATE DIHYDRATE 2 PUFF: 160; 4.5 AEROSOL RESPIRATORY (INHALATION) at 07:35

## 2023-08-24 RX ADMIN — Medication 12.5 MG: at 08:25

## 2023-08-24 RX ADMIN — RIVAROXABAN 15 MG: 15 TABLET, FILM COATED ORAL at 11:33

## 2023-08-24 RX ADMIN — DIGOXIN 125 MCG: 125 TABLET ORAL at 11:33

## 2023-08-24 RX ADMIN — METOPROLOL SUCCINATE 50 MG: 50 TABLET, FILM COATED, EXTENDED RELEASE ORAL at 08:24

## 2023-08-24 RX ADMIN — BUDESONIDE AND FORMOTEROL FUMARATE DIHYDRATE 2 PUFF: 160; 4.5 AEROSOL RESPIRATORY (INHALATION) at 19:00

## 2023-08-24 NOTE — PROGRESS NOTES
"    Patient Name: Claude Spencer  :1955  68 y.o.      Patient Care Team:  Jason King MD as PCP - General (Internal Medicine)    Chief Complaint: follow up atrial fibrillation     Interval History:  while sitting in the chair eating lunch during my assessment. Asymptomatic. He was later walking in the alfred and HR was 140's.     At rest he is 80-90s. He is over all feeling much better.          Objective   Vital Signs  Temp:  [97.2 °F (36.2 °C)-98.7 °F (37.1 °C)] 97.4 °F (36.3 °C)  Heart Rate:  [] 115  Resp:  [16-18] 18  BP: ()/(64-82) 92/73    Intake/Output Summary (Last 24 hours) at 2023 1359  Last data filed at 2023 1242  Gross per 24 hour   Intake 240 ml   Output 450 ml   Net -210 ml     Flowsheet Rows      Flowsheet Row First Filed Value   Admission Height 175.3 cm (69.02\") Documented at 2023 2344   Admission Weight 80.6 kg (177 lb 9.6 oz) Documented at 2023 2344            Physical Exam:   General Appearance:    Alert, cooperative, in no acute distress   Lungs:     Clear to auscultation.  Normal respiratory effort and rate.      Heart:    irregular rhythm and normal rate, normal S1 and S2, no murmurs, gallops or rubs.     Chest Wall:    No abnormalities observed   Abdomen:     Soft, nontender, positive bowel sounds.     Extremities:   no cyanosis, clubbing or edema.  No marked joint deformities.  Adequate musculoskeletal strength.       Results Review:    Results from last 7 days   Lab Units 23  0708   SODIUM mmol/L 134*   POTASSIUM mmol/L 4.2   CHLORIDE mmol/L 98   CO2 mmol/L 28.0   BUN mg/dL 30*   CREATININE mg/dL 0.90   GLUCOSE mg/dL 78   CALCIUM mg/dL 8.7     Results from last 7 days   Lab Units 23  1446 23  1247 23  0713   HSTROP T ng/L 33* 29* 28*     Results from last 7 days   Lab Units 23  0708   WBC 10*3/mm3 15.30*   HEMOGLOBIN g/dL 14.2   HEMATOCRIT % 42.2   PLATELETS 10*3/mm3 268     Results from last 7 days   Lab " Units 08/22/23  1247   INR  1.09     Results from last 7 days   Lab Units 08/24/23  0708   MAGNESIUM mg/dL 2.8*                   Medication Review:   budesonide-formoterol, 2 puff, Inhalation, BID  cholecalciferol, 1,000 Units, Oral, Daily  digoxin, 125 mcg, Oral, Daily  furosemide, 40 mg, Oral, Daily  ipratropium-albuterol, 3 mL, Nebulization, Q6H While Awake - RT  metoprolol succinate XL, 50 mg, Oral, Q12H  montelukast, 10 mg, Oral, Nightly  pantoprazole, 40 mg, Oral, Q AM  rivaroxaban, 15 mg, Oral, BID With Meals  rosuvastatin, 20 mg, Oral, Nightly  senna-docusate sodium, 2 tablet, Oral, BID  spironolactone, 12.5 mg, Oral, Daily                Assessment & Plan   Acute on chronic systolic congestive heart failure due to atrial fibrillation and valvular heart disease  Permanent atrial fibrillation  Mitral valve regurgitation  Renal mass, needs nephrectomy in next month or so  Mild to moderate coronary artery disease by cath 4/2021  Pulmonary hypertension  Elevated transaminases - improving     - HR variable. Still with tachycardia - 110's while eating lunch in chair. 140-150 when walking in the alfred.     - volume status improved. Transition to oral diuretics.     - I agree that Mr. Spencer looks pretty good. His HR is decently controlled with periods of tachycardia depending on what he is doing. He has had recurrent heart failure from this. His insight and access to follow up is poor. I will have EP just take a look and see if they have anything to office.     - He needs clearance for nephrectomy in the future. This is challenging because he can so easily tip in to decompensated heart failure with his labile heart rate.     - discussed with Dr. Draper.     KHADRA Key  [facility]  08/24/23  13:59 EDT

## 2023-08-24 NOTE — PLAN OF CARE
Goal Outcome Evaluation:              Outcome Evaluation: Pt A&Ox4, VSS - still in Afib but rate better controlled, and no c/o pain. Pt was switched from IV lasix to PO and was started on Xarelto. However, Xarelto was D/C'd bc EP is going to see pt in the morning and possibly do a PPM or ablation. Will CTM.

## 2023-08-24 NOTE — PROGRESS NOTES
"    DAILY PROGRESS NOTE  [facility]    Patient Identification:  Name: Claude Spencer  Age: 68 y.o.  Sex: male  :  1955  MRN: [id number]         Primary Care Physician: Jason King MD    Subjective:  Interval History: Seems a bit upset over the thought of not receiving an ablation.  Patient reports compliance with meds except for the ones that have been a physical burden.  Cardiology working with [facility] for assistance.  Denies any chest pain.  Happy with his volume status of his lower extremities and resolution of edema    Objective: Conversational pleasant.  Nontoxic.  No family present    Scheduled Meds:budesonide-formoterol, 2 puff, Inhalation, BID  cholecalciferol, 1,000 Units, Oral, Daily  digoxin, 125 mcg, Oral, Daily  furosemide, 40 mg, Intravenous, Q12H  ipratropium-albuterol, 3 mL, Nebulization, Q6H While Awake - RT  metoprolol succinate XL, 50 mg, Oral, Q12H  montelukast, 10 mg, Oral, Nightly  pantoprazole, 40 mg, Oral, Q AM  rosuvastatin, 20 mg, Oral, Nightly  senna-docusate sodium, 2 tablet, Oral, BID  spironolactone, 12.5 mg, Oral, Daily      Continuous Infusions:     Vital signs in last 24 hours:  Temp:  [97.2 °F (36.2 °C)-98.7 °F (37.1 °C)] 98.7 °F (37.1 °C)  Heart Rate:  [] 95  Resp:  [16-18] 18  BP: ()/(63-82) 112/64    Intake/Output:    Intake/Output Summary (Last 24 hours) at 2023 1019  Last data filed at 2023 1337  Gross per 24 hour   Intake --   Output 950 ml   Net -950 ml       Exam:  /64 (BP Location: Right arm, Patient Position: Sitting)   Pulse 95   Temp 98.7 °F (37.1 °C) (Oral)   Resp 18   Ht 175.3 cm (69.02\")   Wt 78.6 kg (173 lb 4.8 oz)   SpO2 96%   BMI 25.58 kg/m²     General Appearance:    Alert, cooperative, no distress, AAOx3                         Throat:   Oral mucosa pink and moist                           Neck:   No JVD                         Lungs:    Clear to auscultation bilaterally, respirations unlabored       "                    Heart:  Irregularly irregular rate and rhythm, S1 and S2 normal                  Abdomen:     Soft, nontender, bowel sounds active                  Extremities: Moving all, stasis changes noted, resolved edema                        Pulses:   Pulses palpable in lower extremities                              Data Review:  Labs in chart were reviewed.    Assessment:  Active Hospital Problems    Diagnosis  POA    **Acute on chronic combined systolic and diastolic CHF (congestive heart failure) [I50.43]  Unknown    Pulmonary hypertension [I27.20]  Yes    Pulmonary emphysema, unspecified emphysema type [J43.9]  Yes    Atrial fibrillation [I48.91]  Yes    Gastroesophageal reflux disease [K21.9]  Yes    Hypertension [I10]  Yes    Hypercholesterolemia [E78.00]  Yes      Resolved Hospital Problems   No resolved problems to display.       Plan:    Acute on chronic both systolic and diastolic CHF/nonischemic cardiomyopathy compounded by severe mitral regurgitation and uncontrolled A-fib/RVR compounded by pulmonary hypertension              -Cardiology consulted and previous plans for EP evaluation seem to evolve              -Still Lasix 40 mg IV every 12 -anticipate transition to p.o. pending cards              -Metoprolol/digoxin noted for A-fib -BP holding more stable currently at 112/64              -6/6/2023 echo reviewed -EF of 26 to 30% compounded by severe hypokinesis with severely dilated left atrium and severe mitral valve regurgitation with moderate tricuspid valve regurgitation     COPD without acute exacerbation     HTN stable/hypotensive      Abnormal LFTs likely secondary to passive congestion -okay with statin for now -elevation is mild and has improved with no plans for further inpatient surveillance     Outpatient plans for partial nephrectomy noted per urology -they request OR clearance from cardiology       Disposition -awaiting cardiology and medication approval/coverage fiscally as they  are currently utilizing Lovenox with plans for Xarelto   -At any time pending cardiology      Gerry Damon MD  8/24/2023  10:19 EDT

## 2023-08-24 NOTE — PAYOR COMM NOTE
"Annika Dobbs (68 y.o. Male)     PLEASE SEE ATTACHED FOR INPT AUTH     REF #   [id number]    PLEASE CALL EMILEE ARTEAGA RN/ DEPT @ [phone]  OR FAX  DEPARTMENT @  [phone]    THANK YOU   EMILEE ARTEAGA RN  [facility]          Date of Birth   1955    Social Security Number       Address   [address] [place] [address]    Home Phone   [phone]    MRN   [id number]       Confucianism   Other    Marital Status                               Admission Date   8/22/23    Admission Type   Urgent    Admitting Provider   Junior Saul MD    Attending Provider   Gerry Damon MD    Department, Room/Bed   [address], S508/1       Discharge Date       Discharge Disposition       Discharge Destination                                 Attending Provider: Gerry Damon MD    Allergies: Apixaban    Isolation: None   Infection: None   Code Status: CPR    Ht: 175.3 cm (69.02\")   Wt: 78.6 kg (173 lb 4.8 oz)    Admission Cmt: None   Principal Problem: Acute on chronic combined systolic and diastolic CHF (congestive heart failure) [I50.43]                   Active Insurance as of 8/22/2023       Primary Coverage       Payor Plan Insurance Group Employer/Plan Group    MEDICARE MEDICARE A ONLY        Payor Plan Address Payor Plan Phone Number Payor Plan Fax Number Effective Dates    [address] [phone]  3/1/2015 - None Entered    [facility] [id number]         Subscriber Name Subscriber Birth Date Member ID       ANNIKA DOBBS 1955 [id number]               Secondary Coverage       Payor Plan Insurance Group Employer/Plan Group    ANTHEM BLUE CROSS ANTHEM BLUE CROSS BLUE SHIELD PPO [id number]       Payor Plan Address Payor Plan Phone Number Payor Plan Fax Number Effective Dates    [address] [phone]  1/1/2019 - None Entered    [facility] [id number]         Subscriber Name Subscriber Birth Date Member ID       ISAI DOBBS 3/13/1962 " [id number]                     Emergency Contacts        (Rel.) Home Phone Work Phone Mobile Phone    LEXIE DOBBS (Daughter) -- -- [phone]    Antonieta Dobbs (Spouse) [phone] -- --              [place]: [facility] [id number]  Tax ID [id number]     History & Physical        Stingl, Jeni Bob MD at 23          HISTORY AND PHYSICAL   [facility]        Date of Admission: 2023  Patient Identification:  Name: Claude Dobbs  Age: 68 y.o.  Sex: male  :  1955  MRN: [id number]                     Primary Care Physician: Jason King MD    Chief Complaint:  68 year old gentleman presented to the emergency room at [place] with worsening chf and a fib with has not been rate controlled; he was seen by dr benavidez earlier today and advised to go to the ED; she felt that he needs diuresis and possibly an ablation and pacemaker; he denies fever or chills; he reports brisk urine output with lasix given prior to his transfer; no chest pain; no fever or chills    History of Present Illness:   As above    Past Medical History:  Past Medical History:   Diagnosis Date    Atrial fibrillation 2021    COPD (chronic obstructive pulmonary disease)     Gastroesophageal reflux disease 2016    Hyperlipidemia     Hypertension     Nosebleed     Osteopenia 2016    Description: Her bone density 2015 secondary to steroid use    Vitamin D deficiency 2016     Past Surgical History:  Past Surgical History:   Procedure Laterality Date    ANKLE SURGERY      CARDIAC CATHETERIZATION Left 2021    Procedure: Coronary Angiography;  Surgeon: Rao Del Cid MD;  Location: [facility] CATH INVASIVE LOCATION;  Service: Cardiovascular;  Laterality: Left;    CARDIAC CATHETERIZATION N/A 2021    Procedure: Left Heart Cath;  Surgeon: Rao Del Cid MD;  Location: [facility] CATH INVASIVE LOCATION;  Service: Cardiovascular;  Laterality: N/A;     INGUINAL HERNIA REPAIR Bilateral 7/1/2023    Procedure: INGUINAL HERNIA REPAIR LAPAROSCOPIC;  Surgeon: Ayanna Moreira DO;  Location:  [facility] OR;  Service: General;  Laterality: Bilateral;    UMBILICAL HERNIA REPAIR N/A 7/1/2023    Procedure: UMBILICAL HERNIA REPAIR;  Surgeon: Ayanna Moreira DO;  Location:  [facility] OR;  Service: General;  Laterality: N/A;      Home Meds:  Medications Prior to Admission   Medication Sig Dispense Refill Last Dose    albuterol sulfate  (90 Base) MCG/ACT inhaler Inhale 2 puffs Every 4 (Four) Hours As Needed for Wheezing.       digoxin (LANOXIN) 125 MCG tablet Take 1 tablet by mouth Daily.       furosemide (LASIX) 80 MG tablet Take 1 tablet by mouth 2 (Two) Times a Day for 30 days. 60 tablet 0     losartan (COZAAR) 25 MG tablet Take 1 tablet by mouth 2 (Two) Times a Day for 30 days. 60 tablet 0     methylPREDNISolone (MEDROL) 4 MG dose pack Daily.   8/21/2023    budesonide-formoterol (Symbicort) 160-4.5 MCG/ACT inhaler Inhale 2 puffs 2 (Two) Times a Day. 10.2 g 6     cholecalciferol (VITAMIN D3) 25 MCG (1000 UT) tablet Take 1 tablet by mouth Daily.       ipratropium-albuterol (DUO-NEB) 0.5-2.5 mg/3 ml nebulizer Take 3 mL by nebulization Every 4 (Four) Hours As Needed for Wheezing or Shortness of Air. 360 mL 1     metoprolol succinate XL (TOPROL-XL) 50 MG 24 hr tablet Take 1 tablet by mouth Every 12 (Twelve) Hours. 60 tablet 0     montelukast (SINGULAIR) 10 MG tablet Take 1 tablet by mouth Every Night. 60 tablet 0     omeprazole (priLOSEC) 20 MG capsule Take 1 capsule by mouth Daily. 30 capsule 0     rosuvastatin (CRESTOR) 20 MG tablet Take 1 tablet by mouth Every Night. 90 tablet 3     spironolactone (ALDACTONE) 25 MG tablet Take 0.5 tablets by mouth Daily.          Allergies:  Allergies   Allergen Reactions    Apixaban Other (See Comments)     Nose bleed     Immunizations:  Immunization History   Administered Date(s) Administered    DTP 05/09/1963, 07/03/1963, 08/17/1963     Flublok 18+yrs 10/22/2021    Fluzone High-Dose 65+yrs 2022    Measles 1970, 1970    Pneumococcal Conjugate 13-Valent (PCV13) 2016    Polio, Unspecified 1963, 1963, 1963, 1970    Smallpox 1965    Td, Unspecified 1965, 1970, 1972     Social History:   Social History     Social History Narrative     - third marriage    Disabled from COPD    One daughter      Social History     Socioeconomic History    Marital status:    Tobacco Use    Smoking status: Former     Packs/day: 1.00     Years: 30.00     Pack years: 30.00     Types: Cigarettes     Quit date: 2000     Years since quittin.3     Passive exposure: Past    Smokeless tobacco: Current     Types: Chew    Tobacco comments:     quit 15 years ago, wife current smoker outside   Vaping Use    Vaping Use: Never used   Substance and Sexual Activity    Alcohol use: No    Drug use: No     Types: Marijuana    Sexual activity: Yes     Partners: Female       Family History:  Family History   Problem Relation Age of Onset    COPD Mother 78    Seizures Father     Vasculitis Father     Prostate cancer Maternal Uncle     Heart disease Brother         Review of Systems  See history of present illness and past medical history.  Patient denies headache, dizziness, syncope, falls, trauma, change in vision, change in hearing, change in taste, changes in weight, changes in appetite, focal weakness, numbness, or paresthesia.  Patient denies chest pain, palpitations cough, sinus pressure, rhinorrhea, epistaxis, hemoptysis, nausea, vomiting,hematemesis, diarrhea, constipation or hematochezia.  Denies cold or heat intolerance, polydipsia, polyuria, polyphagia. Denies hematuria, pyuria, dysuria, hesitancy, frequency or urgency. Denies consumption of raw and under cooked meats foods or change in water source.  Denies fever, chills, sweats, night sweats.       Objective:  T Max 24 hrs: Temp  (24hrs), Av.4 °F (36.9 °C), Min:98.1 °F (36.7 °C), Max:98.8 °F (37.1 °C)    Vitals Ranges:   Temp:  [98.1 °F (36.7 °C)-98.8 °F (37.1 °C)] 98.2 °F (36.8 °C)  Heart Rate:  [] 114  Resp:  [18-22] 18  BP: (100-125)/(58-95) 124/89      Exam:  /89 (BP Location: Right arm, Patient Position: Lying)   Pulse 114   Temp 98.2 °F (36.8 °C) (Oral)   Resp 18   SpO2 97%     General Appearance:    Alert, cooperative, no distress, appears stated age; chronically ill appearing   Head:    Normocephalic, without obvious abnormality, atraumatic   Eyes:    PERRL, conjunctivae/corneas clear, EOM's intact, both eyes   Ears:    Normal external ear canals, both ears   Nose:   Nares normal, septum midline, mucosa normal, no drainage    or sinus tenderness   Throat:   Lips, mucosa, and tongue normal   Neck:   Supple, symmetrical, trachea midline, no adenopathy;     thyroid:  no enlargement/tenderness/nodules; no carotid    bruit or JVD   Back:     Symmetric, no curvature, ROM normal, no CVA tenderness   Lungs:     Clear to auscultation bilaterally, respirations unlabored   Chest Wall:    No tenderness or deformity    Heart:    Regular rate and rhythm, S1 and S2 normal, no murmur, rub   or gallop   Abdomen:     Soft, nontender, bowel sounds active all four quadrants,     no masses, no hepatomegaly, no splenomegaly   Extremities:   Extremities normal, atraumatic,  2 plus edema                       .    Data Review:  Labs in chart were reviewed.  WBC   Date Value Ref Range Status   2023 14.76 (H) 3.40 - 10.80 10*3/mm3 Final     Hemoglobin   Date Value Ref Range Status   2023 14.2 13.0 - 17.7 g/dL Final     Hematocrit   Date Value Ref Range Status   2023 44.5 37.5 - 51.0 % Final     Platelets   Date Value Ref Range Status   2023 280 140 - 450 10*3/mm3 Final     Sodium   Date Value Ref Range Status   2023 138 136 - 145 mmol/L Final     Potassium   Date Value Ref Range Status   2023 3.6 3.5 -  5.2 mmol/L Final     Chloride   Date Value Ref Range Status   2023 96 (L) 98 - 107 mmol/L Final     CO2   Date Value Ref Range Status   2023 30.1 (H) 22.0 - 29.0 mmol/L Final     BUN   Date Value Ref Range Status   2023 27 (H) 8 - 23 mg/dL Final     Creatinine   Date Value Ref Range Status   2023 0.81 0.76 - 1.27 mg/dL Final     Glucose   Date Value Ref Range Status   2023 137 (H) 65 - 99 mg/dL Final     Calcium   Date Value Ref Range Status   2023 8.9 8.6 - 10.5 mg/dL Final     Magnesium   Date Value Ref Range Status   2023 2.1 1.6 - 2.4 mg/dL Final     Phosphorus   Date Value Ref Range Status   2023 3.9 2.5 - 4.5 mg/dL Final                Imaging Results (All)       None              Assessment:  Acute on chronic diastolic chf  A fib  Copd  Hypertension  Hyperglycemia  Renal mass  Pulmonary hypertension      Plan:  Continue diuresis  Monitor on telemetry  Ask ep and cardiology to see him  Trend labs  Ask urology to see him  Dw patient and ed provider  Jeni Troy MD  2023  19:22 EDT      Electronically signed by Jeni Troy MD at 23 1930       Emergency Department Notes    No notes of this type exist for this encounter.          Physician Progress Notes (last 48 hours)        Gerry Damon MD at 23 1019              DAILY PROGRESS NOTE  [facility]    Patient Identification:  Name: Claude Spencer  Age: 68 y.o.  Sex: male  :  1955  MRN: [id number]         Primary Care Physician: Jason King MD    Subjective:  Interval History: Seems a bit upset over the thought of not receiving an ablation.  Patient reports compliance with meds except for the ones that have been a physical burden.  Cardiology working with CCP for assistance.  Denies any chest pain.  Happy with his volume status of his lower extremities and resolution of edema    Objective: Conversational pleasant.  Nontoxic.  No family  "present    Scheduled Meds:budesonide-formoterol, 2 puff, Inhalation, BID  cholecalciferol, 1,000 Units, Oral, Daily  digoxin, 125 mcg, Oral, Daily  furosemide, 40 mg, Intravenous, Q12H  ipratropium-albuterol, 3 mL, Nebulization, Q6H While Awake - RT  metoprolol succinate XL, 50 mg, Oral, Q12H  montelukast, 10 mg, Oral, Nightly  pantoprazole, 40 mg, Oral, Q AM  rosuvastatin, 20 mg, Oral, Nightly  senna-docusate sodium, 2 tablet, Oral, BID  spironolactone, 12.5 mg, Oral, Daily      Continuous Infusions:     Vital signs in last 24 hours:  Temp:  [97.2 °F (36.2 °C)-98.7 °F (37.1 °C)] 98.7 °F (37.1 °C)  Heart Rate:  [] 95  Resp:  [16-18] 18  BP: ()/(63-82) 112/64    Intake/Output:    Intake/Output Summary (Last 24 hours) at 8/24/2023 1019  Last data filed at 8/23/2023 1337  Gross per 24 hour   Intake --   Output 950 ml   Net -950 ml       Exam:  /64 (BP Location: Right arm, Patient Position: Sitting)   Pulse 95   Temp 98.7 °F (37.1 °C) (Oral)   Resp 18   Ht 175.3 cm (69.02\")   Wt 78.6 kg (173 lb 4.8 oz)   SpO2 96%   BMI 25.58 kg/m²     General Appearance:    Alert, cooperative, no distress, AAOx3                         Throat:   Oral mucosa pink and moist                           Neck:   No JVD                         Lungs:    Clear to auscultation bilaterally, respirations unlabored                          Heart:  Irregularly irregular rate and rhythm, S1 and S2 normal                  Abdomen:     Soft, nontender, bowel sounds active                  Extremities: Moving all, stasis changes noted, resolved edema                        Pulses:   Pulses palpable in lower extremities                              Data Review:  Labs in chart were reviewed.    Assessment:  Active Hospital Problems    Diagnosis  POA    **Acute on chronic combined systolic and diastolic CHF (congestive heart failure) [I50.43]  Unknown    Pulmonary hypertension [I27.20]  Yes    Pulmonary emphysema, unspecified " emphysema type [J43.9]  Yes    Atrial fibrillation [I48.91]  Yes    Gastroesophageal reflux disease [K21.9]  Yes    Hypertension [I10]  Yes    Hypercholesterolemia [E78.00]  Yes      Resolved Hospital Problems   No resolved problems to display.       Plan:    Acute on chronic both systolic and diastolic CHF/nonischemic cardiomyopathy compounded by severe mitral regurgitation and uncontrolled A-fib/RVR compounded by pulmonary hypertension              -Cardiology consulted and previous plans for EP evaluation seem to evolve              -Still Lasix 40 mg IV every 12 -anticipate transition to p.o. pending cards              -Metoprolol/digoxin noted for A-fib -BP holding more stable currently at 112/64              -2023 echo reviewed -EF of 26 to 30% compounded by severe hypokinesis with severely dilated left atrium and severe mitral valve regurgitation with moderate tricuspid valve regurgitation     COPD without acute exacerbation     HTN stable/hypotensive      Abnormal LFTs likely secondary to passive congestion -okay with statin for now -elevation is mild and has improved with no plans for further inpatient surveillance     Outpatient plans for partial nephrectomy noted per urology -they request OR clearance from cardiology       Disposition -awaiting cardiology and medication approval/coverage fiscally as they are currently utilizing Lovenox with plans for Xarelto   -At any time pending cardiology      Gerry Damon MD  2023  10:19 EDT      Electronically signed by Gerry Damon MD at 23 1025       Gerry Damon MD at 23 1250              DAILY PROGRESS NOTE  [facility]    Patient Identification:  Name: Claude Spencer  Age: 68 y.o.  Sex: male  :  1955  MRN: [id number]         Primary Care Physician: Jason King MD    Subjective:  Interval History: States he feels much better.  He is requesting a shower.  Denies any chest pain and states  "his swelling is good for him.  Denies any nausea vomiting altered mentation or fever.  Claims Dr. Patel is his long-term cardiologist    Objective: Sitting up in bedside chair conversational and pleasant and certainly nontoxic.    Scheduled Meds:budesonide-formoterol, 2 puff, Inhalation, BID  cholecalciferol, 1,000 Units, Oral, Daily  digoxin, 125 mcg, Oral, Daily  furosemide, 40 mg, Intravenous, Q12H  ipratropium-albuterol, 3 mL, Nebulization, Q6H While Awake - RT  losartan, 25 mg, Oral, BID  metoprolol succinate XL, 50 mg, Oral, Q12H  montelukast, 10 mg, Oral, Nightly  pantoprazole, 40 mg, Oral, Q AM  rosuvastatin, 20 mg, Oral, Nightly  senna-docusate sodium, 2 tablet, Oral, BID  spironolactone, 12.5 mg, Oral, Daily      Continuous Infusions:     Vital signs in last 24 hours:  Temp:  [97.9 °F (36.6 °C)-98.3 °F (36.8 °C)] 97.9 °F (36.6 °C)  Heart Rate:  [] 124  Resp:  [16-18] 16  BP: ()/(58-89) 99/63    Intake/Output:    Intake/Output Summary (Last 24 hours) at 8/23/2023 1254  Last data filed at 8/23/2023 0817  Gross per 24 hour   Intake --   Output 1900 ml   Net -1900 ml       Exam:  BP 99/63 (BP Location: Left arm, Patient Position: Sitting)   Pulse (!) 124   Temp 97.9 °F (36.6 °C) (Oral)   Resp 16   Ht 175.3 cm (69.02\")   Wt 81 kg (178 lb 9.2 oz)   SpO2 94%   BMI 26.36 kg/m²     General Appearance:    Alert, cooperative, AAOx3                         Throat:   Oral mucosa pink and moist                           Neck:   No JVD                         Lungs:    Clear to auscultation bilaterally, respirations unlabored                 Chest Wall:    No tenderness or deformity                          Heart:  Irregularly irregular rate and rhythm, S1 and S2 normal, +murmur                  Abdomen:     Soft, nontender, bowel sounds active, no body wall edema                 Extremities: Teds are in place and he is got 1+ edema above those teds but nothing that is really tracking up into his " thighs today                        Pulses:   Pulses palpable in lower extremities                            Skin:   Skin is warm and dry                  Neurologic:   CNII-XII intact       Data Review:  Labs in chart were reviewed.    Assessment:  Active Hospital Problems    Diagnosis  POA    **Acute on chronic combined systolic and diastolic CHF (congestive heart failure) [I50.43]  Unknown    Pulmonary hypertension [I27.20]  Yes    Pulmonary emphysema, unspecified emphysema type [J43.9]  Yes    Atrial fibrillation [I48.91]  Yes    Gastroesophageal reflux disease [K21.9]  Yes    Hypertension [I10]  Yes    Hypercholesterolemia [E78.00]  Yes      Resolved Hospital Problems   No resolved problems to display.       Plan:    Acute on chronic both systolic and diastolic CHF/nonischemic cardiomyopathy compounded by severe mitral regurgitation and uncontrolled A-fib/RVR compounded by pulmonary hypertension   -Cardiology consulted -EP evaluation pending   -On Lasix 40 mg IV every 12 -give additional K x2 is low normal with IV diuresis   -Metoprolol/digoxin noted for A-fib   -6/6/2023 echo reviewed -EF of 26 to 30% compounded by severe hypokinesis with severely dilated left atrium and severe mitral valve regurgitation with moderate tricuspid valve regurgitation    COPD without acute exacerbation    HTN stable/hypotensive -parameters written and we will go ahead and hold ARB since diuresing     Abnormal LFTs likely secondary to passive congestion -okay with statin for now    Outpatient follow-up of renal mass suggested though I see urology consult previously placed and will appreciate their input but would otherwise anticipate the bulk of which would be ascertained outpatient        Disposition -awaiting cardiology/EP input otherwise was clinically way better than I was anticipating.  Patient hopeful to discharge home tomorrow and ultimately everything hinges on cardiology.    Gerry Damon MD  8/23/2023  12:54  EDT      Electronically signed by Gerry Damon MD at 23 1329       Aida Meyer MD at 23 0808          [facility]  Progress note: 2023    Patient Identification:  Name:Claude Spencer  Age:68 y.o.  Sex: male  :  1955  MRN: [id number]           CC:  Congestive heart failure, atrial fibrillation.    Interval history:  Modest diuresis overnight.  Vital stable.  Heart rate improved earlier but slightly tachycardic this afternoon..  Patient states that dyspnea has improved significantly.    Vital Signs:   Temp:  [98 °F (36.7 °C)-98.8 °F (37.1 °C)] 98.3 °F (36.8 °C)  Heart Rate:  [] 83  Resp:  [18-22] 18  BP: (100-125)/(58-95) 122/79    Intake/Output Summary (Last 24 hours) at 2023 0808  Last data filed at 2023 2300  Gross per 24 hour   Intake --   Output 1300 ml   Net -1300 ml       Physical Examination:    General Appearance No acute distress   Neck Trachea midline, no adenopathy   Lungs Decreased breath sounds at bases,respirations regular, even and unlabored   Heart irregular rhythm and normal rate, normal S1 and S2, 2/6 murmur, no gallop, no rub, no click   Chest wall No abnormalities observed   Abdomen Normal bowel sounds, no masses, no hepatomegaly, soft   Extremities Moves all extremities well, no edema, no cyanosis, no redness   Neurological Alert and oriented x 3     Lab Review:  Personally reviewed the labs, radiology imaging and other cardiac procedures.   Results from last 7 days   Lab Units 23  0545 23  1247   SODIUM mmol/L 139 138   POTASSIUM mmol/L 3.5 3.6   CHLORIDE mmol/L 94* 96*   CO2 mmol/L 34.0* 30.1*   BUN mg/dL 32* 27*   CREATININE mg/dL 0.78 0.81   CALCIUM mg/dL 8.5* 8.9   BILIRUBIN mg/dL  --  0.8   ALK PHOS U/L  --  131*   ALT (SGPT) U/L  --  103*   AST (SGOT) U/L  --  68*   GLUCOSE mg/dL 106* 137*     Results from last 7 days   Lab Units 23  1446 23  1247 23  0713   HSTROP T ng/L 33* 29* 28*     Results  from last 7 days   Lab Units 08/23/23  0545 08/22/23  1247 08/21/23  0454   WBC 10*3/mm3 13.54* 14.76* 12.33*   HEMOGLOBIN g/dL 14.1 14.2 14.3   HEMATOCRIT % 41.5 44.5 43.6   PLATELETS 10*3/mm3 261 280 213     Results from last 7 days   Lab Units 08/22/23  1247   INR  1.09     Medication Review:   Meds reviewed  Scheduled Meds:budesonide-formoterol, 2 puff, Inhalation, BID  cholecalciferol, 1,000 Units, Oral, Daily  digoxin, 125 mcg, Oral, Daily  furosemide, 40 mg, Intravenous, Q12H  ipratropium-albuterol, 3 mL, Nebulization, Q6H While Awake - RT  losartan, 25 mg, Oral, BID  metoprolol succinate XL, 50 mg, Oral, Q12H  montelukast, 10 mg, Oral, Nightly  pantoprazole, 40 mg, Oral, Q AM  rosuvastatin, 20 mg, Oral, Nightly  senna-docusate sodium, 2 tablet, Oral, BID  spironolactone, 12.5 mg, Oral, Daily        I personally viewed and interpreted the patient's EKG/Telemetry data    Assessment and Plan  1.  Congestive heart failure, acute on chronic HFrEF.  Echo 6/2023 with global hypokinesis, EF 26 to 30%, severe biatrial enlargement, severe mitral regurgitation moderate tricuspid regurgitation with RVSP 49 mmHg.  Care complicated by hypotension and medical costs and travel limitations.  Currently on IV Lasix twice daily.  Seems to have tolerated losartan, spironolactone and metoprolol from a blood pressure standpoint.  Depending on blood pressure response over the next 48 hours would also add Jardiance.  Patient states that he had been taking all of his and coming meds further has been quite concerned that he had not been able to afford these.  The fact that he is responding nicely to his home regimen suggests he likely was not taking meds correctly at home.  2.  Permanent atrial fibrillation, rates previously poorly controlled.  Rates now improved significantly on metoprolol.  Given response to medical therapy I do not think ablation will be needed.  I previously discussed with Dr. Draper and no clear contraindication  for anticoagulation other than affordability and subsequent compliance.  Urology consult pending for renal mass.  Once cleared, will start Lovenox while CCP addresses affordability for Xarelto.  3.  Mitral regurgitation, severe.  Hopefully will improve with treatment of tachycardia   4.  Renal mass, possible renal cell carcinoma, evaluation incomplete and consult has been requested.    5.  Mild to moderate coronary artery disease by cath 2021  6.  Pulmonary hypertension, modest  7.  Elevated transaminases.  Recheck tomorrow with further diuresis  8.  Poor medication coverage.  We will add CCP to review  For candidacy for assistance       Aida Meyer  308:08 EDT  25min spent in reviewing records, discussion and examination of the patient and discussion with other members of the patient's medical team.     Dictated utilizing Dragon dictation     Electronically signed by Aida Meyer MD at 23 2132          Consult Notes (last 48 hours)        Rao Martinez MD at 23 1344                   [facility] CONSULT      Patient Identification:  NAME:  Claude Spencer  Age:  68 y.o.   Sex:  male   :  1955   MRN:  [id number]       Chief complaint: Kidney mass    History of present illness: This a very pleasant 68-year-old gentleman seen on his last admission for hematuria and worked up.  Found to have a mass in his left kidney.  I saw him in consultation about a month ago.  We have been having trouble getting a hold of him to get him scheduled for surgery.  He is now back in with cardiac issues.  He has had no further hematuria.  He is voiding well.  No flank pain.  The mass is associated with a large cyst in the upper pole left kidney and one of our plan is to do a partial nephrectomy.      Past medical history:  Past Medical History:   Diagnosis Date    Atrial fibrillation 2021    COPD (chronic obstructive pulmonary disease)     Gastroesophageal reflux disease 2016     Hyperlipidemia     Hypertension     Nosebleed     Osteopenia 03/04/2016    Description: Her bone density August 2015 secondary to steroid use    Vitamin D deficiency 03/04/2016       Past surgical history:  Past Surgical History:   Procedure Laterality Date    ANKLE SURGERY      CARDIAC CATHETERIZATION Left 4/20/2021    Procedure: Coronary Angiography;  Surgeon: Rao Del Cid MD;  Location:  [facility];  Service: Cardiovascular;  Laterality: Left;    CARDIAC CATHETERIZATION N/A 4/20/2021    Procedure: Left Heart Cath;  Surgeon: Rao Del Cid MD;  Location:  [facility];  Service: Cardiovascular;  Laterality: N/A;    INGUINAL HERNIA REPAIR Bilateral 7/1/2023    Procedure: INGUINAL HERNIA REPAIR LAPAROSCOPIC;  Surgeon: Ayanna Moreira DO;  Location:  [facility];  Service: General;  Laterality: Bilateral;    UMBILICAL HERNIA REPAIR N/A 7/1/2023    Procedure: UMBILICAL HERNIA REPAIR;  Surgeon: Ayanna Moreira DO;  Location:  [facility];  Service: General;  Laterality: N/A;       Allergies:  Apixaban    Home medications:  Medications Prior to Admission   Medication Sig Dispense Refill Last Dose    albuterol sulfate  (90 Base) MCG/ACT inhaler Inhale 2 puffs Every 4 (Four) Hours As Needed for Wheezing.       digoxin (LANOXIN) 125 MCG tablet Take 1 tablet by mouth Daily.       furosemide (LASIX) 80 MG tablet Take 1 tablet by mouth 2 (Two) Times a Day for 30 days. 60 tablet 0     losartan (COZAAR) 25 MG tablet Take 1 tablet by mouth 2 (Two) Times a Day for 30 days. 60 tablet 0     methylPREDNISolone (MEDROL) 4 MG dose pack Daily.   8/21/2023    budesonide-formoterol (Symbicort) 160-4.5 MCG/ACT inhaler Inhale 2 puffs 2 (Two) Times a Day. 10.2 g 6     cholecalciferol (VITAMIN D3) 25 MCG (1000 UT) tablet Take 1 tablet by mouth Daily.       ipratropium-albuterol (DUO-NEB) 0.5-2.5 mg/3 ml nebulizer Take 3 mL by nebulization Every 4 (Four) Hours As Needed for  Wheezing or Shortness of Air. 360 mL 1     metoprolol succinate XL (TOPROL-XL) 50 MG 24 hr tablet Take 1 tablet by mouth Every 12 (Twelve) Hours. 60 tablet 0     montelukast (SINGULAIR) 10 MG tablet Take 1 tablet by mouth Every Night. 60 tablet 0     omeprazole (priLOSEC) 20 MG capsule Take 1 capsule by mouth Daily. 30 capsule 0     rosuvastatin (CRESTOR) 20 MG tablet Take 1 tablet by mouth Every Night. 90 tablet 3     spironolactone (ALDACTONE) 25 MG tablet Take 0.5 tablets by mouth Daily.           Hospital medications:  budesonide-formoterol, 2 puff, Inhalation, BID  cholecalciferol, 1,000 Units, Oral, Daily  digoxin, 125 mcg, Oral, Daily  furosemide, 40 mg, Intravenous, Q12H  ipratropium-albuterol, 3 mL, Nebulization, Q6H While Awake - RT  metoprolol succinate XL, 50 mg, Oral, Q12H  montelukast, 10 mg, Oral, Nightly  pantoprazole, 40 mg, Oral, Q AM  potassium chloride, 40 mEq, Oral, Q4H  rosuvastatin, 20 mg, Oral, Nightly  senna-docusate sodium, 2 tablet, Oral, BID  spironolactone, 12.5 mg, Oral, Daily           acetaminophen    albuterol    senna-docusate sodium **AND** polyethylene glycol **AND** bisacodyl **AND** bisacodyl    melatonin    ondansetron **OR** ondansetron    Family history:  Family History   Problem Relation Age of Onset    COPD Mother 78    Seizures Father     Vasculitis Father     Prostate cancer Maternal Uncle     Heart disease Brother        Social history:  Social History     Tobacco Use    Smoking status: Former     Packs/day: 1.00     Years: 30.00     Pack years: 30.00     Types: Cigarettes     Quit date: 2000     Years since quittin.3     Passive exposure: Past    Smokeless tobacco: Current     Types: Chew    Tobacco comments:     quit 15 years ago, wife current smoker outside   Vaping Use    Vaping Use: Never used   Substance Use Topics    Alcohol use: No    Drug use: No     Types: Marijuana       Review of systems:    Negative 12-system ROS except for the following: No  voiding complaints at this time      Objective:  TMax 24 hours:   Temp (24hrs), Av.1 °F (36.7 °C), Min:97.9 °F (36.6 °C), Max:98.3 °F (36.8 °C)      Vitals Ranges:   Temp:  [97.9 °F (36.6 °C)-98.3 °F (36.8 °C)] 97.9 °F (36.6 °C)  Heart Rate:  [] 124  Resp:  [16-18] 16  BP: ()/(58-89) 99/63    Intake/Output Last 3 shifts:  I/O last 3 completed shifts:  In: -   Out: 1300 [Urine:1300]     Physical Exam:       General Appearance:    Alert, cooperative, in no acute distress   Head:    Normocephalic, without obvious abnormality, atraumatic   Eyes:          PERRL, conjunctivae and corneas clear   Ears:    Normal external inspection   Throat:   No oral lesions, oral mucosa moist   Neck:   Supple, no LAD, trachea midline   Back:     No CVA tenderness   Lungs:     Respirations unlabored, symmetric excursion    Heart:    RRR, intact peripheral pulses   Abdomen:   Soft benign   :  Penis normal testes normal   WILMER:  Extremities: Deferred.  No edema, no deformity   Skin:   No bleeding, bruising or rashes   Neuro/Psych:   Orientation intact, mood/affect pleasant, no focal findings       Results review:   I reviewed the patient's new clinical results.    Data review:  Lab Results (last 24 hours)       Procedure Component Value Units Date/Time    Basic Metabolic Panel [[id number]]  (Abnormal) Collected: 23    Specimen: Blood Updated: 23     Glucose 106 mg/dL      BUN 32 mg/dL      Creatinine 0.78 mg/dL      Sodium 139 mmol/L      Potassium 3.5 mmol/L      Chloride 94 mmol/L      CO2 34.0 mmol/L      Calcium 8.5 mg/dL      BUN/Creatinine Ratio 41.0     Anion Gap 11.0 mmol/L      eGFR 98.4 mL/min/1.73     Narrative:      GFR Normal >60  Chronic Kidney Disease <60  Kidney Failure <15      CBC (No Diff) [[id number]]  (Abnormal) Collected: 23    Specimen: Blood Updated: 23     WBC 13.54 10*3/mm3      RBC 4.29 10*6/mm3      Hemoglobin 14.1 g/dL      Hematocrit 41.5 %      MCV  96.7 fL      MCH 32.9 pg      MCHC 34.0 g/dL      RDW 12.5 %      RDW-SD 43.9 fl      MPV 10.2 fL      Platelets 261 10*3/mm3              Imaging:  Imaging Results (Last 24 Hours)       ** No results found for the last 24 hours. **               Assessment:       Acute on chronic combined systolic and diastolic CHF (congestive heart failure)    Gastroesophageal reflux disease    Hypercholesterolemia    Hypertension    Atrial fibrillation    Pulmonary emphysema, unspecified emphysema type    Pulmonary hypertension    Left upper pole renal mass    Plan:     We will work on getting him scheduled for his laparoscopic partial nephrectomy.  I have confirmed his cell phone number.  It will not certainly be in the next week or 2 but he will have to be obviously cleared by cardiology to have this done but were looking probably in another month or so before I could get him scheduled.    Rao Martinez MD  08/23/23  13:44 EDT    Electronically signed by Rao Martinez MD at 08/23/23 [id number]

## 2023-08-24 NOTE — TELEPHONE ENCOUNTER
Caller: Claude Spencer    Relationship: Self    Best call back number:  [phone]      PATIENT IS CURRENTLY ADMITTED. HE IS WANTING TO DISCUSS HIS PLAN OF CARE, HE IS A LITTLE RESTLESS AS HE STATES NOTHING HAS REALLY BEEN DISCUSSED.   PLEASE HAVE THE DOCTOR ON CALL GO BY HIS ROOM TODAY TO LET HIM KNOW WHAT IS GOING ON.

## 2023-08-24 NOTE — PROGRESS NOTES
Discharge Planning Assessment  [facility]     Patient Name: Claude Spencer  MRN: [id number]  Today's Date: 8/24/2023    Admit Date: 8/22/2023    Plan: Home with spouse   Discharge Needs Assessment       Row Name 08/24/23 1120       Living Environment    People in Home spouse    Name(s) of People in Home Antonieta spouse    Current Living Arrangements home    Primary Care Provided by self    Provides Primary Care For no one    Family Caregiver if Needed spouse    Family Caregiver Names Antonieta [phone] spouse    Quality of Family Relationships supportive;involved;helpful    Able to Return to Prior Arrangements yes       Resource/Environmental Concerns    Resource/Environmental Concerns none       Transition Planning    Patient/Family Anticipates Transition to home with family    Patient/Family Anticipated Services at Transition none    Transportation Anticipated family or friend will provide       Discharge Needs Assessment    Equipment Currently Used at Home nebulizer    Concerns to be Addressed denies needs/concerns at this time;no discharge needs identified                   Discharge Plan       Row Name 08/24/23 1124       Plan    Plan Home with spouse    Plan Comments Spoke with pt at bedside to screen for DCP/needs. Pt confirmed that he does live with his wife in [facility] and plans to  return  home at [place].  Pt reports that he is independent with all self care and mobility.  Pt reports that he does drive and denies using any adaptive devices.  Pt does have nebulizer at home and uses 2L O2 at night which is provided  [facility].  Pt denies having any issues getting or affording his home meds.  Per Phadaniel pt's Xarelto has a $0 co pay.  Pt reports that his daughter can transport him home at [place] but it will have to be after she gets off work.  CCP will follow to assist if any DC needs do arise.                  Continued Care and Services - Admitted Since 8/22/2023    Coordination has not been  started for this encounter.       Expected Discharge Date and Time       Expected Discharge Date Expected Discharge Time    Aug 24, 2023            Demographic Summary       Row Name 08/24/23 1117       General Information    Admission Type inpatient    Arrived From home    Referral Source admission list    Reason for Consult discharge planning    Preferred Language English                   Functional Status       Row Name 08/24/23 1118       Functional Status    Usual Activity Tolerance good    Current Activity Tolerance good       Functional Status, IADL    Medications independent    Meal Preparation independent    Housekeeping independent    Laundry independent    Shopping independent       Mental Status Summary    Recent Changes in Mental Status/Cognitive Functioning no changes                   Psychosocial    No documentation.                  Abuse/Neglect    No documentation.                  Legal    No documentation.                  Substance Abuse    No documentation.                  Patient Forms    No documentation.                     CORNELIUS Reed

## 2023-08-25 ENCOUNTER — APPOINTMENT (OUTPATIENT)
Dept: GENERAL RADIOLOGY | Facility: HOSPITAL | Age: 68
DRG: 292 | End: 2023-08-25
Payer: COMMERCIAL

## 2023-08-25 PROCEDURE — A9270 NON-COVERED ITEM OR SERVICE: HCPCS | Performed by: INTERNAL MEDICINE

## 2023-08-25 PROCEDURE — 63710000001 PANTOPRAZOLE 40 MG TABLET DELAYED-RELEASE: Performed by: INTERNAL MEDICINE

## 2023-08-25 PROCEDURE — 63710000001 ROSUVASTATIN 20 MG TABLET: Performed by: INTERNAL MEDICINE

## 2023-08-25 PROCEDURE — A9270 NON-COVERED ITEM OR SERVICE: HCPCS | Performed by: NURSE PRACTITIONER

## 2023-08-25 PROCEDURE — A9270 NON-COVERED ITEM OR SERVICE: HCPCS

## 2023-08-25 PROCEDURE — 99223 1ST HOSP IP/OBS HIGH 75: CPT

## 2023-08-25 PROCEDURE — 63710000001 SIMETHICONE 80 MG CHEWABLE TABLET: Performed by: NURSE PRACTITIONER

## 2023-08-25 PROCEDURE — 94799 UNLISTED PULMONARY SVC/PX: CPT

## 2023-08-25 PROCEDURE — 63710000001 SENNOSIDES-DOCUSATE 8.6-50 MG TABLET: Performed by: INTERNAL MEDICINE

## 2023-08-25 PROCEDURE — 63710000001 MONTELUKAST 10 MG TABLET: Performed by: INTERNAL MEDICINE

## 2023-08-25 PROCEDURE — 99232 SBSQ HOSP IP/OBS MODERATE 35: CPT | Performed by: NURSE PRACTITIONER

## 2023-08-25 PROCEDURE — 63710000001 BISMUTH SUBSALICYLATE 262 MG CHEWABLE TABLET: Performed by: HOSPITALIST

## 2023-08-25 PROCEDURE — 63710000001 RIVAROXABAN 20 MG TABLET

## 2023-08-25 PROCEDURE — 94664 DEMO&/EVAL PT USE INHALER: CPT

## 2023-08-25 PROCEDURE — 63710000001 DIGOXIN 125 MCG TABLET: Performed by: INTERNAL MEDICINE

## 2023-08-25 PROCEDURE — 94761 N-INVAS EAR/PLS OXIMETRY MLT: CPT

## 2023-08-25 PROCEDURE — 74018 RADEX ABDOMEN 1 VIEW: CPT

## 2023-08-25 PROCEDURE — A9270 NON-COVERED ITEM OR SERVICE: HCPCS | Performed by: HOSPITALIST

## 2023-08-25 PROCEDURE — 63710000001 FUROSEMIDE 40 MG TABLET: Performed by: NURSE PRACTITIONER

## 2023-08-25 PROCEDURE — 63710000001 METOPROLOL SUCCINATE XL 50 MG TABLET SUSTAINED-RELEASE 24 HOUR: Performed by: INTERNAL MEDICINE

## 2023-08-25 PROCEDURE — 63710000001 CHOLECALCIFEROL 25 MCG (1000 UT) TABLET: Performed by: INTERNAL MEDICINE

## 2023-08-25 PROCEDURE — 94760 N-INVAS EAR/PLS OXIMETRY 1: CPT

## 2023-08-25 PROCEDURE — 63710000001 POLYETHYLENE GLYCOL 17 G PACK: Performed by: INTERNAL MEDICINE

## 2023-08-25 RX ORDER — BISMUTH SUBSALICYLATE 262 MG/1
524 TABLET, CHEWABLE ORAL
Status: DISCONTINUED | OUTPATIENT
Start: 2023-08-25 | End: 2023-08-30 | Stop reason: HOSPADM

## 2023-08-25 RX ORDER — SIMETHICONE 80 MG
80 TABLET,CHEWABLE ORAL 4 TIMES DAILY PRN
Status: DISCONTINUED | OUTPATIENT
Start: 2023-08-25 | End: 2023-08-30 | Stop reason: HOSPADM

## 2023-08-25 RX ADMIN — SIMETHICONE 80 MG: 80 TABLET, CHEWABLE ORAL at 08:56

## 2023-08-25 RX ADMIN — IPRATROPIUM BROMIDE AND ALBUTEROL SULFATE 3 ML: 2.5; .5 SOLUTION RESPIRATORY (INHALATION) at 20:40

## 2023-08-25 RX ADMIN — ROSUVASTATIN CALCIUM 20 MG: 20 TABLET, FILM COATED ORAL at 20:31

## 2023-08-25 RX ADMIN — BUDESONIDE AND FORMOTEROL FUMARATE DIHYDRATE 2 PUFF: 160; 4.5 AEROSOL RESPIRATORY (INHALATION) at 07:03

## 2023-08-25 RX ADMIN — IPRATROPIUM BROMIDE AND ALBUTEROL SULFATE 3 ML: 2.5; .5 SOLUTION RESPIRATORY (INHALATION) at 07:02

## 2023-08-25 RX ADMIN — MONTELUKAST SODIUM 10 MG: 10 TABLET, FILM COATED ORAL at 20:31

## 2023-08-25 RX ADMIN — SIMETHICONE 80 MG: 80 TABLET, CHEWABLE ORAL at 03:36

## 2023-08-25 RX ADMIN — BUDESONIDE AND FORMOTEROL FUMARATE DIHYDRATE 2 PUFF: 160; 4.5 AEROSOL RESPIRATORY (INHALATION) at 20:45

## 2023-08-25 RX ADMIN — IPRATROPIUM BROMIDE AND ALBUTEROL SULFATE 3 ML: 2.5; .5 SOLUTION RESPIRATORY (INHALATION) at 11:20

## 2023-08-25 RX ADMIN — METOPROLOL SUCCINATE 50 MG: 50 TABLET, FILM COATED, EXTENDED RELEASE ORAL at 20:31

## 2023-08-25 RX ADMIN — SENNOSIDES AND DOCUSATE SODIUM 2 TABLET: 50; 8.6 TABLET ORAL at 20:31

## 2023-08-25 RX ADMIN — Medication 1000 UNITS: at 08:57

## 2023-08-25 RX ADMIN — DIGOXIN 125 MCG: 125 TABLET ORAL at 12:15

## 2023-08-25 RX ADMIN — POLYETHYLENE GLYCOL 3350 17 G: 17 POWDER, FOR SOLUTION ORAL at 12:49

## 2023-08-25 RX ADMIN — SIMETHICONE 80 MG: 80 TABLET, CHEWABLE ORAL at 12:15

## 2023-08-25 RX ADMIN — RIVAROXABAN 20 MG: 20 TABLET, FILM COATED ORAL at 18:49

## 2023-08-25 RX ADMIN — Medication 524 MG: at 16:46

## 2023-08-25 RX ADMIN — FUROSEMIDE 40 MG: 40 TABLET ORAL at 08:56

## 2023-08-25 RX ADMIN — PANTOPRAZOLE SODIUM 40 MG: 40 TABLET, DELAYED RELEASE ORAL at 08:56

## 2023-08-25 NOTE — CONSULTS
Electrophysiology Hospital Consult            Patient Name: Claude Spencer  Age/Sex: 68 y.o. male  : 1955  MRN: [id number]    Date of Admission: 2023  Date of Encounter Visit: 23  Encounter Provider: KHADRA Valladares  Referring Provider: Ashok Whitt MD  Place of Service: [facility] CARDIOLOGY  Patient Care Team:  Jason King MD as PCP - General (Internal Medicine)      Subjective:   EP Consultation for: persistent atrial fibrillation with RVR     Chief Complaint: edema, dyspnea    History of Present Illness:  Claude Spencer is a 68 y.o. male who follows with Dr. Draper. He has a history of COPD, HTN, and persistent atrial fibrillation.     He was first seen in  for AF with RVR and diastolic HF. EF was 55%. Cath at that time was normal.     Was lost in follow up after that. Due to non compliance, cost, and transportation issues.     In  was noted to have LVEF of 25-30%. Severe MR and moderate TR.     Hospitalized a few weeks ago in [facility] for CHF. Had significant edema. Heart rates consistently in the 130-140s. BP low. Also noted to have renal mass concerning for malignancy.     Saw Dr. Draper on . Having worsening HF symptoms and uncontrolled HR. Unable to manage in outpatient setting he was advised to go to ED.       During this admission he has had issues with RVR and hypotension limiting uptitration of his medical therapy. He has been diuresed.     EP has been asked to see for AF and consider pace and ablate strategy.       Dr. Goldsmith and I saw this morning. He says currently he feels okay but still has some dyspnea on exertion. He says his swelling is a lot better than when he came in. He does not complain of palpitations at rest but occasionally with exertion. His resting HR is low 100s but 140-150s with any exertion. He has been in persistent AF for years.     Past Medical History:  Past Medical History:   Diagnosis Date     Atrial fibrillation 04/17/2021    COPD (chronic obstructive pulmonary disease)     Gastroesophageal reflux disease 03/04/2016    Hyperlipidemia     Hypertension     Nosebleed     Osteopenia 03/04/2016    Description: Her bone density August 2015 secondary to steroid use    Vitamin D deficiency 03/04/2016       Past Surgical History:   Procedure Laterality Date    ANKLE SURGERY      CARDIAC CATHETERIZATION Left 4/20/2021    Procedure: Coronary Angiography;  Surgeon: Rao Del Cid MD;  Location:  [facility];  Service: Cardiovascular;  Laterality: Left;    CARDIAC CATHETERIZATION N/A 4/20/2021    Procedure: Left Heart Cath;  Surgeon: Rao Del Cid MD;  Location:  [facility];  Service: Cardiovascular;  Laterality: N/A;    INGUINAL HERNIA REPAIR Bilateral 7/1/2023    Procedure: INGUINAL HERNIA REPAIR LAPAROSCOPIC;  Surgeon: Ayanna Moreira DO;  Location:  [facility];  Service: General;  Laterality: Bilateral;    UMBILICAL HERNIA REPAIR N/A 7/1/2023    Procedure: UMBILICAL HERNIA REPAIR;  Surgeon: Ayanna Moreira DO;  Location:  [facility];  Service: General;  Laterality: N/A;       Home Medications:   Medications Prior to Admission   Medication Sig Dispense Refill Last Dose    albuterol sulfate  (90 Base) MCG/ACT inhaler Inhale 2 puffs Every 4 (Four) Hours As Needed for Wheezing.       digoxin (LANOXIN) 125 MCG tablet Take 1 tablet by mouth Daily.       furosemide (LASIX) 80 MG tablet Take 1 tablet by mouth 2 (Two) Times a Day for 30 days. 60 tablet 0     losartan (COZAAR) 25 MG tablet Take 1 tablet by mouth 2 (Two) Times a Day for 30 days. 60 tablet 0     methylPREDNISolone (MEDROL) 4 MG dose pack Daily.   8/21/2023    budesonide-formoterol (Symbicort) 160-4.5 MCG/ACT inhaler Inhale 2 puffs 2 (Two) Times a Day. 10.2 g 6     cholecalciferol (VITAMIN D3) 25 MCG (1000 UT) tablet Take 1 tablet by mouth Daily.       ipratropium-albuterol (DUO-NEB) 0.5-2.5  mg/3 ml nebulizer Take 3 mL by nebulization Every 4 (Four) Hours As Needed for Wheezing or Shortness of Air. 360 mL 1     metoprolol succinate XL (TOPROL-XL) 50 MG 24 hr tablet Take 1 tablet by mouth Every 12 (Twelve) Hours. 60 tablet 0     montelukast (SINGULAIR) 10 MG tablet Take 1 tablet by mouth Every Night. 60 tablet 0     omeprazole (priLOSEC) 20 MG capsule Take 1 capsule by mouth Daily. 30 capsule 0     rosuvastatin (CRESTOR) 20 MG tablet Take 1 tablet by mouth Every Night. 90 tablet 3     spironolactone (ALDACTONE) 25 MG tablet Take 0.5 tablets by mouth Daily.          Allergies:  Allergies   Allergen Reactions    Apixaban Other (See Comments)     Nose bleed       Past Social History:  Social History     Socioeconomic History    Marital status:    Tobacco Use    Smoking status: Former     Packs/day: 1.00     Years: 30.00     Pack years: 30.00     Types: Cigarettes     Quit date: 2000     Years since quittin.3     Passive exposure: Past    Smokeless tobacco: Current     Types: Chew    Tobacco comments:     quit 15 years ago, wife current smoker outside   Vaping Use    Vaping Use: Never used   Substance and Sexual Activity    Alcohol use: No    Drug use: No     Types: Marijuana    Sexual activity: Yes     Partners: Female       Past Family History:  Family History   Problem Relation Age of Onset    COPD Mother 78    Seizures Father     Vasculitis Father     Prostate cancer Maternal Uncle     Heart disease Brother        Review of Systems: All systems reviewed. Pertinent positives identified in HPI. All other systems are negative.     14 point ROS was performed and is negative except as outlined in HPI.     Objective:     Objective:  Vital Signs (last 24 hours)          0700   0659  0700   0932   Most Recent      Temp (°F) 97.4 -  98.7      98.8     98.8 (37.1)  0723    Heart Rate 88 -  115    86 -  115     86  0723    Resp 18 -  20      18     18  0723    BP  92/73 -  112/64    89/65 -  98/65     98/65 08/25 0724    SpO2 (%) 94 -  98    93 -  95     93 08/25 0723          Temp:  [97.4 °F (36.3 °C)-98.8 °F (37.1 °C)] 98.8 °F (37.1 °C)  Heart Rate:  [] 86  Resp:  [18-20] 18  BP: ()/(65-75) 98/65  Body mass index is 25.21 kg/m².        Physical Exam:     General Appearance: No acute distress, well developed and well nourished.   Eyes: Conjunctiva and lids: No erythema, swelling, or discharge. Sclera non-icteric.   HENT: Atraumatic, normocephalic. External eyes, ears, and nose normal.   Respiratory: No signs of respiratory distress. Respiration rhythm and depth normal.   Clear to auscultation. No rales, crackles, rhonchi, or wheezing auscultated.   Cardiovascular:  Heart Rate and Rhythm: irregularly irregular, Heart Sounds: Normal S1 and S2. No S3 or S4 noted.  Gastrointestinal:  Abdomen soft, non-distended, non-tender.  Musculoskeletal: Normal movement of extremities  Skin: Warm and dry.   Psychiatric: Patient alert and oriented to person, place, and time. Speech and behavior appropriate. Normal mood and affect.    Labs:   Lab Review:     Results from last 7 days   Lab Units 08/24/23  0708 08/23/23  0545 08/22/23  1247 08/21/23  0454   SODIUM mmol/L 134* 139 138 135*   POTASSIUM mmol/L 4.2 3.5 3.6 4.0   CHLORIDE mmol/L 98 94* 96* 94*   CO2 mmol/L 28.0 34.0* 30.1* 31.3*   BUN mg/dL 30* 32* 27* 36*   CREATININE mg/dL 0.90 0.78 0.81 0.71*   GLUCOSE mg/dL 78 106* 137* 102*   CALCIUM mg/dL 8.7 8.5* 8.9 8.7   AST (SGOT) U/L 48*  --  68* 55*   ALT (SGPT) U/L 78*  --  103* 100*     Results from last 7 days   Lab Units 08/22/23  1446 08/22/23  1247 08/21/23  0713   HSTROP T ng/L 33* 29* 28*     Results from last 7 days   Lab Units 08/24/23  0708   WBC 10*3/mm3 15.30*   HEMOGLOBIN g/dL 14.2   HEMATOCRIT % 42.2   PLATELETS 10*3/mm3 268     Results from last 7 days   Lab Units 08/22/23  1247   INR  1.09         Results from last 7 days   Lab Units 08/24/23  0708   MAGNESIUM  mg/dL 2.8*         Results from last 7 days   Lab Units 08/22/23  1247   PROBNP pg/mL 5,556.0*     Results from last 7 days   Lab Units 08/22/23  1247   DIGOXIN LVL ng/mL 0.52*         EKG:           I personally viewed and interpreted the patient's EKG/Telemetry tracings.    Assessment:       Acute on chronic combined systolic and diastolic CHF (congestive heart failure)    Gastroesophageal reflux disease    Hypercholesterolemia    Hypertension    Atrial fibrillation    Pulmonary emphysema, unspecified emphysema type    Pulmonary hypertension        Plan:   Dr. Stoll and I saw this patient.      He has several year history of persistent AF. Recently was noted to have worsening cardiomyopathy and has been admitted multiple times for HF exacerbation.     HR control has been limited by hypotension. Really no room for increase in medication.     We discussed and offered patient pace and ablate to control his HR and prevent future hospital admissions.     We discussed the risks of pacemaker placement; stroke, tamponade, cardiac injury, and infection.       He is agreeable to proceed.     We will plan to add him onto schedule for Monday. I will resume his rivaroxaban and hold Sunday night for pacmaker implant Monday.       He will follow up as outpatient for incision and then we will set up for AV node ablation.       Thank you for allowing me to participate in the care of Claude MAGED Spencer. Feel free to contact me directly with any further questions or concerns.    KHADRA Valladares  [facility]  08/25/23  09:32 EDT

## 2023-08-25 NOTE — PROGRESS NOTES
"    DAILY PROGRESS NOTE  [facility]    Patient Identification:  Name: Claude Spencer  Age: 68 y.o.  Sex: male  :  1955  MRN: [id number]         Primary Care Physician: Jason King MD    Subjective:  Interval History: Patient wants to proceed with the procedure while here.  Denies any new chest pain.  Still happy with his volume status.  Good p.o. intake no nausea no vomiting.    Objective: Conversational and pleasant.  Nontoxic    Scheduled Meds:budesonide-formoterol, 2 puff, Inhalation, BID  cholecalciferol, 1,000 Units, Oral, Daily  digoxin, 125 mcg, Oral, Daily  furosemide, 40 mg, Oral, Daily  ipratropium-albuterol, 3 mL, Nebulization, Q6H While Awake - RT  metoprolol succinate XL, 50 mg, Oral, Q12H  montelukast, 10 mg, Oral, Nightly  pantoprazole, 40 mg, Oral, Q AM  rivaroxaban, 20 mg, Oral, Daily With Dinner  rosuvastatin, 20 mg, Oral, Nightly  senna-docusate sodium, 2 tablet, Oral, BID  spironolactone, 12.5 mg, Oral, Daily      Continuous Infusions:     Vital signs in last 24 hours:  Temp:  [97.4 °F (36.3 °C)-98.8 °F (37.1 °C)] 98.8 °F (37.1 °C)  Heart Rate:  [] 86  Resp:  [18-20] 18  BP: ()/(65-75) 98/65    Intake/Output:    Intake/Output Summary (Last 24 hours) at 2023 1117  Last data filed at 2023 0850  Gross per 24 hour   Intake 360 ml   Output 450 ml   Net -90 ml       Exam:  BP 98/65   Pulse 86   Temp 98.8 °F (37.1 °C) (Oral)   Resp 18   Ht 175.3 cm (69.02\")   Wt 77.5 kg (170 lb 12.8 oz)   SpO2 93%   BMI 25.21 kg/m²     General Appearance:    Alert, cooperative, no distress, AAOx3                         Throat:   Oral mucosa pink and moist                           Neck:   No JVD                         Lungs:    Clear to auscultation bilaterally, respirations unlabored                          Heart:  Irregularly irregular rate and rhythm, S1 and S2 normal                  Abdomen:     Soft, nontender, bowel sounds active                 " Extremities: Stasis changes, no cellulitis, no pitting edema                        pulses:   Pulses palpable in lower extremities                                 Data Review:  Labs in chart were reviewed.    Assessment:  Active Hospital Problems    Diagnosis  POA    **Acute on chronic combined systolic and diastolic CHF (congestive heart failure) [I50.43]  Unknown    Pulmonary hypertension [I27.20]  Yes    Pulmonary emphysema, unspecified emphysema type [J43.9]  Yes    Atrial fibrillation [I48.91]  Yes    Gastroesophageal reflux disease [K21.9]  Yes    Hypertension [I10]  Yes    Hypercholesterolemia [E78.00]  Yes      Resolved Hospital Problems   No resolved problems to display.       Plan:    Acute on chronic both systolic and diastolic CHF/nonischemic cardiomyopathy compounded by severe mitral regurgitation and uncontrolled A-fib/RVR compounded by pulmonary hypertension              -Acute resolved and now on p.o. Lasix              -Metoprolol/digoxin noted for A-fib -PPM on Monday              -6/6/2023 echo reviewed -EF of 26 to 30% compounded by severe hypokinesis with severely dilated left atrium and severe mitral valve regurgitation with moderate tricuspid valve regurgitation     COPD without acute exacerbation     HTN stable/hypotensive      Abnormal LFTs likely secondary to passive congestion -okay with statin for now -elevation is mild and has improved with no plans for further inpatient surveillance     Outpatient plans for partial nephrectomy noted per urology -they request OR clearance from cardiology         Disposition -PPM plan for Monday          Gerry Damon MD  8/25/2023  11:17 EDT

## 2023-08-25 NOTE — PLAN OF CARE
Goal Outcome Evaluation:  Plan of Care Reviewed With: patient        Progress: no change  Outcome Evaluation: VSS still afib but rate controlled while in bed. Pt c/o gas pain, prn meds given. XArelto restarted. Plans for Pacer placement on monday. Pt up adlib.

## 2023-08-25 NOTE — PLAN OF CARE
Problem: Adult Inpatient Plan of Care  Goal: Plan of Care Review  Outcome: Ongoing, Progressing   Goal Outcome Evaluation:  VSS. Afib, HR increases up to the 140s with activity. Pt c/o gas like pains in abdomen, prn mylicon tabs ordered, see mar. Pt on clears since midnight, having ablation vs ppm placement today. Anticoagulant on hold for procedure. Pt rested off and on throughout the night with no other issues or complaints.

## 2023-08-25 NOTE — PROGRESS NOTES
"    Patient Name: Claude Spencer  :1955  68 y.o.      Patient Care Team:  Jason King MD as PCP - General (Internal Medicine)    Chief Complaint: follow up atrial fibrillation     Interval History:  sitting in bed. EP saw this morning.           Objective   Vital Signs  Temp:  [98.8 °F (37.1 °C)] 98.8 °F (37.1 °C)  Heart Rate:  [] 125  Resp:  [18-20] 18  BP: ()/(65-75) 101/73    Intake/Output Summary (Last 24 hours) at 2023 1323  Last data filed at 2023 0850  Gross per 24 hour   Intake 120 ml   Output --   Net 120 ml     Flowsheet Rows      Flowsheet Row First Filed Value   Admission Height 175.3 cm (69.02\") Documented at 2023 2344   Admission Weight 80.6 kg (177 lb 9.6 oz) Documented at 2023 2344            Physical Exam:   General Appearance:    Alert, cooperative, in no acute distress   Lungs:     Clear to auscultation.  Normal respiratory effort and rate.      Heart:    irregular rhythm and normal rate, normal S1 and S2, no murmurs, gallops or rubs.     Chest Wall:    No abnormalities observed   Abdomen:     Soft, nontender, positive bowel sounds.     Extremities:   no cyanosis, clubbing or edema.  No marked joint deformities.  Adequate musculoskeletal strength.       Results Review:    Results from last 7 days   Lab Units 23  0708   SODIUM mmol/L 134*   POTASSIUM mmol/L 4.2   CHLORIDE mmol/L 98   CO2 mmol/L 28.0   BUN mg/dL 30*   CREATININE mg/dL 0.90   GLUCOSE mg/dL 78   CALCIUM mg/dL 8.7     Results from last 7 days   Lab Units 23  1446 23  1247 23  0713   HSTROP T ng/L 33* 29* 28*     Results from last 7 days   Lab Units 23  0708   WBC 10*3/mm3 15.30*   HEMOGLOBIN g/dL 14.2   HEMATOCRIT % 42.2   PLATELETS 10*3/mm3 268     Results from last 7 days   Lab Units 23  1247   INR  1.09     Results from last 7 days   Lab Units 23  0708   MAGNESIUM mg/dL 2.8*                   Medication Review: "   budesonide-formoterol, 2 puff, Inhalation, BID  cholecalciferol, 1,000 Units, Oral, Daily  digoxin, 125 mcg, Oral, Daily  furosemide, 40 mg, Oral, Daily  ipratropium-albuterol, 3 mL, Nebulization, Q6H While Awake - RT  metoprolol succinate XL, 50 mg, Oral, Q12H  montelukast, 10 mg, Oral, Nightly  pantoprazole, 40 mg, Oral, Q AM  rivaroxaban, 20 mg, Oral, Daily With Dinner  rosuvastatin, 20 mg, Oral, Nightly  senna-docusate sodium, 2 tablet, Oral, BID  spironolactone, 12.5 mg, Oral, Daily                Assessment & Plan   Acute on chronic systolic congestive heart failure due to atrial fibrillation and valvular heart disease  Permanent atrial fibrillation  Mitral valve regurgitation  Renal mass, needs nephrectomy in next month or so  Mild to moderate coronary artery disease by cath 4/2021  Pulmonary hypertension  Elevated transaminases - improving     - continue oral diuretics.   - EP saw and plans for PPM Monday and outpatient AVN ablation.   - He needs clearance for nephrectomy in the future. This is challenging because he can so easily tip in to decompensated heart failure with his labile heart rate.     KHADRA Key  [facility]  08/25/23  13:23 EDT

## 2023-08-25 NOTE — TELEPHONE ENCOUNTER
The EP team and Miguelina SORIANO have been there today. Miguelina will you call and see what is going on?    LIZZIE

## 2023-08-26 ENCOUNTER — APPOINTMENT (OUTPATIENT)
Dept: CT IMAGING | Facility: HOSPITAL | Age: 68
DRG: 292 | End: 2023-08-26
Payer: COMMERCIAL

## 2023-08-26 PROBLEM — K57.92 DIVERTICULITIS: Status: ACTIVE | Noted: 2023-08-26

## 2023-08-26 PROCEDURE — 94799 UNLISTED PULMONARY SVC/PX: CPT

## 2023-08-26 PROCEDURE — 63710000001 DIGOXIN 125 MCG TABLET: Performed by: INTERNAL MEDICINE

## 2023-08-26 PROCEDURE — A9270 NON-COVERED ITEM OR SERVICE: HCPCS

## 2023-08-26 PROCEDURE — A9270 NON-COVERED ITEM OR SERVICE: HCPCS | Performed by: INTERNAL MEDICINE

## 2023-08-26 PROCEDURE — 63710000001 ROSUVASTATIN 20 MG TABLET: Performed by: INTERNAL MEDICINE

## 2023-08-26 PROCEDURE — 74176 CT ABD & PELVIS W/O CONTRAST: CPT

## 2023-08-26 PROCEDURE — 63710000001 RIVAROXABAN 20 MG TABLET

## 2023-08-26 PROCEDURE — 99221 1ST HOSP IP/OBS SF/LOW 40: CPT | Performed by: SURGERY

## 2023-08-26 PROCEDURE — 94664 DEMO&/EVAL PT USE INHALER: CPT

## 2023-08-26 PROCEDURE — 25010000002 PIPERACILLIN SOD-TAZOBACTAM PER 1 G: Performed by: HOSPITALIST

## 2023-08-26 PROCEDURE — A9270 NON-COVERED ITEM OR SERVICE: HCPCS | Performed by: NURSE PRACTITIONER

## 2023-08-26 PROCEDURE — 94761 N-INVAS EAR/PLS OXIMETRY MLT: CPT

## 2023-08-26 PROCEDURE — 63710000001 METOPROLOL SUCCINATE XL 50 MG TABLET SUSTAINED-RELEASE 24 HOUR: Performed by: INTERNAL MEDICINE

## 2023-08-26 PROCEDURE — 99232 SBSQ HOSP IP/OBS MODERATE 35: CPT | Performed by: INTERNAL MEDICINE

## 2023-08-26 PROCEDURE — 63710000001 MONTELUKAST 10 MG TABLET: Performed by: INTERNAL MEDICINE

## 2023-08-26 PROCEDURE — 63710000001 FUROSEMIDE 40 MG TABLET: Performed by: NURSE PRACTITIONER

## 2023-08-26 PROCEDURE — 63710000001 SENNOSIDES-DOCUSATE 8.6-50 MG TABLET: Performed by: INTERNAL MEDICINE

## 2023-08-26 PROCEDURE — 63710000001 CHOLECALCIFEROL 25 MCG (1000 UT) TABLET: Performed by: INTERNAL MEDICINE

## 2023-08-26 PROCEDURE — 63710000001 PANTOPRAZOLE 40 MG TABLET DELAYED-RELEASE: Performed by: INTERNAL MEDICINE

## 2023-08-26 RX ADMIN — IPRATROPIUM BROMIDE AND ALBUTEROL SULFATE 3 ML: 2.5; .5 SOLUTION RESPIRATORY (INHALATION) at 11:56

## 2023-08-26 RX ADMIN — RIVAROXABAN 20 MG: 20 TABLET, FILM COATED ORAL at 17:13

## 2023-08-26 RX ADMIN — PIPERACILLIN SODIUM AND TAZOBACTAM SODIUM 3.38 G: 3; .375 INJECTION, SOLUTION INTRAVENOUS at 22:30

## 2023-08-26 RX ADMIN — Medication 1000 UNITS: at 09:22

## 2023-08-26 RX ADMIN — DIGOXIN 125 MCG: 125 TABLET ORAL at 13:19

## 2023-08-26 RX ADMIN — METOPROLOL SUCCINATE 50 MG: 50 TABLET, FILM COATED, EXTENDED RELEASE ORAL at 09:22

## 2023-08-26 RX ADMIN — PANTOPRAZOLE SODIUM 40 MG: 40 TABLET, DELAYED RELEASE ORAL at 09:22

## 2023-08-26 RX ADMIN — METOPROLOL SUCCINATE 50 MG: 50 TABLET, FILM COATED, EXTENDED RELEASE ORAL at 20:06

## 2023-08-26 RX ADMIN — PIPERACILLIN SODIUM AND TAZOBACTAM SODIUM 3.38 G: 3; .375 INJECTION, SOLUTION INTRAVENOUS at 15:05

## 2023-08-26 RX ADMIN — IPRATROPIUM BROMIDE AND ALBUTEROL SULFATE 3 ML: 2.5; .5 SOLUTION RESPIRATORY (INHALATION) at 07:01

## 2023-08-26 RX ADMIN — ROSUVASTATIN CALCIUM 20 MG: 20 TABLET, FILM COATED ORAL at 20:06

## 2023-08-26 RX ADMIN — IPRATROPIUM BROMIDE AND ALBUTEROL SULFATE 3 ML: 2.5; .5 SOLUTION RESPIRATORY (INHALATION) at 19:31

## 2023-08-26 RX ADMIN — BUDESONIDE AND FORMOTEROL FUMARATE DIHYDRATE 2 PUFF: 160; 4.5 AEROSOL RESPIRATORY (INHALATION) at 07:06

## 2023-08-26 RX ADMIN — PIPERACILLIN SODIUM AND TAZOBACTAM SODIUM 3.38 G: 3; .375 INJECTION, SOLUTION INTRAVENOUS at 09:21

## 2023-08-26 RX ADMIN — FUROSEMIDE 40 MG: 40 TABLET ORAL at 09:22

## 2023-08-26 RX ADMIN — MONTELUKAST SODIUM 10 MG: 10 TABLET, FILM COATED ORAL at 20:06

## 2023-08-26 RX ADMIN — SENNOSIDES AND DOCUSATE SODIUM 2 TABLET: 50; 8.6 TABLET ORAL at 09:22

## 2023-08-26 RX ADMIN — BUDESONIDE AND FORMOTEROL FUMARATE DIHYDRATE 2 PUFF: 160; 4.5 AEROSOL RESPIRATORY (INHALATION) at 19:31

## 2023-08-26 NOTE — PROGRESS NOTES
DAILY PROGRESS NOTE  [facility]    Patient Identification:  Name: Claude Spencer  Age: 68 y.o.  Sex: male  :  1955  MRN: [id number]         Primary Care Physician: Jason King MD    Subjective:  Interval History: Complained of abdominal pain yesterday that led to additional imaging escalated toward CT.  I discussed the CT results with the gentleman today.  He tells me that he is not longer having nausea or vomiting and he ate breakfast this morning.  He states he has had a bowel movement and is passing gas and his abdominal pain somehow was better today but he still some mild distention.  Denies any bloody stool or bloody emesis.  Denies any fever or chills    Objective: Unique affect/bedside interaction.  Certainly nontoxic to me and does not seem to be in the distress that have been reported in regards to his abdomen as he is sitting in bed comfortably and conversational.  No family present at bedside    Scheduled Meds:budesonide-formoterol, 2 puff, Inhalation, BID  cholecalciferol, 1,000 Units, Oral, Daily  digoxin, 125 mcg, Oral, Daily  furosemide, 40 mg, Oral, Daily  ipratropium-albuterol, 3 mL, Nebulization, Q6H While Awake - RT  metoprolol succinate XL, 50 mg, Oral, Q12H  montelukast, 10 mg, Oral, Nightly  pantoprazole, 40 mg, Oral, Q AM  piperacillin-tazobactam, 3.375 g, Intravenous, Q8H  rivaroxaban, 20 mg, Oral, Daily With Dinner  rosuvastatin, 20 mg, Oral, Nightly  senna-docusate sodium, 2 tablet, Oral, BID  spironolactone, 12.5 mg, Oral, Daily      Continuous Infusions:     Vital signs in last 24 hours:  Temp:  [98.2 °F (36.8 °C)-98.4 °F (36.9 °C)] 98.3 °F (36.8 °C)  Heart Rate:  [102-125] 102  Resp:  [18-20] 20  BP: (101-115)/(63-83) 115/83    Intake/Output:    Intake/Output Summary (Last 24 hours) at 2023 1024  Last data filed at 2023 2319  Gross per 24 hour   Intake --   Output 890 ml   Net -890 ml       Exam:  /83 (BP Location: Left arm, Patient  "Position: Lying)   Pulse 102   Temp 98.3 °F (36.8 °C) (Oral)   Resp 20   Ht 175.3 cm (69.02\")   Wt 78.6 kg (173 lb 4.5 oz)   SpO2 94%   BMI 25.58 kg/m²     General Appearance:    Alert, cooperative, nontoxic, AAOx3                         Throat:   Oral mucosa pink and moist                           Neck:   No JVD                         Lungs:    Clear to auscultation bilaterally, respirations unlabored                          Heart:  Irregularly irregular rate and rhythm, S1 and S2 normal                  Abdomen:     Soft, mild tenderness to palpation diffusely on lower quadrants, bowel sounds noted, mild distention but no rebound or guarding                 Extremities:   No cyanosis or edema                  Neurologic:   CNII-XII intact, no focal deficits noted     Data Review:  Labs in chart were reviewed.    Assessment:  Active Hospital Problems    Diagnosis  POA    **Acute on chronic combined systolic and diastolic CHF (congestive heart failure) [I50.43]  Unknown    Pulmonary hypertension [I27.20]  Yes    Pulmonary emphysema, unspecified emphysema type [J43.9]  Yes    Atrial fibrillation [I48.91]  Yes    Gastroesophageal reflux disease [K21.9]  Yes    Hypertension [I10]  Yes    Hypercholesterolemia [E78.00]  Yes      Resolved Hospital Problems   No resolved problems to display.       Plan:    Patient sitting here awaiting upcoming PPM procedure on Monday and then started to complain of abdominal pain yesterday.  I started with a KUB that led to a CT of the abdomen and pelvis.  He states he has no recollection of diverticulitis in the past and today upon entering the room he did not seem in pain at all.  He states he passed stool overnight and feels better.  No current nausea or vomiting today but he still has some diffuse discomfort in his belly    CT of the abdomen pelvis had concerning aspects for gas and possible perforation with diverticulosis and mild changes surrounding the sigmoid colon.  " There was also concern for developing ileus versus obstruction.  He seems to have no problems with p.o. intake as he ate his entire tray this morning and he is really only having some mild distention at this point in time.    I went ahead and added IV Zosyn and consult to general surgery for additional recommendations and made him n.p.o. except for sips with meds    Obviously if further intervention is warranted, this will complicate his pacemaker that was pending on Monday.    CBC/CMP ordered for a.m.    Gerry Damon MD  8/26/2023  10:24 EDT

## 2023-08-26 NOTE — NURSING NOTE
Received call from radiologist regarding pts kub that was done last night. States concerning for SBO and recommends CT of abd. Placing call to on call provider to notify of new findings and recommendation from radiologist. Pt has been c/o gas like pains since night before last with little relief. Pt has not c/o any nausea or vomiting at all until now, pt reported he vomited. Awaiting call back.

## 2023-08-26 NOTE — PROGRESS NOTES
"   LOS: 4 days   Patient Care Team:  Jason King MD as PCP - General (Internal Medicine)    Chief Complaint: CHF     Interval History: Feels well other than mild abdominal bloating. No SOA. On RA.      Objective   Vital Signs  Temp:  [97.3 °F (36.3 °C)-98.4 °F (36.9 °C)] 97.3 °F (36.3 °C)  Heart Rate:  [101-112] 112  Resp:  [18-20] 18  BP: (105-115)/(63-83) 114/77    Intake/Output Summary (Last 24 hours) at 8/26/2023 1547  Last data filed at 8/25/2023 2319  Gross per 24 hour   Intake --   Output 540 ml   Net -540 ml       Last Weight and Admission Weight        08/26/23  0611   Weight: 78.6 kg (173 lb 4.5 oz)     Flowsheet Rows      Flowsheet Row First Filed Value   Admission Height 175.3 cm (69.02\") Documented at 08/22/2023 2344   Admission Weight 80.6 kg (177 lb 9.6 oz) Documented at 08/22/2023 2344                    Physical Exam  Vitals reviewed.   Constitutional:       Appearance: He is well-developed.   HENT:      Head: Normocephalic.      Nose: Nose normal.      Mouth/Throat:      Pharynx: Oropharynx is clear.   Eyes:      Conjunctiva/sclera: Conjunctivae normal.   Neck:      Vascular: No JVD.   Cardiovascular:      Rate and Rhythm: Tachycardia present. Rhythm regularly irregular.      Heart sounds: Normal heart sounds.      Comments: Stasis changes but no edema  Pulmonary:      Effort: Pulmonary effort is normal.      Breath sounds: Normal breath sounds.   Abdominal:      General: There is distension (mild).      Palpations: Abdomen is soft.      Tenderness: There is abdominal tenderness (very mild LLQ).   Musculoskeletal:         General: Normal range of motion.      Cervical back: Normal range of motion.   Skin:     General: Skin is warm and dry.      Findings: No erythema.   Neurological:      General: No focal deficit present.      Mental Status: He is alert.      Cranial Nerves: No cranial nerve deficit.   Psychiatric:         Mood and Affect: Mood normal.       Results Review:      Results from " last 7 days   Lab Units 08/24/23  0708 08/23/23  0545 08/22/23  1247   SODIUM mmol/L 134* 139 138   POTASSIUM mmol/L 4.2 3.5 3.6   CHLORIDE mmol/L 98 94* 96*   CO2 mmol/L 28.0 34.0* 30.1*   BUN mg/dL 30* 32* 27*   CREATININE mg/dL 0.90 0.78 0.81   GLUCOSE mg/dL 78 106* 137*   CALCIUM mg/dL 8.7 8.5* 8.9     Results from last 7 days   Lab Units 08/22/23  1446 08/22/23  1247 08/21/23  0713   HSTROP T ng/L 33* 29* 28*     Results from last 7 days   Lab Units 08/24/23  0708 08/23/23  0545 08/22/23  1247   WBC 10*3/mm3 15.30* 13.54* 14.76*   HEMOGLOBIN g/dL 14.2 14.1 14.2   HEMATOCRIT % 42.2 41.5 44.5   PLATELETS 10*3/mm3 268 261 280     Results from last 7 days   Lab Units 08/22/23  1247   INR  1.09         Results from last 7 days   Lab Units 08/24/23  0708   MAGNESIUM mg/dL 2.8*           I reviewed the patient's new clinical results.  I personally viewed and interpreted the patient's EKG/Telemetry data        Medication Review:   budesonide-formoterol, 2 puff, Inhalation, BID  cholecalciferol, 1,000 Units, Oral, Daily  digoxin, 125 mcg, Oral, Daily  furosemide, 40 mg, Oral, Daily  ipratropium-albuterol, 3 mL, Nebulization, Q6H While Awake - RT  metoprolol succinate XL, 50 mg, Oral, Q12H  montelukast, 10 mg, Oral, Nightly  pantoprazole, 40 mg, Oral, Q AM  piperacillin-tazobactam, 3.375 g, Intravenous, Q8H  rivaroxaban, 20 mg, Oral, Daily With Dinner  rosuvastatin, 20 mg, Oral, Nightly  senna-docusate sodium, 2 tablet, Oral, BID  spironolactone, 12.5 mg, Oral, Daily             Assessment & Plan     1. NICM/EF ~25%/Acute on chronic HFrEF 2/2 rapid AF and valvular heart disease  2. Permanent atrial fibrillation  3. Severe MR  4. PHTN  5. Renal mass, will ultimately need nephrectomy  6. Diverticulitis  7. Nonobstructive CAD by cath in 2021    *He is now euvolemic, continue oral diuretics (furosemide, spironolactone)  *There was an intent to place PPM Monday and do AVJ ablation in two weeks, and I don't know if this  episode of diverticulitis will derail that  *Continue digoxin, metoprolol; BP too low for ACE/ARB/ARNI      Wilfred Draper MD  08/26/23  15:47 EDT

## 2023-08-26 NOTE — CONSULTS
Cc: Abdominal pain, abnormal CT    History of presenting illness:   This is a 68-year-old gentleman with multiple medical issues including suspected renal carcinoma, COPD, hypertension, atrial fibrillation and congestive heart failure.  He was admitted for consideration of pacemaker.  He says that for the last couple of days he has had some abdominal pain and developed abdominal distention and nausea.  He had some plain abdominal films which were worrisome for bowel obstruction, prompting a CT which suggested dilated small bowel loops and perhaps some evidence of acute diverticulitis with concern for possible microperforation.  The patient states she had a bowel movement yesterday and feels better, although he still has some abdominal pain.  He is hungry and wants to try at least ice chips and water.    Past Medical History: Atrial fibrillation, COPD, hypertension, hyperlipidemia, congestive heart failure    Past Surgical History: He underwent recent (July 2023) umbilical hernia repair and bilateral laparoscopic inguinal hernia repair.  He has had a cardiac catheterization in 2021.  Ankle surgery as well.    Medications: Reviewed and reconciled in epic, noted to include Xarelto, also currently started on Zosyn.    Allergies: Eliquis    Social History: Smoker who quit around the year 2000    Family History: Negative for colon or rectal cancer    Review of Systems:  Constitutional: Positive for decreased appetite, positive for weakness, denies fever  Neck: no swollen glands or dysphagia or odynophagia  Respiratory: negative for SOB, cough, hemoptysis or wheezing  Cardiovascular: negative for chest pain, palpitations or peripheral edema  Gastrointestinal: Positive for abdominal pain, denies nausea or vomiting      Physical Exam:  BMI: 25.6  Temperature 98.3 heart rate 102 blood pressure 115/83  General: alert and oriented, appropriate, no acute distress  Eyes: No scleral icterus, extraocular movements are intact  Neck:  Supple without lymphadenopathy or thyromegaly, trachea is in the midline  Respiratory: There is good bilateral chest expansion, no use of accessory muscles is noted  Cardiovascular: No jugular venous distention or peripheral edema is seen  Gastrointestinal: Soft, perhaps mildly distended, subjective tenderness diffusely, no guarding or rebound tenderness, certainly no peritonitis and no recurrent hernia appreciated.    Laboratory data: White blood cell count 15.3, roughly in line with where it has been the last couple of days, hemoglobin 14.2, chemistries are in reasonable order, creatinine 0.9, AST and ALT mildly elevated at 48 and 78 respectively.    Imaging data: CT images are reviewed.  There is colonic diverticulosis and probable inflammatory change around this likely related to diverticulitis.  There may be a dot of free air, although this may also be diverticular in nature.  There is no abscess.  Small bowel in the area of the inflamed colon shows evidence of bowel obstruction versus ileus.      Assessment and plan:   -Abdominal pain and distention, probable mild diverticulitis and associated ileus versus small bowel obstruction  -Multiple other medical illnesses, complicating above  -I recommend conservative management with IV antibiotics and bowel rest, but okay for ice chips and medications, may be able to advance to clear liquids tomorrow  -Denies any nausea or vomiting currently  -Continue to mobilize  -May have to have repeat CT in the next day or 2 if symptoms do not improve, but at this time no indication for surgical intervention      Lemuel Martell MD, FACS  General, Minimally Invasive and Endoscopic Surgery  [facility]    [address], [address]  [place], [place], [address]  P: [phone]  F: [phone]

## 2023-08-26 NOTE — PLAN OF CARE
Problem: Adult Inpatient Plan of Care  Goal: Plan of Care Review  Outcome: Ongoing, Progressing   Goal Outcome Evaluation:  VSS. .5L nasal cannula while sleeping. Afib, HR up to 160-170s when up. Pt still c/o gas like abd pain and had one episode of vomiting over night. KUB showed concern for possible sbo. Stat CT abd ordered. Pt did have bm this am, stated it gave him some relief from the gas like abd pain. Pt to have pacemaker placed Monday. Pt rested throughout the night with no other issues or complaints.

## 2023-08-27 LAB
ALBUMIN SERPL-MCNC: 3.5 G/DL (ref 3.5–5.2)
ALBUMIN/GLOB SERPL: 1.3 G/DL
ALP SERPL-CCNC: 75 U/L (ref 39–117)
ALT SERPL W P-5'-P-CCNC: 40 U/L (ref 1–41)
ANION GAP SERPL CALCULATED.3IONS-SCNC: 12.1 MMOL/L (ref 5–15)
AST SERPL-CCNC: 28 U/L (ref 1–40)
BILIRUB SERPL-MCNC: 1.7 MG/DL (ref 0–1.2)
BUN SERPL-MCNC: 21 MG/DL (ref 8–23)
BUN/CREAT SERPL: 26.3 (ref 7–25)
CALCIUM SPEC-SCNC: 9.8 MG/DL (ref 8.6–10.5)
CHLORIDE SERPL-SCNC: 93 MMOL/L (ref 98–107)
CO2 SERPL-SCNC: 27.9 MMOL/L (ref 22–29)
CREAT SERPL-MCNC: 0.8 MG/DL (ref 0.76–1.27)
DEPRECATED RDW RBC AUTO: 43.8 FL (ref 37–54)
EGFRCR SERPLBLD CKD-EPI 2021: 97.6 ML/MIN/1.73
ERYTHROCYTE [DISTWIDTH] IN BLOOD BY AUTOMATED COUNT: 12.2 % (ref 12.3–15.4)
GLOBULIN UR ELPH-MCNC: 2.8 GM/DL
GLUCOSE SERPL-MCNC: 97 MG/DL (ref 65–99)
HCT VFR BLD AUTO: 41.3 % (ref 37.5–51)
HGB BLD-MCNC: 13.8 G/DL (ref 13–17.7)
MCH RBC QN AUTO: 32.4 PG (ref 26.6–33)
MCHC RBC AUTO-ENTMCNC: 33.4 G/DL (ref 31.5–35.7)
MCV RBC AUTO: 96.9 FL (ref 79–97)
PLATELET # BLD AUTO: 243 10*3/MM3 (ref 140–450)
PMV BLD AUTO: 9.7 FL (ref 6–12)
POTASSIUM SERPL-SCNC: 3.9 MMOL/L (ref 3.5–5.2)
PROT SERPL-MCNC: 6.3 G/DL (ref 6–8.5)
RBC # BLD AUTO: 4.26 10*6/MM3 (ref 4.14–5.8)
SODIUM SERPL-SCNC: 133 MMOL/L (ref 136–145)
WBC NRBC COR # BLD: 27.17 10*3/MM3 (ref 3.4–10.8)

## 2023-08-27 PROCEDURE — 94799 UNLISTED PULMONARY SVC/PX: CPT

## 2023-08-27 PROCEDURE — 63710000001 ROSUVASTATIN 20 MG TABLET: Performed by: INTERNAL MEDICINE

## 2023-08-27 PROCEDURE — 85027 COMPLETE CBC AUTOMATED: CPT | Performed by: HOSPITALIST

## 2023-08-27 PROCEDURE — 94761 N-INVAS EAR/PLS OXIMETRY MLT: CPT

## 2023-08-27 PROCEDURE — 87040 BLOOD CULTURE FOR BACTERIA: CPT | Performed by: INTERNAL MEDICINE

## 2023-08-27 PROCEDURE — 63710000001 PANTOPRAZOLE 40 MG TABLET DELAYED-RELEASE: Performed by: INTERNAL MEDICINE

## 2023-08-27 PROCEDURE — A9270 NON-COVERED ITEM OR SERVICE: HCPCS | Performed by: NURSE PRACTITIONER

## 2023-08-27 PROCEDURE — 63710000001 DIGOXIN 125 MCG TABLET: Performed by: INTERNAL MEDICINE

## 2023-08-27 PROCEDURE — A9270 NON-COVERED ITEM OR SERVICE: HCPCS | Performed by: SURGERY

## 2023-08-27 PROCEDURE — A9270 NON-COVERED ITEM OR SERVICE: HCPCS | Performed by: INTERNAL MEDICINE

## 2023-08-27 PROCEDURE — 63710000001 FUROSEMIDE 40 MG TABLET: Performed by: NURSE PRACTITIONER

## 2023-08-27 PROCEDURE — 63710000001 METOPROLOL SUCCINATE XL 50 MG TABLET SUSTAINED-RELEASE 24 HOUR: Performed by: INTERNAL MEDICINE

## 2023-08-27 PROCEDURE — 99231 SBSQ HOSP IP/OBS SF/LOW 25: CPT | Performed by: SURGERY

## 2023-08-27 PROCEDURE — 63710000001 SIMETHICONE 80 MG CHEWABLE TABLET: Performed by: NURSE PRACTITIONER

## 2023-08-27 PROCEDURE — 80053 COMPREHEN METABOLIC PANEL: CPT | Performed by: HOSPITALIST

## 2023-08-27 PROCEDURE — 63710000001 SPIRONOLACTONE 12.5 MG TABLET: Performed by: INTERNAL MEDICINE

## 2023-08-27 PROCEDURE — 63710000001 BISMUTH SUBSALICYLATE 262 MG CHEWABLE TABLET: Performed by: HOSPITALIST

## 2023-08-27 PROCEDURE — 94664 DEMO&/EVAL PT USE INHALER: CPT

## 2023-08-27 PROCEDURE — 63710000001 BISACODYL 10 MG SUPPOSITORY: Performed by: SURGERY

## 2023-08-27 PROCEDURE — 63710000001 MONTELUKAST 10 MG TABLET: Performed by: INTERNAL MEDICINE

## 2023-08-27 PROCEDURE — 94760 N-INVAS EAR/PLS OXIMETRY 1: CPT

## 2023-08-27 PROCEDURE — 63710000001 CHOLECALCIFEROL 25 MCG (1000 UT) TABLET: Performed by: INTERNAL MEDICINE

## 2023-08-27 PROCEDURE — 25010000002 ONDANSETRON PER 1 MG: Performed by: INTERNAL MEDICINE

## 2023-08-27 PROCEDURE — A9270 NON-COVERED ITEM OR SERVICE: HCPCS | Performed by: HOSPITALIST

## 2023-08-27 PROCEDURE — 25010000002 PIPERACILLIN SOD-TAZOBACTAM PER 1 G: Performed by: HOSPITALIST

## 2023-08-27 PROCEDURE — 63710000001 SENNOSIDES-DOCUSATE 8.6-50 MG TABLET: Performed by: SURGERY

## 2023-08-27 RX ORDER — BISACODYL 5 MG/1
5 TABLET, DELAYED RELEASE ORAL DAILY PRN
Status: DISCONTINUED | OUTPATIENT
Start: 2023-08-27 | End: 2023-08-30 | Stop reason: HOSPADM

## 2023-08-27 RX ORDER — AMOXICILLIN 250 MG
2 CAPSULE ORAL 2 TIMES DAILY
Status: DISCONTINUED | OUTPATIENT
Start: 2023-08-27 | End: 2023-08-30 | Stop reason: HOSPADM

## 2023-08-27 RX ORDER — BISACODYL 10 MG
10 SUPPOSITORY, RECTAL RECTAL DAILY PRN
Status: DISCONTINUED | OUTPATIENT
Start: 2023-08-27 | End: 2023-08-30 | Stop reason: HOSPADM

## 2023-08-27 RX ORDER — POLYETHYLENE GLYCOL 3350 17 G/17G
17 POWDER, FOR SOLUTION ORAL DAILY
Status: DISCONTINUED | OUTPATIENT
Start: 2023-08-28 | End: 2023-08-30 | Stop reason: HOSPADM

## 2023-08-27 RX ADMIN — Medication 524 MG: at 16:24

## 2023-08-27 RX ADMIN — Medication 1000 UNITS: at 08:57

## 2023-08-27 RX ADMIN — IPRATROPIUM BROMIDE AND ALBUTEROL SULFATE 3 ML: 2.5; .5 SOLUTION RESPIRATORY (INHALATION) at 07:36

## 2023-08-27 RX ADMIN — ONDANSETRON 4 MG: 2 INJECTION INTRAMUSCULAR; INTRAVENOUS at 08:57

## 2023-08-27 RX ADMIN — IPRATROPIUM BROMIDE AND ALBUTEROL SULFATE 3 ML: 2.5; .5 SOLUTION RESPIRATORY (INHALATION) at 20:01

## 2023-08-27 RX ADMIN — Medication 12.5 MG: at 08:57

## 2023-08-27 RX ADMIN — ROSUVASTATIN CALCIUM 20 MG: 20 TABLET, FILM COATED ORAL at 19:58

## 2023-08-27 RX ADMIN — DIGOXIN 125 MCG: 125 TABLET ORAL at 13:12

## 2023-08-27 RX ADMIN — IPRATROPIUM BROMIDE AND ALBUTEROL SULFATE 3 ML: 2.5; .5 SOLUTION RESPIRATORY (INHALATION) at 11:25

## 2023-08-27 RX ADMIN — FUROSEMIDE 40 MG: 40 TABLET ORAL at 08:57

## 2023-08-27 RX ADMIN — SENNOSIDES AND DOCUSATE SODIUM 2 TABLET: 50; 8.6 TABLET ORAL at 19:58

## 2023-08-27 RX ADMIN — METOPROLOL SUCCINATE 50 MG: 50 TABLET, FILM COATED, EXTENDED RELEASE ORAL at 08:56

## 2023-08-27 RX ADMIN — METOPROLOL SUCCINATE 50 MG: 50 TABLET, FILM COATED, EXTENDED RELEASE ORAL at 19:58

## 2023-08-27 RX ADMIN — BISACODYL 10 MG: 10 SUPPOSITORY RECTAL at 18:37

## 2023-08-27 RX ADMIN — BUDESONIDE AND FORMOTEROL FUMARATE DIHYDRATE 2 PUFF: 160; 4.5 AEROSOL RESPIRATORY (INHALATION) at 07:36

## 2023-08-27 RX ADMIN — PIPERACILLIN SODIUM AND TAZOBACTAM SODIUM 3.38 G: 3; .375 INJECTION, SOLUTION INTRAVENOUS at 16:25

## 2023-08-27 RX ADMIN — PANTOPRAZOLE SODIUM 40 MG: 40 TABLET, DELAYED RELEASE ORAL at 08:56

## 2023-08-27 RX ADMIN — MONTELUKAST SODIUM 10 MG: 10 TABLET, FILM COATED ORAL at 19:58

## 2023-08-27 RX ADMIN — BUDESONIDE AND FORMOTEROL FUMARATE DIHYDRATE 2 PUFF: 160; 4.5 AEROSOL RESPIRATORY (INHALATION) at 20:01

## 2023-08-27 RX ADMIN — PIPERACILLIN SODIUM AND TAZOBACTAM SODIUM 3.38 G: 3; .375 INJECTION, SOLUTION INTRAVENOUS at 22:49

## 2023-08-27 RX ADMIN — SIMETHICONE 80 MG: 80 TABLET, CHEWABLE ORAL at 08:57

## 2023-08-27 RX ADMIN — PIPERACILLIN SODIUM AND TAZOBACTAM SODIUM 3.38 G: 3; .375 INJECTION, SOLUTION INTRAVENOUS at 06:34

## 2023-08-27 RX ADMIN — SIMETHICONE 80 MG: 80 TABLET, CHEWABLE ORAL at 16:25

## 2023-08-27 NOTE — PROGRESS NOTES
Overall cardiac status stable. Will check blood cultures x 2 given need for PPM. Will make NPO after MN but will ultimately defer to EP as to whether or not the dx of diverticulitis will delay the procedure.    If the PM will be delayed, rivaroxaban will need to be resumed.

## 2023-08-27 NOTE — PLAN OF CARE
Goal Outcome Evaluation:           Progress: no change       Pt A&Ox4. On 1L nasal cannula. Pt was slightly upset this AM due to frustration with being in hospital. Pt still having some discomfort from gas. PRN medication given for this. Pt ambulated in room and on unit.

## 2023-08-27 NOTE — PROGRESS NOTES
Follow-up acute diverticulitis/partial bowel obstruction    Subjective:  Still feels distended.  He did have a bowel movement yesterday and has passed some flatus but also has had some episodes of nausea and heaving.  He has been up and walking.  Heart rate remains elevated    Objective:  Afebrile, heart rate in the 90s currently but apparently went as high as 160s overnight, blood pressure 124/88  General: Awake and alert, no acute distress but appears slightly uncomfortable  Abdomen: Mildly distended, mild lower abdominal tenderness without guarding, no hernia    White blood cell count 27.2, up from 15.3 yesterday.  Chemistries largely unremarkable, albumin 3.5, total bilirubin slightly elevated at 1.7.    Assessment and plan:  -Acute diverticulitis, really fairly mild changes yesterday without evidence of abscess or perforation on CT  -Clinically exam is relatively benign and patient is having some bowel function  -Recommend continue antibiotics, defer IV fluid to primary service  -I do not think there is much value in repeating his CT today but in the next day or 2 it may be reasonable if symptoms do not improve  -Bowel regimen    Lemuel Maretll MD  General and Endoscopic Surgery  [facility]    [address], [address]  [place], [address]  P: [phone]  F: [phone]

## 2023-08-27 NOTE — PLAN OF CARE
Problem: Adult Inpatient Plan of Care  Goal: Plan of Care Review  Outcome: Ongoing, Progressing   Goal Outcome Evaluation:  HR still up into the 160-170s at times when up moving around but does not sustain, otherwise VSS. RA. NPO except ice chips and sips with meds. Pt to have pacemaker placed Monday. Pt rested throughout the nigh with no other issues or complaints.

## 2023-08-27 NOTE — PROGRESS NOTES
"    DAILY PROGRESS NOTE  [facility]    Patient Identification:  Name: Claude Spencer  Age: 68 y.o.  Sex: male  :  1955  MRN: [id number]         Primary Care Physician: Jason King MD    Subjective:  Interval History: Still some GI issues and he had small-volume emesis today but is otherwise passing gas and stool.  His abdominal distention is proved but he still has some discomfort.  Denies any chest pain or troubles breathing    Objective: Nontoxic in appearance, sitting up in bed comfortable, no family present, LTP    Scheduled Meds:budesonide-formoterol, 2 puff, Inhalation, BID  cholecalciferol, 1,000 Units, Oral, Daily  digoxin, 125 mcg, Oral, Daily  furosemide, 40 mg, Oral, Daily  ipratropium-albuterol, 3 mL, Nebulization, Q6H While Awake - RT  metoprolol succinate XL, 50 mg, Oral, Q12H  montelukast, 10 mg, Oral, Nightly  pantoprazole, 40 mg, Oral, Q AM  piperacillin-tazobactam, 3.375 g, Intravenous, Q8H  senna-docusate sodium, 2 tablet, Oral, BID   And  [START ON 2023] polyethylene glycol, 17 g, Oral, Daily  rosuvastatin, 20 mg, Oral, Nightly  spironolactone, 12.5 mg, Oral, Daily      Continuous Infusions:     Vital signs in last 24 hours:  Temp:  [97.3 °F (36.3 °C)-99.1 °F (37.3 °C)] 97.9 °F (36.6 °C)  Heart Rate:  [] 92  Resp:  [18-20] 18  BP: (108-124)/(77-91) 124/88    Intake/Output:  No intake or output data in the 24 hours ending 23 1043    Exam:  /88 (BP Location: Left arm, Patient Position: Lying)   Pulse 92   Temp 97.9 °F (36.6 °C) (Oral)   Resp 18   Ht 175.3 cm (69.02\")   Wt 78.5 kg (173 lb)   SpO2 97%   BMI 25.54 kg/m²     General Appearance:    Alert, cooperative, nontoxic, AAOx3                           Eyes:  No scleral icterus                         Throat:   Oral mucosa pink and moist                           Neck:   No JVD                         Lungs:    Clear to auscultation bilaterally, respirations unlabored                  "         Heart:  Irregular rate and rhythm, S1 and S2 normal                  Abdomen:     Soft, mildly tender to palpation but no rebound or guarding, distention improved                 Extremities: Stasis changes, generalized weakness with resolved edema                        Pulses:   Pulses palpable in lower extremities                            Skin:   Skin is warm and dry, nonjaundiced                  Neurologic:   CNII-XII intact, no focal deficits noted     Data Review:  Labs in chart were reviewed.    Assessment:  Active Hospital Problems    Diagnosis  POA    **Acute on chronic combined systolic and diastolic CHF (congestive heart failure) [I50.43]  Unknown    Diverticulitis [K57.92]  Unknown    Pulmonary hypertension [I27.20]  Yes    Pulmonary emphysema, unspecified emphysema type [J43.9]  Yes    Atrial fibrillation [I48.91]  Yes    Gastroesophageal reflux disease [K21.9]  Yes    Hypertension [I10]  Yes    Hypercholesterolemia [E78.00]  Yes      Resolved Hospital Problems   No resolved problems to display.       Plan:    Acute diverticulitis we will treat medically   -Appreciate 's surgical input and no plans for intervention at this time but I do think we may need to repeat a CT in the next couple days as well   -Zosyn D2   -Full liquid diet   -WBC took a jump to 27 -nontoxic in appearance and otherwise afebrile -likely an element of reaction due to infection and will continue to trend      Previous plans were for PPM on Monday -cardiology is consulted and following and may need to delay that PPM secondary to current infection but will defer to EP/cardiology's input    Acute biventricular CHF resolved with chronic history/nonischemic cardiomyopathy compounded by severe MR and chronic A-fib with pulmonary hypertension    COPD without acute exacerbation    Hypertension stable    Abnormal LFTs likely secondary to passive congestion with statin continued   -LFTs now normal though bilirubin a  little escalated at 1.7    Urology planning on outpatient renal mass partial nephrectomy    Gerry Damon MD  8/27/2023  10:43 EDT

## 2023-08-28 LAB
ALBUMIN SERPL-MCNC: 3.1 G/DL (ref 3.5–5.2)
ALBUMIN/GLOB SERPL: 1.1 G/DL
ALP SERPL-CCNC: 71 U/L (ref 39–117)
ALT SERPL W P-5'-P-CCNC: 36 U/L (ref 1–41)
ANION GAP SERPL CALCULATED.3IONS-SCNC: 8.6 MMOL/L (ref 5–15)
AST SERPL-CCNC: 35 U/L (ref 1–40)
BILIRUB SERPL-MCNC: 1.1 MG/DL (ref 0–1.2)
BUN SERPL-MCNC: 21 MG/DL (ref 8–23)
BUN/CREAT SERPL: 26.9 (ref 7–25)
CALCIUM SPEC-SCNC: 9.2 MG/DL (ref 8.6–10.5)
CHLORIDE SERPL-SCNC: 93 MMOL/L (ref 98–107)
CO2 SERPL-SCNC: 27.4 MMOL/L (ref 22–29)
CREAT SERPL-MCNC: 0.78 MG/DL (ref 0.76–1.27)
DEPRECATED RDW RBC AUTO: 43.5 FL (ref 37–54)
EGFRCR SERPLBLD CKD-EPI 2021: 98.4 ML/MIN/1.73
ERYTHROCYTE [DISTWIDTH] IN BLOOD BY AUTOMATED COUNT: 12.2 % (ref 12.3–15.4)
GLOBULIN UR ELPH-MCNC: 2.9 GM/DL
GLUCOSE SERPL-MCNC: 82 MG/DL (ref 65–99)
HCT VFR BLD AUTO: 38.1 % (ref 37.5–51)
HGB BLD-MCNC: 12.9 G/DL (ref 13–17.7)
MAGNESIUM SERPL-MCNC: 2.1 MG/DL (ref 1.6–2.4)
MCH RBC QN AUTO: 32.7 PG (ref 26.6–33)
MCHC RBC AUTO-ENTMCNC: 33.9 G/DL (ref 31.5–35.7)
MCV RBC AUTO: 96.5 FL (ref 79–97)
OSMOLALITY UR: 617 MOSM/KG
PLATELET # BLD AUTO: 229 10*3/MM3 (ref 140–450)
PMV BLD AUTO: 9.7 FL (ref 6–12)
POTASSIUM SERPL-SCNC: 3.7 MMOL/L (ref 3.5–5.2)
PROT SERPL-MCNC: 6 G/DL (ref 6–8.5)
RBC # BLD AUTO: 3.95 10*6/MM3 (ref 4.14–5.8)
SODIUM SERPL-SCNC: 129 MMOL/L (ref 136–145)
SODIUM UR-SCNC: <20 MMOL/L
WBC NRBC COR # BLD: 14.1 10*3/MM3 (ref 3.4–10.8)

## 2023-08-28 PROCEDURE — A9270 NON-COVERED ITEM OR SERVICE: HCPCS | Performed by: INTERNAL MEDICINE

## 2023-08-28 PROCEDURE — 63710000001 METOPROLOL SUCCINATE XL 50 MG TABLET SUSTAINED-RELEASE 24 HOUR: Performed by: INTERNAL MEDICINE

## 2023-08-28 PROCEDURE — 63710000001 SIMETHICONE 80 MG CHEWABLE TABLET: Performed by: NURSE PRACTITIONER

## 2023-08-28 PROCEDURE — 63710000001 ROSUVASTATIN 20 MG TABLET: Performed by: INTERNAL MEDICINE

## 2023-08-28 PROCEDURE — 63710000001 RIVAROXABAN 20 MG TABLET

## 2023-08-28 PROCEDURE — 63710000001 BISACODYL 10 MG SUPPOSITORY: Performed by: SURGERY

## 2023-08-28 PROCEDURE — A9270 NON-COVERED ITEM OR SERVICE: HCPCS | Performed by: NURSE PRACTITIONER

## 2023-08-28 PROCEDURE — 94799 UNLISTED PULMONARY SVC/PX: CPT

## 2023-08-28 PROCEDURE — A9270 NON-COVERED ITEM OR SERVICE: HCPCS

## 2023-08-28 PROCEDURE — 94664 DEMO&/EVAL PT USE INHALER: CPT

## 2023-08-28 PROCEDURE — 25010000002 PIPERACILLIN SOD-TAZOBACTAM PER 1 G: Performed by: HOSPITALIST

## 2023-08-28 PROCEDURE — 63710000001 CHOLECALCIFEROL 25 MCG (1000 UT) TABLET: Performed by: INTERNAL MEDICINE

## 2023-08-28 PROCEDURE — 63710000001 MONTELUKAST 10 MG TABLET: Performed by: INTERNAL MEDICINE

## 2023-08-28 PROCEDURE — 63710000001 DIGOXIN 125 MCG TABLET: Performed by: INTERNAL MEDICINE

## 2023-08-28 PROCEDURE — 83935 ASSAY OF URINE OSMOLALITY: CPT | Performed by: HOSPITALIST

## 2023-08-28 PROCEDURE — 94761 N-INVAS EAR/PLS OXIMETRY MLT: CPT

## 2023-08-28 PROCEDURE — 63710000001 BISMUTH SUBSALICYLATE 262 MG CHEWABLE TABLET: Performed by: HOSPITALIST

## 2023-08-28 PROCEDURE — 83735 ASSAY OF MAGNESIUM: CPT | Performed by: NURSE PRACTITIONER

## 2023-08-28 PROCEDURE — 84300 ASSAY OF URINE SODIUM: CPT | Performed by: HOSPITALIST

## 2023-08-28 PROCEDURE — 63710000001 MAGNESIUM HYDROXIDE 400 MG/5ML SUSPENSION: Performed by: NURSE PRACTITIONER

## 2023-08-28 PROCEDURE — 99233 SBSQ HOSP IP/OBS HIGH 50: CPT

## 2023-08-28 PROCEDURE — 85027 COMPLETE CBC AUTOMATED: CPT | Performed by: HOSPITALIST

## 2023-08-28 PROCEDURE — A9270 NON-COVERED ITEM OR SERVICE: HCPCS | Performed by: HOSPITALIST

## 2023-08-28 PROCEDURE — 99231 SBSQ HOSP IP/OBS SF/LOW 25: CPT | Performed by: SURGERY

## 2023-08-28 PROCEDURE — 80053 COMPREHEN METABOLIC PANEL: CPT | Performed by: HOSPITALIST

## 2023-08-28 PROCEDURE — 63710000001 PANTOPRAZOLE 40 MG TABLET DELAYED-RELEASE: Performed by: INTERNAL MEDICINE

## 2023-08-28 PROCEDURE — 63710000001 FUROSEMIDE 40 MG TABLET: Performed by: NURSE PRACTITIONER

## 2023-08-28 PROCEDURE — 63710000001 SPIRONOLACTONE 12.5 MG TABLET: Performed by: INTERNAL MEDICINE

## 2023-08-28 PROCEDURE — A9270 NON-COVERED ITEM OR SERVICE: HCPCS | Performed by: SURGERY

## 2023-08-28 PROCEDURE — 63710000001 SENNOSIDES-DOCUSATE 8.6-50 MG TABLET: Performed by: SURGERY

## 2023-08-28 RX ADMIN — METOPROLOL SUCCINATE 50 MG: 50 TABLET, FILM COATED, EXTENDED RELEASE ORAL at 20:51

## 2023-08-28 RX ADMIN — SIMETHICONE 80 MG: 80 TABLET, CHEWABLE ORAL at 17:22

## 2023-08-28 RX ADMIN — BISACODYL 10 MG: 10 SUPPOSITORY RECTAL at 12:16

## 2023-08-28 RX ADMIN — PANTOPRAZOLE SODIUM 40 MG: 40 TABLET, DELAYED RELEASE ORAL at 09:00

## 2023-08-28 RX ADMIN — SENNOSIDES AND DOCUSATE SODIUM 2 TABLET: 50; 8.6 TABLET ORAL at 09:00

## 2023-08-28 RX ADMIN — PIPERACILLIN SODIUM AND TAZOBACTAM SODIUM 3.38 G: 3; .375 INJECTION, SOLUTION INTRAVENOUS at 17:17

## 2023-08-28 RX ADMIN — MONTELUKAST SODIUM 10 MG: 10 TABLET, FILM COATED ORAL at 20:51

## 2023-08-28 RX ADMIN — FUROSEMIDE 40 MG: 40 TABLET ORAL at 09:01

## 2023-08-28 RX ADMIN — Medication 524 MG: at 18:47

## 2023-08-28 RX ADMIN — RIVAROXABAN 20 MG: 20 TABLET, FILM COATED ORAL at 17:23

## 2023-08-28 RX ADMIN — ROSUVASTATIN CALCIUM 20 MG: 20 TABLET, FILM COATED ORAL at 20:51

## 2023-08-28 RX ADMIN — BUDESONIDE AND FORMOTEROL FUMARATE DIHYDRATE 2 PUFF: 160; 4.5 AEROSOL RESPIRATORY (INHALATION) at 06:52

## 2023-08-28 RX ADMIN — Medication 12.5 MG: at 09:01

## 2023-08-28 RX ADMIN — MAGNESIUM HYDROXIDE 15 ML: 1200 LIQUID ORAL at 22:49

## 2023-08-28 RX ADMIN — Medication 1000 UNITS: at 09:01

## 2023-08-28 RX ADMIN — IPRATROPIUM BROMIDE AND ALBUTEROL SULFATE 3 ML: 2.5; .5 SOLUTION RESPIRATORY (INHALATION) at 19:32

## 2023-08-28 RX ADMIN — BUDESONIDE AND FORMOTEROL FUMARATE DIHYDRATE 2 PUFF: 160; 4.5 AEROSOL RESPIRATORY (INHALATION) at 19:32

## 2023-08-28 RX ADMIN — PIPERACILLIN SODIUM AND TAZOBACTAM SODIUM 3.38 G: 3; .375 INJECTION, SOLUTION INTRAVENOUS at 22:47

## 2023-08-28 RX ADMIN — IPRATROPIUM BROMIDE AND ALBUTEROL SULFATE 3 ML: 2.5; .5 SOLUTION RESPIRATORY (INHALATION) at 06:51

## 2023-08-28 RX ADMIN — SENNOSIDES AND DOCUSATE SODIUM 2 TABLET: 50; 8.6 TABLET ORAL at 20:51

## 2023-08-28 RX ADMIN — DIGOXIN 125 MCG: 125 TABLET ORAL at 12:15

## 2023-08-28 RX ADMIN — METOPROLOL SUCCINATE 50 MG: 50 TABLET, FILM COATED, EXTENDED RELEASE ORAL at 09:01

## 2023-08-28 RX ADMIN — PIPERACILLIN SODIUM AND TAZOBACTAM SODIUM 3.38 G: 3; .375 INJECTION, SOLUTION INTRAVENOUS at 06:18

## 2023-08-28 NOTE — PROGRESS NOTES
Electrophysiology Follow-Up Note      Patient Name: Claude Spencer  Age/Sex: 68 y.o. male  : 1955  MRN: [id number]      Day of Service: 23       Chief Complaint/Follow-up: persistent atrial fibrillation with RVR      S: doing okay, abdominal pain better. Diagnosed with diverticiulitis over the weekend, now on ABX. WBC elevated.       Temp:  [97.9 °F (36.6 °C)-98.2 °F (36.8 °C)] 98.2 °F (36.8 °C)  Heart Rate:  [] 93  Resp:  [18] 18  BP: (107-115)/(74-78) 113/78     PHYSICAL EXAM:    General Appearance: No acute distress, well developed and well nourished.   Eyes: Conjunctiva and lids: No erythema, swelling, or discharge. Sclera non-icteric.   HENT: Atraumatic, normocephalic. External eyes, ears, and nose normal.   Respiratory: No signs of respiratory distress. Respiration rhythm and depth normal.   Clear to auscultation. No rales, crackles, rhonchi, or wheezing auscultated.   Cardiovascular:  Heart Rate and Rhythm: irregularly irregular, Heart Sounds: Normal S1 and S2. No S3 or S4 noted.  Gastrointestinal:  Abdomen soft, non-distended, non-tender.  Musculoskeletal: Normal movement of extremities  Skin: Warm and dry.   Psychiatric: Patient alert and oriented to person, place, and time. Speech and behavior appropriate. Normal mood and affect.      Results from last 7 days   Lab Units 23  0708   SODIUM mmol/L 129* 133* 134*   POTASSIUM mmol/L 3.7 3.9 4.2   CHLORIDE mmol/L 93* 93* 98   CO2 mmol/L 27.4 27.9 28.0   BUN mg/dL 21 21 30*   CREATININE mg/dL 0.78 0.80 0.90   GLUCOSE mg/dL 82 97 78   CALCIUM mg/dL 9.2 9.8 8.7     Results from last 7 days   Lab Units 2328 23  0708   WBC 10*3/mm3 14.10* 27.17* 15.30*   HEMOGLOBIN g/dL 12.9* 13.8 14.2   HEMATOCRIT % 38.1 41.3 42.2   PLATELETS 10*3/mm3 229 243 268     Results from last 7 days   Lab Units 23  1247   INR  1.09     Results from last 7 days   Lab Units  08/22/23  1446 08/22/23  1247   HSTROP T ng/L 33* 29*               Current Medications:   Scheduled Meds:budesonide-formoterol, 2 puff, Inhalation, BID  cholecalciferol, 1,000 Units, Oral, Daily  digoxin, 125 mcg, Oral, Daily  furosemide, 40 mg, Oral, Daily  ipratropium-albuterol, 3 mL, Nebulization, Q6H While Awake - RT  metoprolol succinate XL, 50 mg, Oral, Q12H  montelukast, 10 mg, Oral, Nightly  pantoprazole, 40 mg, Oral, Q AM  piperacillin-tazobactam, 3.375 g, Intravenous, Q8H  senna-docusate sodium, 2 tablet, Oral, BID   And  polyethylene glycol, 17 g, Oral, Daily  rosuvastatin, 20 mg, Oral, Nightly  spironolactone, 12.5 mg, Oral, Daily            Acute on chronic combined systolic and diastolic CHF (congestive heart failure)    Gastroesophageal reflux disease    Hypercholesterolemia    Hypertension    Atrial fibrillation    Pulmonary emphysema, unspecified emphysema type    Pulmonary hypertension    Diverticulitis       Plan:   Persistent atrial fibrillation with RVR---- was scheduled for pacemaker placement today. However, with diagnosis of diverticulitis, elevated WBC, and now on ABX we will need to postpone pacemaker placement due to increased risk of infection. We will just have to rate control for now. Continue metoprolol and digoxin. Will restart rivaroxaban today.       We will plan to bring him back in two weeks for procedure. From EP standpoint he can probably go home today.       Paco Landa, KHADRA  08/28/23  10:20 EDT

## 2023-08-28 NOTE — PLAN OF CARE
Problem: Adult Inpatient Plan of Care  Goal: Plan of Care Review  Outcome: Ongoing, Progressing   Goal Outcome Evaluation:  VSS. 1L nasal cannula when sleeping. Bm x 2 last night. NPO since midnight for pacemaker placement today. Pt rested throughout the night with no issues or complaints.

## 2023-08-28 NOTE — PROGRESS NOTES
"DAILY PROGRESS NOTE  [facility]    Patient Identification:  Name: Claude Spencer  Age: 68 y.o.  Sex: male  :  1955  MRN: [id number]         Primary Care Physician: Jason King MD      Subjective  No new or acute complaints.  States his abdomen is feeling \"rough\" today.  He just had a bowel movement which was a moderate size soft dark stool.    Objective:  General Appearance:  Comfortable, well-appearing, in no acute distress and not in pain.    Vital signs: (most recent): Blood pressure 113/78, pulse 93, temperature 98.2 °F (36.8 °C), temperature source Oral, resp. rate 18, height 175.3 cm (69.02\"), weight 78.6 kg (173 lb 3.2 oz), SpO2 96 %.    Lungs:  Normal effort and normal respiratory rate.  He is not in respiratory distress.  No stridor.  There are rales.  No decreased breath sounds, wheezes or rhonchi.  (Few right basilar rales.)  Heart: Normal rate.  Irregular rhythm.    Abdomen: Abdomen is soft and non-distended.  Hypoactive bowel sounds.     Extremities: There is no dependent edema.    Neurological: Patient is alert and oriented to person, place and time.    Skin:  Warm and dry.                Vital signs in last 24 hours:  Temp:  [97.9 °F (36.6 °C)-98.2 °F (36.8 °C)] 98.2 °F (36.8 °C)  Heart Rate:  [] 93  Resp:  [18] 18  BP: (107-115)/(74-78) 113/78    Intake/Output:    Intake/Output Summary (Last 24 hours) at 2023 1422  Last data filed at 2023 0300  Gross per 24 hour   Intake --   Output 600 ml   Net -600 ml         Results from last 7 days   Lab Units 23  0633 23  0728 23  0708 23  0545 23  1247   WBC 10*3/mm3 14.10* 27.17* 15.30* 13.54* 14.76*   HEMOGLOBIN g/dL 12.9* 13.8 14.2 14.1 14.2   PLATELETS 10*3/mm3 229 243 268 261 280     Results from last 7 days   Lab Units 23  0633 23  0728 23  0708 23  0545 23  1247   SODIUM mmol/L 129* 133* 134* 139 138   POTASSIUM mmol/L 3.7 3.9 4.2 3.5 3.6 "   CHLORIDE mmol/L 93* 93* 98 94* 96*   CO2 mmol/L 27.4 27.9 28.0 34.0* 30.1*   BUN mg/dL 21 21 30* 32* 27*   CREATININE mg/dL 0.78 0.80 0.90 0.78 0.81   GLUCOSE mg/dL 82 97 78 106* 137*   Estimated Creatinine Clearance: 103.6 mL/min (by C-G formula based on SCr of 0.78 mg/dL).  Results from last 7 days   Lab Units 08/28/23  0633 08/27/23  0728 08/24/23  0708 08/23/23  0545 08/22/23  1247   CALCIUM mg/dL 9.2 9.8 8.7 8.5* 8.9   ALBUMIN g/dL 3.1* 3.5 3.7  --  4.1   MAGNESIUM mg/dL  --   --  2.8*  --   --      Results from last 7 days   Lab Units 08/28/23 0633 08/27/23 0728 08/24/23  0708 08/22/23  1247   ALBUMIN g/dL 3.1* 3.5 3.7 4.1   BILIRUBIN mg/dL 1.1 1.7* 1.0 0.8   ALK PHOS U/L 71 75 81 131*   AST (SGOT) U/L 35 28 48* 68*   ALT (SGPT) U/L 36 40 78* 103*       Assessment:    Acute on chronic combined systolic and diastolic CHF (congestive heart failure)    Gastroesophageal reflux disease    Hypercholesterolemia    Hypertension    Atrial fibrillation    Pulmonary emphysema, unspecified emphysema type    Pulmonary hypertension    Diverticulitis    Acute on chronic systolic CHF: LVEF 26 to 30%.  Cardiology evaluation appreciated.  Now on oral diuretics.  Continue to monitor.  Atrial fibrillation: Complicating above.  Continue rate control and anticoagulation.  Plans for PPM.  At this point likely as an outpatient due to acute diverticulitis and elevated white count.  Severe mitral regurgitation: Contributing to CHF.  Pulmonary hypertension: Also complicating above.  Acute sigmoid diverticulitis: Slowly improving.  Surgical evaluation appreciated.  Continue antibiotics.  Oral intake per surgery.  Hyponatremia: Associated with systolic CHF.  Monitor closely.  Avoid excess fluids.  COPD: Clinically stable.  Hypertension: Presently with reasonable control.  Transaminitis: Likely congestive hepatitis.  Resolved.    All problems new to this examiner.    Plan:  Please see above.  Discussed with patient.      Miguel JAIME  MD Laura  8/28/2023  14:22 EDT

## 2023-08-28 NOTE — PROGRESS NOTES
"    Patient Name: Claude Spencer  :1955  68 y.o.      Patient Care Team:  Jason King MD as PCP - General (Internal Medicine)    Chief Complaint: follow up atrial fibrillation     Interval History: Hr ok resting in bed.          Objective   Vital Signs  Temp:  [97.9 °F (36.6 °C)-98.2 °F (36.8 °C)] 98.2 °F (36.8 °C)  Heart Rate:  [] 93  Resp:  [18] 18  BP: (107-115)/(74-78) 113/78    Intake/Output Summary (Last 24 hours) at 2023 1342  Last data filed at 2023 0300  Gross per 24 hour   Intake --   Output 600 ml   Net -600 ml     Flowsheet Rows      Flowsheet Row First Filed Value   Admission Height 175.3 cm (69.02\") Documented at 2023 2344   Admission Weight 80.6 kg (177 lb 9.6 oz) Documented at 2023 2344            Physical Exam:   General Appearance:    Alert, cooperative, in no acute distress   Lungs:     Clear to auscultation.  Normal respiratory effort and rate.      Heart:    irregular rhythm and normal rate, normal S1 and S2, no murmurs, gallops or rubs.     Chest Wall:    No abnormalities observed   Abdomen:     Soft, nontender, positive bowel sounds.     Extremities:   no cyanosis, clubbing or edema.  No marked joint deformities.  Adequate musculoskeletal strength.       Results Review:    Results from last 7 days   Lab Units 23  0633   SODIUM mmol/L 129*   POTASSIUM mmol/L 3.7   CHLORIDE mmol/L 93*   CO2 mmol/L 27.4   BUN mg/dL 21   CREATININE mg/dL 0.78   GLUCOSE mg/dL 82   CALCIUM mg/dL 9.2     Results from last 7 days   Lab Units 23  1446 23  1247   HSTROP T ng/L 33* 29*     Results from last 7 days   Lab Units 23  0633   WBC 10*3/mm3 14.10*   HEMOGLOBIN g/dL 12.9*   HEMATOCRIT % 38.1   PLATELETS 10*3/mm3 229     Results from last 7 days   Lab Units 23  1247   INR  1.09     Results from last 7 days   Lab Units 23  0708   MAGNESIUM mg/dL 2.8*                   Medication Review:   budesonide-formoterol, 2 puff, Inhalation, " BID  cholecalciferol, 1,000 Units, Oral, Daily  digoxin, 125 mcg, Oral, Daily  furosemide, 40 mg, Oral, Daily  ipratropium-albuterol, 3 mL, Nebulization, Q6H While Awake - RT  metoprolol succinate XL, 50 mg, Oral, Q12H  montelukast, 10 mg, Oral, Nightly  pantoprazole, 40 mg, Oral, Q AM  piperacillin-tazobactam, 3.375 g, Intravenous, Q8H  senna-docusate sodium, 2 tablet, Oral, BID   And  polyethylene glycol, 17 g, Oral, Daily  rivaroxaban, 20 mg, Oral, Daily With Dinner  rosuvastatin, 20 mg, Oral, Nightly  spironolactone, 12.5 mg, Oral, Daily                Assessment & Plan   Acute on chronic systolic congestive heart failure due to atrial fibrillation and valvular heart disease  Permanent atrial fibrillation - rivaroxaban restarted.   Mitral valve regurgitation  Renal mass, needs nephrectomy in next month or so  Mild to moderate coronary artery disease by cath 4/2021  Pulmonary hypertension  Elevated transaminases - improving     - continue oral diuretics.   - EP saw and planned for PPM today however leukocytosis over the weekend and diagnosed with diverticulitis. Plans for PPM in 2 weeks. EP to arrange.   - hyponatremic. Volume status looks ok. Needs labs after discharge to follow closely if he goes home today.   - He needs clearance for nephrectomy in the future. This is challenging because he can so easily tip in to decompensated heart failure with his labile heart rate.     KHADRA Key  [facility]  08/28/23  13:42 EDT

## 2023-08-28 NOTE — PLAN OF CARE
Goal Outcome Evaluation:           Progress: no change          Pt A&Ox4. On room air, uses 1L nasal cannula occasionally. Pt very upset that PM placement has been cancelled and planned for outpatient. Pt wanted to Dc home although still receiving antibiotics and complaining of abdominal discomfort. Pt did have BM's. RN spoke with daughter, Kati, to notify her of plans.

## 2023-08-29 DIAGNOSIS — I48.19 PERSISTENT ATRIAL FIBRILLATION: Primary | ICD-10-CM

## 2023-08-29 LAB
ALBUMIN SERPL-MCNC: 3.4 G/DL (ref 3.5–5.2)
ANION GAP SERPL CALCULATED.3IONS-SCNC: 7 MMOL/L (ref 5–15)
BASOPHILS # BLD AUTO: 0.01 10*3/MM3 (ref 0–0.2)
BASOPHILS NFR BLD AUTO: 0.1 % (ref 0–1.5)
BUN SERPL-MCNC: 21 MG/DL (ref 8–23)
BUN/CREAT SERPL: 24.1 (ref 7–25)
CALCIUM SPEC-SCNC: 9.6 MG/DL (ref 8.6–10.5)
CHLORIDE SERPL-SCNC: 94 MMOL/L (ref 98–107)
CO2 SERPL-SCNC: 30 MMOL/L (ref 22–29)
CREAT SERPL-MCNC: 0.87 MG/DL (ref 0.76–1.27)
DEPRECATED RDW RBC AUTO: 41.6 FL (ref 37–54)
EGFRCR SERPLBLD CKD-EPI 2021: 95.2 ML/MIN/1.73
EOSINOPHIL # BLD AUTO: 0.13 10*3/MM3 (ref 0–0.4)
EOSINOPHIL NFR BLD AUTO: 1.1 % (ref 0.3–6.2)
ERYTHROCYTE [DISTWIDTH] IN BLOOD BY AUTOMATED COUNT: 12 % (ref 12.3–15.4)
GLUCOSE SERPL-MCNC: 87 MG/DL (ref 65–99)
HCT VFR BLD AUTO: 38.1 % (ref 37.5–51)
HGB BLD-MCNC: 12.9 G/DL (ref 13–17.7)
IMM GRANULOCYTES # BLD AUTO: 0.09 10*3/MM3 (ref 0–0.05)
IMM GRANULOCYTES NFR BLD AUTO: 0.8 % (ref 0–0.5)
LYMPHOCYTES # BLD AUTO: 1.62 10*3/MM3 (ref 0.7–3.1)
LYMPHOCYTES NFR BLD AUTO: 13.7 % (ref 19.6–45.3)
MCH RBC QN AUTO: 32.3 PG (ref 26.6–33)
MCHC RBC AUTO-ENTMCNC: 33.9 G/DL (ref 31.5–35.7)
MCV RBC AUTO: 95.5 FL (ref 79–97)
MONOCYTES # BLD AUTO: 1.03 10*3/MM3 (ref 0.1–0.9)
MONOCYTES NFR BLD AUTO: 8.7 % (ref 5–12)
NEUTROPHILS NFR BLD AUTO: 75.6 % (ref 42.7–76)
NEUTROPHILS NFR BLD AUTO: 8.93 10*3/MM3 (ref 1.7–7)
NRBC BLD AUTO-RTO: 0.1 /100 WBC (ref 0–0.2)
OSMOLALITY SERPL: 275 MOSM/KG (ref 280–301)
PHOSPHATE SERPL-MCNC: 2.5 MG/DL (ref 2.5–4.5)
PLATELET # BLD AUTO: 211 10*3/MM3 (ref 140–450)
PMV BLD AUTO: 9.6 FL (ref 6–12)
POTASSIUM SERPL-SCNC: 3.9 MMOL/L (ref 3.5–5.2)
RBC # BLD AUTO: 3.99 10*6/MM3 (ref 4.14–5.8)
SODIUM SERPL-SCNC: 131 MMOL/L (ref 136–145)
WBC NRBC COR # BLD: 11.81 10*3/MM3 (ref 3.4–10.8)

## 2023-08-29 PROCEDURE — 25010000002 PIPERACILLIN SOD-TAZOBACTAM PER 1 G: Performed by: HOSPITALIST

## 2023-08-29 PROCEDURE — 85025 COMPLETE CBC W/AUTO DIFF WBC: CPT | Performed by: HOSPITALIST

## 2023-08-29 PROCEDURE — 99231 SBSQ HOSP IP/OBS SF/LOW 25: CPT | Performed by: SURGERY

## 2023-08-29 PROCEDURE — 63710000001 METOPROLOL SUCCINATE XL 50 MG TABLET SUSTAINED-RELEASE 24 HOUR: Performed by: INTERNAL MEDICINE

## 2023-08-29 PROCEDURE — A9270 NON-COVERED ITEM OR SERVICE: HCPCS | Performed by: INTERNAL MEDICINE

## 2023-08-29 PROCEDURE — 94761 N-INVAS EAR/PLS OXIMETRY MLT: CPT

## 2023-08-29 PROCEDURE — 94799 UNLISTED PULMONARY SVC/PX: CPT

## 2023-08-29 PROCEDURE — 63710000001 FUROSEMIDE 40 MG TABLET: Performed by: NURSE PRACTITIONER

## 2023-08-29 PROCEDURE — 94760 N-INVAS EAR/PLS OXIMETRY 1: CPT

## 2023-08-29 PROCEDURE — 80069 RENAL FUNCTION PANEL: CPT | Performed by: HOSPITALIST

## 2023-08-29 PROCEDURE — A9270 NON-COVERED ITEM OR SERVICE: HCPCS

## 2023-08-29 PROCEDURE — 63710000001 RIVAROXABAN 20 MG TABLET

## 2023-08-29 PROCEDURE — 63710000001 DIGOXIN 125 MCG TABLET: Performed by: INTERNAL MEDICINE

## 2023-08-29 PROCEDURE — A9270 NON-COVERED ITEM OR SERVICE: HCPCS | Performed by: NURSE PRACTITIONER

## 2023-08-29 PROCEDURE — 99232 SBSQ HOSP IP/OBS MODERATE 35: CPT | Performed by: NURSE PRACTITIONER

## 2023-08-29 PROCEDURE — 83930 ASSAY OF BLOOD OSMOLALITY: CPT | Performed by: HOSPITALIST

## 2023-08-29 PROCEDURE — 63710000001 ROSUVASTATIN 20 MG TABLET: Performed by: INTERNAL MEDICINE

## 2023-08-29 PROCEDURE — 63710000001 MONTELUKAST 10 MG TABLET: Performed by: INTERNAL MEDICINE

## 2023-08-29 PROCEDURE — 63710000001 SPIRONOLACTONE 12.5 MG TABLET: Performed by: INTERNAL MEDICINE

## 2023-08-29 PROCEDURE — 63710000001 PANTOPRAZOLE 40 MG TABLET DELAYED-RELEASE: Performed by: INTERNAL MEDICINE

## 2023-08-29 PROCEDURE — 63710000001 CHOLECALCIFEROL 25 MCG (1000 UT) TABLET: Performed by: INTERNAL MEDICINE

## 2023-08-29 PROCEDURE — 94664 DEMO&/EVAL PT USE INHALER: CPT

## 2023-08-29 RX ADMIN — Medication 12.5 MG: at 08:41

## 2023-08-29 RX ADMIN — PANTOPRAZOLE SODIUM 40 MG: 40 TABLET, DELAYED RELEASE ORAL at 05:04

## 2023-08-29 RX ADMIN — METOPROLOL SUCCINATE 50 MG: 50 TABLET, FILM COATED, EXTENDED RELEASE ORAL at 20:17

## 2023-08-29 RX ADMIN — IPRATROPIUM BROMIDE AND ALBUTEROL SULFATE 3 ML: 2.5; .5 SOLUTION RESPIRATORY (INHALATION) at 19:47

## 2023-08-29 RX ADMIN — METOPROLOL SUCCINATE 50 MG: 50 TABLET, FILM COATED, EXTENDED RELEASE ORAL at 08:41

## 2023-08-29 RX ADMIN — DIGOXIN 125 MCG: 125 TABLET ORAL at 12:47

## 2023-08-29 RX ADMIN — FUROSEMIDE 40 MG: 40 TABLET ORAL at 08:41

## 2023-08-29 RX ADMIN — PIPERACILLIN SODIUM AND TAZOBACTAM SODIUM 3.38 G: 3; .375 INJECTION, SOLUTION INTRAVENOUS at 15:49

## 2023-08-29 RX ADMIN — BUDESONIDE AND FORMOTEROL FUMARATE DIHYDRATE 2 PUFF: 160; 4.5 AEROSOL RESPIRATORY (INHALATION) at 07:21

## 2023-08-29 RX ADMIN — IPRATROPIUM BROMIDE AND ALBUTEROL SULFATE 3 ML: 2.5; .5 SOLUTION RESPIRATORY (INHALATION) at 07:14

## 2023-08-29 RX ADMIN — IPRATROPIUM BROMIDE AND ALBUTEROL SULFATE 3 ML: 2.5; .5 SOLUTION RESPIRATORY (INHALATION) at 11:09

## 2023-08-29 RX ADMIN — Medication 1000 UNITS: at 08:41

## 2023-08-29 RX ADMIN — ROSUVASTATIN CALCIUM 20 MG: 20 TABLET, FILM COATED ORAL at 20:17

## 2023-08-29 RX ADMIN — BUDESONIDE AND FORMOTEROL FUMARATE DIHYDRATE 2 PUFF: 160; 4.5 AEROSOL RESPIRATORY (INHALATION) at 19:53

## 2023-08-29 RX ADMIN — RIVAROXABAN 20 MG: 20 TABLET, FILM COATED ORAL at 18:31

## 2023-08-29 RX ADMIN — PIPERACILLIN SODIUM AND TAZOBACTAM SODIUM 3.38 G: 3; .375 INJECTION, SOLUTION INTRAVENOUS at 22:14

## 2023-08-29 RX ADMIN — MONTELUKAST SODIUM 10 MG: 10 TABLET, FILM COATED ORAL at 20:17

## 2023-08-29 RX ADMIN — PIPERACILLIN SODIUM AND TAZOBACTAM SODIUM 3.38 G: 3; .375 INJECTION, SOLUTION INTRAVENOUS at 08:42

## 2023-08-29 NOTE — PLAN OF CARE
Goal Outcome Evaluation:         Resting in bed, a/ox4, RA, up to bathroom, black loose stools noted, pt states stool has been dark. PIV patent, blood return noted, IV ABX admin. Up ad elgin, ambulated in alfred, steady gait noted. Pt is vocal about wanting to go home.

## 2023-08-29 NOTE — PROGRESS NOTES
"Nutrition Services    Patient Name:  Claude Spencer  YOB: 1955  MRN: [id number]  Admit Date:  8/22/2023    FOLLOW UP - CLINICAL NUTRITION    Assessment Date:  08/29/23    Encounter Information         Reason for Encounter RD f/u.     Current Issues Pt reports good appetite, eating 100% of meals. Diet downgraded to full liquids yesterday but pt still tolerating well. Pt c/o bloating. Pt has acute diverticulitis and abdominal distention- surgery is following. He is having bowel movements- multiple today. Plan for PPM on hold d/t these issues.      Current Nutrition Orders & Evaluation of Intake       Oral Nutrition     Current PO Diet Diet: Liquid Diets; Full Liquid; Texture: Regular Texture (IDDSI 7); Fluid Consistency: Thin (IDDSI 0)   Supplement n/a   PO Evaluation     % PO Intake 100%    # of Days Evaluated     Factors Affecting Intake  No factors at this time   --  Anthropometrics          Height    Weight Height: 175.3 cm (69.02\")  Weight: 77.9 kg (171 lb 11.8 oz) (08/29/23 0609)    BMI kg/m2 Body mass index is 25.35 kg/m².  Overweight (25 - 29.9)    Weight trend Loss r/t fluid     Labs        Pertinent Labs Reviewed, listed below     Results from last 7 days   Lab Units 08/29/23 0613 08/28/23 0633 08/27/23 0728 08/24/23  0708   SODIUM mmol/L 131* 129* 133* 134*   POTASSIUM mmol/L 3.9 3.7 3.9 4.2   CHLORIDE mmol/L 94* 93* 93* 98   CO2 mmol/L 30.0* 27.4 27.9 28.0   BUN mg/dL 21 21 21 30*   CREATININE mg/dL 0.87 0.78 0.80 0.90   CALCIUM mg/dL 9.6 9.2 9.8 8.7   BILIRUBIN mg/dL  --  1.1 1.7* 1.0   ALK PHOS U/L  --  71 75 81   ALT (SGPT) U/L  --  36 40 78*   AST (SGOT) U/L  --  35 28 48*   GLUCOSE mg/dL 87 82 97 78     Results from last 7 days   Lab Units 08/29/23 0613 08/28/23 2122 08/27/23  0728 08/24/23  0708   MAGNESIUM mg/dL  --  2.1  --  2.8*   PHOSPHORUS mg/dL 2.5  --   --   --    HEMOGLOBIN g/dL 12.9*  --    < > 14.2   HEMATOCRIT % 38.1  --    < > 42.2   WBC 10*3/mm3 11.81*  --    < > " 15.30*   ALBUMIN g/dL 3.4*  --    < > 3.7    < > = values in this interval not displayed.     Results from last 7 days   Lab Units 08/29/23  0613 08/28/23  0633 08/27/23  0728 08/24/23  0708 08/23/23  0545   PLATELETS 10*3/mm3 211 229 243 268 261     COVID19   Date Value Ref Range Status   11/16/2021 Not Detected Not Detected - Ref. Range Final     Lab Results   Component Value Date    HGBA1C 5.40 06/27/2023          Medications            Scheduled Medications budesonide-formoterol, 2 puff, Inhalation, BID  cholecalciferol, 1,000 Units, Oral, Daily  digoxin, 125 mcg, Oral, Daily  furosemide, 40 mg, Oral, Daily  ipratropium-albuterol, 3 mL, Nebulization, Q6H While Awake - RT  metoprolol succinate XL, 50 mg, Oral, Q12H  montelukast, 10 mg, Oral, Nightly  pantoprazole, 40 mg, Oral, Q AM  piperacillin-tazobactam, 3.375 g, Intravenous, Q8H  senna-docusate sodium, 2 tablet, Oral, BID   And  polyethylene glycol, 17 g, Oral, Daily  rivaroxaban, 20 mg, Oral, Daily With Dinner  rosuvastatin, 20 mg, Oral, Nightly  spironolactone, 12.5 mg, Oral, Daily        Infusions      PRN Medications   acetaminophen    albuterol    senna-docusate sodium **AND** polyethylene glycol **AND** bisacodyl **AND** bisacodyl    bismuth subsalicylate    melatonin    ondansetron **OR** ondansetron    simethicone     Physical Findings          Physical Appearance alert, oriented, overweight, room air   Oral/Mouth Cavity tooth or teeth missing   Edema  lower extremity , 1+ (trace)   Gastrointestinal abdominal distension, last bowel movement: 8/29   Skin  bruising   Tubes/Drains/Lines none   NFPE Not indicated at this time   --  NUTRITION INTERVENTION / PLAN OF CARE  Intervention Goal         Intervention Goal(s) Maintain nutrition status, Tolerate PO , Increase intake, Advance diet, and Maintain weight     Nutrition Intervention         RD Action Encourage intake and Follow Tx Progress     Nutrition Prescription         Diet Prescription      Supplement Prescription n/a   EN/PN Prescription    New Prescription Ordered? No changes at this time   --  Monitor/Evaluation        Monitor Per protocol   Discharge Needs Pending clinical course   Education Will instruct as appropriate   --    RD to follow up per protocol.    Electronically signed by:  Michelle Lua RD  08/29/23 14:37 EDT

## 2023-08-29 NOTE — PROGRESS NOTES
Chief Complaint:    Acute diverticulitis    Subjective:    The patient continues to have lower abdominal pain and feels tight with constipation.  He is asking for milk of magnesia.    Objective:    Temp:  [97.3 °F (36.3 °C)-98.2 °F (36.8 °C)] 97.3 °F (36.3 °C)  Heart Rate:  [] 108  Resp:  [18] 18  BP: (113-154)/(74-99) 154/99    Physical Exam  Constitutional:       Appearance: He is not ill-appearing or toxic-appearing.   Abdominal:      Palpations: Abdomen is soft.      Tenderness: There is abdominal tenderness (Mildly tender) in the suprapubic area.   Neurological:      Mental Status: He is alert.   Psychiatric:         Behavior: Behavior is cooperative.       Results:    WBC is 14.10.  H/H is 12.9/38.1.  BUN is 21 creatinine 0.78.    Assessment/Plan:    The patient has acute diverticulitis that continues to be symptomatic.  His WBC has decreased nicely.  We will continue antibiotics and MiraLAX for now to try to establish bowel function.  As the inflammatory changes decrease, his bowel function should improve.    Shawn Anaya Jr., M.D.

## 2023-08-29 NOTE — PROGRESS NOTES
Chief Complaint:    Acute diverticulitis    Subjective:    The patient is feeling better but continues to complain of bloating.  He has had several bowel movements.    Objective:    Temp:  [97.9 °F (36.6 °C)-98.4 °F (36.9 °C)] 98.2 °F (36.8 °C)  Heart Rate:  [] 90  Resp:  [16-17] 16  BP: ()/(67-85) 94/67    Physical Exam  Constitutional:       Appearance: He is not ill-appearing or toxic-appearing.   Abdominal:      Palpations: Abdomen is soft.      Tenderness: There is abdominal tenderness (Mildly tender) in the suprapubic area.   Neurological:      Mental Status: He is alert.   Psychiatric:         Behavior: Behavior is cooperative.       Results:    WBC is 11.81.  H/H is 12.9/30.1.  BUN is 21 creatinine 0.87.    Assessment/Plan:    The patient has acute diverticulitis that is slowly responding to antibiotic management.  We will continue present antibiotics.  Hopefully he will be ready to advance his diet tomorrow.    Shawn Anaya Jr., M.D.

## 2023-08-29 NOTE — PLAN OF CARE
Goal Outcome Evaluation:    Patient alert and oriented, VSS, on 2L NC. Patient denies pain. Received milk of magnesia and had 2 loose BM. Receiving metoprolol for rate control regarding AFIB. Call light in reach.

## 2023-08-29 NOTE — PROGRESS NOTES
"    Patient Name: Claude Spencer  :1955  68 y.o.      Patient Care Team:  Jason King MD as PCP - General (Internal Medicine)    Chief Complaint: follow up atrial fibrillation     Interval History: Hr . Still with abd distention. Na better. Kidney function ok. CO2 creeping up. No edema.          Objective   Vital Signs  Temp:  [97.3 °F (36.3 °C)-98.4 °F (36.9 °C)] 97.9 °F (36.6 °C)  Heart Rate:  [] 95  Resp:  [16-18] 16  BP: ()/(76-99) 126/76    Intake/Output Summary (Last 24 hours) at 2023 1050  Last data filed at 2023 0200  Gross per 24 hour   Intake 450 ml   Output 700 ml   Net -250 ml     Flowsheet Rows      Flowsheet Row First Filed Value   Admission Height 175.3 cm (69.02\") Documented at 2023 2344   Admission Weight 80.6 kg (177 lb 9.6 oz) Documented at 2023 2344            Physical Exam:   General Appearance:    Alert, cooperative, in no acute distress   Lungs:     Clear to auscultation.  Normal respiratory effort and rate.      Heart:    irregular rhythm and normal rate, normal S1 and S2, no murmurs, gallops or rubs.     Chest Wall:    No abnormalities observed   Abdomen:     Soft, nontender, positive bowel sounds.     Extremities:   no cyanosis, clubbing or edema.  No marked joint deformities.  Adequate musculoskeletal strength.       Results Review:    Results from last 7 days   Lab Units 23  0613   SODIUM mmol/L 131*   POTASSIUM mmol/L 3.9   CHLORIDE mmol/L 94*   CO2 mmol/L 30.0*   BUN mg/dL 21   CREATININE mg/dL 0.87   GLUCOSE mg/dL 87   CALCIUM mg/dL 9.6     Results from last 7 days   Lab Units 23  1446 23  1247   HSTROP T ng/L 33* 29*     Results from last 7 days   Lab Units 23  0613   WBC 10*3/mm3 11.81*   HEMOGLOBIN g/dL 12.9*   HEMATOCRIT % 38.1   PLATELETS 10*3/mm3 211     Results from last 7 days   Lab Units 23  1247   INR  1.09     Results from last 7 days   Lab Units 23  2122   MAGNESIUM mg/dL 2.1       "             Medication Review:   budesonide-formoterol, 2 puff, Inhalation, BID  cholecalciferol, 1,000 Units, Oral, Daily  digoxin, 125 mcg, Oral, Daily  furosemide, 40 mg, Oral, Daily  ipratropium-albuterol, 3 mL, Nebulization, Q6H While Awake - RT  metoprolol succinate XL, 50 mg, Oral, Q12H  montelukast, 10 mg, Oral, Nightly  pantoprazole, 40 mg, Oral, Q AM  piperacillin-tazobactam, 3.375 g, Intravenous, Q8H  senna-docusate sodium, 2 tablet, Oral, BID   And  polyethylene glycol, 17 g, Oral, Daily  rivaroxaban, 20 mg, Oral, Daily With Dinner  rosuvastatin, 20 mg, Oral, Nightly  spironolactone, 12.5 mg, Oral, Daily                Assessment & Plan   Acute on chronic systolic congestive heart failure due to atrial fibrillation and valvular heart disease  Permanent atrial fibrillation - rivaroxaban restarted.   Mitral valve regurgitation  Renal mass, needs nephrectomy in next month or so  Mild to moderate coronary artery disease by cath 4/2021  Pulmonary hypertension  Elevated transaminases - improving     - continue oral diuretics.   - EP saw and planned for PPM today however leukocytosis over the weekend and diagnosed with diverticulitis. Plans for PPM in 2 weeks. EP to arrange.   - hyponatremic - better today. Volume status looks ok.   - nothing further to add. Cardiac status is optimized as best we can given clinical scenario. Will see as needed.     KHADRA Key  [facility]  08/29/23  10:50 EDT

## 2023-08-29 NOTE — PROGRESS NOTES
"DAILY PROGRESS NOTE  [facility]    Patient Identification:  Name: Claude Spencer  Age: 68 y.o.  Sex: male  :  1955  MRN: [id number]         Primary Care Physician: Jason King MD      Subjective  At the patient's insistence he received a dose of milk of magnesia last night.  He is now complaining of diarrhea.    Objective:  General Appearance:  Comfortable, well-appearing, in no acute distress and not in pain.    Vital signs: (most recent): Blood pressure 94/67, pulse 90, temperature 98.2 °F (36.8 °C), temperature source Oral, resp. rate 16, height 175.3 cm (69.02\"), weight 77.9 kg (171 lb 11.8 oz), SpO2 95 %.    Lungs:  Normal effort and normal respiratory rate.  Breath sounds clear to auscultation.    Heart: Normal rate.  Regular rhythm.    Extremities: There is no dependent edema.    Neurological: Patient is alert and oriented to person, place and time.    Skin:  Warm and dry.                Vital signs in last 24 hours:  Temp:  [97.9 °F (36.6 °C)-98.4 °F (36.9 °C)] 98.2 °F (36.8 °C)  Heart Rate:  [] 90  Resp:  [16-17] 16  BP: ()/(67-85) 94/67    Intake/Output:    Intake/Output Summary (Last 24 hours) at 2023 1536  Last data filed at 2023 0200  Gross per 24 hour   Intake 450 ml   Output 700 ml   Net -250 ml         Results from last 7 days   Lab Units 23  0613 23  0633 23  0728 23  0708 23  0545   WBC 10*3/mm3 11.81* 14.10* 27.17* 15.30* 13.54*   HEMOGLOBIN g/dL 12.9* 12.9* 13.8 14.2 14.1   PLATELETS 10*3/mm3 211 229 243 268 261     Results from last 7 days   Lab Units 23  0613 23  0633 23  0728 23  0708 23  0545   SODIUM mmol/L 131* 129* 133* 134* 139   POTASSIUM mmol/L 3.9 3.7 3.9 4.2 3.5   CHLORIDE mmol/L 94* 93* 93* 98 94*   CO2 mmol/L 30.0* 27.4 27.9 28.0 34.0*   BUN mg/dL 21 21 21 30* 32*   CREATININE mg/dL 0.87 0.78 0.80 0.90 0.78   GLUCOSE mg/dL 87 82 97 78 106*   Estimated Creatinine " Clearance: 92 mL/min (by C-G formula based on SCr of 0.87 mg/dL).  Results from last 7 days   Lab Units 08/29/23  0613 08/28/23 2122 08/28/23 0633 08/27/23 0728 08/24/23  0708 08/23/23  0545   CALCIUM mg/dL 9.6  --  9.2 9.8 8.7 8.5*   ALBUMIN g/dL 3.4*  --  3.1* 3.5 3.7  --    MAGNESIUM mg/dL  --  2.1  --   --  2.8*  --    PHOSPHORUS mg/dL 2.5  --   --   --   --   --      Results from last 7 days   Lab Units 08/29/23 0613 08/28/23 0633 08/27/23 0728 08/24/23  0708   ALBUMIN g/dL 3.4* 3.1* 3.5 3.7   BILIRUBIN mg/dL  --  1.1 1.7* 1.0   ALK PHOS U/L  --  71 75 81   AST (SGOT) U/L  --  35 28 48*   ALT (SGPT) U/L  --  36 40 78*       Assessment:    Acute on chronic combined systolic and diastolic CHF (congestive heart failure)    Gastroesophageal reflux disease    Hypercholesterolemia    Hypertension    Atrial fibrillation    Pulmonary emphysema, unspecified emphysema type    Pulmonary hypertension    Diverticulitis    Acute on chronic systolic CHF: LVEF 26 to 30%.  Cardiology evaluation appreciated.  Now on oral diuretics.  Continue to monitor.  Atrial fibrillation: Complicating above.  Continue rate control and anticoagulation.  Plans for PPM.  At this point likely as an outpatient due to acute diverticulitis and elevated white count.  Severe mitral regurgitation: Contributing to CHF.  Pulmonary hypertension: Also complicating above.  Acute sigmoid diverticulitis: Slowly improving.  Surgical evaluation appreciated.  Continue antibiotics.  Oral intake per surgery.  Hyponatremia: Associated with systolic CHF.  Improved.  Monitor closely.  Avoid excess fluids.  COPD: Clinically stable.  Hypertension: Presently with reasonable control.  Transaminitis: Likely congestive hepatitis.  Resolved.    Plan:  Please see above.  Discussed with patient.    Miguel Sanchez MD  8/29/2023  15:36 EDT

## 2023-08-30 ENCOUNTER — READMISSION MANAGEMENT (OUTPATIENT)
Dept: CALL CENTER | Facility: HOSPITAL | Age: 68
End: 2023-08-30
Payer: COMMERCIAL

## 2023-08-30 VITALS
HEIGHT: 69 IN | OXYGEN SATURATION: 100 % | SYSTOLIC BLOOD PRESSURE: 112 MMHG | RESPIRATION RATE: 16 BRPM | BODY MASS INDEX: 26.45 KG/M2 | HEART RATE: 91 BPM | DIASTOLIC BLOOD PRESSURE: 80 MMHG | WEIGHT: 178.57 LBS | TEMPERATURE: 98.2 F

## 2023-08-30 LAB
ANION GAP SERPL CALCULATED.3IONS-SCNC: 13 MMOL/L (ref 5–15)
BASOPHILS # BLD AUTO: 0.02 10*3/MM3 (ref 0–0.2)
BASOPHILS NFR BLD AUTO: 0.1 % (ref 0–1.5)
BUN SERPL-MCNC: 16 MG/DL (ref 8–23)
BUN/CREAT SERPL: 19.5 (ref 7–25)
CALCIUM SPEC-SCNC: 8.8 MG/DL (ref 8.6–10.5)
CHLORIDE SERPL-SCNC: 93 MMOL/L (ref 98–107)
CO2 SERPL-SCNC: 26 MMOL/L (ref 22–29)
CREAT SERPL-MCNC: 0.82 MG/DL (ref 0.76–1.27)
DEPRECATED RDW RBC AUTO: 42.1 FL (ref 37–54)
EGFRCR SERPLBLD CKD-EPI 2021: 96.9 ML/MIN/1.73
EOSINOPHIL # BLD AUTO: 0.16 10*3/MM3 (ref 0–0.4)
EOSINOPHIL NFR BLD AUTO: 1.1 % (ref 0.3–6.2)
ERYTHROCYTE [DISTWIDTH] IN BLOOD BY AUTOMATED COUNT: 12.2 % (ref 12.3–15.4)
GLUCOSE SERPL-MCNC: 96 MG/DL (ref 65–99)
HCT VFR BLD AUTO: 39.7 % (ref 37.5–51)
HGB BLD-MCNC: 13.6 G/DL (ref 13–17.7)
IMM GRANULOCYTES # BLD AUTO: 0.08 10*3/MM3 (ref 0–0.05)
IMM GRANULOCYTES NFR BLD AUTO: 0.6 % (ref 0–0.5)
LYMPHOCYTES # BLD AUTO: 1.77 10*3/MM3 (ref 0.7–3.1)
LYMPHOCYTES NFR BLD AUTO: 12.6 % (ref 19.6–45.3)
MCH RBC QN AUTO: 32.4 PG (ref 26.6–33)
MCHC RBC AUTO-ENTMCNC: 34.3 G/DL (ref 31.5–35.7)
MCV RBC AUTO: 94.5 FL (ref 79–97)
MONOCYTES # BLD AUTO: 0.95 10*3/MM3 (ref 0.1–0.9)
MONOCYTES NFR BLD AUTO: 6.8 % (ref 5–12)
NEUTROPHILS NFR BLD AUTO: 11.09 10*3/MM3 (ref 1.7–7)
NEUTROPHILS NFR BLD AUTO: 78.8 % (ref 42.7–76)
NRBC BLD AUTO-RTO: 0 /100 WBC (ref 0–0.2)
PLATELET # BLD AUTO: 223 10*3/MM3 (ref 140–450)
PMV BLD AUTO: 9.5 FL (ref 6–12)
POTASSIUM SERPL-SCNC: 3.4 MMOL/L (ref 3.5–5.2)
RBC # BLD AUTO: 4.2 10*6/MM3 (ref 4.14–5.8)
SODIUM SERPL-SCNC: 132 MMOL/L (ref 136–145)
WBC NRBC COR # BLD: 14.07 10*3/MM3 (ref 3.4–10.8)

## 2023-08-30 PROCEDURE — 94664 DEMO&/EVAL PT USE INHALER: CPT

## 2023-08-30 PROCEDURE — 63710000001 SPIRONOLACTONE 12.5 MG TABLET: Performed by: INTERNAL MEDICINE

## 2023-08-30 PROCEDURE — A9270 NON-COVERED ITEM OR SERVICE: HCPCS | Performed by: INTERNAL MEDICINE

## 2023-08-30 PROCEDURE — 63710000001 DIGOXIN 125 MCG TABLET: Performed by: INTERNAL MEDICINE

## 2023-08-30 PROCEDURE — A9270 NON-COVERED ITEM OR SERVICE: HCPCS | Performed by: HOSPITALIST

## 2023-08-30 PROCEDURE — 63710000001 CHOLECALCIFEROL 25 MCG (1000 UT) TABLET: Performed by: INTERNAL MEDICINE

## 2023-08-30 PROCEDURE — 63710000001 METOPROLOL SUCCINATE XL 50 MG TABLET SUSTAINED-RELEASE 24 HOUR: Performed by: INTERNAL MEDICINE

## 2023-08-30 PROCEDURE — 99231 SBSQ HOSP IP/OBS SF/LOW 25: CPT | Performed by: SURGERY

## 2023-08-30 PROCEDURE — 63710000001 POTASSIUM CHLORIDE 20 MEQ PACK: Performed by: HOSPITALIST

## 2023-08-30 PROCEDURE — 85025 COMPLETE CBC W/AUTO DIFF WBC: CPT | Performed by: HOSPITALIST

## 2023-08-30 PROCEDURE — 63710000001 PANTOPRAZOLE 40 MG TABLET DELAYED-RELEASE: Performed by: INTERNAL MEDICINE

## 2023-08-30 PROCEDURE — A9270 NON-COVERED ITEM OR SERVICE: HCPCS | Performed by: NURSE PRACTITIONER

## 2023-08-30 PROCEDURE — 63710000001 FUROSEMIDE 40 MG TABLET: Performed by: NURSE PRACTITIONER

## 2023-08-30 PROCEDURE — 94799 UNLISTED PULMONARY SVC/PX: CPT

## 2023-08-30 PROCEDURE — 80048 BASIC METABOLIC PNL TOTAL CA: CPT | Performed by: HOSPITALIST

## 2023-08-30 PROCEDURE — 25010000002 PIPERACILLIN SOD-TAZOBACTAM PER 1 G: Performed by: HOSPITALIST

## 2023-08-30 RX ORDER — POTASSIUM CHLORIDE 1.5 G/1.58G
40 POWDER, FOR SOLUTION ORAL ONCE
Status: COMPLETED | OUTPATIENT
Start: 2023-08-30 | End: 2023-08-30

## 2023-08-30 RX ORDER — POLYETHYLENE GLYCOL 3350 17 G/17G
17 POWDER, FOR SOLUTION ORAL DAILY
Qty: 510 G | Refills: 0 | Status: SHIPPED | OUTPATIENT
Start: 2023-08-31

## 2023-08-30 RX ORDER — FUROSEMIDE 80 MG
40 TABLET ORAL DAILY
Start: 2023-08-30 | End: 2023-09-05 | Stop reason: SDUPTHER

## 2023-08-30 RX ORDER — AMOXICILLIN AND CLAVULANATE POTASSIUM 875; 125 MG/1; MG/1
1 TABLET, FILM COATED ORAL 2 TIMES DAILY
Qty: 6 TABLET | Refills: 0 | Status: SHIPPED | OUTPATIENT
Start: 2023-08-30

## 2023-08-30 RX ADMIN — PIPERACILLIN SODIUM AND TAZOBACTAM SODIUM 3.38 G: 3; .375 INJECTION, SOLUTION INTRAVENOUS at 06:40

## 2023-08-30 RX ADMIN — Medication 12.5 MG: at 10:10

## 2023-08-30 RX ADMIN — POTASSIUM CHLORIDE 40 MEQ: 1.5 POWDER, FOR SOLUTION ORAL at 14:00

## 2023-08-30 RX ADMIN — BUDESONIDE AND FORMOTEROL FUMARATE DIHYDRATE 2 PUFF: 160; 4.5 AEROSOL RESPIRATORY (INHALATION) at 07:02

## 2023-08-30 RX ADMIN — PIPERACILLIN SODIUM AND TAZOBACTAM SODIUM 3.38 G: 3; .375 INJECTION, SOLUTION INTRAVENOUS at 14:00

## 2023-08-30 RX ADMIN — IPRATROPIUM BROMIDE AND ALBUTEROL SULFATE 3 ML: 2.5; .5 SOLUTION RESPIRATORY (INHALATION) at 11:35

## 2023-08-30 RX ADMIN — Medication 1000 UNITS: at 10:10

## 2023-08-30 RX ADMIN — METOPROLOL SUCCINATE 50 MG: 50 TABLET, FILM COATED, EXTENDED RELEASE ORAL at 10:10

## 2023-08-30 RX ADMIN — PANTOPRAZOLE SODIUM 40 MG: 40 TABLET, DELAYED RELEASE ORAL at 06:40

## 2023-08-30 RX ADMIN — IPRATROPIUM BROMIDE AND ALBUTEROL SULFATE 3 ML: 2.5; .5 SOLUTION RESPIRATORY (INHALATION) at 06:58

## 2023-08-30 RX ADMIN — FUROSEMIDE 40 MG: 40 TABLET ORAL at 10:10

## 2023-08-30 RX ADMIN — DIGOXIN 125 MCG: 125 TABLET ORAL at 14:00

## 2023-08-30 NOTE — PROGRESS NOTES
"DAILY PROGRESS NOTE  [facility]    Patient Identification:  Name: Claude Spencer  Age: 68 y.o.  Sex: male  :  1955  MRN: [id number]         Primary Care Physician: Jason King MD      Subjective  No new or acute complaints.  Overall feeling well.    Objective:  General Appearance:  Comfortable, well-appearing, in no acute distress and not in pain.    Vital signs: (most recent): Blood pressure 112/80, pulse 91, temperature 98.2 °F (36.8 °C), temperature source Oral, resp. rate 16, height 175.3 cm (69.02\"), weight 81 kg (178 lb 9.2 oz), SpO2 100 %.    Lungs:  Normal effort and normal respiratory rate.  Breath sounds clear to auscultation.    Heart: Normal rate.  Regular rhythm and irregular rhythm.    Abdomen: Abdomen is soft and non-distended.  Bowel sounds are normal.   There is no abdominal tenderness.     Extremities: There is no dependent edema.    Neurological: Patient is alert and oriented to person, place and time.    Skin:  Warm and dry.                Vital signs in last 24 hours:  Temp:  [98.1 °F (36.7 °C)-98.4 °F (36.9 °C)] 98.2 °F (36.8 °C)  Heart Rate:  [75-98] 91  Resp:  [16-18] 16  BP: ()/(67-86) 112/80    Intake/Output:    Intake/Output Summary (Last 24 hours) at 2023 1323  Last data filed at 2023 0643  Gross per 24 hour   Intake 360 ml   Output 200 ml   Net 160 ml         Results from last 7 days   Lab Units 23  0539 23  0613 23  0633 23  0728 23  0708   WBC 10*3/mm3 14.07* 11.81* 14.10* 27.17* 15.30*   HEMOGLOBIN g/dL 13.6 12.9* 12.9* 13.8 14.2   PLATELETS 10*3/mm3 223 211 229 243 268     Results from last 7 days   Lab Units 23  0539 23  0613 23  0633 23  0728 23  0708   SODIUM mmol/L 132* 131* 129* 133* 134*   POTASSIUM mmol/L 3.4* 3.9 3.7 3.9 4.2   CHLORIDE mmol/L 93* 94* 93* 93* 98   CO2 mmol/L 26.0 30.0* 27.4 27.9 28.0   BUN mg/dL 16 21 21 21 30*   CREATININE mg/dL 0.82 0.87 0.78 0.80 " 0.90   GLUCOSE mg/dL 96 87 82 97 78   Estimated Creatinine Clearance: 101.5 mL/min (by C-G formula based on SCr of 0.82 mg/dL).  Results from last 7 days   Lab Units 08/30/23  0539 08/29/23 0613 08/28/23 2122 08/28/23 0633 08/27/23 0728 08/24/23  0708   CALCIUM mg/dL 8.8 9.6  --  9.2 9.8 8.7   ALBUMIN g/dL  --  3.4*  --  3.1* 3.5 3.7   MAGNESIUM mg/dL  --   --  2.1  --   --  2.8*   PHOSPHORUS mg/dL  --  2.5  --   --   --   --      Results from last 7 days   Lab Units 08/29/23 0613 08/28/23 0633 08/27/23 0728 08/24/23  0708   ALBUMIN g/dL 3.4* 3.1* 3.5 3.7   BILIRUBIN mg/dL  --  1.1 1.7* 1.0   ALK PHOS U/L  --  71 75 81   AST (SGOT) U/L  --  35 28 48*   ALT (SGPT) U/L  --  36 40 78*       Assessment:    Acute on chronic combined systolic and diastolic CHF (congestive heart failure)    Gastroesophageal reflux disease    Hypercholesterolemia    Hypertension    Atrial fibrillation    Pulmonary emphysema, unspecified emphysema type    Pulmonary hypertension    Diverticulitis    Acute on chronic systolic CHF: LVEF 26 to 30%.  Cardiology evaluation appreciated.  Now on oral diuretics.  Continue to monitor.  Atrial fibrillation: Complicating above.  Continue rate control and anticoagulation.  Plans for PPM.  At this point likely as an outpatient due to acute diverticulitis and elevated white count.  Severe mitral regurgitation: Contributing to CHF.  Pulmonary hypertension: Also complicating above.  Acute sigmoid diverticulitis: Improving.  Surgical evaluation appreciated.  Benign exam today.  Leukocytosis: Persistent leukocytosis.  Noted that the majority of his preadmission WBCs are also elevated.  Hyponatremia: Associated with systolic CHF.  Improved.  Monitor closely.  Avoid excess fluids.  COPD: Clinically stable.  Hypertension: Presently with reasonable control.  Transaminitis: Likely congestive hepatitis.  Resolved.    Plan:  Please see above.  P.o. antibiotics and discharge when okay with surgery.    Miguel JAIME  MD Laura  8/30/2023  13:23 EDT

## 2023-08-30 NOTE — DISCHARGE SUMMARY
PHYSICIAN DISCHARGE SUMMARY                                                                        [facility]    Patient Identification:  Name: Claude Spencer  Age: 68 y.o.  Sex: male  :  1955  MRN: [id number]  Primary Care Physician: Jason King MD    Admit date: 2023  Discharge date and time: 2023     Discharged Condition: good    Discharge Diagnoses:  Acute on chronic systolic CHF: LVEF 26 to 30%.    Atrial fibrillation:   Plans for outpatient PM placement.  Severe mitral regurgitation: Contributing to CHF.  Pulmonary hypertension: Also complicating above.  Acute sigmoid diverticulitis: Improving.    Leukocytosis: Persistent leukocytosis.  Noted that the majority of his preadmission WBCs are also elevated.  Hyponatremia: Associated with systolic CHF.  Improved.    COPD:   Hypertension: .  Transaminitis: Likely congestive hepatitis.  Resolved.  Left upper pole renal mass: Follow-up with urology.  Plans for a laparoscopic partial nephrectomy.      Hospital Course:  Pleasant 68-year-old gentleman with history of chronic atrial fibrillation and CHF with worsening cardiomyopathy.  History of difficulty with rate control also.  He was admitted with worsening of CHF.  Please see H&P for full details.  The patient was admitted and responded very nicely to IV loop diuretics and was readily switched over to oral diuretics again.  He was seen by cardiology and evaluated for possible placement of pacer and ablation procedure for the AF.  He is actually on schedule for pacer placement when he developed left lower quadrant pain work-up revealed acute sigmoid diverticulitis.  Patient placement plans were put on hold.  Patient switched over the clear liquid diets and broad-spectrum antibiotics started.  He had what appeared to be a slow recovery.  He has remained afebrile however and today the exam is completely benign  "and he is feeling well.  He had a significant leukocytosis which has improved but have returned to normal per review of past records indicate that majority of his previous CBCs also revealed a leukocytosis.  He is tolerating oral intake well at this point and is cleared for discharge [facility] treatment follow-up as an outpatient.  He will be followed up by surgery for the diverticulitis.  Cardiology will reschedule him for a pacer as an outpatient also.  In addition he also has a history of left upper pole renal mass and when all is stable plans are for partial nephrectomy.        Consults:     Consults       Date and Time Order Name Status Description    8/26/2023  8:09 AM Inpatient General Surgery Consult Completed     8/22/2023  7:25 PM Inpatient Urology Consult      8/22/2023  7:25 PM Cardiac Electrophysiologist Inpatient Consult      8/22/2023  6:16 PM Inpatient Cardiology Consult Completed               Discharge Exam:  General Appearance:  Comfortable, well-appearing, in no acute distress and not in pain.    Vital signs: (most recent): Blood pressure 112/80, pulse 91, temperature 98.2 °F (36.8 °C), temperature source Oral, resp. rate 16, height 175.3 cm (69.02\"), weight 81 kg (178 lb 9.2 oz), SpO2 100 %.    Lungs:  Normal effort and normal respiratory rate.  Breath sounds clear to auscultation.    Heart: Normal rate.  Regular rhythm and irregular rhythm.    Abdomen: Abdomen is soft and non-distended.  Bowel sounds are normal.   There is no abdominal tenderness.     Extremities: There is no dependent edema.    Neurological: Patient is alert and oriented to person, place and time.    Skin:  Warm and dry.      Disposition:  Home    Patient Instructions:      Discharge Medications        New Medications        Instructions Start Date   amoxicillin-clavulanate 875-125 MG per tablet  Commonly known as: AUGMENTIN   1 tablet, Oral, 2 Times Daily      polyethylene glycol 17 g packet  Commonly known as: MIRALAX   17 g, " Oral, Daily   Start Date: August 31, 2023     rivaroxaban 20 MG tablet  Commonly known as: XARELTO   20 mg, Oral, Daily With Dinner             Changes to Medications        Instructions Start Date   furosemide 80 MG tablet  Commonly known as: LASIX  What changed:   how much to take  when to take this   40 mg, Oral, Daily             Continue These Medications        Instructions Start Date   albuterol sulfate  (90 Base) MCG/ACT inhaler  Commonly known as: PROVENTIL HFA;VENTOLIN HFA;PROAIR HFA   2 puffs, Inhalation, Every 4 Hours PRN      budesonide-formoterol 160-4.5 MCG/ACT inhaler  Commonly known as: Symbicort   2 puffs, Inhalation, 2 Times Daily      cholecalciferol 25 MCG (1000 UT) tablet  Commonly known as: VITAMIN D3   1,000 Units, Oral, Daily      digoxin 125 MCG tablet  Commonly known as: LANOXIN   125 mcg, Oral, Daily Digoxin      ipratropium-albuterol 0.5-2.5 mg/3 ml nebulizer  Commonly known as: DUO-NEB   3 mL, Nebulization, Every 4 Hours PRN      losartan 25 MG tablet  Commonly known as: COZAAR   25 mg, Oral, 2 Times Daily      metoprolol succinate XL 50 MG 24 hr tablet  Commonly known as: TOPROL-XL   50 mg, Oral, Every 12 Hours Scheduled      montelukast 10 MG tablet  Commonly known as: SINGULAIR   10 mg, Oral, Nightly      omeprazole 20 MG capsule  Commonly known as: priLOSEC   20 mg, Oral, Daily      rosuvastatin 20 MG tablet  Commonly known as: CRESTOR   20 mg, Oral, Nightly      spironolactone 25 MG tablet  Commonly known as: ALDACTONE   12.5 mg, Oral, Daily             Stop These Medications      methylPREDNISolone 4 MG dose pack  Commonly known as: MEDROL            Diet Instructions       Advance Diet As Tolerated -Target Diet: Cardiac diet      Target Diet: Cardiac diet          Future Appointments   Date Time Provider Department Center   9/1/2023 10:30 AM Zohreh Diop MD [facility]   10/24/2023 10:15 AM Wilfred Draper MD [facility]     Additional Instructions for the  Follow-ups that You Need to Schedule       Discharge Follow-up with PCP   As directed       Currently Documented PCP:    Jason King MD    PCP Phone Number:    [phone]     Follow Up Details: 1 week        Discharge Follow-up with Specialty: Cardiology.  Surgery.   As directed      Specialty: Cardiology.  Surgery.   Follow Up Details: As directed.               Follow-up Information       Shawn Anaya Jr., MD. Schedule an appointment as soon as possible for a visit in 2 week(s).    Specialty: General Surgery  Contact information:  [address] [facility] [address]  [facility] [id number] [phone]               Jason King MD .    Specialty: Internal Medicine  Why: 1 week  Contact information:  [address] [facility] [id number] [phone]                           Discharge Order (From admission, onward)       Start     Ordered    08/30/23 1450  Discharge patient  Once        Expected Discharge Date: 08/30/23   Discharge Disposition: Home or Self Care   Physician of Record for Attribution - Please select from Treatment Team: MIGUEL SANCHEZ [[id number]]   Review needed by CMO to determine Physician of Record: No      Question Answer Comment   Physician of Record for Attribution - Please select from Treatment Team MIGUEL SANCHEZ    Review needed by CMO to determine Physician of Record No        08/30/23 1453                      Total time spent discharging patient including evaluation,post hospitalization follow up,  medication and post hospitalization instructions and education total time exceeds 30 minutes.    Signed:  Miguel Sanchez MD  8/30/2023  14:54 EDT    EMR Dragon/Transcription disclaimer:   Much of this encounter note is an electronic transcription/translation of spoken language to printed text. The electronic translation of spoken language may permit erroneous, or at times, nonsensical words or phrases to be inadvertently transcribed; Although I have reviewed the note for such  errors, some may still exist.

## 2023-08-30 NOTE — PLAN OF CARE
Goal Outcome Evaluation:   Per MD orders pt discharged, Pt to discharge home. Primary RN aware. Discharge in process

## 2023-08-30 NOTE — PLAN OF CARE
Goal Outcome Evaluation:  Plan of Care Reviewed With: patient    Patient alert and oriented, VSS, on room air. Patient denies pain and refused stool softener overnight. States BM's are loose/liquid. Continues to receive Zosyn Q8H. Wearing SCD's. Voiding appropriately. Call light in reach. Called and updated daughter.

## 2023-08-30 NOTE — PROGRESS NOTES
Chief Complaint:    Acute diverticulitis    Subjective:    The patient is feeling much better today.  He is not having any abdominal pain.  He is tolerating a diet.  He has had several bowel movements.    Objective:    Temp:  [98.1 °F (36.7 °C)-98.4 °F (36.9 °C)] 98.2 °F (36.8 °C)  Heart Rate:  [75-98] 91  Resp:  [16-18] 16  BP: (112-120)/(80-86) 112/80    Physical Exam  Constitutional:       Appearance: He is not ill-appearing or toxic-appearing.   Abdominal:      Palpations: Abdomen is soft.      Tenderness: There is no abdominal tenderness.   Neurological:      Mental Status: He is alert.   Psychiatric:         Behavior: Behavior is cooperative.       Results:    WBC is 14.07.  H/H is 13.6/39.7.  BUN is 16 and creatinine 0.82.    Assessment/Plan:    The patient has acute diverticulitis that is improving with antibiotics.  He is clinically ready for discharge home from a surgical standpoint based on his benign abdominal exam.  His WBC has risen today but he is overall improved and appears ready for discharge.  I will follow him as an outpatient.    Shawn Anaya Jr., M.D.

## 2023-08-30 NOTE — OUTREACH NOTE
Prep Survey      Flowsheet Row Responses   [facility] patient discharged from? [place]   Is LACE score < 7 ? No   Eligibility [facility]   Date of Admission 08/22/23   Date of Discharge 08/30/23   Discharge Disposition Home or Self Care   Discharge diagnosis Acute on chronic combined systolic and diastolic CHF (congestive heart failure)   Does the patient have one of the following disease processes/diagnoses(primary or secondary)? CHF   Does the patient have Home health ordered? No   Is there a DME ordered? No   Prep survey completed? Yes            Ana Rosa TUCKER - Registered Nurse

## 2023-08-31 ENCOUNTER — TRANSITIONAL CARE MANAGEMENT TELEPHONE ENCOUNTER (OUTPATIENT)
Dept: CALL CENTER | Facility: HOSPITAL | Age: 68
End: 2023-08-31
Payer: COMMERCIAL

## 2023-08-31 NOTE — OUTREACH NOTE
Call Center TCM Note      Flowsheet Row Responses   [facility] patient discharged from? [place]   Does the patient have one of the following disease processes/diagnoses(primary or secondary)? CHF   TCM attempt successful? Yes   Call start time 1010   Call end time 1015   Discharge diagnosis Acute on chronic combined systolic and diastolic CHF (congestive heart failure)   Person spoke with today (if not patient) and relationship patient   Meds reviewed with patient/caregiver? Yes   Does the patient have all medications ordered at discharge? Yes   Prescription comments no concerns or questions noted   Is the patient taking all medications as directed (includes completed medication regime)? Yes   Comments Patient aware of new patient appt on 09/01 @ 1030   Does the patient have an appointment with their PCP within 7-14 days of discharge? Yes   Has home health visited the patient within 72 hours of discharge? N/A   Psychosocial issues? No   Did the patient receive a copy of their discharge instructions? Yes   Nursing interventions Reviewed instructions with patient   What is the patient's perception of their health status since discharge? Improving   Nursing interventions Nurse provided patient education   Is the patient able to teach back signs and symptoms of worsening condition? (i.e. weight gain, shortness of air, etc.) Yes   Is the patient/caregiver able to teach back the hierarchy of who to call/visit for symptoms/problems? PCP, Specialist, Home health nurse, Urgent Care, ED, 911 Yes   Is the patient able to teach back Heart Failure Zones? Yes   CHF Zone this Call Green Zone   Green Zone Patient reports doing well, No new or worsening shortness of breath, No new swelling -  feet, ankles and legs look normal for you, Weight check stable   Green Zone Interventions Daily weight check, Meds as directed, Follow up visits planned   TCM call completed? Yes   Wrap up additional comments Educated the importance of  daily wt. Patient reports he is doing well no concerns or questions noted.   Call end time 1015   Would this patient benefit from a Referral to [facility] Social Work? No   Is the patient interested in additional calls from an ambulatory ? No             Ana Rosa Ha RN    8/31/2023, 10:16 EDT

## 2023-08-31 NOTE — OUTREACH NOTE
Call Center TCM Note      Flowsheet Row Responses   [facility] patient discharged from? [place]   Does the patient have one of the following disease processes/diagnoses(primary or secondary)? CHF   TCM attempt successful? No   Unsuccessful attempts Attempt 1   Call Status Comments no updated  verbal release- New patient appt 09/01 @ 1030             Ana Rosa Ha RN    8/31/2023, 09:30 EDT

## 2023-09-01 ENCOUNTER — OFFICE VISIT (OUTPATIENT)
Dept: INTERNAL MEDICINE | Facility: CLINIC | Age: 68
End: 2023-09-01
Payer: COMMERCIAL

## 2023-09-01 ENCOUNTER — TELEPHONE (OUTPATIENT)
Dept: INTERNAL MEDICINE | Facility: CLINIC | Age: 68
End: 2023-09-01

## 2023-09-01 VITALS
TEMPERATURE: 98.6 F | WEIGHT: 168.4 LBS | SYSTOLIC BLOOD PRESSURE: 108 MMHG | OXYGEN SATURATION: 93 % | HEIGHT: 69 IN | BODY MASS INDEX: 24.94 KG/M2 | DIASTOLIC BLOOD PRESSURE: 70 MMHG | HEART RATE: 62 BPM

## 2023-09-01 DIAGNOSIS — N28.89 RENAL MASS: ICD-10-CM

## 2023-09-01 DIAGNOSIS — E78.00 HYPERCHOLESTEROLEMIA: ICD-10-CM

## 2023-09-01 DIAGNOSIS — I50.22 CHRONIC SYSTOLIC HEART FAILURE: Primary | ICD-10-CM

## 2023-09-01 DIAGNOSIS — I10 PRIMARY HYPERTENSION: ICD-10-CM

## 2023-09-01 DIAGNOSIS — I48.21 PERMANENT ATRIAL FIBRILLATION: ICD-10-CM

## 2023-09-01 LAB
BACTERIA SPEC AEROBE CULT: NORMAL
BACTERIA SPEC AEROBE CULT: NORMAL

## 2023-09-01 NOTE — TELEPHONE ENCOUNTER
Left message asking Antonieta to call back to discuss. Patient did have recent X-ray, but Dr. Diop ordered an MRI today not a x-ray for patient to have done. Need to discuss if he will have this done.  Hub to relay

## 2023-09-01 NOTE — TELEPHONE ENCOUNTER
FYI-  Patients wife called back, I let her know that it was a MRI ordered today, not a x-ray because I wanted to double check with her on this since it was 2 different tests and the MRI would be a more in-depth look than a x-ray. Patients wife states she understands that, but at this time she and her  agree that It is just not necessary at this time. She understands and appreciates Dr. Pacheco concerns but he has an appointment on the 24th with a doctor and he just got out of the hospital a couple of days ago. States that patient needs time to rest for a while and with all of these tests everyone is ordering, he really can't handle it financially. I let her know I would let Dr. Diop know so she can cancel the order and would be aware he is not having this completed. Wife states she will call us if they change their mind but at this time he is not going to have the MRI done.

## 2023-09-01 NOTE — PROGRESS NOTES
"Chief Complaint  Establish Care, history of CHF, afib     Subjective        Claude Spencer presents to [facility] PRIMARY CARE  History of Present Illness    Mr. Spencer is a jasper 67 yo M who presents to establish [facility] and discuss CHF, afib, constipation.     Renal mass: Left kidney 1.1 cm mass    Bowel issues: SBO, possible ileus on CT A&P from 8/26/23; possible perforated diverticulitis  - Needs surgery eval f/u -->    Has not drank in about 15 years; has stopped chewing tobacco as well    Objective   Vital Signs:  /70 (BP Location: Left arm, Patient Position: Sitting, Cuff Size: Adult)   Pulse 62   Temp 98.6 °F (37 °C) (Infrared)   Ht 175.3 cm (69.02\")   Wt 76.4 kg (168 lb 6.4 oz)   SpO2 93%   BMI 24.85 kg/m²   Estimated body mass index is 24.85 kg/m² as calculated from the following:    Height as of this encounter: 175.3 cm (69.02\").    Weight as of this encounter: 76.4 kg (168 lb 6.4 oz).       BMI is within normal parameters. No other follow-up for BMI required.      Physical Exam  Vitals reviewed.   Constitutional:       General: He is not in acute distress.     Appearance: Normal appearance.   HENT:      Head: Normocephalic and atraumatic.      Nose: Nose normal.      Mouth/Throat:      Mouth: Mucous membranes are moist.   Eyes:      Conjunctiva/sclera: Conjunctivae normal.   Cardiovascular:      Rate and Rhythm: Normal rate and regular rhythm.      Pulses: Normal pulses.      Heart sounds: Normal heart sounds.   Pulmonary:      Effort: Pulmonary effort is normal.      Breath sounds: Normal breath sounds.   Abdominal:      Palpations: Abdomen is soft.      Tenderness: There is no abdominal tenderness.   Musculoskeletal:      Right lower leg: Edema present.      Left lower leg: Edema present.   Skin:     General: Skin is warm and dry.   Neurological:      General: No focal deficit present.      Mental Status: He is alert.   Psychiatric:         Mood and Affect: Mood normal. "      Result Review :  The following data was reviewed by: Zohreh Diop MD on 09/01/2023:  Common labs          8/28/2023    06:33 8/29/2023    06:13 8/30/2023    05:39   Common Labs   Glucose 82  87  96    BUN 21  21  16    Creatinine 0.78  0.87  0.82    Sodium 129  131  132    Potassium 3.7  3.9  3.4    Chloride 93  94  93    Calcium 9.2  9.6  8.8    Albumin 3.1  3.4     Total Bilirubin 1.1      Alkaline Phosphatase 71      AST (SGOT) 35      ALT (SGPT) 36      WBC 14.10  11.81  14.07    Hemoglobin 12.9  12.9  13.6    Hematocrit 38.1  38.1  39.7    Platelets 229  211  223      Data reviewed : CT A&P from 8/26/23 reviewed             Assessment and Plan   Diagnoses and all orders for this visit:    1. Chronic systolic heart failure (Primary)    2. Renal mass  -     MRI abdomen w contrast    3. Hypercholesterolemia    4. Primary hypertension    5. Permanent atrial fibrillation      Needs f/u of renal lesion.  MR abdomen was previously recommended and I ordered today.  Also due to multiple health conditions, could benefit from closer follow up initially.  After visit, wife called and said he does not need another test.  We called back and stated how important it is to follow up with this test due to this renal lesion.  He is at risk of having some sort of malignancy and if he does not follow up this could progress were there to be malignancy.          Follow Up   Return in about 3 months (around 12/1/2023) for f/u renal mass, history of diverticulitis, CHF.  Patient was given instructions and counseling regarding his condition or for health maintenance advice. Please see specific information pulled into the AVS if appropriate.

## 2023-09-01 NOTE — TELEPHONE ENCOUNTER
Caller: Antonieta Spencer    Relationship: Emergency Contact    Best call back number: [phone]    What is the best time to reach you: ANYTIME    Who are you requesting to speak with (clinical staff, provider,  specific staff member):CLINICAL     What was the call regarding: PATIENT HAD RECENT STOMACH XRAYS AT [facility]. PATIENTS WIFE SAID THEY SHOULD BE IN HIS CHART AND DOES NOT NEED TO GET ANYMORE DONE.

## 2023-09-05 ENCOUNTER — TELEPHONE (OUTPATIENT)
Dept: CARDIOLOGY | Facility: CLINIC | Age: 68
End: 2023-09-05
Payer: COMMERCIAL

## 2023-09-05 RX ORDER — FUROSEMIDE 80 MG
80 TABLET ORAL 2 TIMES DAILY
Qty: 15 TABLET | Refills: 0
Start: 2023-09-05 | End: 2023-10-05

## 2023-09-05 NOTE — TELEPHONE ENCOUNTER
Does he have 80mg tabs at home? If so, go back on 80mg BID. If not, will you pend an order to his pharmacy? And help him get in for an appt?    geovani

## 2023-09-05 NOTE — TELEPHONE ENCOUNTER
Called patient back an went over recommendations. He then told me that for the last 2 days he has already increased his Lasix to 40mg BID. He said it has not helped him. Before he was on 80mg BID and he thinks that is what he needs to go back to.     Allison Dalton RN  Triage [facility]

## 2023-09-05 NOTE — TELEPHONE ENCOUNTER
Patient was discharged on 8/30. He said he did not have any swelling and his weight was 166lb. Now since he has been home his feet are swollen and his weight is between 169-170lbs. He also said he is a little SOA. He is unsure of his HR or BP. Below are his meds:    Xarelto 20mg daily  Lasix 40mg daily  Digoxin 125mcg daily  Losartan 25mg BID  Metoprolol 50mg BID  Spironolactone 12.5mg daily    Allison Dalton RN  Triage [facility]

## 2023-09-05 NOTE — TELEPHONE ENCOUNTER
He is to double furosemide to 40mg BID. He is scheduled for his first EP procedure 9/22. He needs an appt with MM or [facility] in [place] next week.    Casey

## 2023-09-05 NOTE — TELEPHONE ENCOUNTER
Patient has 80mg tablets at home. Went over recommendations and FU scheduled.     Allison Dalton RN  Triage [facility]

## 2023-09-12 PROBLEM — I50.20 HEART FAILURE WITH REDUCED EJECTION FRACTION, NYHA CLASS III: Status: ACTIVE | Noted: 2023-06-30

## 2023-09-12 PROBLEM — I50.43 ACUTE ON CHRONIC COMBINED SYSTOLIC AND DIASTOLIC CHF (CONGESTIVE HEART FAILURE): Status: RESOLVED | Noted: 2023-08-23 | Resolved: 2023-09-12

## 2023-09-12 PROBLEM — I48.21 PERMANENT ATRIAL FIBRILLATION: Status: ACTIVE | Noted: 2021-04-17

## 2023-09-12 NOTE — H&P (VIEW-ONLY)
CARDIOLOGY    Date of Office Visit: 2023  Patient Name: Claude Spencer  : 1955  Encounter Provider: Gordon Baird PA-C  Primary Cardiologist: Wilfred Draper MD    CHIEF COMPLAINT / REASON FOR OFFICE VISIT     Shortness of breath      HISTORY OF PRESENT ILLNESS     This is a 68 y.o. year old male who presents to [facility] CARDIOLOGY for a  1 week follow-up after recent medication changes.    He has a longstanding history of persistent atrial fibrillation.  This was initially diagnosed in .  He has had difficulty with medication adherence due to access and cost of medications.  In , he was seen to have an acute decline in ejection fraction down to 25 to 30% from a previous 55%.  Additionally, he has had multiple hospitalizations for acute volume overload in the setting of atrial fibrillation with RVR.  His heart rates have been in the 130s to 140s persistently at home.  Medication up titration has been limited due to persistent hypotension with systolic readings in the 100s.  He was evaluated by Dr. Draper in the office on 2023 and was admitted to [facility] for inpatient evaluation.    He was admitted to the hospital from  through 2023 for an acute congestive heart failure exacerbation in the setting of worsening cardiomyopathy from atrial fibrillation with RVR.  He was started on IV diuretics and breathing improved.  He was stabilized with plans to discharge home to place a pacemaker and have a subsequent AV node ablation.  He was discharged home on Xarelto 20 mg daily, furosemide 40 mg daily, losartan 25 mg twice daily, spironolactone 12.5 mg daily, digoxin 125 mcg daily,  and metoprolol succinate 50 mg twice daily.    He has plans for an AV node ablation with pacemaker placement on 2023 with Dr. Goldsmith.     He had called our office on 2023 with increasing lower extremity edema and weight gain of around 4 pounds since  "discharge.  He was encouraged to double up his frusemide to 40 mg twice daily and to follow-up in our office in 1 week.  This did not improve symptoms and he was encouraged to start taking 80 mg twice daily of Lasix.    Today the patient reports he has been doing well the past few days. He brought in all of his medications to our office. His heart rate is well controlled today at 60 bpm. His fluid has improved on the lasix 80 mg twice daily. His leg edema has resolved. He has ongoing minimal abdominal distension. Home blood pressure has been low in the 90-100s systolic. He has mild shortness of air and uses oxygen as needed at home.     He is being followed by nephrology and will eventually undergo a partial nephrectomy for a left upper pole renal mass measuring 1.1 cm.  Previously was supposed to get a pacemaker implantation but this was delayed due to an incarcerated inguinal hernia.    PMHx: Permanent atrial fibrillation, nonischemic cardiomyopathy, heart failure with reduced ejection fraction, pulmonary hypertension, mitral regurgitation      REVIEW OF SYSTEMS   Review of Systems   Constitutional: Negative for malaise/fatigue, weight gain and weight loss.   Cardiovascular:  Positive for chest pain and dyspnea on exertion. Negative for cyanosis, orthopnea and palpitations.   Neurological:  Negative for dizziness and light-headedness.     PHYSICAL EXAMINATION     Vital Signs:  /60 (BP Location: Left arm)   Pulse 60   Ht 165.1 cm (65\")   Wt 74.8 kg (165 lb)   SpO2 97%   BMI 27.46 kg/m²   Estimated body mass index is 27.46 kg/m² as calculated from the following:    Height as of this encounter: 165.1 cm (65\").    Weight as of this encounter: 74.8 kg (165 lb).       BMI is within normal parameters. No other follow-up for BMI required.      Physical Exam  Constitutional:       Appearance: Normal appearance.   HENT:      Head: Normocephalic and atraumatic.   Cardiovascular:      Rate and Rhythm: Tachycardia " present. Rhythm irregular.      Heart sounds: No murmur heard.  Pulmonary:      Effort: Pulmonary effort is normal.      Breath sounds: Normal breath sounds.   Musculoskeletal:      Right lower leg: No edema.      Left lower leg: No edema.   Skin:     General: Skin is warm and dry.   Neurological:      General: No focal deficit present.      Mental Status: He is alert and oriented to person, place, and time.        Cardiac Testing/Results     Cardiac Testing:   - Echo 6/26/2023: Moderately dilated left ventricle.  Severe left ventricle global hypokinesis.  EF 26 to 30%, dilated right ventricle.  Severe MR, moderate TR with RVSP 49 mmHg.    -Heart catheterization 4/20/2021: LM patent, LAD 10 to 20%, left circumflex 40 to 50%, RCA 10 to 20%    Result Review :  The following data was reviewed by: Gordon Baird PA-C on 09/13/2023:       Lab Results   Component Value Date     (L) 08/30/2023     (L) 08/29/2023    K 3.4 (L) 08/30/2023    K 3.9 08/29/2023    CL 93 (L) 08/30/2023    CL 94 (L) 08/29/2023    CO2 26.0 08/30/2023    CO2 30.0 (H) 08/29/2023    BUN 16 08/30/2023    BUN 21 08/29/2023    CREATININE 0.82 08/30/2023    CREATININE 0.87 08/29/2023    EGFRIFNONA 98 07/21/2021    EGFRIFNONA 147 06/02/2021    EGFRIFAFRI 126 11/08/2018    EGFRIFAFRI 92 04/20/2018    GLUCOSE 96 08/30/2023    GLUCOSE 87 08/29/2023    CALCIUM 8.8 08/30/2023    CALCIUM 9.6 08/29/2023    ALBUMIN 3.4 (L) 08/29/2023    ALBUMIN 3.1 (L) 08/28/2023    AST 35 08/28/2023    AST 28 08/27/2023    ALT 36 08/28/2023    ALT 40 08/27/2023     Lab Results   Component Value Date    WBC 14.07 (H) 08/30/2023    WBC 11.81 (H) 08/29/2023    HGB 13.6 08/30/2023    HGB 12.9 (L) 08/29/2023    HCT 39.7 08/30/2023    HCT 38.1 08/29/2023    MCV 94.5 08/30/2023    MCV 95.5 08/29/2023     08/30/2023     08/29/2023     Lab Results   Component Value Date    PROBNP 5,556.0 (H) 08/22/2023    PROBNP 8,491.0 (H) 08/21/2023     Lab Results    Component Value Date    TROPONINT 33 (H) 08/22/2023     Lab Results   Component Value Date    TSH 3.110 06/26/2023    TSH 1.300 05/27/2021         Data reviewed : Recent hospitalization notes most recent hospitalization August 2023                  ASSESSMENT & PLAN       Diagnoses and all orders for this visit:    1. Heart failure with reduced ejection fraction, NYHA class III (Primary)  Most recent echo with EF 26-30%, recent CHF exacerbation in setting of uncontrolled AF with RVR.   Currently heart rate controlled, only mild abdominal distension noted on exam.   Decrease lasix to 80 mg daily and 80 mg at night every other day. He is keeping weights at home and limiting sodium. Hand out given to patient for more information.   Continue losartan 25 mg daily at night (to prevent daytime hypotension), spironolactone 12.5 mg daily. He will get preop labs to check renal function and electrolytes next week.   2. Permanent atrial fibrillation  Recent uncontrolled episode of atrial fibrillation with RVR.  Continue metoprolol 50 mg twice daily and digoxin 125 mcg daily.  Planning for pacemaker implantation (unclear if single-lead or BiV pacemaker will be implanted) with AV node ablation on 9/22/2023 as planned.  Postoperatively we will build to discontinue his digoxin and decrease metoprolol due to persistent hypotension with readings into the 90s systolic at home.  Currently he is tolerating taking his Xarelto 20 mg daily.  Denies any bleeding complications.  [id number] Score: 3    3. Nonrheumatic mitral valve regurgitation  He has severe mitral regurgitation contributing to worsening volume overload.  Continue diuretics.  Will need intermittent echocardiogram monitoring.  4. NICM (nonischemic cardiomyopathy)  Prior heart catheterization in 2021 with no obstructive coronary artery disease with 40 to 50% stenosis of the left circumflex.  Continue maximally tolerated medical therapy as above.  5. Primary  hypertension  Hypotensive in the mornings, start taking losartan 25 mg daily at night.   6. Hypercholesterolemia  Continue Crestor 20 mg daily.  Goal LDL less than 100  7. Pulmonary hypertension  Suspect type II pulmonary hypertension.  Most recent RVSP 49 mmHg.  Continue with diuretic therapy      Follow Up:  No follow-ups on file.  Patient was given instructions and counseling regarding his condition or for health maintenance advice. Please contact office if worsening symptoms or proceed to ER when appropriate.      Gordon Baird PA-C  09/13/23  09:17 EDT    MEDICATIONS         Discharge Medications            Accurate as of September 13, 2023  9:17 AM. If you have any questions, ask your nurse or doctor.                Continue These Medications        Instructions Start Date   budesonide-formoterol 160-4.5 MCG/ACT inhaler  Commonly known as: Symbicort   2 puffs, Inhalation, 2 Times Daily      cholecalciferol 25 MCG (1000 UT) tablet  Commonly known as: VITAMIN D3   1,000 Units, Oral, Daily      digoxin 125 MCG tablet  Commonly known as: LANOXIN   125 mcg, Oral, Daily Digoxin      furosemide 80 MG tablet  Commonly known as: LASIX   80 mg, Oral, 2 Times Daily      ipratropium-albuterol 0.5-2.5 mg/3 ml nebulizer  Commonly known as: DUO-NEB   3 mL, Nebulization, Every 4 Hours PRN      losartan 25 MG tablet  Commonly known as: COZAAR   25 mg, Oral, 2 Times Daily      metoprolol succinate XL 50 MG 24 hr tablet  Commonly known as: TOPROL-XL   50 mg, Oral, Every 12 Hours Scheduled      montelukast 10 MG tablet  Commonly known as: SINGULAIR   10 mg, Oral, Nightly      omeprazole 20 MG capsule  Commonly known as: priLOSEC   20 mg, Oral, Daily      polyethylene glycol 17 GM/SCOOP powder  Commonly known as: MIRALAX   Mix one capful (17 g) in liquid and take by mouth Daily.      rosuvastatin 20 MG tablet  Commonly known as: CRESTOR   20 mg, Oral, Nightly      spironolactone 25 MG tablet  Commonly known as: ALDACTONE    12.5 mg, Oral, Daily      Xarelto 20 MG tablet  Generic drug: rivaroxaban   Take 1 tablet by mouth Daily With Dinner.                   **Nasrin Disclaimer: This note was dictated using an electronic transcription. The electronic translation of spoken language may permit erroneous, or at times, nonsensical words or phrases to be inadvertently transcribed. Although I have reviewed the note for such errors, some may still exist.

## 2023-09-12 NOTE — PROGRESS NOTES
CARDIOLOGY    Date of Office Visit: 2023  Patient Name: Claude Spencer  : 1955  Encounter Provider: Gordon Baird PA-C  Primary Cardiologist: Wilfred Draper MD    CHIEF COMPLAINT / REASON FOR OFFICE VISIT     Shortness of breath      HISTORY OF PRESENT ILLNESS     This is a 68 y.o. year old male who presents to [facility] CARDIOLOGY for a  1 week follow-up after recent medication changes.    He has a longstanding history of persistent atrial fibrillation.  This was initially diagnosed in .  He has had difficulty with medication adherence due to access and cost of medications.  In , he was seen to have an acute decline in ejection fraction down to 25 to 30% from a previous 55%.  Additionally, he has had multiple hospitalizations for acute volume overload in the setting of atrial fibrillation with RVR.  His heart rates have been in the 130s to 140s persistently at home.  Medication up titration has been limited due to persistent hypotension with systolic readings in the 100s.  He was evaluated by Dr. Draper in the office on 2023 and was admitted to [facility] for inpatient evaluation.    He was admitted to the hospital from  through 2023 for an acute congestive heart failure exacerbation in the setting of worsening cardiomyopathy from atrial fibrillation with RVR.  He was started on IV diuretics and breathing improved.  He was stabilized with plans to discharge home to place a pacemaker and have a subsequent AV node ablation.  He was discharged home on Xarelto 20 mg daily, furosemide 40 mg daily, losartan 25 mg twice daily, spironolactone 12.5 mg daily, digoxin 125 mcg daily,  and metoprolol succinate 50 mg twice daily.    He has plans for an AV node ablation with pacemaker placement on 2023 with Dr. Goldsmith.     He had called our office on 2023 with increasing lower extremity edema and weight gain of around 4 pounds since  "discharge.  He was encouraged to double up his frusemide to 40 mg twice daily and to follow-up in our office in 1 week.  This did not improve symptoms and he was encouraged to start taking 80 mg twice daily of Lasix.    Today the patient reports he has been doing well the past few days. He brought in all of his medications to our office. His heart rate is well controlled today at 60 bpm. His fluid has improved on the lasix 80 mg twice daily. His leg edema has resolved. He has ongoing minimal abdominal distension. Home blood pressure has been low in the 90-100s systolic. He has mild shortness of air and uses oxygen as needed at home.     He is being followed by nephrology and will eventually undergo a partial nephrectomy for a left upper pole renal mass measuring 1.1 cm.  Previously was supposed to get a pacemaker implantation but this was delayed due to an incarcerated inguinal hernia.    PMHx: Permanent atrial fibrillation, nonischemic cardiomyopathy, heart failure with reduced ejection fraction, pulmonary hypertension, mitral regurgitation      REVIEW OF SYSTEMS   Review of Systems   Constitutional: Negative for malaise/fatigue, weight gain and weight loss.   Cardiovascular:  Positive for chest pain and dyspnea on exertion. Negative for cyanosis, orthopnea and palpitations.   Neurological:  Negative for dizziness and light-headedness.     PHYSICAL EXAMINATION     Vital Signs:  /60 (BP Location: Left arm)   Pulse 60   Ht 165.1 cm (65\")   Wt 74.8 kg (165 lb)   SpO2 97%   BMI 27.46 kg/m²   Estimated body mass index is 27.46 kg/m² as calculated from the following:    Height as of this encounter: 165.1 cm (65\").    Weight as of this encounter: 74.8 kg (165 lb).       BMI is within normal parameters. No other follow-up for BMI required.      Physical Exam  Constitutional:       Appearance: Normal appearance.   HENT:      Head: Normocephalic and atraumatic.   Cardiovascular:      Rate and Rhythm: Tachycardia " present. Rhythm irregular.      Heart sounds: No murmur heard.  Pulmonary:      Effort: Pulmonary effort is normal.      Breath sounds: Normal breath sounds.   Musculoskeletal:      Right lower leg: No edema.      Left lower leg: No edema.   Skin:     General: Skin is warm and dry.   Neurological:      General: No focal deficit present.      Mental Status: He is alert and oriented to person, place, and time.        Cardiac Testing/Results     Cardiac Testing:   - Echo 6/26/2023: Moderately dilated left ventricle.  Severe left ventricle global hypokinesis.  EF 26 to 30%, dilated right ventricle.  Severe MR, moderate TR with RVSP 49 mmHg.    -Heart catheterization 4/20/2021: LM patent, LAD 10 to 20%, left circumflex 40 to 50%, RCA 10 to 20%    Result Review :  The following data was reviewed by: Gordon Baird PA-C on 09/13/2023:       Lab Results   Component Value Date     (L) 08/30/2023     (L) 08/29/2023    K 3.4 (L) 08/30/2023    K 3.9 08/29/2023    CL 93 (L) 08/30/2023    CL 94 (L) 08/29/2023    CO2 26.0 08/30/2023    CO2 30.0 (H) 08/29/2023    BUN 16 08/30/2023    BUN 21 08/29/2023    CREATININE 0.82 08/30/2023    CREATININE 0.87 08/29/2023    EGFRIFNONA 98 07/21/2021    EGFRIFNONA 147 06/02/2021    EGFRIFAFRI 126 11/08/2018    EGFRIFAFRI 92 04/20/2018    GLUCOSE 96 08/30/2023    GLUCOSE 87 08/29/2023    CALCIUM 8.8 08/30/2023    CALCIUM 9.6 08/29/2023    ALBUMIN 3.4 (L) 08/29/2023    ALBUMIN 3.1 (L) 08/28/2023    AST 35 08/28/2023    AST 28 08/27/2023    ALT 36 08/28/2023    ALT 40 08/27/2023     Lab Results   Component Value Date    WBC 14.07 (H) 08/30/2023    WBC 11.81 (H) 08/29/2023    HGB 13.6 08/30/2023    HGB 12.9 (L) 08/29/2023    HCT 39.7 08/30/2023    HCT 38.1 08/29/2023    MCV 94.5 08/30/2023    MCV 95.5 08/29/2023     08/30/2023     08/29/2023     Lab Results   Component Value Date    PROBNP 5,556.0 (H) 08/22/2023    PROBNP 8,491.0 (H) 08/21/2023     Lab Results    Component Value Date    TROPONINT 33 (H) 08/22/2023     Lab Results   Component Value Date    TSH 3.110 06/26/2023    TSH 1.300 05/27/2021         Data reviewed : Recent hospitalization notes most recent hospitalization August 2023                  ASSESSMENT & PLAN       Diagnoses and all orders for this visit:    1. Heart failure with reduced ejection fraction, NYHA class III (Primary)  Most recent echo with EF 26-30%, recent CHF exacerbation in setting of uncontrolled AF with RVR.   Currently heart rate controlled, only mild abdominal distension noted on exam.   Decrease lasix to 80 mg daily and 80 mg at night every other day. He is keeping weights at home and limiting sodium. Hand out given to patient for more information.   Continue losartan 25 mg daily at night (to prevent daytime hypotension), spironolactone 12.5 mg daily. He will get preop labs to check renal function and electrolytes next week.   2. Permanent atrial fibrillation  Recent uncontrolled episode of atrial fibrillation with RVR.  Continue metoprolol 50 mg twice daily and digoxin 125 mcg daily.  Planning for pacemaker implantation (unclear if single-lead or BiV pacemaker will be implanted) with AV node ablation on 9/22/2023 as planned.  Postoperatively we will build to discontinue his digoxin and decrease metoprolol due to persistent hypotension with readings into the 90s systolic at home.  Currently he is tolerating taking his Xarelto 20 mg daily.  Denies any bleeding complications.  [id number] Score: 3    3. Nonrheumatic mitral valve regurgitation  He has severe mitral regurgitation contributing to worsening volume overload.  Continue diuretics.  Will need intermittent echocardiogram monitoring.  4. NICM (nonischemic cardiomyopathy)  Prior heart catheterization in 2021 with no obstructive coronary artery disease with 40 to 50% stenosis of the left circumflex.  Continue maximally tolerated medical therapy as above.  5. Primary  hypertension  Hypotensive in the mornings, start taking losartan 25 mg daily at night.   6. Hypercholesterolemia  Continue Crestor 20 mg daily.  Goal LDL less than 100  7. Pulmonary hypertension  Suspect type II pulmonary hypertension.  Most recent RVSP 49 mmHg.  Continue with diuretic therapy      Follow Up:  No follow-ups on file.  Patient was given instructions and counseling regarding his condition or for health maintenance advice. Please contact office if worsening symptoms or proceed to ER when appropriate.      Gordon Baird PA-C  09/13/23  09:17 EDT    MEDICATIONS         Discharge Medications            Accurate as of September 13, 2023  9:17 AM. If you have any questions, ask your nurse or doctor.                Continue These Medications        Instructions Start Date   budesonide-formoterol 160-4.5 MCG/ACT inhaler  Commonly known as: Symbicort   2 puffs, Inhalation, 2 Times Daily      cholecalciferol 25 MCG (1000 UT) tablet  Commonly known as: VITAMIN D3   1,000 Units, Oral, Daily      digoxin 125 MCG tablet  Commonly known as: LANOXIN   125 mcg, Oral, Daily Digoxin      furosemide 80 MG tablet  Commonly known as: LASIX   80 mg, Oral, 2 Times Daily      ipratropium-albuterol 0.5-2.5 mg/3 ml nebulizer  Commonly known as: DUO-NEB   3 mL, Nebulization, Every 4 Hours PRN      losartan 25 MG tablet  Commonly known as: COZAAR   25 mg, Oral, 2 Times Daily      metoprolol succinate XL 50 MG 24 hr tablet  Commonly known as: TOPROL-XL   50 mg, Oral, Every 12 Hours Scheduled      montelukast 10 MG tablet  Commonly known as: SINGULAIR   10 mg, Oral, Nightly      omeprazole 20 MG capsule  Commonly known as: priLOSEC   20 mg, Oral, Daily      polyethylene glycol 17 GM/SCOOP powder  Commonly known as: MIRALAX   Mix one capful (17 g) in liquid and take by mouth Daily.      rosuvastatin 20 MG tablet  Commonly known as: CRESTOR   20 mg, Oral, Nightly      spironolactone 25 MG tablet  Commonly known as: ALDACTONE    12.5 mg, Oral, Daily      Xarelto 20 MG tablet  Generic drug: rivaroxaban   Take 1 tablet by mouth Daily With Dinner.                   **Nasrin Disclaimer: This note was dictated using an electronic transcription. The electronic translation of spoken language may permit erroneous, or at times, nonsensical words or phrases to be inadvertently transcribed. Although I have reviewed the note for such errors, some may still exist.

## 2023-09-13 ENCOUNTER — OFFICE VISIT (OUTPATIENT)
Dept: CARDIOLOGY | Facility: CLINIC | Age: 68
End: 2023-09-13
Payer: COMMERCIAL

## 2023-09-13 VITALS
WEIGHT: 165 LBS | HEIGHT: 65 IN | BODY MASS INDEX: 27.49 KG/M2 | DIASTOLIC BLOOD PRESSURE: 60 MMHG | HEART RATE: 60 BPM | SYSTOLIC BLOOD PRESSURE: 110 MMHG | OXYGEN SATURATION: 97 %

## 2023-09-13 DIAGNOSIS — I10 PRIMARY HYPERTENSION: ICD-10-CM

## 2023-09-13 DIAGNOSIS — I42.8 NICM (NONISCHEMIC CARDIOMYOPATHY): ICD-10-CM

## 2023-09-13 DIAGNOSIS — I48.21 PERMANENT ATRIAL FIBRILLATION: ICD-10-CM

## 2023-09-13 DIAGNOSIS — I34.0 NONRHEUMATIC MITRAL VALVE REGURGITATION: ICD-10-CM

## 2023-09-13 DIAGNOSIS — I27.20 PULMONARY HYPERTENSION: ICD-10-CM

## 2023-09-13 DIAGNOSIS — E78.00 HYPERCHOLESTEROLEMIA: ICD-10-CM

## 2023-09-13 DIAGNOSIS — I50.20 HEART FAILURE WITH REDUCED EJECTION FRACTION, NYHA CLASS III: Primary | ICD-10-CM

## 2023-09-13 NOTE — PATIENT INSTRUCTIONS
Limit fluid intake to less than 1800 ml per day    Keep daily weight log. If over 3 lbs weight gain in 1 day or 5 lbs weight gain in 1 week contact office for medication recommendations.      Limiting Salt: CardioSmart  printed for patient

## 2023-09-15 ENCOUNTER — READMISSION MANAGEMENT (OUTPATIENT)
Dept: CALL CENTER | Facility: HOSPITAL | Age: 68
End: 2023-09-15
Payer: COMMERCIAL

## 2023-09-15 NOTE — OUTREACH NOTE
CHF Week 3 Survey      Flowsheet Row Responses   [facility] patient discharged from? [facility]   Does the patient have one of the following disease processes/diagnoses(primary or secondary)? CHF   Week 3 attempt successful? Yes   Call start time 1224   Call end time 1225   Discharge diagnosis Acute on chronic combined systolic and diastolic CHF (congestive heart failure)   Meds reviewed with patient/caregiver? Yes   Is the patient having any side effects they believe may be caused by any medication additions or changes? No   Does the patient have all medications ordered at discharge? Yes   Is the patient taking all medications as directed (includes completed medication regime)? Yes   Does the patient have a primary care provider?  Yes   Does the patient have an appointment with their PCP within 7 days of discharge? Yes   Has the patient kept scheduled appointments due by today? Yes   Pulse Ox monitoring None   Psychosocial issues? No   Did the patient receive a copy of their discharge instructions? Yes   Nursing interventions Reviewed instructions with patient   What is the patient's perception of their health status since discharge? Improving   Nursing interventions Nurse provided patient education   Is the patient able to teach back signs and symptoms of worsening condition? (i.e. weight gain, shortness of air, etc.) Yes   Is the patient/caregiver able to teach back the hierarchy of who to call/visit for symptoms/problems? PCP, Specialist, Home health nurse, Urgent Care, ED, 911 Yes   Is the patient able to teach back Heart Failure Zones? Yes   CHF Nursing Interventions Education provided on various zones   CHF Zone this Call Green Zone   Green Zone Patient reports doing well, No new or worsening shortness of breath, Physical activity level is normal for you, No new swelling -  feet, ankles and legs look normal for you, Weight check stable, No chest pain   Green Zone Interventions Daily weight check, Meds  as directed, Low sodium diet, Follow up visits planned   CHF Week 3 call completed? Yes   Graduated Yes   Is the patient interested in additional calls from an ambulatory ? No   Would this patient benefit from a Referral to [facility] Social Work? No   Wrap up additional comments pt getting a pacemaker placed next friday 9/22   Call end time 5009            FRANCINE TUCKER - Registered Nurse

## 2023-09-22 ENCOUNTER — HOSPITAL ENCOUNTER (OUTPATIENT)
Facility: HOSPITAL | Age: 68
Setting detail: OBSERVATION
Discharge: HOME | End: 2023-09-23
Attending: INTERNAL MEDICINE | Admitting: INTERNAL MEDICINE
Payer: COMMERCIAL

## 2023-09-22 DIAGNOSIS — I48.19 PERSISTENT ATRIAL FIBRILLATION: ICD-10-CM

## 2023-09-22 PROBLEM — I48.91 ATRIAL FIBRILLATION: Status: ACTIVE | Noted: 2023-09-22

## 2023-09-22 LAB
QT INTERVAL: 361 MS
QTC INTERVAL: 419 MS

## 2023-09-22 PROCEDURE — C1898 LEAD, PMKR, OTHER THAN TRANS: HCPCS | Performed by: INTERNAL MEDICINE

## 2023-09-22 PROCEDURE — 25510000001 IOPAMIDOL PER 1 ML: Performed by: INTERNAL MEDICINE

## 2023-09-22 PROCEDURE — 93650 ICAR CATH ABLTJ AV NODE FUNC: CPT | Performed by: INTERNAL MEDICINE

## 2023-09-22 PROCEDURE — 93010 ELECTROCARDIOGRAM REPORT: CPT | Performed by: INTERNAL MEDICINE

## 2023-09-22 PROCEDURE — 99153 MOD SED SAME PHYS/QHP EA: CPT | Performed by: INTERNAL MEDICINE

## 2023-09-22 PROCEDURE — A9270 NON-COVERED ITEM OR SERVICE: HCPCS | Performed by: INTERNAL MEDICINE

## 2023-09-22 PROCEDURE — C1894 INTRO/SHEATH, NON-LASER: HCPCS | Performed by: INTERNAL MEDICINE

## 2023-09-22 PROCEDURE — 63710000001 BUDESONIDE-FORMOTEROL 160-4.5 MCG/ACT AEROSOL 6 G INHALER: Performed by: INTERNAL MEDICINE

## 2023-09-22 PROCEDURE — 0 LIDOCAINE 1 % SOLUTION: Performed by: INTERNAL MEDICINE

## 2023-09-22 PROCEDURE — 25010000002 FENTANYL CITRATE (PF) 50 MCG/ML SOLUTION: Performed by: INTERNAL MEDICINE

## 2023-09-22 PROCEDURE — 25010000002 MIDAZOLAM PER 1 MG: Performed by: INTERNAL MEDICINE

## 2023-09-22 PROCEDURE — C1760 CLOSURE DEV, VASC: HCPCS | Performed by: INTERNAL MEDICINE

## 2023-09-22 PROCEDURE — 94799 UNLISTED PULMONARY SVC/PX: CPT

## 2023-09-22 PROCEDURE — 63710000001 MONTELUKAST 10 MG TABLET: Performed by: INTERNAL MEDICINE

## 2023-09-22 PROCEDURE — 25010000002 CEFAZOLIN IN DEXTROSE 2-4 GM/100ML-% SOLUTION: Performed by: INTERNAL MEDICINE

## 2023-09-22 PROCEDURE — 93005 ELECTROCARDIOGRAM TRACING: CPT | Performed by: INTERNAL MEDICINE

## 2023-09-22 PROCEDURE — 94640 AIRWAY INHALATION TREATMENT: CPT

## 2023-09-22 PROCEDURE — C1732 CATH, EP, DIAG/ABL, 3D/VECT: HCPCS | Performed by: INTERNAL MEDICINE

## 2023-09-22 PROCEDURE — 63710000001 RIVAROXABAN 20 MG TABLET: Performed by: INTERNAL MEDICINE

## 2023-09-22 PROCEDURE — 33208 INSRT HEART PM ATRIAL & VENT: CPT | Performed by: INTERNAL MEDICINE

## 2023-09-22 PROCEDURE — C1766 INTRO/SHEATH,STRBLE,NON-PEEL: HCPCS | Performed by: INTERNAL MEDICINE

## 2023-09-22 PROCEDURE — 63710000001 ROSUVASTATIN 20 MG TABLET: Performed by: INTERNAL MEDICINE

## 2023-09-22 PROCEDURE — C1887 CATHETER, GUIDING: HCPCS | Performed by: INTERNAL MEDICINE

## 2023-09-22 PROCEDURE — 99152 MOD SED SAME PHYS/QHP 5/>YRS: CPT | Performed by: INTERNAL MEDICINE

## 2023-09-22 PROCEDURE — 63710000001 LOSARTAN 25 MG TABLET: Performed by: INTERNAL MEDICINE

## 2023-09-22 PROCEDURE — 63710000001 FUROSEMIDE 40 MG TABLET: Performed by: INTERNAL MEDICINE

## 2023-09-22 PROCEDURE — G0378 HOSPITAL OBSERVATION PER HR: HCPCS

## 2023-09-22 PROCEDURE — C1785 PMKR, DUAL, RATE-RESP: HCPCS | Performed by: INTERNAL MEDICINE

## 2023-09-22 DEVICE — LD PM CAPSUREFIX NOVUS5076 58CM: Type: IMPLANTABLE DEVICE | Status: FUNCTIONAL

## 2023-09-22 DEVICE — GEN PM AZURE S SURESCAN DR MRI: Type: IMPLANTABLE DEVICE | Status: FUNCTIONAL

## 2023-09-22 DEVICE — IMPLANTABLE DEVICE: Type: IMPLANTABLE DEVICE | Status: FUNCTIONAL

## 2023-09-22 RX ORDER — ONDANSETRON 2 MG/ML
4 INJECTION INTRAMUSCULAR; INTRAVENOUS EVERY 6 HOURS PRN
Status: DISCONTINUED | OUTPATIENT
Start: 2023-09-22 | End: 2023-09-23 | Stop reason: HOSPADM

## 2023-09-22 RX ORDER — ACETAMINOPHEN 325 MG/1
650 TABLET ORAL EVERY 4 HOURS PRN
Status: DISCONTINUED | OUTPATIENT
Start: 2023-09-22 | End: 2023-09-23 | Stop reason: HOSPADM

## 2023-09-22 RX ORDER — ACETAMINOPHEN 650 MG/1
650 SUPPOSITORY RECTAL EVERY 4 HOURS PRN
Status: DISCONTINUED | OUTPATIENT
Start: 2023-09-22 | End: 2023-09-23 | Stop reason: HOSPADM

## 2023-09-22 RX ORDER — FENTANYL CITRATE 50 UG/ML
INJECTION, SOLUTION INTRAMUSCULAR; INTRAVENOUS
Status: DISCONTINUED | OUTPATIENT
Start: 2023-09-22 | End: 2023-09-22 | Stop reason: HOSPADM

## 2023-09-22 RX ORDER — ROSUVASTATIN CALCIUM 20 MG/1
20 TABLET, COATED ORAL NIGHTLY
Status: DISCONTINUED | OUTPATIENT
Start: 2023-09-22 | End: 2023-09-23 | Stop reason: HOSPADM

## 2023-09-22 RX ORDER — IPRATROPIUM BROMIDE AND ALBUTEROL SULFATE 2.5; .5 MG/3ML; MG/3ML
3 SOLUTION RESPIRATORY (INHALATION) EVERY 4 HOURS PRN
Status: DISCONTINUED | OUTPATIENT
Start: 2023-09-22 | End: 2023-09-23 | Stop reason: HOSPADM

## 2023-09-22 RX ORDER — BUDESONIDE AND FORMOTEROL FUMARATE DIHYDRATE 160; 4.5 UG/1; UG/1
2 AEROSOL RESPIRATORY (INHALATION) 2 TIMES DAILY
Status: DISCONTINUED | OUTPATIENT
Start: 2023-09-22 | End: 2023-09-23 | Stop reason: HOSPADM

## 2023-09-22 RX ORDER — LOSARTAN POTASSIUM 25 MG/1
25 TABLET ORAL 2 TIMES DAILY
Status: DISCONTINUED | OUTPATIENT
Start: 2023-09-22 | End: 2023-09-23 | Stop reason: HOSPADM

## 2023-09-22 RX ORDER — FUROSEMIDE 40 MG/1
80 TABLET ORAL 2 TIMES DAILY
Status: DISCONTINUED | OUTPATIENT
Start: 2023-09-22 | End: 2023-09-23 | Stop reason: HOSPADM

## 2023-09-22 RX ORDER — POLYETHYLENE GLYCOL 3350 17 G/17G
17 POWDER, FOR SOLUTION ORAL DAILY
COMMUNITY

## 2023-09-22 RX ORDER — MONTELUKAST SODIUM 10 MG/1
10 TABLET ORAL NIGHTLY
Status: DISCONTINUED | OUTPATIENT
Start: 2023-09-22 | End: 2023-09-23 | Stop reason: HOSPADM

## 2023-09-22 RX ORDER — ALBUTEROL SULFATE 90 UG/1
2 AEROSOL, METERED RESPIRATORY (INHALATION) EVERY 4 HOURS PRN
COMMUNITY

## 2023-09-22 RX ORDER — PANTOPRAZOLE SODIUM 40 MG/1
40 TABLET, DELAYED RELEASE ORAL
Status: DISCONTINUED | OUTPATIENT
Start: 2023-09-23 | End: 2023-09-23 | Stop reason: HOSPADM

## 2023-09-22 RX ORDER — CEFAZOLIN SODIUM 2 G/100ML
INJECTION, SOLUTION INTRAVENOUS
Status: COMPLETED | OUTPATIENT
Start: 2023-09-22 | End: 2023-09-22

## 2023-09-22 RX ORDER — SODIUM CHLORIDE 0.9 % (FLUSH) 0.9 %
10 SYRINGE (ML) INJECTION AS NEEDED
Status: DISCONTINUED | OUTPATIENT
Start: 2023-09-22 | End: 2023-09-22 | Stop reason: HOSPADM

## 2023-09-22 RX ORDER — NALOXONE HCL 0.4 MG/ML
0.4 VIAL (ML) INJECTION
Status: DISCONTINUED | OUTPATIENT
Start: 2023-09-22 | End: 2023-09-23 | Stop reason: HOSPADM

## 2023-09-22 RX ORDER — LIDOCAINE HYDROCHLORIDE 10 MG/ML
INJECTION, SOLUTION INFILTRATION; PERINEURAL
Status: DISCONTINUED | OUTPATIENT
Start: 2023-09-22 | End: 2023-09-22 | Stop reason: HOSPADM

## 2023-09-22 RX ORDER — METHOHEXITAL IN WATER/PF 100MG/10ML
SYRINGE (ML) INTRAVENOUS
Status: DISCONTINUED | OUTPATIENT
Start: 2023-09-22 | End: 2023-09-22 | Stop reason: HOSPADM

## 2023-09-22 RX ORDER — MIDAZOLAM HYDROCHLORIDE 1 MG/ML
INJECTION INTRAMUSCULAR; INTRAVENOUS
Status: DISCONTINUED | OUTPATIENT
Start: 2023-09-22 | End: 2023-09-22 | Stop reason: HOSPADM

## 2023-09-22 RX ORDER — HYDROMORPHONE HYDROCHLORIDE 1 MG/ML
0.5 INJECTION, SOLUTION INTRAMUSCULAR; INTRAVENOUS; SUBCUTANEOUS
Status: DISCONTINUED | OUTPATIENT
Start: 2023-09-22 | End: 2023-09-23 | Stop reason: HOSPADM

## 2023-09-22 RX ORDER — MELATONIN
1000 DAILY
Status: DISCONTINUED | OUTPATIENT
Start: 2023-09-23 | End: 2023-09-23 | Stop reason: HOSPADM

## 2023-09-22 RX ORDER — POLYETHYLENE GLYCOL 3350 17 G/17G
17 POWDER, FOR SOLUTION ORAL DAILY
Status: DISCONTINUED | OUTPATIENT
Start: 2023-09-23 | End: 2023-09-23 | Stop reason: HOSPADM

## 2023-09-22 RX ORDER — SODIUM CHLORIDE 0.9 % (FLUSH) 0.9 %
10 SYRINGE (ML) INJECTION EVERY 12 HOURS SCHEDULED
Status: DISCONTINUED | OUTPATIENT
Start: 2023-09-22 | End: 2023-09-22 | Stop reason: HOSPADM

## 2023-09-22 RX ORDER — SODIUM CHLORIDE 9 MG/ML
75 INJECTION, SOLUTION INTRAVENOUS CONTINUOUS
Status: DISCONTINUED | OUTPATIENT
Start: 2023-09-22 | End: 2023-09-23 | Stop reason: HOSPADM

## 2023-09-22 RX ADMIN — SODIUM CHLORIDE 75 ML/HR: 9 INJECTION, SOLUTION INTRAVENOUS at 12:19

## 2023-09-22 RX ADMIN — RIVAROXABAN 20 MG: 20 TABLET, FILM COATED ORAL at 18:40

## 2023-09-22 RX ADMIN — FUROSEMIDE 80 MG: 40 TABLET ORAL at 22:56

## 2023-09-22 RX ADMIN — MONTELUKAST SODIUM 10 MG: 10 TABLET, FILM COATED ORAL at 22:55

## 2023-09-22 RX ADMIN — LOSARTAN POTASSIUM 25 MG: 25 TABLET, FILM COATED ORAL at 22:55

## 2023-09-22 RX ADMIN — BUDESONIDE AND FORMOTEROL FUMARATE DIHYDRATE 2 PUFF: 160; 4.5 AEROSOL RESPIRATORY (INHALATION) at 20:14

## 2023-09-22 RX ADMIN — ROSUVASTATIN CALCIUM 20 MG: 20 TABLET, FILM COATED ORAL at 22:55

## 2023-09-22 NOTE — Clinical Note
Prepped: groin and left chest. Prepped with: ChloraPrep. The site was clipped. The patient was draped in a sterile fashion.

## 2023-09-22 NOTE — Clinical Note
Prepped: groin. Prepped with: ChloraPrep. The site was clipped. The patient was draped in a sterile fashion. Draping of groin site completed.

## 2023-09-22 NOTE — Clinical Note
Hemostasis started on the right femoral vein. Perclose was used in achieving hemostasis. Closure device deployed in the vessel. Hemostasis achieved successfully. Closure device additional comment: Sheath removed by MD with perclose closure. Site covered by 4x4/tegaderm

## 2023-09-22 NOTE — Clinical Note
Here for routine prenatal visit  Abdominal rash is improved in the skin folds ( using nysatin )  VE 4-5/80/-2  Discussed IOL for comfort. Declines at this time. Replaced previous sheath in the right femoral vein.

## 2023-09-23 ENCOUNTER — NURSE TRIAGE (OUTPATIENT)
Dept: CALL CENTER | Facility: HOSPITAL | Age: 68
End: 2023-09-23
Payer: COMMERCIAL

## 2023-09-23 ENCOUNTER — APPOINTMENT (OUTPATIENT)
Dept: GENERAL RADIOLOGY | Facility: HOSPITAL | Age: 68
End: 2023-09-23
Payer: COMMERCIAL

## 2023-09-23 ENCOUNTER — READMISSION MANAGEMENT (OUTPATIENT)
Dept: CALL CENTER | Facility: HOSPITAL | Age: 68
End: 2023-09-23
Payer: COMMERCIAL

## 2023-09-23 VITALS
OXYGEN SATURATION: 95 % | SYSTOLIC BLOOD PRESSURE: 110 MMHG | RESPIRATION RATE: 18 BRPM | TEMPERATURE: 98.3 F | HEIGHT: 69 IN | BODY MASS INDEX: 24.88 KG/M2 | HEART RATE: 80 BPM | DIASTOLIC BLOOD PRESSURE: 82 MMHG | WEIGHT: 168 LBS

## 2023-09-23 PROBLEM — I50.22 CHRONIC SYSTOLIC HEART FAILURE: Status: ACTIVE | Noted: 2023-09-23

## 2023-09-23 PROBLEM — I50.22 CHRONIC HFREF (HEART FAILURE WITH REDUCED EJECTION FRACTION): Status: ACTIVE | Noted: 2023-09-23

## 2023-09-23 LAB
ANION GAP SERPL CALCULATED.3IONS-SCNC: 12 MMOL/L (ref 5–15)
BUN SERPL-MCNC: 15 MG/DL (ref 8–23)
BUN/CREAT SERPL: 16.5 (ref 7–25)
CALCIUM SPEC-SCNC: 9 MG/DL (ref 8.6–10.5)
CHLORIDE SERPL-SCNC: 101 MMOL/L (ref 98–107)
CO2 SERPL-SCNC: 27 MMOL/L (ref 22–29)
CREAT SERPL-MCNC: 0.91 MG/DL (ref 0.76–1.27)
EGFRCR SERPLBLD CKD-EPI 2021: 93 ML/MIN/1.73
GLUCOSE SERPL-MCNC: 127 MG/DL (ref 65–99)
POTASSIUM SERPL-SCNC: 3.7 MMOL/L (ref 3.5–5.2)
SODIUM SERPL-SCNC: 140 MMOL/L (ref 136–145)

## 2023-09-23 PROCEDURE — 80048 BASIC METABOLIC PNL TOTAL CA: CPT | Performed by: NURSE PRACTITIONER

## 2023-09-23 PROCEDURE — 71046 X-RAY EXAM CHEST 2 VIEWS: CPT

## 2023-09-23 PROCEDURE — 63710000001 SPIRONOLACTONE 12.5 MG TABLET: Performed by: INTERNAL MEDICINE

## 2023-09-23 PROCEDURE — A9270 NON-COVERED ITEM OR SERVICE: HCPCS | Performed by: INTERNAL MEDICINE

## 2023-09-23 PROCEDURE — 63710000001 PANTOPRAZOLE 40 MG TABLET DELAYED-RELEASE: Performed by: INTERNAL MEDICINE

## 2023-09-23 PROCEDURE — 99238 HOSP IP/OBS DSCHRG MGMT 30/<: CPT | Performed by: NURSE PRACTITIONER

## 2023-09-23 PROCEDURE — 63710000001 LOSARTAN 25 MG TABLET: Performed by: INTERNAL MEDICINE

## 2023-09-23 PROCEDURE — 63710000001 FUROSEMIDE 40 MG TABLET: Performed by: INTERNAL MEDICINE

## 2023-09-23 PROCEDURE — 94799 UNLISTED PULMONARY SVC/PX: CPT

## 2023-09-23 PROCEDURE — G0378 HOSPITAL OBSERVATION PER HR: HCPCS

## 2023-09-23 PROCEDURE — 63710000001 CHOLECALCIFEROL 25 MCG (1000 UT) TABLET: Performed by: INTERNAL MEDICINE

## 2023-09-23 RX ORDER — METOPROLOL SUCCINATE 25 MG/1
50 TABLET, EXTENDED RELEASE ORAL DAILY
Qty: 30 TABLET | Refills: 11
Start: 2023-09-23 | End: 2023-09-23 | Stop reason: SDUPTHER

## 2023-09-23 RX ORDER — METOPROLOL SUCCINATE 25 MG/1
50 TABLET, EXTENDED RELEASE ORAL DAILY
Qty: 30 TABLET | Refills: 11
Start: 2023-09-23

## 2023-09-23 RX ADMIN — FUROSEMIDE 80 MG: 40 TABLET ORAL at 08:45

## 2023-09-23 RX ADMIN — LOSARTAN POTASSIUM 25 MG: 25 TABLET, FILM COATED ORAL at 08:45

## 2023-09-23 RX ADMIN — PANTOPRAZOLE SODIUM 40 MG: 40 TABLET, DELAYED RELEASE ORAL at 08:46

## 2023-09-23 RX ADMIN — Medication 1000 UNITS: at 08:45

## 2023-09-23 RX ADMIN — BUDESONIDE AND FORMOTEROL FUMARATE DIHYDRATE 2 PUFF: 160; 4.5 AEROSOL RESPIRATORY (INHALATION) at 11:06

## 2023-09-23 RX ADMIN — Medication 12.5 MG: at 08:45

## 2023-09-23 NOTE — OUTREACH NOTE
Prep Survey      Flowsheet Row Responses   [facility] patient discharged from? [facility]   Is LACE score < 7 ? No   Eligibility [facility]   Date of Admission 09/22/23   Date of Discharge 09/23/23   Discharge Disposition Home or Self Care   Discharge diagnosis Atrial fibrillation   Does the patient have one of the following disease processes/diagnoses(primary or secondary)? Other   Does the patient have Home health ordered? No   Is there a DME ordered? No   Prep survey completed? Yes            Ana Rosa TUCKER - Registered Nurse

## 2023-09-23 NOTE — DISCHARGE SUMMARY
Hospital Discharge    Patient Name: Claude Spencer  Age/Sex: 68 y.o. male  : 1955  MRN: [id number]    Encounter Provider: KHADRA Key  Referring Provider: Mauro Goldsmith MD  Place of Service: [facility] CARDIOLOGY  Patient Care Team:  Zohreh Diop MD as PCP - General (Internal Medicine)         Date of Discharge:  2023   Date of Admit: 2023    Discharge Condition: Stable  Discharge Diagnosis:    Permanent atrial fibrillation    Atrial fibrillation      Hospital Course:   Claude Spencer is a 68 y.o. male with atrial fibrillation and HFrEF , likely tachy mediated. He had moderate non obstructive disease by cath in 2021. We have had difficulty controlling his atrial fibrillation due to marginal hemodynamics. He has had recurrent heart failure exacerbations and hospitalizations. He was admitted in August for heart failure. He was diuresed. Struggled again with heart rate control. He was evaluated by Dr. Goldsmith who recommended PPM and AV node ablation however he then developed significant leukocytosis due to diverticulitis. He was discharged home with plans to return as outpatient. He called the office a few weeks ago. He was volume overloaded. Lasix was increased to 80 mg BID . He was seen shortly after in the office and was doing well at that time. He came yesterday for PPM, AVN ablation. He underwent implantation of right ventricular left bundle lead, dual chamber permanent pacemaker (Medtronic) and ablation of AV node. He was monitored over night and is doing well this morning. His incision looks good without hematoma or drainage. He has been started back on his rivaroxaban. Now that his HR is better controlled - he may need less diuretic. Will decrease back to 40 mg BID. Will also decrease metoprolol to 50 mg BID -- and consider further titration of GDMT as tolerated as outpatient. He will return in 7-10 days for incision and  device check. He will see Dr. Draper in one month. He will call office with problems or concerns.     Medtronic to interrogate device and if functioning properly and CXR ok --- he will be stable for discharge this afternoon.     Objective:  Temp:  [97.6 °F (36.4 °C)-98.5 °F (36.9 °C)] 98.5 °F (36.9 °C)  Heart Rate:  [72-89] 80  Resp:  [13-27] 16  BP: (114-138)/() 116/92    Intake/Output Summary (Last 24 hours) at 9/23/2023 0815  Last data filed at 9/22/2023 2045  Gross per 24 hour   Intake 925 ml   Output 250 ml   Net 675 ml     Body mass index is 24.81 kg/m².      09/22/23  1215   Weight: 76.2 kg (168 lb)     Weight change:     Physical Exam:  Constitutional: He is oriented to person, place, and time. He appears well-developed. He does not appear ill.   HENT:   Head: Normocephalic and atraumatic. Head is without contusion.   Right Ear: Hearing normal. No drainage.   Left Ear: Hearing normal. No drainage.   Nose: No nasal deformity. No epistaxis.   Eyes: Lids are normal. Right eye exhibits no exudate. Left eye exhibits no exudate.  Neck: No JVD present. Carotid bruit is not present. No tracheal deviation present. No thyroid mass and no thyromegaly present.   Cardiovascular: Normal rate, regular rhythm and normal heart sounds.    Pulses:       Posterior tibial pulses are 2+ on the right side, and 2+ on the left side.   Pulmonary/Chest: Effort normal and breath sounds normal. Left chest incision , no drainage. No hematoma.   Abdominal: Soft. Normal appearance and bowel sounds are normal. There is no tenderness.   Musculoskeletal: Normal range of motion.        Right shoulder: He exhibits no deformity.        Left shoulder: He exhibits no deformity.   Neurological: He is alert and oriented to person, place, and time. He has normal strength.   Skin: Skin is warm, dry and intact. No rash noted.   Psychiatric: He has a normal mood and affect. His behavior is normal. Thought content normal.   Vitals  reviewed      Procedures Performed  Procedure(s):  Pacemaker DC new medtronic  AV node ablation  3D Mapping EP       Consults:  Consults       Date and Time Order Name Status Description    8/26/2023  8:09 AM Inpatient General Surgery Consult Completed     8/22/2023  6:16 PM Inpatient Cardiology Consult Completed             Pertinent Test Results:                            Invalid input(s): LDLCALC            Discharge Medications     Discharge Medications        Continue These Medications        Instructions Start Date   albuterol sulfate  (90 Base) MCG/ACT inhaler  Commonly known as: PROVENTIL HFA;VENTOLIN HFA;PROAIR HFA   2 puffs, Inhalation, Every 4 Hours PRN      budesonide-formoterol 160-4.5 MCG/ACT inhaler  Commonly known as: Symbicort   2 puffs, Inhalation, 2 Times Daily      cholecalciferol 25 MCG (1000 UT) tablet  Commonly known as: VITAMIN D3   1,000 Units, Oral, Daily      furosemide 80 MG tablet  Commonly known as: LASIX   80 mg, Oral, 2 Times Daily      ipratropium-albuterol 0.5-2.5 mg/3 ml nebulizer  Commonly known as: DUO-NEB   3 mL, Nebulization, Every 4 Hours PRN      losartan 25 MG tablet  Commonly known as: COZAAR   25 mg, Oral, 2 Times Daily      montelukast 10 MG tablet  Commonly known as: SINGULAIR   10 mg, Oral, Nightly      omeprazole 20 MG capsule  Commonly known as: priLOSEC   20 mg, Oral, Daily      polyethylene glycol 17 g packet  Commonly known as: MIRALAX   17 g, Oral, Daily      rosuvastatin 20 MG tablet  Commonly known as: CRESTOR   20 mg, Oral, Nightly      spironolactone 25 MG tablet  Commonly known as: ALDACTONE   12.5 mg, Oral, Daily      Xarelto 20 MG tablet  Generic drug: rivaroxaban   Take 1 tablet by mouth Daily With Dinner.               Discharge Diet:    Dietary Orders (From admission, onward)       Start     Ordered    09/22/23 1803  Diet: Regular/House Diet; Texture: Regular Texture (IDDSI 7); Fluid Consistency: Thin (IDDSI 0)  Diet Effective Now         References:    Diet Order [place]   Question Answer Comment   Diets: Regular/House Diet    Texture: Regular Texture (IDDSI 7)    Fluid Consistency: Thin (IDDSI 0)        09/22/23 1802                    Activity at Discharge:   Follow up in the pacemaker clinic for wound check in 7-10 days call [phone] to schedule appointment.  Follow up with Dr. Draper in 4-6 weeks.     Routine post pacemaker/AICD/loop recorder implant discharge home care instructions:  1. Do not get site wet for 72 hours.  2. Do not submerge device site below water for 7-10 days.  3. No lifting left upper extremity above head for 6 weeks.   4. No lifting objects greater than 1 pound with affected extremity for 6 weeks.   5. No driving until cleared by cardiologist at follow up appointment.   6.Call office at [phone] for any fevers, chills, redness,bleeding, drainage, increased pain, etc.       Discharge disposition: home     Discharge Instructions and Follow ups:  Future Appointments   Date Time Provider Department Center   10/11/2023  9:30 AM Gordon Shah PA-C [facility]   10/24/2023 10:15 AM Wilfred Draper MD [facility]   12/1/2023 10:15 AM Zohreh Diop MD [facility]      Follow-up Information       Zohreh Diop MD .    Specialties: Internal Medicine, Pediatrics  Contact information:  [address] [address] [phone]                             Test Results Pending at Discharge: CXR and device interrogation     KHADRA Key  09/23/23  08:15 EDT    Time: Discharge 25 min

## 2023-09-24 NOTE — TELEPHONE ENCOUNTER
He was discharged yesterday was supposed to be taking Toprol-XL does not have it, how can he take it?  She will call pharmacy she says he takes Toprol not sure is same as ordered but it is 50 mg  at home, and the order is for 2 tablets  25 mg daily. Told her to call pharmacy and see if same

## 2023-09-24 NOTE — TELEPHONE ENCOUNTER
Reason for Disposition  • [1] Caller has medicine question about med NOT prescribed by PCP AND [2] triager unable to answer question (e.g., compatibility with other med, storage)    Additional Information  • Negative: [1] Intentional drug overdose AND [2] suicidal thoughts or ideas  • Negative: Drug overdose and triager unable to answer question  • Negative: Caller requesting a renewal or refill of a medicine patient is currently taking  • Negative: Caller requesting information unrelated to medicine  • Negative: Caller requesting information about COVID-19 Vaccine  • Negative: Caller requesting information about Emergency Contraception  • Negative: Caller requesting information about Combined Birth Control Pills  • Negative: Caller requesting information about Progestin Birth Control Pills  • Negative: Caller requesting information about Post-Op pain or medicines  • Negative: Caller requesting a prescription antibiotic (such as Penicillin) for Strep throat and has a positive culture result  • Negative: Caller requesting a prescription anti-viral med (such as Tamiflu) and has influenza (flu) symptoms  • Negative: Immunization reaction suspected  • Negative: Rash while taking a medicine or within 3 days of stopping it  • Negative: [1] Asthma and [2] having symptoms of asthma (cough, wheezing, etc.)  • Negative: [1] Symptom of illness (e.g., headache, abdominal pain, earache, vomiting) AND [2] more than mild  • Negative: Breastfeeding questions about mother's medicines and diet  • Negative: MORE THAN A DOUBLE DOSE of a prescription or over-the-counter (OTC) drug  • Negative: [1] DOUBLE DOSE (an extra dose or lesser amount) of prescription drug AND [2] any symptoms (e.g., dizziness, nausea, pain, sleepiness)  • Negative: [1] DOUBLE DOSE (an extra dose or lesser amount) of over-the-counter (OTC) drug AND [2] any symptoms (e.g., dizziness, nausea, pain, sleepiness)  • Negative: Took another person's prescription drug  •  "Negative: [1] DOUBLE DOSE (an extra dose or lesser amount) of prescription drug AND [2] NO symptoms  (Exception: A double dose of antibiotics.)  • Negative: Diabetes drug error or overdose (e.g., took wrong type of insulin or took extra dose)  • Negative: [1] Prescription not at pharmacy AND [2] was prescribed by PCP recently (Exception: Triager has access to EMR and prescription is recorded there. Go to Home Care and confirm for pharmacy.)  • Negative: [1] Pharmacy calling with prescription question AND [2] triager unable to answer question  • Negative: [1] Caller has URGENT medicine question about med that PCP or specialist prescribed AND [2] triager unable to answer question  • Negative: Medicine patch causing local rash or itching    Answer Assessment - Initial Assessment Questions  1. NAME of MEDICINE: \"What medicine(s) are you calling about?\"      Toprol-XL  2. QUESTION: \"What is your question?\" (e.g., double dose of medicine, side effect)      It was not at pharmacy I have 50 mg order is for 25 mg  3. PRESCRIBER: \"Who prescribed the medicine?\" Reason: if prescribed by specialist, call should be referred to that group.      PCP  4. SYMPTOMS: \"Do you have any symptoms?\" If Yes, ask: \"What symptoms are you having?\"  \"How bad are the symptoms (e.g., mild, moderate, severe)      no  5. PREGNANCY:  \"Is there any chance that you are pregnant?\" \"When was your last menstrual period?\"      no    Protocols used: Medication Question Call-ADULT-    "

## 2023-09-25 ENCOUNTER — TRANSITIONAL CARE MANAGEMENT TELEPHONE ENCOUNTER (OUTPATIENT)
Dept: CALL CENTER | Facility: HOSPITAL | Age: 68
End: 2023-09-25

## 2023-09-25 NOTE — OUTREACH NOTE
Call Center TCM Note      Flowsheet Row Responses   [facility] patient discharged from? [place]   Does the patient have one of the following disease processes/diagnoses(primary or secondary)? Other   TCM attempt successful? No   Unsuccessful attempts Attempt 2            Stephanie Hartman RN    9/25/2023, 14:55 EDT

## 2023-09-25 NOTE — OUTREACH NOTE
Call Center TCM Note      Flowsheet Row Responses   [facility] patient discharged from? [place]   Does the patient have one of the following disease processes/diagnoses(primary or secondary)? Other   TCM attempt successful? No   Unsuccessful attempts Attempt 1   Call Status Left message            Stephanie Hartman RN    9/25/2023, 09:54 EDT

## 2023-09-26 ENCOUNTER — TELEPHONE (OUTPATIENT)
Dept: INTERNAL MEDICINE | Facility: CLINIC | Age: 68
End: 2023-09-26

## 2023-09-26 ENCOUNTER — TELEPHONE (OUTPATIENT)
Dept: CARDIOLOGY | Facility: CLINIC | Age: 68
End: 2023-09-26
Payer: COMMERCIAL

## 2023-09-26 ENCOUNTER — TRANSITIONAL CARE MANAGEMENT TELEPHONE ENCOUNTER (OUTPATIENT)
Dept: CALL CENTER | Facility: HOSPITAL | Age: 68
End: 2023-09-26
Payer: COMMERCIAL

## 2023-09-26 NOTE — TELEPHONE ENCOUNTER
This pt is a new implant you did on 9/22/23 with an AVN ablation.     I spoke with him yesterday to schedule his incision check appt, it sounds as though he has picked off all the skin glue around the incision. He denied any drainage or bleeding. I informed the pt to not pick or touch his incision to reduce risk for infection, pt verbalized understanding.     I tried to schedule the pt's incision check appt but the pt was adamant about not driving to the [facility] clinic but would go to the [facility] clinic. We are at the [facility] clinic this Thursday, 9/28/23, so scheduled his incision check appt for this Thursday.

## 2023-09-26 NOTE — TELEPHONE ENCOUNTER
Caller: Claude Spencer    Relationship: Self    Best call back number: [phone]    What medication are you requesting: XARELTO 20 MGS            If a prescription is needed, what is your preferred pharmacy and phone number:    [facility] [id number] - [place] - [address] - [phone]  - [phone]     Additional notes: PATIENT GOT xarelto 20 MG MEDICATION WHEN HE WAS IN HOSPITAL GAVE HIM A 2 WEEK SUPPLY PATIENT IS DOWN TO 5 PILLS. PLEASE FILL THIS MEDICATION IT IS A BLOOD THINNER.

## 2023-09-26 NOTE — OUTREACH NOTE
Call Center TCM Note      Flowsheet Row Responses   [facility] patient discharged from? [place]   Does the patient have one of the following disease processes/diagnoses(primary or secondary)? Other   TCM attempt successful? No   Unsuccessful attempts Attempt 3   Wrap up additional comments D/C DX: afib,  Pacemaker - Unable to reach pt x 3 attempts for TCM call. Pt is not yet sched for TCM APPT with PCP Dr Diop. d/c date from [facility] was 09/23/2023.            Zohreh Cordova MA    9/26/2023, 10:19 EDT

## 2023-09-26 NOTE — TELEPHONE ENCOUNTER
Caller: Claude Spencer    Relationship: Self    Best call back number:   [phone]    What orders are you requesting (i.e. lab or imaging): LABS    In what timeframe would the patient need to come in: BEFORE APPOINTMENT      Additional notes: PATIENT WANTED TO KNOW  IF LABS NEEDED TO BE ORDERED FOR HIM BEFORE HE CAME TO HIS APPOINTMENT ALSO HE WANTED TO KNOW IF HE DOES NEED TO DO LABS DOES HE NEED TO FAST?

## 2023-09-28 ENCOUNTER — CLINICAL SUPPORT NO REQUIREMENTS (OUTPATIENT)
Dept: CARDIOLOGY | Facility: CLINIC | Age: 68
End: 2023-09-28
Payer: COMMERCIAL

## 2023-09-28 ENCOUNTER — TELEPHONE (OUTPATIENT)
Dept: CARDIOLOGY | Facility: CLINIC | Age: 68
End: 2023-09-28

## 2023-09-28 ENCOUNTER — OFFICE VISIT (OUTPATIENT)
Dept: INTERNAL MEDICINE | Facility: CLINIC | Age: 68
End: 2023-09-28
Payer: COMMERCIAL

## 2023-09-28 VITALS
TEMPERATURE: 98 F | OXYGEN SATURATION: 93 % | DIASTOLIC BLOOD PRESSURE: 74 MMHG | HEART RATE: 58 BPM | SYSTOLIC BLOOD PRESSURE: 118 MMHG | BODY MASS INDEX: 25.3 KG/M2 | HEIGHT: 69 IN | WEIGHT: 170.8 LBS

## 2023-09-28 DIAGNOSIS — I48.21 PERMANENT ATRIAL FIBRILLATION: Primary | ICD-10-CM

## 2023-09-28 DIAGNOSIS — Z23 NEED FOR VACCINATION: ICD-10-CM

## 2023-09-28 NOTE — PROGRESS NOTES
Claude Spencer is a 68 y.o. male who presents with a chief complaint of   Chief Complaint   Patient presents with    Hospital Follow Up Visit       HPI     Refill blood thinner.  Denies need for abdominal MRI.     The following portions of the patient's history were reviewed and updated as appropriate: allergies, current medications, past family history, past medical history, past social history, past surgical history and problem list.      Current Outpatient Medications:     albuterol sulfate  (90 Base) MCG/ACT inhaler, Inhale 2 puffs Every 4 (Four) Hours As Needed for Wheezing., Disp: , Rfl:     budesonide-formoterol (Symbicort) 160-4.5 MCG/ACT inhaler, Inhale 2 puffs 2 (Two) Times a Day., Disp: 10.2 g, Rfl: 6    cholecalciferol (VITAMIN D3) 25 MCG (1000 UT) tablet, Take 1 tablet by mouth Daily., Disp: , Rfl:     furosemide (LASIX) 80 MG tablet, Take 1 tablet by mouth 2 (Two) Times a Day for 30 days., Disp: 15 tablet, Rfl: 0    ipratropium-albuterol (DUO-NEB) 0.5-2.5 mg/3 ml nebulizer, Take 3 mL by nebulization Every 4 (Four) Hours As Needed for Wheezing or Shortness of Air., Disp: 360 mL, Rfl: 1    metoprolol succinate XL (TOPROL-XL) 25 MG 24 hr tablet, Take 2 tablets by mouth Daily., Disp: 30 tablet, Rfl: 11    montelukast (SINGULAIR) 10 MG tablet, Take 1 tablet by mouth Every Night., Disp: 60 tablet, Rfl: 0    omeprazole (priLOSEC) 20 MG capsule, Take 1 capsule by mouth Daily., Disp: 30 capsule, Rfl: 0    rivaroxaban (XARELTO) 20 MG tablet, Take 1 tablet by mouth Daily With Dinner., Disp: 90 tablet, Rfl: 3    rosuvastatin (CRESTOR) 20 MG tablet, Take 1 tablet by mouth Every Night., Disp: 90 tablet, Rfl: 3    spironolactone (ALDACTONE) 25 MG tablet, Take 0.5 tablets by mouth Daily., Disp: , Rfl:     polyethylene glycol (MIRALAX) 17 g packet, Take 17 g by mouth Daily. (Patient not taking: Reported on 9/28/2023), Disp: , Rfl:             Physical Exam  /74 (BP Location: Right arm, Patient  "Position: Sitting, Cuff Size: Adult)   Pulse 58   Temp 98 °F (36.7 °C) (Infrared)   Ht 175.3 cm (69\")   Wt 77.5 kg (170 lb 12.8 oz)   SpO2 93%   BMI 25.22 kg/m²     Physical Exam  Vitals reviewed.   Constitutional:       General: He is not in acute distress.     Appearance: Normal appearance.   HENT:      Head: Normocephalic and atraumatic.      Nose: Nose normal.      Mouth/Throat:      Mouth: Mucous membranes are moist.   Eyes:      Conjunctiva/sclera: Conjunctivae normal.   Cardiovascular:      Rate and Rhythm: Normal rate and regular rhythm.      Pulses: Normal pulses.      Heart sounds: Normal heart sounds.   Pulmonary:      Effort: Pulmonary effort is normal.      Breath sounds: Normal breath sounds.   Abdominal:      Palpations: Abdomen is soft.      Tenderness: There is no abdominal tenderness.   Skin:     General: Skin is warm and dry.   Neurological:      General: No focal deficit present.      Mental Status: He is alert.   Psychiatric:         Mood and Affect: Mood normal.         Results for orders placed or performed during the hospital encounter of 09/22/23   Basic Metabolic Panel    Specimen: Blood   Result Value Ref Range    Glucose 127 (H) 65 - 99 mg/dL    BUN 15 8 - 23 mg/dL    Creatinine 0.91 0.76 - 1.27 mg/dL    Sodium 140 136 - 145 mmol/L    Potassium 3.7 3.5 - 5.2 mmol/L    Chloride 101 98 - 107 mmol/L    CO2 27.0 22.0 - 29.0 mmol/L    Calcium 9.0 8.6 - 10.5 mg/dL    BUN/Creatinine Ratio 16.5 7.0 - 25.0    Anion Gap 12.0 5.0 - 15.0 mmol/L    eGFR 93.0 >60.0 mL/min/1.73   ECG 12 Lead Pre-Op / Pre-Procedure   Result Value Ref Range    QT Interval 361 ms    QTC Interval 419 ms           Diagnoses and all orders for this visit:    1. Permanent atrial fibrillation (Primary)  -     rivaroxaban (XARELTO) 20 MG tablet; Take 1 tablet by mouth Daily With Dinner.  Dispense: 90 tablet; Refill: 3    2. Need for vaccination  -     Fluzone High-Dose 65+yrs (4204-0819)      Doing well post PPM placement. "

## 2023-09-28 NOTE — TELEPHONE ENCOUNTER
This pt was in for his 1 week check post op of a LBB (in atrial port) and RV back up Medtronic pacemaker. He also had AVN ablation. It appears his has an underlying escape rhythm in the 60s bpm (sensing strip below). AP 93.2%,  76.9%. He is having some NSVT. On one of the NSVT (9/23) the pt goes in and out of it with the longest run 11 beats followed by 1-2 paced beats and shorter runs(strip below) avg A/V rate of 156/214 bpm. I have scheduled him to come back in October for his 1 month check (he will not go to [place] so he is coming to [place]). He has also picked off his incision glue (I sent a photo to JALIL GAVIRIA). It looked like it was healing with no s/s of infection or gapes/drainage seen when palpitated. I just wanted to let you know.     Kindly,   Natividad Canchola Device RN     Sensing Test:         NSVT:

## 2023-09-28 NOTE — TELEPHONE ENCOUNTER
He has an appt with Gordon in just two weeks.  We can assess his heart rate and blood pressure in the office and see if we need to adjust any of his medications, and look at his incision again.    geovani

## 2023-10-02 ENCOUNTER — HOSPITAL ENCOUNTER (EMERGENCY)
Facility: HOSPITAL | Age: 68
Discharge: HOME | End: 2023-10-02
Attending: EMERGENCY MEDICINE | Admitting: EMERGENCY MEDICINE
Payer: COMMERCIAL

## 2023-10-02 VITALS
HEART RATE: 81 BPM | BODY MASS INDEX: 25.18 KG/M2 | OXYGEN SATURATION: 94 % | SYSTOLIC BLOOD PRESSURE: 105 MMHG | HEIGHT: 69 IN | TEMPERATURE: 98.3 F | WEIGHT: 170 LBS | DIASTOLIC BLOOD PRESSURE: 76 MMHG | RESPIRATION RATE: 16 BRPM

## 2023-10-02 DIAGNOSIS — L76.82 BLEEDING AT INSERTION SITE: Primary | ICD-10-CM

## 2023-10-02 PROCEDURE — 99283 EMERGENCY DEPT VISIT LOW MDM: CPT

## 2023-10-02 NOTE — TELEPHONE ENCOUNTER
The pt's wife called in and the pt is stating that he is bleeding at the incision site. I discussed this with the pt's wife and strongly suggested they come in for it to be evaluated. The pt's wife stated they cannot come to [place] due to transportation. I suggested they go into the ER at [place] to have the site assessed. The pt's wife stated that if the ER suggests they come to be seen in [place] they could arrange it for tomorrow. I just wanted to keep you all update.   Kindly,   Meg Schotanus Device RN

## 2023-10-02 NOTE — DISCHARGE INSTRUCTIONS
Clean the pacemaker wound daily with soap and water and apply Neosporin and a bulky dressing for the next week.  Keep padding over your pacemaker when the shoulder builder your car is in place.

## 2023-10-02 NOTE — ED PROVIDER NOTES
Subjective     History provided by:  Patient  History of Present Illness    Chief complaint: Bleeding from pacemaker site    Location: Bleeding from the corner of the pacemaker incision wound.    Quality/Severity: Bleeding from the lateral corner of the pacemaker surgical wound on the left upper chest.    Timing/Onset: This morning.    Modifying Factors: Patient thinks that the seatbelt rubbing against the area caused it.  He has not had a dressing on it anymore.  Patient is anticoagulated with Xarelto.    Associated symptoms: No pain.    Narrative: The patient is a 68-year-old white male who is status post pacemaker placement in his left upper chest at [facility] on 9/22/2023.  He no longer wears a gauze dressing over it.  He believes that the shoulder belt in his car was rubbing it and it caused it to bleed from the lateral aspect of the surgical wound.  He denies any pain.  No redness or fever.  The patient is anticoagulated with Xarelto.  Review of Systems    Past Medical History:   Diagnosis Date    Atrial fibrillation 04/17/2021    COPD (chronic obstructive pulmonary disease)     Gastroesophageal reflux disease 03/04/2016    Hyperlipidemia     Hypertension     Nosebleed     Osteopenia 03/04/2016    Description: Her bone density August 2015 secondary to steroid use    Vitamin D deficiency 03/04/2016       Allergies   Allergen Reactions    Apixaban Other (See Comments)     Nose bleed       Past Surgical History:   Procedure Laterality Date    ANKLE SURGERY      CARDIAC CATHETERIZATION Left 4/20/2021    Procedure: Coronary Angiography;  Surgeon: Rao Del Cid MD;  Location: [facility] INVASIVE LOCATION;  Service: Cardiovascular;  Laterality: Left;    CARDIAC CATHETERIZATION N/A 4/20/2021    Procedure: Left Heart Cath;  Surgeon: Rao Del Cid MD;  Location: [facility] INVASIVE LOCATION;  Service: Cardiovascular;  Laterality: N/A;    CARDIAC ELECTROPHYSIOLOGY PROCEDURE N/A  2023    Procedure: Pacemaker DC new medtronic;  Surgeon: Mauro Goldsmith MD;  Location:  [facility] INVASIVE LOCATION;  Service: Cardiovascular;  Laterality: N/A;    CARDIAC ELECTROPHYSIOLOGY PROCEDURE N/A 2023    Procedure: AV node ablation;  Surgeon: Mauro Goldsmith MD;  Location:  [facility] INVASIVE LOCATION;  Service: Cardiovascular;  Laterality: N/A;    INGUINAL HERNIA REPAIR Bilateral 2023    Procedure: INGUINAL HERNIA REPAIR LAPAROSCOPIC;  Surgeon: Ayanna Moreira DO;  Location:  [facility] OR;  Service: General;  Laterality: Bilateral;    UMBILICAL HERNIA REPAIR N/A 2023    Procedure: UMBILICAL HERNIA REPAIR;  Surgeon: Ayanna Moreira DO;  Location:  [facility] OR;  Service: General;  Laterality: N/A;       Family History   Problem Relation Age of Onset    COPD Mother 78    Seizures Father     Vasculitis Father     Prostate cancer Maternal Uncle     Heart disease Brother        Social History     Socioeconomic History    Marital status:    Tobacco Use    Smoking status: Former     Packs/day: 1.00     Years: 30.00     Pack years: 30.00     Types: Cigarettes     Quit date: 2000     Years since quittin.4     Passive exposure: Past    Smokeless tobacco: Current     Types: Chew    Tobacco comments:     quit 15 years ago, wife current smoker outside   Vaping Use    Vaping Use: Never used   Substance and Sexual Activity    Alcohol use: No    Drug use: No     Types: Marijuana    Sexual activity: Yes     Partners: Female           Objective   Physical Exam  Vitals and nursing note reviewed.   Constitutional:       General: He is not in acute distress.     Appearance: Normal appearance. He is normal weight. He is not ill-appearing, toxic-appearing or diaphoretic.      Comments: Patient appears healthy in no acute distress.  His vital signs are within normal limits.   Cardiovascular:      Comments: The patient's pacemaker site appears noninfected.  There is a slight  oozing of dark red blood from the lateral corner of the surgical wound.  The wound is not infected.  The surgical wound is well approximated.  Neurological:      General: No focal deficit present.      Mental Status: He is alert and oriented to person, place, and time.      Cranial Nerves: No cranial nerve deficit.      Sensory: No sensory deficit.      Motor: No weakness.   Psychiatric:         Mood and Affect: Mood normal.         Thought Content: Thought content normal.         Judgment: Judgment normal.       Procedures           ED Course  ED Course as of 10/02/23 1428   Mon Oct 02, 2023   1358 The patient is only oozing a small amount of dark red blood from the corner of the surgical wound of his pacemaker.  I had the tech cleaned it and applied bulky dressing.  I instructed the patient to clean it daily and apply Neosporin and a bulky dressing for the next week until it heals. [TP]      ED Course User Index  [TP] West Lopez MD                                           Medical Decision Making  Problems Addressed:  Bleeding at insertion site: acute illness or injury        Final diagnoses:   Bleeding at insertion site       ED Disposition  ED Disposition       ED Disposition   Discharge    Condition   Stable    Comment   --               Mauro Goldsmith MD  [address] [address]  [phone]    Schedule an appointment as soon as possible for a visit   If symptoms worsen         Medication List      No changes were made to your prescriptions during this visit.           Labs Reviewed - No data to display.w  Etread       Medication List      No changes were made to your prescriptions during this visit.              West Lopez MD  10/02/23 1423

## 2023-10-03 NOTE — TELEPHONE ENCOUNTER
Thank you. I spoke to the pt's daughter and he is coming in on Thursday at 1:30 to have an incision check at [facility].

## 2023-10-03 NOTE — TELEPHONE ENCOUNTER
Claude Spencer returned call.  Attempted to transfer call to pacemaker nurses, however there was no answer on any of the lines.  Walked down to 4th floor and provided patient information and park number to Ntaaliya C[place]    Thank you,  Geetha BREWER RN  Triage Nurse [facility]   11:26 EDT

## 2023-10-03 NOTE — TELEPHONE ENCOUNTER
Kati Claude Spencer's daughter returned call stating she had a missed call from Nataliya.  Attempted to call pacemaker nurses, however there was no answer on any of the lines.    Walked down to the 4th floor and provided information to Brittany, along with phone call's park number.  Brittany stated she would see if Nataliya is available to take call.    Thank you,  Geetha BREWER RN  Triage Nurse [facility]   11:58 EDT

## 2023-10-03 NOTE — TELEPHONE ENCOUNTER
I called the pt to check in and see how his site is doing but was unable to get through. I LVM asking for a callback and explaining the importance of seeing his site in the [place] office preferably this week.   Kindly,   Natividad

## 2023-10-05 ENCOUNTER — CLINICAL SUPPORT NO REQUIREMENTS (OUTPATIENT)
Age: 68
End: 2023-10-05
Payer: COMMERCIAL

## 2023-10-05 DIAGNOSIS — I48.21 PERMANENT ATRIAL FIBRILLATION: Primary | ICD-10-CM

## 2023-10-05 DIAGNOSIS — I42.8 NICM (NONISCHEMIC CARDIOMYOPATHY): ICD-10-CM

## 2023-10-09 ENCOUNTER — TELEPHONE (OUTPATIENT)
Dept: INTERNAL MEDICINE | Facility: CLINIC | Age: 68
End: 2023-10-09
Payer: COMMERCIAL

## 2023-10-09 NOTE — TELEPHONE ENCOUNTER
Caller: Tj Claude MAGED    Relationship: Self    Best call back number: [phone]    Requested Prescriptions:   Requested Prescriptions     Pending Prescriptions Disp Refills    metoprolol succinate XL (TOPROL-XL) 25 MG 24 hr tablet       Sig: Take 2 tablets by mouth Daily.        Pharmacy where request should be sent: [facility] [id number] [place], [place] - [address] - [phone] PH - [phone] FX     Last office visit with prescribing clinician: 9/13/2023   Last telemedicine visit with prescribing clinician: Visit date not found   Next office visit with prescribing clinician: 10/11/2023     Does the patient have less than a 3 day supply:  [] Yes  [x] No    Would you like a call back once the refill request has been completed: [] Yes [x] No    If the office needs to give you a call back, can they leave a voicemail: [] Yes [x] No    Yobani Quintanilla Rep   10/09/23 11:31 EDT

## 2023-10-09 NOTE — TELEPHONE ENCOUNTER
I let patient know Cardio should prescribe this. He asked me to call the cardiology office. I did and they said they would send a request to the doctor to fill

## 2023-10-09 NOTE — TELEPHONE ENCOUNTER
Patient states, blood pressure pills have been changed. Wants to be on 50 mg. Per day, as it was working well for him. Needs sent to [facility] pharmacy in [place]. Questions, can call him. He tossed his 50 mg. After being put on 25 mg. So he would like thie 50's called in at this time. He takes the 50 twice daily. Please call patient and make aware of what is called in.

## 2023-10-10 ENCOUNTER — TELEPHONE (OUTPATIENT)
Dept: INTERNAL MEDICINE | Facility: CLINIC | Age: 68
End: 2023-10-10

## 2023-10-10 ENCOUNTER — TELEPHONE (OUTPATIENT)
Dept: CARDIOLOGY | Facility: CLINIC | Age: 68
End: 2023-10-10
Payer: COMMERCIAL

## 2023-10-10 RX ORDER — METOPROLOL SUCCINATE 50 MG/1
50 TABLET, EXTENDED RELEASE ORAL DAILY
Qty: 90 TABLET | Refills: 1 | Status: SHIPPED | OUTPATIENT
Start: 2023-10-10 | End: 2023-10-11 | Stop reason: SDUPTHER

## 2023-10-10 NOTE — TELEPHONE ENCOUNTER
Spoke with patient, he wanted an update on his metoprolol. I let him know I did call the Cardiology office yesterday and requested they fill it but I see it was not done. I called and spoke with Clinical this morning (Angela), and she said she would send Dr. Goldsmith a message and see if he is okay with prescribing it.

## 2023-10-10 NOTE — TELEPHONE ENCOUNTER
I spoke with pt and let him know that Gordon would address his metoprolol refill tomorrow at his appointment.  Verbalized understanding.    Angela Sandoval, RN  Triage RN  10/10/23 13:24 EDT

## 2023-10-10 NOTE — TELEPHONE ENCOUNTER
Mary, we saw him in the [place] office - MD Goldsmith assessed the site as well on 10/5. At that time there were no immediate concerns for infection and KP released pt to follow up in [place].  Kindly,   Meg Schotanus Device RN

## 2023-10-10 NOTE — TELEPHONE ENCOUNTER
HUB ATTEMPTED WARM TRANSFER BUT WAS UNSUCCESSFUL.    Caller: Annika Spencer    Relationship: Self    Best call back number: [phone]     What is the best time to reach you: ANY    Who are you requesting to speak with (clinical staff, provider,  specific staff member): CLINICAL    Do you know the name of the person who called: ANNIKA    What was the call regarding: PATIENT WOULD LIKE TO TALK TO THE CLINICAL STAFF ABOUT HIS BLOOD PRESSURE MEDICATION. PLEASE CALL PATIENT BACK.    Is it okay if the provider responds through MyChart:

## 2023-10-10 NOTE — TELEPHONE ENCOUNTER
Pt called PCP office for refill of metoprolol 50mg BID.  Dr. Diop's office called here requesting that pt's cardiologist refill this medication.  They were unsure if you wanted to continue this med.    Thanks so much,  Angela Sandoval, RN  Triage RN  10/10/23 08:56 EDT

## 2023-10-11 ENCOUNTER — OFFICE VISIT (OUTPATIENT)
Dept: CARDIOLOGY | Facility: CLINIC | Age: 68
End: 2023-10-11
Payer: COMMERCIAL

## 2023-10-11 VITALS
HEIGHT: 69 IN | BODY MASS INDEX: 26.35 KG/M2 | DIASTOLIC BLOOD PRESSURE: 78 MMHG | HEART RATE: 87 BPM | OXYGEN SATURATION: 96 % | SYSTOLIC BLOOD PRESSURE: 116 MMHG | WEIGHT: 177.9 LBS

## 2023-10-11 DIAGNOSIS — I27.20 PULMONARY HYPERTENSION: ICD-10-CM

## 2023-10-11 DIAGNOSIS — I34.0 NONRHEUMATIC MITRAL VALVE REGURGITATION: ICD-10-CM

## 2023-10-11 DIAGNOSIS — Z95.0 PRESENCE OF CARDIAC PACEMAKER: Primary | ICD-10-CM

## 2023-10-11 DIAGNOSIS — I48.21 PERMANENT ATRIAL FIBRILLATION: ICD-10-CM

## 2023-10-11 DIAGNOSIS — I10 PRIMARY HYPERTENSION: ICD-10-CM

## 2023-10-11 DIAGNOSIS — E78.00 HYPERCHOLESTEROLEMIA: ICD-10-CM

## 2023-10-11 DIAGNOSIS — I50.20 HEART FAILURE WITH REDUCED EJECTION FRACTION, NYHA CLASS III: ICD-10-CM

## 2023-10-11 PROBLEM — I50.22 CHRONIC HFREF (HEART FAILURE WITH REDUCED EJECTION FRACTION): Status: RESOLVED | Noted: 2023-09-23 | Resolved: 2023-10-11

## 2023-10-11 PROBLEM — I50.22 CHRONIC SYSTOLIC HEART FAILURE: Status: RESOLVED | Noted: 2023-09-23 | Resolved: 2023-10-11

## 2023-10-11 PROBLEM — I48.91 ATRIAL FIBRILLATION: Status: RESOLVED | Noted: 2023-09-22 | Resolved: 2023-10-11

## 2023-10-11 RX ORDER — FUROSEMIDE 40 MG/1
80 TABLET ORAL 2 TIMES DAILY
Start: 2023-10-11 | End: 2023-11-10

## 2023-10-11 RX ORDER — METOPROLOL SUCCINATE 50 MG/1
50 TABLET, EXTENDED RELEASE ORAL DAILY
Qty: 90 TABLET | Refills: 1 | Status: SHIPPED | OUTPATIENT
Start: 2023-10-11

## 2023-10-11 NOTE — PROGRESS NOTES
CARDIOLOGY    Date of Office Visit: 10/11/2023  Patient Name: Claude Spencer  : 1955  Encounter Provider: Gordon Baird PA-C  Primary Cardiologist: Wilfred Draper MD and Dr. Goldsmith    CHIEF COMPLAINT / REASON FOR OFFICE VISIT     Post AVN abalation and PPM implant      HISTORY OF PRESENT ILLNESS     This is a 68 y.o. year old male who presents to [facility] CARDIOLOGY for a  1 month follow up with labs.    He has a longstanding history of persistent atrial fibrillation.  This was initially diagnosed in .  He has had difficulty with medication adherence due to access and cost of medications.  In , he was seen to have an acute decline in ejection fraction down to 25 to 30% from a previous 55%.  Additionally, he has had multiple hospitalizations for acute volume overload in the setting of atrial fibrillation with RVR.  His heart rates have been in the 130s to 140s persistently at home.  Medication up titration has been limited due to persistent hypotension with systolic readings in the 100s.  He was evaluated by Dr. Draper in the office on 2023 and was admitted to [facility] for inpatient evaluation.     He was admitted to the hospital from  through 2023 for an acute congestive heart failure exacerbation in the setting of worsening cardiomyopathy from atrial fibrillation with RVR.  He was started on IV diuretics and breathing improved.  He was stabilized with plans to discharge home to place a pacemaker and have a subsequent AV node ablation.  He was discharged home on Xarelto 20 mg daily, furosemide 40 mg daily, losartan 25 mg twice daily, spironolactone 12.5 mg daily, digoxin 125 mcg daily,  and metoprolol succinate 50 mg twice daily.  He was seen in the office on 2003 and had significant volume overload and was instructed to start taking Lasix 80 mg twice daily.    He had a Medtronic pacemaker implantation as well as AV node ablation on  "9/22/2023.  At discharge his metoprolol was decreased to 50 mg twice daily.  At this time his Lasix was decreased back to 40 mg daily.    He was seen for an in office device check on 9/20/2023.  He had 93.2% atrial paced beats and 76.5% V paced beats.  He was having brief runs of nonsustained ventricular tachycardia.  The longest was 11 beats on 9/23/2023.  He is going to have his device rechecked today to evaluate for new arrhythmias.  The patient had called on 10/2/2023 stating he has bleeding from his pacemaker site.  They went to the ER on 10/2/2023 after they had seen bleeding that was thought to be due to due to the seatbelt rubbing on this area.  I reviewed the note and they reported a small ooze around the site and he was instructed to clean the site daily and apply Neosporin.    Today the patient reports he has been doing fairly well since pacemaker implantation.  He has noted an increase in his exercise tolerance and is walking better.  He is not getting as short of breath doing activities around the home.  He has not had a recurrence of lower extremity edema or fluid in his lungs since his Lasix dosing was changed.    He is being followed by nephrology and will eventually undergo a partial nephrectomy for a left upper pole renal mass measuring 1.1 cm.      PMHx:  Permanent atrial fibrillation s/p Medtronic PPM and AVN abalation, nonischemic cardiomyopathy, heart failure with reduced ejection fraction, pulmonary hypertension, mitral regurgitation       REVIEW OF SYSTEMS   ROS    PHYSICAL EXAMINATION     Vital Signs:  /78   Pulse 87   Ht 175.3 cm (69\")   Wt 80.7 kg (177 lb 14.4 oz)   SpO2 96%   BMI 26.27 kg/mý   Estimated body mass index is 26.27 kg/mý as calculated from the following:    Height as of this encounter: 175.3 cm (69\").    Weight as of this encounter: 80.7 kg (177 lb 14.4 oz).               Physical Exam  Constitutional:       Appearance: Normal appearance.   HENT:      Head: " Normocephalic and atraumatic.   Cardiovascular:      Rate and Rhythm: Normal rate and regular rhythm.      Pulses: Normal pulses.      Heart sounds: Normal heart sounds.      Comments: Pacemaker site has 1/2 cm open wound with a reddish-brown discharge.  No surrounding erythema or induration.  The site has clean margins and does not appear infected.  Pulmonary:      Effort: Pulmonary effort is normal.      Breath sounds: Normal breath sounds.   Musculoskeletal:      Right lower leg: No edema.      Left lower leg: No edema.   Skin:     General: Skin is warm and dry.   Neurological:      General: No focal deficit present.      Mental Status: He is alert and oriented to person, place, and time.          Cardiac Testing/Results     Cardiac Testing:   - Echo 6/26/2023: Moderately dilated left ventricle.  Severe left ventricle global hypokinesis.  EF 26 to 30%, dilated right ventricle.  Severe MR, moderate TR with RVSP 49 mmHg.     -Heart catheterization 4/20/2021: LM patent, LAD 10 to 20%, left circumflex 40 to 50%, RCA 10 to 20%      Result Review :  The following data was reviewed by: Gordon Baird PA-C on 10/11/2023:       Lab Results   Component Value Date     09/23/2023     (L) 08/30/2023    K 3.7 09/23/2023    K 3.4 (L) 08/30/2023     09/23/2023    CL 93 (L) 08/30/2023    CO2 27.0 09/23/2023    CO2 26.0 08/30/2023    BUN 15 09/23/2023    BUN 16 08/30/2023    CREATININE 0.91 09/23/2023    CREATININE 0.82 08/30/2023    EGFRIFNONA 98 07/21/2021    EGFRIFNONA 147 06/02/2021    EGFRIFAFRI 126 11/08/2018    EGFRIFAFRI 92 04/20/2018    GLUCOSE 127 (H) 09/23/2023    GLUCOSE 96 08/30/2023    CALCIUM 9.0 09/23/2023    CALCIUM 8.8 08/30/2023    ALBUMIN 3.4 (L) 08/29/2023    ALBUMIN 3.1 (L) 08/28/2023    AST 35 08/28/2023    AST 28 08/27/2023    ALT 36 08/28/2023    ALT 40 08/27/2023     Lab Results   Component Value Date    WBC 14.07 (H) 08/30/2023    WBC 11.81 (H) 08/29/2023    HGB 13.6 08/30/2023     HGB 12.9 (L) 08/29/2023    HCT 39.7 08/30/2023    HCT 38.1 08/29/2023    MCV 94.5 08/30/2023    MCV 95.5 08/29/2023     08/30/2023     08/29/2023     Lab Results   Component Value Date    PROBNP 5,556.0 (H) 08/22/2023    PROBNP 8,491.0 (H) 08/21/2023     Lab Results   Component Value Date    TROPONINT 33 (H) 08/22/2023     Lab Results   Component Value Date    TSH 3.110 06/26/2023    TSH 1.300 05/27/2021                          ASSESSMENT & PLAN       Diagnoses and all orders for this visit:    1. Presence of cardiac pacemaker (Primary)  S/p permanent pacemaker implantation 9/23/2023.  He has had normal device function with a few episodes of nonsustained ventricular tachycardia.  He is having a repeat device check next week.  Continue metoprolol 50 mg twice daily.  He has had a difficult time with closure of the wound.  He has ongoing 1/2 cm open incision that has well-healing margins.  I applied Steri-Strips to the site today and he will follow-up next week in the office for repeat incision check  I did not provide additional antibiotics at this time with the wound does not appear infected but they will call if they start seeing discharge or induration and we will plan to give him a prescription of cephalexin at that time  2. Heart failure with reduced ejection fraction, NYHA class III  Energy has dramatically improved.  S/p pacemaker implantation 23 September 2023.  Most recent echocardiogram with EF of 26 to 30%.  Tolerating decrease Lasix to 40 mg daily.  Keep weights at home and limit sodium  Continue losartan 25 mg daily and spironolactone 12.5 mg daily.  Continue Toprol.  3. Permanent atrial fibrillation  S/p AV node ablation.  Continued off of digoxin.  [id number] score 3, continue Xarelto 20 mg daily  4. Hypercholesterolemia  Continue Crestor.  Goal LDL less than 100  5. Primary hypertension  Blood pressure has been well controlled.  No repeat episodes of hypotension since pacemaker  implantation.  Continue losartan, spironolactone and metoprolol.  6. Pulmonary hypertension  Suspect type II pulmonary hypertension.  Most recent RVSP 49 mmHg.  Continue diuretics.  7. Nonrheumatic mitral valve regurgitation  Severe MR noted on echocardiogram.  Will need intermittent echocardiogram every 6 months.  Continue diuretics.        Follow Up:  Return in about 3 months (around 1/11/2024) for JOO.  Patient was given instructions and counseling regarding his condition or for health maintenance advice. Please contact office if worsening symptoms or proceed to ER when appropriate.      Gordon Baird PA-C  10/11/23  10:06 EDT    MEDICATIONS         Discharge Medications            Accurate as of October 11, 2023 10:06 AM. If you have any questions, ask your nurse or doctor.                Changes to Medications        Instructions Start Date   furosemide 40 MG tablet  Commonly known as: LASIX  What changed: medication strength  Changed by: Gordon Baird PA-C   80 mg, Oral, 2 Times Daily             Continue These Medications        Instructions Start Date   albuterol sulfate  (90 Base) MCG/ACT inhaler  Commonly known as: PROVENTIL HFA;VENTOLIN HFA;PROAIR HFA   2 puffs, Inhalation, Every 4 Hours PRN      budesonide-formoterol 160-4.5 MCG/ACT inhaler  Commonly known as: Symbicort   2 puffs, Inhalation, 2 Times Daily      cholecalciferol 25 MCG (1000 UT) tablet  Commonly known as: VITAMIN D3   1,000 Units, Oral, Daily      ipratropium-albuterol 0.5-2.5 mg/3 ml nebulizer  Commonly known as: DUO-NEB   3 mL, Nebulization, Every 4 Hours PRN      metoprolol succinate XL 50 MG 24 hr tablet  Commonly known as: TOPROL-XL   50 mg, Oral, Daily      montelukast 10 MG tablet  Commonly known as: SINGULAIR   10 mg, Oral, Nightly      omeprazole 20 MG capsule  Commonly known as: priLOSEC   20 mg, Oral, Daily      rivaroxaban 20 MG tablet  Commonly known as: XARELTO   Take 1 tablet by mouth Daily With Dinner.       rosuvastatin 20 MG tablet  Commonly known as: CRESTOR   20 mg, Oral, Nightly      spironolactone 25 MG tablet  Commonly known as: ALDACTONE   12.5 mg, Oral, Daily                   **Nasrin Disclaimer: This note was dictated using an electronic transcription. The electronic translation of spoken language may permit erroneous, or at times, nonsensical words or phrases to be inadvertently transcribed. Although I have reviewed the note for such errors, some may still exist.

## 2023-10-20 ENCOUNTER — TELEPHONE (OUTPATIENT)
Dept: CARDIOLOGY | Facility: CLINIC | Age: 68
End: 2023-10-20
Payer: COMMERCIAL

## 2023-10-20 DIAGNOSIS — I50.20 HEART FAILURE WITH REDUCED EJECTION FRACTION, NYHA CLASS III: Primary | ICD-10-CM

## 2023-10-20 RX ORDER — BUMETANIDE 1 MG/1
1 TABLET ORAL 2 TIMES DAILY
Qty: 60 TABLET | Refills: 2 | Status: SHIPPED | OUTPATIENT
Start: 2023-10-20

## 2023-10-20 NOTE — TELEPHONE ENCOUNTER
Noted, please have him start bumex 1 mg twice daily. He was suppose to have a pacemaker check last week in the office to see if it has healed up, if this has not been done can we get him to have a pacemaker site check. Thank you!    Gordon  Refill request is for a maintenance medication and has met the criteria specified in the Ambulatory Medication Refill Standing Order for eligibility, visits, laboratory, alerts and was sent to the requested pharmacy.

## 2023-10-20 NOTE — TELEPHONE ENCOUNTER
Notified patient of results/recommendations. Patient verbalized understanding. He is scheduled for device check 10/26 and I reminded him to please come to that appointment.     Allison Dalton RN  Triage [facility] [place]

## 2023-10-20 NOTE — TELEPHONE ENCOUNTER
Patient calls to report that despite taking Lasix 80mg BID and spironolactone 12.5mg daily on, his weight is still 177lbs. He said his normal weight is about 166lbs. He also said that he feels like the edema in his abdomen has worsened too. He also is having SOA on exertion. BPs the last couple of days have also been in the 110s-120s/100s. He said he has been mindful of his sodium intake too.     Allison Dalton RN  Triage [facility] [place]

## 2023-10-24 RX ORDER — OMEPRAZOLE 20 MG/1
20 CAPSULE, DELAYED RELEASE ORAL DAILY
Start: 2023-10-24 | End: 2023-10-25 | Stop reason: SDUPTHER

## 2023-10-24 NOTE — TELEPHONE ENCOUNTER
PATIENT CALLED FOR MEDICATION REFILL OF    omeprazole (priLOSEC) 20 MG capsule     HE HAS 3-4 DAYS LEFT    THIS WILL BE A NEW PRESCRIPTION FOR [facility] [id number] - [facility] [place] - [address] - [phone]  - [phone]  [phone]     CALL BACK NUMBER [phone]

## 2023-10-25 ENCOUNTER — TELEPHONE (OUTPATIENT)
Dept: INTERNAL MEDICINE | Facility: CLINIC | Age: 68
End: 2023-10-25
Payer: COMMERCIAL

## 2023-10-25 DIAGNOSIS — K21.9 GASTROESOPHAGEAL REFLUX DISEASE WITHOUT ESOPHAGITIS: Primary | ICD-10-CM

## 2023-10-25 RX ORDER — OMEPRAZOLE 20 MG/1
20 CAPSULE, DELAYED RELEASE ORAL DAILY
Qty: 90 CAPSULE | Refills: 3 | Status: SHIPPED | OUTPATIENT
Start: 2023-10-25

## 2023-10-25 RX ORDER — OMEPRAZOLE 20 MG/1
20 CAPSULE, DELAYED RELEASE ORAL DAILY
Status: CANCELLED
Start: 2023-10-25

## 2023-10-25 NOTE — TELEPHONE ENCOUNTER
PATIENT CALLED AGAIN FOR HIS MEDICATION REFILL OF  omeprazole (priLOSEC) 20 MG capsule     THIS IS HIS 2ND TIME CALLING FOR MEDICATION REFILL     [facility] [id number] - [place] - [address] - [phone]  - [phone]  [phone]     CALL BACK NUMBER [phone]

## 2023-11-02 ENCOUNTER — CLINICAL SUPPORT NO REQUIREMENTS (OUTPATIENT)
Dept: CARDIOLOGY | Facility: CLINIC | Age: 68
End: 2023-11-02
Payer: COMMERCIAL

## 2023-11-02 DIAGNOSIS — I48.19 PERSISTENT ATRIAL FIBRILLATION: Primary | ICD-10-CM

## 2023-11-06 ENCOUNTER — TELEPHONE (OUTPATIENT)
Dept: CARDIOLOGY | Facility: CLINIC | Age: 68
End: 2023-11-06
Payer: COMMERCIAL

## 2023-11-06 NOTE — TELEPHONE ENCOUNTER
Caller: Claude Spencer    Relationship: Self    Best call back number: [phone]    What is the best time to reach you: ANYTIME    Who are you requesting to speak with (clinical staff, provider,  specific staff member): CLINICAL    What was the call regarding: PT IS CALLING TO SEE IF WE CAN SEND A TEXT MESSAGE OF THE DIRECTIONS/ ADDRESS TO HIM FOR HIS APPT TOMORROW.    Is it okay if the provider responds through [employer]: NO

## 2023-11-06 NOTE — TELEPHONE ENCOUNTER
Pt called and states the pacemaker incision has busted loose and seems to be getting infected.    The incision is red and oozing no odor.      [facility]

## 2023-11-07 ENCOUNTER — CLINICAL SUPPORT NO REQUIREMENTS (OUTPATIENT)
Age: 68
End: 2023-11-07
Payer: COMMERCIAL

## 2023-11-07 DIAGNOSIS — I97.190 COMPLETE ATRIOVENTRICULAR BLOCK DUE TO ATRIOVENTRICULAR NODAL ABLATION: ICD-10-CM

## 2023-11-07 DIAGNOSIS — I44.2 COMPLETE ATRIOVENTRICULAR BLOCK DUE TO ATRIOVENTRICULAR NODAL ABLATION: ICD-10-CM

## 2023-11-07 DIAGNOSIS — L08.9 SKIN INFECTION: Primary | ICD-10-CM

## 2023-11-07 DIAGNOSIS — I48.19 PERSISTENT ATRIAL FIBRILLATION: ICD-10-CM

## 2023-11-07 RX ORDER — SULFAMETHOXAZOLE AND TRIMETHOPRIM 800; 160 MG/1; MG/1
1 TABLET ORAL 2 TIMES DAILY
Qty: 20 TABLET | Refills: 0 | Status: SHIPPED | OUTPATIENT
Start: 2023-11-07

## 2023-11-07 NOTE — TELEPHONE ENCOUNTER
Caller: Tj Claude MAGED    Relationship: Self    Best call back number: [phone]     Requested Prescriptions:   Requested Prescriptions     Pending Prescriptions Disp Refills    spironolactone (ALDACTONE) 25 MG tablet       Sig: Take 0.5 tablets by mouth Daily.    montelukast (SINGULAIR) 10 MG tablet       Sig: Take 1 tablet by mouth Every Night.        Pharmacy where request should be sent: [facility] [id number] [place], [place] - [address] - [phone]  - [phone] FX     Last office visit with prescribing clinician: 9/28/2023   Last telemedicine visit with prescribing clinician: Visit date not found   Next office visit with prescribing clinician: 12/1/2023     Additional details provided by patient: PATIENT CANNOT GET REFILLS UNTIL THESE MEDICATIONS ARE SENT TO PHARMACY UNDER DR YAIR BHATTI'S NAME.     PATIENT IS OUT OF THIS MEDICATION    Does the patient have less than a 3 day supply:  [x] Yes  [] No    Would you like a call back once the refill request has been completed: [] Yes [x] No    If the office needs to give you a call back, can they leave a voicemail: [x] Yes [] No    Yobani Hernandez Rep   11/07/23 15:12 EST

## 2023-11-08 RX ORDER — MONTELUKAST SODIUM 10 MG/1
10 TABLET ORAL NIGHTLY
Qty: 90 TABLET | Refills: 1 | Status: SHIPPED | OUTPATIENT
Start: 2023-11-08

## 2023-11-08 RX ORDER — SPIRONOLACTONE 25 MG/1
12.5 TABLET ORAL DAILY
Qty: 90 TABLET | Refills: 1 | Status: SHIPPED | OUTPATIENT
Start: 2023-11-08

## 2023-11-09 DIAGNOSIS — E78.00 HYPERCHOLESTEROLEMIA: ICD-10-CM

## 2023-11-10 NOTE — TELEPHONE ENCOUNTER
Claude Spencer's appointment was on 11/7.  In reviewing chart, it appears patient made it to his appointment.    Thank you,  Geetha BREWER RN  Triage [facility] [place]   11:59 EST

## 2023-11-13 RX ORDER — ROSUVASTATIN CALCIUM 20 MG/1
20 TABLET, COATED ORAL NIGHTLY
Qty: 30 TABLET | Refills: 0 | Status: SHIPPED | OUTPATIENT
Start: 2023-11-13

## 2023-11-21 ENCOUNTER — CLINICAL SUPPORT NO REQUIREMENTS (OUTPATIENT)
Age: 68
End: 2023-11-21
Payer: COMMERCIAL

## 2023-11-21 DIAGNOSIS — I97.190 COMPLETE ATRIOVENTRICULAR BLOCK DUE TO ATRIOVENTRICULAR NODAL ABLATION: Primary | ICD-10-CM

## 2023-11-21 DIAGNOSIS — I44.2 COMPLETE ATRIOVENTRICULAR BLOCK DUE TO ATRIOVENTRICULAR NODAL ABLATION: Primary | ICD-10-CM

## 2023-12-01 ENCOUNTER — HOSPITAL ENCOUNTER (OUTPATIENT)
Dept: GENERAL RADIOLOGY | Facility: HOSPITAL | Age: 68
Discharge: HOME | End: 2023-12-01
Admitting: INTERNAL MEDICINE
Payer: COMMERCIAL

## 2023-12-01 ENCOUNTER — OFFICE VISIT (OUTPATIENT)
Dept: INTERNAL MEDICINE | Facility: CLINIC | Age: 68
End: 2023-12-01
Payer: COMMERCIAL

## 2023-12-01 VITALS
WEIGHT: 189.8 LBS | BODY MASS INDEX: 28.11 KG/M2 | SYSTOLIC BLOOD PRESSURE: 120 MMHG | OXYGEN SATURATION: 93 % | HEIGHT: 69 IN | TEMPERATURE: 98.7 F | HEART RATE: 82 BPM | DIASTOLIC BLOOD PRESSURE: 78 MMHG

## 2023-12-01 DIAGNOSIS — J43.9 PULMONARY EMPHYSEMA, UNSPECIFIED EMPHYSEMA TYPE: ICD-10-CM

## 2023-12-01 DIAGNOSIS — I10 PRIMARY HYPERTENSION: ICD-10-CM

## 2023-12-01 DIAGNOSIS — E78.00 HYPERCHOLESTEROLEMIA: ICD-10-CM

## 2023-12-01 DIAGNOSIS — I48.21 PERMANENT ATRIAL FIBRILLATION: ICD-10-CM

## 2023-12-01 DIAGNOSIS — I50.20 HEART FAILURE WITH REDUCED EJECTION FRACTION, NYHA CLASS III: Primary | ICD-10-CM

## 2023-12-01 PROCEDURE — 71046 X-RAY EXAM CHEST 2 VIEWS: CPT

## 2023-12-01 RX ORDER — SPIRONOLACTONE 25 MG/1
25 TABLET ORAL DAILY
Qty: 90 TABLET | Refills: 1 | Status: SHIPPED | OUTPATIENT
Start: 2023-12-01

## 2023-12-01 RX ORDER — FUROSEMIDE 80 MG
80 TABLET ORAL DAILY
COMMUNITY
Start: 2023-10-20 | End: 2023-12-01

## 2023-12-01 NOTE — PROGRESS NOTES
Claude Spencer is a 68 y.o. male who presents with a chief complaint of   Chief Complaint   Patient presents with    Permanent atrial fibrillation     F/U, Pt is fasting if labs are needed       HPI     Weight is up today.  189 lb from 170 lb in August.       The following portions of the patient's history were reviewed and updated as appropriate: allergies, current medications, past family history, past medical history, past social history, past surgical history and problem list.      Current Outpatient Medications:     albuterol sulfate  (90 Base) MCG/ACT inhaler, Inhale 2 puffs Every 4 (Four) Hours As Needed for Wheezing., Disp: , Rfl:     budesonide-formoterol (Symbicort) 160-4.5 MCG/ACT inhaler, Inhale 2 puffs 2 (Two) Times a Day., Disp: 10.2 g, Rfl: 6    ipratropium-albuterol (DUO-NEB) 0.5-2.5 mg/3 ml nebulizer, Take 3 mL by nebulization Every 4 (Four) Hours As Needed for Wheezing or Shortness of Air., Disp: 360 mL, Rfl: 1    spironolactone (ALDACTONE) 25 MG tablet, Take 1 tablet by mouth Daily., Disp: 90 tablet, Rfl: 1    bumetanide (Bumex) 1 MG tablet, Take 1 tablet by mouth 2 (Two) Times a Day., Disp: 60 tablet, Rfl: 2    cholecalciferol (VITAMIN D3) 25 MCG (1000 UT) tablet, Take 1 tablet by mouth Daily., Disp: , Rfl:     metoprolol succinate XL (TOPROL-XL) 50 MG 24 hr tablet, Take 1 tablet by mouth Daily., Disp: 90 tablet, Rfl: 1    montelukast (SINGULAIR) 10 MG tablet, Take 1 tablet by mouth Every Night., Disp: 90 tablet, Rfl: 1    omeprazole (priLOSEC) 20 MG capsule, Take 1 capsule by mouth Daily., Disp: 90 capsule, Rfl: 3    rivaroxaban (XARELTO) 20 MG tablet, Take 1 tablet by mouth Daily With Dinner., Disp: 90 tablet, Rfl: 3    rosuvastatin (CRESTOR) 20 MG tablet, Take 1 tablet by mouth Every Night., Disp: 30 tablet, Rfl: 0    sulfamethoxazole-trimethoprim (Bactrim DS) 800-160 MG per tablet, Take 1 tablet by mouth 2 (Two) Times a Day., Disp: 20 tablet, Rfl: 0            Physical  "Exam  /78 (BP Location: Left arm, Patient Position: Sitting, Cuff Size: Adult)   Pulse 82   Temp 98.7 °F (37.1 °C) (Infrared)   Ht 175.3 cm (69\")   Wt 86.1 kg (189 lb 12.8 oz)   SpO2 93%   BMI 28.03 kg/m²     Physical Exam  Vitals reviewed.   Constitutional:       General: He is not in acute distress.     Appearance: Normal appearance.   HENT:      Head: Normocephalic and atraumatic.      Nose: Nose normal.      Mouth/Throat:      Mouth: Mucous membranes are moist.   Eyes:      Conjunctiva/sclera: Conjunctivae normal.   Cardiovascular:      Rate and Rhythm: Normal rate and regular rhythm.      Heart sounds: Normal heart sounds.   Pulmonary:      Effort: Pulmonary effort is normal.      Comments: Diminished breath sounds  Musculoskeletal:      Comments: B/l LE edema, but improved from prior times that I have noted edema.    Skin:     General: Skin is warm and dry.   Neurological:      General: No focal deficit present.      Mental Status: He is alert.   Psychiatric:         Mood and Affect: Mood normal.           Results for orders placed or performed in visit on 12/01/23   Lipid Panel With LDL / HDL Ratio    Specimen: Blood   Result Value Ref Range    Total Cholesterol 167 100 - 199 mg/dL    Triglycerides 86 0 - 149 mg/dL    HDL Cholesterol 52 >39 mg/dL    VLDL Cholesterol Rei 16 5 - 40 mg/dL    LDL Chol Calc (NIH) 99 0 - 99 mg/dL    LDL/HDL RATIO 1.9 0.0 - 3.6 ratio   Comprehensive Metabolic Panel    Specimen: Blood   Result Value Ref Range    Glucose 91 70 - 99 mg/dL    BUN 20 8 - 27 mg/dL    Creatinine 1.02 0.76 - 1.27 mg/dL    EGFR Result 81 >59 mL/min/1.73    BUN/Creatinine Ratio 20 10 - 24    Sodium 141 134 - 144 mmol/L    Potassium 4.4 3.5 - 5.2 mmol/L    Chloride 98 96 - 106 mmol/L    Total CO2 26 20 - 29 mmol/L    Calcium 9.6 8.6 - 10.2 mg/dL    Total Protein 7.0 6.0 - 8.5 g/dL    Albumin 4.6 3.9 - 4.9 g/dL    Globulin 2.4 1.5 - 4.5 g/dL    A/G Ratio 1.9 1.2 - 2.2    Total Bilirubin 0.5 0.0 - " 1.2 mg/dL    Alkaline Phosphatase 67 44 - 121 IU/L    AST (SGOT) 34 0 - 40 IU/L    ALT (SGPT) 20 0 - 44 IU/L   CBC (No Diff)    Specimen: Blood   Result Value Ref Range    WBC 7.4 3.4 - 10.8 x10E3/uL    RBC 4.78 4.14 - 5.80 x10E6/uL    Hemoglobin 13.7 13.0 - 17.7 g/dL    Hematocrit 43.4 37.5 - 51.0 %    MCV 91 79 - 97 fL    MCH 28.7 26.6 - 33.0 pg    MCHC 31.6 31.5 - 35.7 g/dL    RDW 12.8 11.6 - 15.4 %    Platelets 268 150 - 450 x10E3/uL   proBNP    Specimen: Blood   Result Value Ref Range    proBNP 2,523 (H) 0 - 376 pg/mL           Diagnoses and all orders for this visit:    1. Heart failure with reduced ejection fraction, NYHA class III (Primary)  -     Comprehensive Metabolic Panel  -     proBNP  -     spironolactone (ALDACTONE) 25 MG tablet; Take 1 tablet by mouth Daily.  Dispense: 90 tablet; Refill: 1  -     XR Chest PA & Lateral    2. Primary hypertension  -     spironolactone (ALDACTONE) 25 MG tablet; Take 1 tablet by mouth Daily.  Dispense: 90 tablet; Refill: 1    3. Hypercholesterolemia  -     Lipid Panel With LDL / HDL Ratio    4. Permanent atrial fibrillation  -     CBC (No Diff)    5. Pulmonary emphysema, unspecified emphysema type  -     XR Chest PA & Lateral        Refused PNA vaccine.  Is not taken blood thinner. COPD is active.  He is not sure current treatments make that big of a difference.     Reviewed how he should be taking Bumex.  He says he is taking it.  Increase spironolactone to full pill of 25 mg.      CXR returned within normal limits and labs were not overwhelming.  Sent message to cardiology for additional guidance and am awaiting reply.

## 2023-12-02 LAB
ALBUMIN SERPL-MCNC: 4.6 G/DL (ref 3.9–4.9)
ALBUMIN/GLOB SERPL: 1.9 {RATIO} (ref 1.2–2.2)
ALP SERPL-CCNC: 67 IU/L (ref 44–121)
ALT SERPL-CCNC: 20 IU/L (ref 0–44)
AST SERPL-CCNC: 34 IU/L (ref 0–40)
BILIRUB SERPL-MCNC: 0.5 MG/DL (ref 0–1.2)
BUN SERPL-MCNC: 20 MG/DL (ref 8–27)
BUN/CREAT SERPL: 20 (ref 10–24)
CALCIUM SERPL-MCNC: 9.6 MG/DL (ref 8.6–10.2)
CHLORIDE SERPL-SCNC: 98 MMOL/L (ref 96–106)
CHOLEST SERPL-MCNC: 167 MG/DL (ref 100–199)
CO2 SERPL-SCNC: 26 MMOL/L (ref 20–29)
CREAT SERPL-MCNC: 1.02 MG/DL (ref 0.76–1.27)
EGFRCR SERPLBLD CKD-EPI 2021: 81 ML/MIN/1.73
ERYTHROCYTE [DISTWIDTH] IN BLOOD BY AUTOMATED COUNT: 12.8 % (ref 11.6–15.4)
GLOBULIN SER CALC-MCNC: 2.4 G/DL (ref 1.5–4.5)
GLUCOSE SERPL-MCNC: 91 MG/DL (ref 70–99)
HCT VFR BLD AUTO: 43.4 % (ref 37.5–51)
HDLC SERPL-MCNC: 52 MG/DL
HGB BLD-MCNC: 13.7 G/DL (ref 13–17.7)
LDLC SERPL CALC-MCNC: 99 MG/DL (ref 0–99)
LDLC/HDLC SERPL: 1.9 RATIO (ref 0–3.6)
MCH RBC QN AUTO: 28.7 PG (ref 26.6–33)
MCHC RBC AUTO-ENTMCNC: 31.6 G/DL (ref 31.5–35.7)
MCV RBC AUTO: 91 FL (ref 79–97)
NT-PROBNP SERPL-MCNC: 2523 PG/ML (ref 0–376)
PLATELET # BLD AUTO: 268 X10E3/UL (ref 150–450)
POTASSIUM SERPL-SCNC: 4.4 MMOL/L (ref 3.5–5.2)
PROT SERPL-MCNC: 7 G/DL (ref 6–8.5)
RBC # BLD AUTO: 4.78 X10E6/UL (ref 4.14–5.8)
SODIUM SERPL-SCNC: 141 MMOL/L (ref 134–144)
TRIGL SERPL-MCNC: 86 MG/DL (ref 0–149)
VLDLC SERPL CALC-MCNC: 16 MG/DL (ref 5–40)
WBC # BLD AUTO: 7.4 X10E3/UL (ref 3.4–10.8)

## 2023-12-04 ENCOUNTER — TELEPHONE (OUTPATIENT)
Dept: INTERNAL MEDICINE | Facility: CLINIC | Age: 68
End: 2023-12-04
Payer: COMMERCIAL

## 2023-12-04 NOTE — TELEPHONE ENCOUNTER
Lvm to call back regarding results    Relay:  Can you let Mr. Spencer know his CXR looks good.  Thanks!

## 2023-12-04 NOTE — TELEPHONE ENCOUNTER
Name: Claude Spencer    Relationship: Self    Best Callback Number: [phone]    HUB PROVIDED THE RELAY MESSAGE FROM THE OFFICE   PATIENT HAS FURTHER QUESTIONS AND WOULD LIKE A CALL BACK AT THE FOLLOWING PHONE NUMBER [phone]    ADDITIONAL INFORMATION: HE ADVISED HE WAS HAVING TROUBLE BREATHING SO I TRANSFERRED HIM TO THE OFFICE

## 2023-12-04 NOTE — TELEPHONE ENCOUNTER
Spoke with patient, he states something is not right with those results. Frustrated because he is gaining weight and when he saw you he states you told him that you could hear fluid in his chest. Also states when he walks for a period of time, he has to stop and take a breath. He would like to know what he should do next

## 2023-12-07 NOTE — TELEPHONE ENCOUNTER
Spoke with patient- he said he has an appt coming up with cardiology so he will talk to them about it then

## 2024-01-08 NOTE — PROGRESS NOTES
Date of Office Visit: 24  Encounter Provider: Wilfred Draper MD  Place of Service: [facility] CARDIOLOGY  Patient Name: Claude Spencer  :1955    Chief Complaint   Patient presents with    Permanent atrial fibrillation   :     HPI:     Mr. Spencer is 68 y.o. and presents today in follow up. I have reviewed prior notes and there are no changes except for any new updates described below. I have also reviewed any information entered into the medical record by the patient or by ancillary staff.     We initially evaluated him in ; he presented with atrial fibrillation and diastolic heart failure. His EF was 55%. He underwent coronary angiography, which was essentially normal. He was then lost to follow up. He had issues with medication adherence, cost, and access to care. He has trouble travelling outside of this area. He was hospitalized at [facility] in 2023 and was found to have an EF of 25-30%.  He was diuresed and his medications were adjusted, but again, due to the socioeconomic factors mentioned above, he was repetitively admitted at other [facility] hospitals.  He was becoming more more difficult to manage given low blood pressures and rapid atrial fibrillation.  In 2023, he underwent AVJ ablation followed by implantation of a pacemaker with right ventricular and left bundle leads.  There were some initial issues with the pacemaker pocket healing, and he did require antibiotics, but thankfully this is no longer an issue.    He had been doing well until early December, and since then he has noted progressive weight gain and worsening shortness of breath.    Of note, he was noted to have a renal mass that was very concerning for cancer during his previous hospitalization, but he has still been unable to arrange urology follow-up.    Past Medical History:   Diagnosis Date    Atrial fibrillation 2021    COPD (chronic obstructive pulmonary disease)      Diverticulitis 2023    Gastroesophageal reflux disease 2016    Hyperlipidemia     Hypertension     Nonischemic cardiomyopathy     Nosebleed     Osteopenia 2016    Description: Her bone density 2015 secondary to steroid use    Vitamin D deficiency 2016       Past Surgical History:   Procedure Laterality Date    ANKLE SURGERY      CARDIAC CATHETERIZATION Left 2021    Procedure: Coronary Angiography;  Surgeon: Rao Del Cid MD;  Location:  [facility];  Service: Cardiovascular;  Laterality: Left;    CARDIAC CATHETERIZATION N/A 2021    Procedure: Left Heart Cath;  Surgeon: Rao Del Cid MD;  Location:  [facility];  Service: Cardiovascular;  Laterality: N/A;    CARDIAC ELECTROPHYSIOLOGY PROCEDURE N/A 2023    Procedure: Pacemaker DC new medtronic;  Surgeon: Mauro Goldsmith MD;  Location:  [facility];  Service: Cardiovascular;  Laterality: N/A;    CARDIAC ELECTROPHYSIOLOGY PROCEDURE N/A 2023    Procedure: AV node ablation;  Surgeon: Mauro Goldsmith MD;  Location:  [facility];  Service: Cardiovascular;  Laterality: N/A;    INGUINAL HERNIA REPAIR Bilateral 2023    Procedure: INGUINAL HERNIA REPAIR LAPAROSCOPIC;  Surgeon: Ayanna Moreira DO;  Location: [facility] OR;  Service: General;  Laterality: Bilateral;    UMBILICAL HERNIA REPAIR N/A 2023    Procedure: UMBILICAL HERNIA REPAIR;  Surgeon: Ayanna Moreira DO;  Location:  [facility] OR;  Service: General;  Laterality: N/A;       Social History     Socioeconomic History    Marital status:    Tobacco Use    Smoking status: Former     Packs/day: 1.00     Years: 30.00     Additional pack years: 0.00     Total pack years: 30.00     Types: Cigarettes     Quit date: 2000     Years since quittin.7     Passive exposure: Past    Smokeless tobacco: Current     Types: Chew    Tobacco comments:     quit 15 years  ago, wife current smoker outside   Vaping Use    Vaping Use: Never used   Substance and Sexual Activity    Alcohol use: No    Drug use: No     Types: Marijuana    Sexual activity: Yes     Partners: Female       Family History   Problem Relation Age of Onset    COPD Mother 78    Seizures Father     Vasculitis Father     Prostate cancer Maternal Uncle     Heart disease Brother        Review of Systems   Constitutional: Positive for malaise/fatigue.   Cardiovascular:  Positive for dyspnea on exertion, leg swelling and orthopnea.   Respiratory:  Positive for shortness of breath.    Gastrointestinal:  Positive for bloating.       Allergies   Allergen Reactions    Apixaban Other (See Comments)     Nose bleed         Current Outpatient Medications:     albuterol sulfate  (90 Base) MCG/ACT inhaler, Inhale 2 puffs Every 4 (Four) Hours As Needed for Wheezing., Disp: , Rfl:     budesonide-formoterol (Symbicort) 160-4.5 MCG/ACT inhaler, Inhale 2 puffs 2 (Two) Times a Day., Disp: 10.2 g, Rfl: 6    bumetanide (Bumex) 1 MG tablet, Take 1 tablet by mouth 2 (Two) Times a Day., Disp: 60 tablet, Rfl: 2    cholecalciferol (VITAMIN D3) 25 MCG (1000 UT) tablet, Take 1 tablet by mouth Daily., Disp: , Rfl:     ipratropium-albuterol (DUO-NEB) 0.5-2.5 mg/3 ml nebulizer, Take 3 mL by nebulization Every 4 (Four) Hours As Needed for Wheezing or Shortness of Air., Disp: 360 mL, Rfl: 1    metoprolol succinate XL (TOPROL-XL) 50 MG 24 hr tablet, Take 1 tablet by mouth Daily., Disp: 90 tablet, Rfl: 1    montelukast (SINGULAIR) 10 MG tablet, Take 1 tablet by mouth Every Night., Disp: 90 tablet, Rfl: 1    omeprazole (priLOSEC) 20 MG capsule, Take 1 capsule by mouth Daily., Disp: 90 capsule, Rfl: 3    rosuvastatin (CRESTOR) 20 MG tablet, Take 1 tablet by mouth Every Night., Disp: 30 tablet, Rfl: 0    spironolactone (ALDACTONE) 25 MG tablet, Take 1 tablet by mouth Daily., Disp: 90 tablet, Rfl: 1    rivaroxaban (XARELTO) 20 MG tablet, Take 1  "tablet by mouth Daily With Dinner. (Patient not taking: Reported on 1/9/2024), Disp: 90 tablet, Rfl: 3    sulfamethoxazole-trimethoprim (Bactrim DS) 800-160 MG per tablet, Take 1 tablet by mouth 2 (Two) Times a Day. (Patient not taking: Reported on 1/9/2024), Disp: 20 tablet, Rfl: 0      Objective:     Vitals:    01/09/24 1108   BP: 112/76   BP Location: Left arm   Patient Position: Sitting   Cuff Size: Adult   Pulse: 81   SpO2: 95%   Weight: 88.9 kg (196 lb)   Height: 175.3 cm (69\")     Body mass index is 28.94 kg/m².    Vitals reviewed.   Constitutional:       Appearance: Well-developed and not in distress.   Eyes:      Conjunctiva/sclera: Conjunctivae normal.   HENT:      Head: Normocephalic.      Nose: Nose normal.         Comments: Edentulous  Neck:      Vascular: No JVD. JVD normal.      Lymphadenopathy: No cervical adenopathy.   Pulmonary:      Effort: Pulmonary effort is normal.      Breath sounds: Rales present.   Cardiovascular:      Normal rate. Regular rhythm.      Murmurs: There is a grade 2/6 systolic murmur.      Comments: He has 2+ edema to the knees.  He is wearing compression stockings which have evidence of serous fluid leaking.  Edema:     Thigh: bilateral trace edema of the thigh.     Pretibial: bilateral trace edema of the pretibial area.     Ankle: bilateral trace edema of the ankle.     Feet: bilateral trace edema of the feet.  Abdominal:      General: There is distension.      Palpations: Abdomen is soft.      Tenderness: There is no abdominal tenderness.   Musculoskeletal: Normal range of motion.      Cervical back: Normal range of motion. Skin:     General: Skin is warm and dry.   Neurological:      General: No focal deficit present.      Mental Status: Alert and oriented to person, place, and time.      Cranial Nerves: No cranial nerve deficit.   Psychiatric:         Behavior: Behavior normal.         Thought Content: Thought content normal.         Judgment: Judgment normal.         ECG " 12 Lead    Date/Time: 1/9/2024 12:44 PM  Performed by: Wilfred Draper MD    Authorized by: Wilfred Draper MD  Comparison: compared with previous ECG   Similar to previous ECG  Rhythm: atrial fibrillation and paced  Conduction: non-specific intraventricular conduction delay    Clinical impression: abnormal EKG          Assessment:       Diagnosis Plan   1. NICM (nonischemic cardiomyopathy)  Adult Transthoracic Echo Complete W/ Cont if Necessary Per Protocol      2. Heart failure with reduced ejection fraction, NYHA class III  Adult Transthoracic Echo Complete W/ Cont if Necessary Per Protocol      3. Pulmonary hypertension  Adult Transthoracic Echo Complete W/ Cont if Necessary Per Protocol      4. Nonrheumatic mitral valve regurgitation  Adult Transthoracic Echo Complete W/ Cont if Necessary Per Protocol      5. Nonrheumatic tricuspid valve regurgitation  Adult Transthoracic Echo Complete W/ Cont if Necessary Per Protocol      6. Permanent atrial fibrillation        7. AV block, complete, post op complication of AV patricia ablation        8. History of pacemaker           Plan:       Mr Spencer has had progressively worsening nonischemic cardiomyopathy, which was multifactorial and due to ongoing atrial fibrillation as well as severe mitral regurgitation.  Also, his socioeconomic issues with travel and understanding medications and affording medications have complicated matters as well.  He is now status post AVJ ablation and permanent pacemaker implantation in September 2023.    His exam is interesting.  He does not have significant lower extremity edema or jugular venous pulse elevation, but he does have crackles on exam and his abdomen is distended.  His weight is up 26 pounds since the fall.  For the next week, he is going to double his bumetanide dose and take 2 mg in the morning and 2 mg in the afternoon.  He will remain on spironolactone.  I gave him samples of empagliflozin 5 mg to take daily until we can see  him back next week.  We will recheck an echocardiogram when he comes back as well.  The papillomas and will not be an option for him long-term due to the cost.  This has been trialed previously without success.    He will also remain on metoprolol succinate.  He was previously on losartan, as sacubitril-valsartan was unaffordable.  It is very unclear to me why he is not on losartan at this time.  He says he does not see a nephrologist.  His blood pressure was low in the past but it is not daily anymore.  We will have to assess this at her next visit as well.    Regarding atrial fibrillation, he is no longer on anticoagulant and he has no interest in taking any anticoagulant.  He says it causes severe nosebleeds.  He understands the risk of cardioembolic events and stroke still declines oral anticoagulation.  This is unfortunate as his [id number] score is 3.    I will reach out to his PCP -- I tried to convince him to see  about his renal mass, but he's still declining.    Sincerely,       Wilfred Draper MD

## 2024-01-08 NOTE — PROGRESS NOTES
RM:________     PCP: Zohreh Diop MD    : 1955  AGE: 68 y.o.  EST PATIENT     REASON FOR VISIT/  CC:        BP Readings from Last 3 Encounters:   23 120/78   10/11/23 116/78   10/02/23 105/76      Wt Readings from Last 3 Encounters:   23 86.1 kg (189 lb 12.8 oz)   10/11/23 80.7 kg (177 lb 14.4 oz)   10/02/23 77.1 kg (170 lb)        WT: ____________ BP: __________L __________R HR______    CHEST PAIN: _____________    SOA: _____________PALPS: _______________     LIGHTHEADED: ___________FATIGUE: ________________ EDEMA __________    ALLERGIES:Apixaban SMOKING HISTORY:  Social History     Tobacco Use    Smoking status: Former     Packs/day: 1.00     Years: 30.00     Additional pack years: 0.00     Total pack years: 30.00     Types: Cigarettes     Quit date: 2000     Years since quittin.7     Passive exposure: Past    Smokeless tobacco: Current     Types: Chew    Tobacco comments:     quit 15 years ago, wife current smoker outside   Vaping Use    Vaping Use: Never used   Substance Use Topics    Alcohol use: No    Drug use: No     Types: Marijuana     CAFFEINE USE_________________  ALCOHOL ______________________

## 2024-01-09 ENCOUNTER — OFFICE VISIT (OUTPATIENT)
Dept: CARDIOLOGY | Facility: CLINIC | Age: 69
End: 2024-01-09
Payer: MEDICARE

## 2024-01-09 VITALS
DIASTOLIC BLOOD PRESSURE: 76 MMHG | BODY MASS INDEX: 29.03 KG/M2 | HEIGHT: 69 IN | OXYGEN SATURATION: 95 % | WEIGHT: 196 LBS | SYSTOLIC BLOOD PRESSURE: 112 MMHG | HEART RATE: 81 BPM

## 2024-01-09 DIAGNOSIS — I50.20 HEART FAILURE WITH REDUCED EJECTION FRACTION, NYHA CLASS III: ICD-10-CM

## 2024-01-09 DIAGNOSIS — I44.2 AV BLOCK, COMPLETE, POST OP COMPLICATION OF AV NODAL ABLATION: ICD-10-CM

## 2024-01-09 DIAGNOSIS — Z95.0 HISTORY OF PACEMAKER: ICD-10-CM

## 2024-01-09 DIAGNOSIS — I34.0 NONRHEUMATIC MITRAL VALVE REGURGITATION: ICD-10-CM

## 2024-01-09 DIAGNOSIS — I36.1 NONRHEUMATIC TRICUSPID VALVE REGURGITATION: ICD-10-CM

## 2024-01-09 DIAGNOSIS — I27.20 PULMONARY HYPERTENSION: ICD-10-CM

## 2024-01-09 DIAGNOSIS — I42.8 NICM (NONISCHEMIC CARDIOMYOPATHY): Primary | ICD-10-CM

## 2024-01-09 DIAGNOSIS — I97.190 AV BLOCK, COMPLETE, POST OP COMPLICATION OF AV NODAL ABLATION: ICD-10-CM

## 2024-01-09 DIAGNOSIS — I48.21 PERMANENT ATRIAL FIBRILLATION: ICD-10-CM

## 2024-01-09 PROBLEM — J40 BRONCHITIS: Status: RESOLVED | Noted: 2018-02-09 | Resolved: 2024-01-09

## 2024-01-09 PROCEDURE — 93000 ELECTROCARDIOGRAM COMPLETE: CPT | Performed by: INTERNAL MEDICINE

## 2024-01-09 PROCEDURE — 1160F RVW MEDS BY RX/DR IN RCRD: CPT | Performed by: INTERNAL MEDICINE

## 2024-01-09 PROCEDURE — 1159F MED LIST DOCD IN RCRD: CPT | Performed by: INTERNAL MEDICINE

## 2024-01-09 PROCEDURE — 3074F SYST BP LT 130 MM HG: CPT | Performed by: INTERNAL MEDICINE

## 2024-01-09 PROCEDURE — 99214 OFFICE O/P EST MOD 30 MIN: CPT | Performed by: INTERNAL MEDICINE

## 2024-01-09 PROCEDURE — 3078F DIAST BP <80 MM HG: CPT | Performed by: INTERNAL MEDICINE

## 2024-01-09 NOTE — Clinical Note
Hi, just wanted to make sure you knew about his renal mass.  He's declining eval when I ask him.  Ray olivo

## 2024-01-16 DIAGNOSIS — I50.20 HEART FAILURE WITH REDUCED EJECTION FRACTION, NYHA CLASS III: ICD-10-CM

## 2024-01-16 RX ORDER — BUMETANIDE 1 MG/1
1 TABLET ORAL 2 TIMES DAILY
Qty: 60 TABLET | Refills: 3 | Status: SHIPPED | OUTPATIENT
Start: 2024-01-16 | End: 2024-01-18 | Stop reason: SDUPTHER

## 2024-01-17 DIAGNOSIS — I50.20 HEART FAILURE WITH REDUCED EJECTION FRACTION, NYHA CLASS III: ICD-10-CM

## 2024-01-17 RX ORDER — BUMETANIDE 1 MG/1
1 TABLET ORAL 2 TIMES DAILY
Qty: 60 TABLET | Refills: 3 | OUTPATIENT
Start: 2024-01-17

## 2024-01-18 ENCOUNTER — OFFICE VISIT (OUTPATIENT)
Dept: CARDIOLOGY | Facility: CLINIC | Age: 69
End: 2024-01-18
Payer: MEDICARE

## 2024-01-18 ENCOUNTER — HOSPITAL ENCOUNTER (OUTPATIENT)
Dept: CARDIOLOGY | Facility: HOSPITAL | Age: 69
Discharge: HOME | End: 2024-01-18
Admitting: INTERNAL MEDICINE
Payer: MEDICARE

## 2024-01-18 VITALS
BODY MASS INDEX: 28.73 KG/M2 | WEIGHT: 194 LBS | HEART RATE: 51 BPM | DIASTOLIC BLOOD PRESSURE: 104 MMHG | SYSTOLIC BLOOD PRESSURE: 159 MMHG | HEIGHT: 69 IN

## 2024-01-18 VITALS
WEIGHT: 198.9 LBS | SYSTOLIC BLOOD PRESSURE: 136 MMHG | HEIGHT: 69 IN | BODY MASS INDEX: 29.46 KG/M2 | HEART RATE: 75 BPM | DIASTOLIC BLOOD PRESSURE: 80 MMHG | OXYGEN SATURATION: 94 %

## 2024-01-18 DIAGNOSIS — I10 PRIMARY HYPERTENSION: ICD-10-CM

## 2024-01-18 DIAGNOSIS — I34.0 NONRHEUMATIC MITRAL VALVE REGURGITATION: ICD-10-CM

## 2024-01-18 DIAGNOSIS — I42.8 NICM (NONISCHEMIC CARDIOMYOPATHY): Primary | ICD-10-CM

## 2024-01-18 DIAGNOSIS — E78.00 HYPERCHOLESTEROLEMIA: ICD-10-CM

## 2024-01-18 DIAGNOSIS — I50.20 HEART FAILURE WITH REDUCED EJECTION FRACTION, NYHA CLASS III: ICD-10-CM

## 2024-01-18 DIAGNOSIS — Z95.0 HISTORY OF PACEMAKER: ICD-10-CM

## 2024-01-18 DIAGNOSIS — I27.20 PULMONARY HYPERTENSION: ICD-10-CM

## 2024-01-18 DIAGNOSIS — I42.8 NICM (NONISCHEMIC CARDIOMYOPATHY): ICD-10-CM

## 2024-01-18 DIAGNOSIS — I36.1 NONRHEUMATIC TRICUSPID VALVE REGURGITATION: ICD-10-CM

## 2024-01-18 LAB
BH CV ECHO LEFT VENTRICLE GLOBAL LONGITUDINAL STRAIN: -10.6 %
BH CV ECHO MEAS - AO MAX PG: 3.8 MMHG
BH CV ECHO MEAS - AO MEAN PG: 2.11 MMHG
BH CV ECHO MEAS - AO V2 MAX: 97.1 CM/SEC
BH CV ECHO MEAS - AO V2 VTI: 17.8 CM
BH CV ECHO MEAS - AVA(I,D): 2.8 CM2
BH CV ECHO MEAS - EDV(CUBED): 274 ML
BH CV ECHO MEAS - EDV(MOD-SP2): 232 ML
BH CV ECHO MEAS - EDV(MOD-SP4): 206 ML
BH CV ECHO MEAS - EF(MOD-BP): 28.7 %
BH CV ECHO MEAS - EF(MOD-SP2): 28 %
BH CV ECHO MEAS - EF(MOD-SP4): 24.8 %
BH CV ECHO MEAS - ESV(CUBED): 202.5 ML
BH CV ECHO MEAS - ESV(MOD-SP2): 167 ML
BH CV ECHO MEAS - ESV(MOD-SP4): 155 ML
BH CV ECHO MEAS - FS: 9.6 %
BH CV ECHO MEAS - IVS/LVPW: 1.14 CM
BH CV ECHO MEAS - IVSD: 1.15 CM
BH CV ECHO MEAS - LV DIASTOLIC VOL/BSA (35-75): 101.1 CM2
BH CV ECHO MEAS - LV MASS(C)D: 313.3 GRAMS
BH CV ECHO MEAS - LV MAX PG: 2.6 MMHG
BH CV ECHO MEAS - LV MEAN PG: 1.43 MMHG
BH CV ECHO MEAS - LV SYSTOLIC VOL/BSA (12-30): 76.1 CM2
BH CV ECHO MEAS - LV V1 MAX: 80.4 CM/SEC
BH CV ECHO MEAS - LV V1 VTI: 12.2 CM
BH CV ECHO MEAS - LVIDD: 6.5 CM
BH CV ECHO MEAS - LVIDS: 5.9 CM
BH CV ECHO MEAS - LVOT AREA: 4 CM2
BH CV ECHO MEAS - LVOT DIAM: 2.27 CM
BH CV ECHO MEAS - LVPWD: 1.01 CM
BH CV ECHO MEAS - MED PEAK E' VEL: 5.2 CM/SEC
BH CV ECHO MEAS - MR MAX PG: 111.8 MMHG
BH CV ECHO MEAS - MR MAX VEL: 528.7 CM/SEC
BH CV ECHO MEAS - MR MEAN PG: 79.7 MMHG
BH CV ECHO MEAS - MR MEAN VEL: 427.3 CM/SEC
BH CV ECHO MEAS - MR VTI: 179 CM
BH CV ECHO MEAS - MV DEC SLOPE: 693.4 CM/SEC2
BH CV ECHO MEAS - MV MAX PG: 2.9 MMHG
BH CV ECHO MEAS - MV MEAN PG: 1.34 MMHG
BH CV ECHO MEAS - MV P1/2T: 41 MSEC
BH CV ECHO MEAS - MV V2 VTI: 19.5 CM
BH CV ECHO MEAS - MVA(P1/2T): 5.4 CM2
BH CV ECHO MEAS - MVA(VTI): 2.5 CM2
BH CV ECHO MEAS - PA ACC TIME: 0.1 SEC
BH CV ECHO MEAS - PA V2 MAX: 67 CM/SEC
BH CV ECHO MEAS - QP/QS: 1.16
BH CV ECHO MEAS - RAP SYSTOLE: 3 MMHG
BH CV ECHO MEAS - RV MAX PG: 2.31 MMHG
BH CV ECHO MEAS - RV V1 MAX: 76 CM/SEC
BH CV ECHO MEAS - RV V1 VTI: 13.3 CM
BH CV ECHO MEAS - RVOT DIAM: 2.33 CM
BH CV ECHO MEAS - RVSP: 40 MMHG
BH CV ECHO MEAS - SI(MOD-SP2): 31.9 ML/M2
BH CV ECHO MEAS - SI(MOD-SP4): 25 ML/M2
BH CV ECHO MEAS - SV(LVOT): 49.1 ML
BH CV ECHO MEAS - SV(MOD-SP2): 65 ML
BH CV ECHO MEAS - SV(MOD-SP4): 51 ML
BH CV ECHO MEAS - SV(RVOT): 56.8 ML
BH CV ECHO MEAS - TR MAX PG: 37 MMHG
BH CV ECHO MEAS - TR MAX VEL: 304.1 CM/SEC
BH CV XLRA - RV BASE: 4.1 CM
BH CV XLRA - RV LENGTH: 5.3 CM
BH CV XLRA - RV MID: 2.6 CM
BH CV XLRA - TDI S': 12.4 CM/SEC
LEFT ATRIUM VOLUME INDEX: 63.7 ML/M2
SINUS: 3.8 CM

## 2024-01-18 PROCEDURE — 93306 TTE W/DOPPLER COMPLETE: CPT

## 2024-01-18 PROCEDURE — 25510000001 PERFLUTREN (DEFINITY) 8.476 MG IN SODIUM CHLORIDE (PF) 0.9 % 10 ML INJECTION: Performed by: INTERNAL MEDICINE

## 2024-01-18 RX ORDER — LOSARTAN POTASSIUM 25 MG/1
25 TABLET ORAL DAILY
Qty: 30 TABLET | Refills: 3 | Status: SHIPPED | OUTPATIENT
Start: 2024-01-18

## 2024-01-18 RX ORDER — BUMETANIDE 1 MG/1
2 TABLET ORAL 2 TIMES DAILY
Qty: 120 TABLET | Refills: 3 | Status: SHIPPED | OUTPATIENT
Start: 2024-01-18

## 2024-01-18 RX ADMIN — SODIUM CHLORIDE 2 ML: 9 INJECTION INTRAMUSCULAR; INTRAVENOUS; SUBCUTANEOUS at 12:50

## 2024-01-18 NOTE — PROGRESS NOTES
"    CARDIOLOGY        Patient Name: Claude Spencer  :1955  Age: 68 y.o.  Primary Cardiologist: Wilfred Draper MD  Encounter Provider:  Shaq Ruiz PA-C    Date of Service: 24        CHIEF COMPLAINT / REASON FOR OFFICE VISIT     1 week follow-up      HISTORY OF PRESENT ILLNESS       HPI  Claude Spencer is a 68 y.o. male who presents today for 1 week follow-up.     Pt has a  history significant for atrial fibrillation and diastolic heart failure.  Patient was seen in office on 2024 for worsening nonischemic cardiomyopathy.  At that visit he had a 26 pound weight gain along some crackles on exam.  He was to double his bumetanide dose and take 2 mg in morning and 2 mg in the afternoon.  Pt was to remain on his spirolactone.  Patient was given samples of Jardiance.  Patient was scheduled for echo prior to this visit.    Patient states he felt good in office today.  Patient was complaining of his weight gain is localized to his abdomen.  Patient denies any chest pain, palpitations, new shortness of breath, lightheadedness, or edema to his legs.  Patient has been compliant with taking 2 doses of his Bumex twice a day.     The following portions of the patient's history were reviewed and updated as appropriate: allergies, current medications, past family history, past medical history, past social history, past surgical history and problem list.      VITAL SIGNS     Visit Vitals  /80 (BP Location: Left arm)   Pulse 75   Ht 175.3 cm (69\")   Wt 90.2 kg (198 lb 14.4 oz)   SpO2 94%   BMI 29.37 kg/m²         Wt Readings from Last 3 Encounters:   24 90.2 kg (198 lb 14.4 oz)   24 88 kg (194 lb 0.1 oz)   24 88.9 kg (196 lb)     Body mass index is 29.37 kg/m².        PHYSICAL EXAMINATION     Constitutional:       General: Awake.      Appearance: Not in distress.   Pulmonary:      Effort: Pulmonary effort is normal.      Breath sounds: Normal breath sounds.   Cardiovascular:      " Normal rate. Irregularly irregular rhythm.      Murmurs: There is no murmur.   Pulses:     Intact distal pulses.   Edema:     Peripheral edema absent.   Skin:     General: Skin is warm.   Neurological:      Mental Status: Alert.           REVIEWED DATA     Procedures    Cardiac Procedures:      Echo from today  Interpretation Summary        Left ventricular systolic function is moderately decreased. Calculated left ventricular EF = 28.7%    The left ventricular cavity is moderately dilated.    The following left ventricular wall segments are hypokinetic: mid anterior, apical anterior, basal anterolateral, mid anterolateral, apical lateral, basal inferolateral, mid inferolateral, apical inferior, mid inferior, apical septal, basal inferoseptal, mid inferoseptal, apex hypokinetic, mid anteroseptal, basal anterior, basal inferior and basal inferoseptal.    Left ventricular diastolic function was indeterminate.    Mildly reduced right ventricular systolic function noted.    The left atrial cavity is severely dilated.    Left atrial volume is severely increased.    The right atrial cavity is severely  dilated.    There is mild, bileaflet mitral valve thickening present.    Moderate to severe mitral valve regurgitation is present.    Moderate tricuspid valve regurgitation is present.    Calculated right ventricular systolic pressure from tricuspid regurgitation is 40 mmHg.    Mild pulmonary hypertension is present.    Abnormal global longitudinal LV strain (GLS) = -10.6%.       Transthoracic echo on 6/26/2023  Interpretation Summary         The left ventricular cavity is moderately dilated.    There is severe left ventricular global hypokinesis    Left ventricular systolic function is severely decreased. Left ventricular ejection fraction appears to be 26 - 30%.    The right ventricular cavity is mildly dilated. Mildly reduced right ventricular systolic function noted.    The left atrial cavity is severely dilated.    The  right atrial cavity is severely dilated.    The mitral valve leaflets appear to be tethered, likely secondary to left ventricular remodeling    Severe mitral valve regurgitation is present    Moderate tricuspid valve regurgitation is present.    Calculated right ventricular systolic pressure from tricuspid regurgitation is 49 mmHg.    The inferior vena cava is dilated. IVC inspiratory collapse is absent.    There is no evidence of pericardial effusion.       Lipid Panel          12/1/2023    11:11   Lipid Panel   Total Cholesterol 167    Triglycerides 86    HDL Cholesterol 52    VLDL Cholesterol 16    LDL Cholesterol  99    LDL/HDL Ratio 1.9        Lab Results   Component Value Date     12/01/2023     09/23/2023    K 4.4 12/01/2023    K 3.7 09/23/2023    CL 98 12/01/2023     09/23/2023    CO2 26 12/01/2023    CO2 27.0 09/23/2023    BUN 20 12/01/2023    BUN 15 09/23/2023    CREATININE 1.02 12/01/2023    CREATININE 0.91 09/23/2023    EGFRIFNONA 98 07/21/2021    EGFRIFNONA 147 06/02/2021    EGFRIFAFRI 126 11/08/2018    EGFRIFAFRI 92 04/20/2018    GLUCOSE 91 12/01/2023    GLUCOSE 127 (H) 09/23/2023    CALCIUM 9.6 12/01/2023    CALCIUM 9.0 09/23/2023    PROTENTOTREF 7.0 12/01/2023    PROTENTOTREF 7.4 11/08/2018    ALBUMIN 4.6 12/01/2023    ALBUMIN 3.4 (L) 08/29/2023    BILITOT 0.5 12/01/2023    BILITOT 1.1 08/28/2023    AST 34 12/01/2023    AST 35 08/28/2023    ALT 20 12/01/2023    ALT 36 08/28/2023     Lab Results   Component Value Date    WBC 7.4 12/01/2023    WBC 14.07 (H) 08/30/2023    HGB 13.7 12/01/2023    HGB 13.6 08/30/2023    HCT 43.4 12/01/2023    HCT 39.7 08/30/2023    MCV 91 12/01/2023    MCV 94.5 08/30/2023     12/01/2023     08/30/2023     Lab Results   Component Value Date    PROBNP 2,523 (H) 12/01/2023    PROBNP 5,556.0 (H) 08/22/2023     Lab Results   Component Value Date    TROPONINT 33 (H) 08/22/2023     Lab Results   Component Value Date    TSH 3.110 06/26/2023    TSH  1.300 05/27/2021             ASSESSMENT & PLAN     Diagnoses and all orders for this visit:    1. NICM (nonischemic cardiomyopathy) (Primary)   Spoke to patient about his echo today.  Unclear if patient is on metoprolol and losartan.  Continue aggressive GDMT along with low-sodium diet /exercise. I will continue his Farxiga along with placing patient back on losartan.  Did speak to patient about PYP PE study due to his echo and will be discussed with Dr. Draper.  Will address this at next visit    2.  Atrial fibrillation   CHADs-VASc score 3.  Patient not interested in taking his anticoagulation.  He is in understanding of risk for cardioembolic events and stroke.    3. Hypercholesterolemia   Lipid profile noted on 12/1/2023.  Patient is on rosuvastatin 20 mg.    4. History of pacemaker       5. Nonrheumatic mitral valve regurgitation   As above    Patient seems to be doing fine.  Patient's in no distress and talkative in room.  Patient agreeable to follow back up with me in 1 month.  I will call patient about PYP studies as well.  Patient to call if any concerns.    Patient to follow-up in 1 month.    Future Appointments         Provider Department Center    2/8/2024 10:00 AM VINCE NOE [facility] DEVICE CHECK [facility]    2/22/2024 1:00 PM Shaq Ruiz PA-C [facility]                MEDICATIONS         Discharge Medications            Accurate as of January 18, 2024  2:03 PM. If you have any questions, ask your nurse or doctor.                Changes to Medications        Instructions Start Date   bumetanide 1 MG tablet  Commonly known as: Bumex  What changed: how much to take   1 mg, Oral, 2 Times Daily             Continue These Medications        Instructions Start Date   albuterol sulfate  (90 Base) MCG/ACT inhaler  Commonly known as: PROVENTIL HFA;VENTOLIN HFA;PROAIR HFA   2 puffs, Inhalation, Every 4 Hours PRN       budesonide-formoterol 160-4.5 MCG/ACT inhaler  Commonly known as: Symbicort   2 puffs, Inhalation, 2 Times Daily      cholecalciferol 25 MCG (1000 UT) tablet  Commonly known as: VITAMIN D3   1,000 Units, Oral, Daily      Farxiga 10 MG tablet  Generic drug: dapagliflozin Propanediol   5 mg, Oral, Daily      ipratropium-albuterol 0.5-2.5 mg/3 ml nebulizer  Commonly known as: DUO-NEB   3 mL, Nebulization, Every 4 Hours PRN      metoprolol succinate XL 50 MG 24 hr tablet  Commonly known as: TOPROL-XL   50 mg, Oral, Daily      montelukast 10 MG tablet  Commonly known as: SINGULAIR   10 mg, Oral, Nightly      omeprazole 20 MG capsule  Commonly known as: priLOSEC   20 mg, Oral, Daily      rosuvastatin 20 MG tablet  Commonly known as: CRESTOR   20 mg, Oral, Nightly      spironolactone 25 MG tablet  Commonly known as: ALDACTONE   25 mg, Oral, Daily                   **Dragon Disclaimer:   Much of this encounter note is an electronic transcription/translation of spoken language to printed text. The electronic translation of spoken language may permit erroneous, or at times, nonsensical words or phrases to be inadvertently transcribed. Although I have reviewed the note for such errors, some may still exist.

## 2024-01-19 DIAGNOSIS — E78.00 HYPERCHOLESTEROLEMIA: ICD-10-CM

## 2024-01-19 DIAGNOSIS — I42.8 NICM (NONISCHEMIC CARDIOMYOPATHY): ICD-10-CM

## 2024-01-19 DIAGNOSIS — I34.0 NONRHEUMATIC MITRAL VALVE REGURGITATION: ICD-10-CM

## 2024-01-19 DIAGNOSIS — Z95.0 HISTORY OF PACEMAKER: ICD-10-CM

## 2024-01-19 DIAGNOSIS — I10 PRIMARY HYPERTENSION: ICD-10-CM

## 2024-01-19 NOTE — PROGRESS NOTES
I saw him last week and he was volume up, gave samples of empagliflozin. Then NM saw him yesterday and he got this echo. EF has not improved. Echo is suggestive of possible noncompaction vs amyloid but not classic. Per NM's note, his weight is up 2 pounds from when I saw him the week before, which means he needs more diuresis.     Beatrice placed a script for Jardiance today -- he has never been able to afford this medication before. Triage, can you see on Monday if he was able to afford it/pick it up? Shaq, go ahead an order the PYP, and agree with 4 week follow up.    Ty  jdk

## 2024-01-22 ENCOUNTER — TELEPHONE (OUTPATIENT)
Dept: CARDIOLOGY | Facility: CLINIC | Age: 69
End: 2024-01-22
Payer: MEDICARE

## 2024-01-22 NOTE — TELEPHONE ENCOUNTER
I spoke with pt.  I changed his appointment to [facility] (sorry, I didn't realize he was at ).  Pt states that he is only taking bumex 1mg twice daily, but that he can increase it to 2mg twice daily.      Angela Sandoval, RN  Triage RN  01/22/24 15:59 EST     negative...

## 2024-01-22 NOTE — TELEPHONE ENCOUNTER
I tried to call Claude Spencer but there was no answer.  Left a voicemail asking patient to call back.  Will continue to try to reach pt.    HUB- if pt calls back, please transfer through to triage.    Thank you,    Kim JI RN  Triage [facility]  01/22/24 08:46 EST

## 2024-01-22 NOTE — TELEPHONE ENCOUNTER
He should f/u with NM as that's who he saw in [place] last week.    Is he still taking 2 bumetanide pills twice daily?    jdk

## 2024-01-22 NOTE — TELEPHONE ENCOUNTER
----- Message from Wilfred Draper MD sent at 1/19/2024 12:53 PM EST -----  I saw him last week and he was volume up, gave samples of empagliflozin. Then NM saw him yesterday and he got this echo. EF has not improved. Echo is suggestive of possible noncompaction vs amyloid but not classic. Per NM's note, his weight is up 2 pounds from when I saw him the week before, which means he needs more diuresis.     Beatrice placed a script for Jardiance today -- he has never been able to afford this medication before. Triage, can you see on Monday if he was able to afford it/pick it up? Shaq, go ahead an order the PYP, and agree with 4 week follow up.    Jagjit olivo

## 2024-01-22 NOTE — TELEPHONE ENCOUNTER
I spoke with pt.  He states that he started farxiga 5mg daily on 1/9 after he saw you in the office.  He is still taking the samples he was given in the office, and has not tried to  prescription at [facility] yet.  He has not seen a difference in fluid status since he started it.  States that weight is still up at 195lb, continues to have swelling in his abdomen, which makes him SOA.  Denies any CP/dizziness/lightheadedness or other symptoms at this time.  States HR 70-90's, -140's/.  I instructed pt that he should be getting a call to schedule PYP scan, and to schedule as soon as possible.  I scheduled a follow up with Beatrice kraus Jadwin as requested.      Any further recommendations?    Schedulers- please call pt to schedule PYP scan, thanks so much!    Thanks so much,  Angela Sandoval, RN  Triage RN  01/22/24 12:12 EST

## 2024-01-23 NOTE — TELEPHONE ENCOUNTER
I spoke with pt and instructed him to take bumetanide 2mg (2- 1mg tablets) twice a day.  Pt verbalized understanding.      Angela Sandoval, RN  Triage RN  01/23/24 13:14 EST

## 2024-02-08 ENCOUNTER — CLINICAL SUPPORT NO REQUIREMENTS (OUTPATIENT)
Dept: CARDIOLOGY | Facility: CLINIC | Age: 69
End: 2024-02-08
Payer: MEDICARE

## 2024-02-08 DIAGNOSIS — I97.190 AV BLOCK, COMPLETE, POST OP COMPLICATION OF AV NODAL ABLATION: Primary | ICD-10-CM

## 2024-02-08 DIAGNOSIS — I44.2 AV BLOCK, COMPLETE, POST OP COMPLICATION OF AV NODAL ABLATION: Primary | ICD-10-CM

## 2024-02-14 ENCOUNTER — HOSPITAL ENCOUNTER (OUTPATIENT)
Dept: CARDIOLOGY | Facility: HOSPITAL | Age: 69
Discharge: HOME | End: 2024-02-14
Admitting: PHYSICIAN ASSISTANT
Payer: MEDICARE

## 2024-02-14 VITALS — HEIGHT: 69 IN | BODY MASS INDEX: 29.62 KG/M2 | WEIGHT: 200 LBS

## 2024-02-14 DIAGNOSIS — I34.0 NONRHEUMATIC MITRAL VALVE REGURGITATION: ICD-10-CM

## 2024-02-14 DIAGNOSIS — E78.00 HYPERCHOLESTEROLEMIA: ICD-10-CM

## 2024-02-14 DIAGNOSIS — I10 PRIMARY HYPERTENSION: ICD-10-CM

## 2024-02-14 DIAGNOSIS — I42.8 NICM (NONISCHEMIC CARDIOMYOPATHY): ICD-10-CM

## 2024-02-14 DIAGNOSIS — Z95.0 HISTORY OF PACEMAKER: ICD-10-CM

## 2024-02-14 PROCEDURE — 78803 RP LOCLZJ TUM SPECT 1 AREA: CPT | Performed by: INTERNAL MEDICINE

## 2024-02-14 PROCEDURE — 78803 RP LOCLZJ TUM SPECT 1 AREA: CPT

## 2024-02-14 PROCEDURE — 0 TECHNETIUM TC99M PYROPHOSPHATE: Performed by: PHYSICIAN ASSISTANT

## 2024-02-14 PROCEDURE — A9538 TC99M PYROPHOSPHATE: HCPCS | Performed by: PHYSICIAN ASSISTANT

## 2024-02-14 RX ADMIN — TECHNETIUM TC99M PYROPHOSPHATE 1 DOSE: 12 INJECTION INTRAVENOUS at 10:50

## 2024-02-16 ENCOUNTER — OFFICE VISIT (OUTPATIENT)
Dept: CARDIOLOGY | Facility: CLINIC | Age: 69
End: 2024-02-16
Payer: MEDICARE

## 2024-02-16 VITALS
OXYGEN SATURATION: 98 % | BODY MASS INDEX: 29.62 KG/M2 | SYSTOLIC BLOOD PRESSURE: 140 MMHG | DIASTOLIC BLOOD PRESSURE: 80 MMHG | HEIGHT: 69 IN | WEIGHT: 200 LBS | HEART RATE: 68 BPM

## 2024-02-16 DIAGNOSIS — E78.00 HYPERCHOLESTEROLEMIA: ICD-10-CM

## 2024-02-16 DIAGNOSIS — I97.190 AV BLOCK, COMPLETE, POST OP COMPLICATION OF AV NODAL ABLATION: ICD-10-CM

## 2024-02-16 DIAGNOSIS — I42.8 NICM (NONISCHEMIC CARDIOMYOPATHY): Primary | ICD-10-CM

## 2024-02-16 DIAGNOSIS — I48.21 PERMANENT ATRIAL FIBRILLATION: ICD-10-CM

## 2024-02-16 DIAGNOSIS — I44.2 AV BLOCK, COMPLETE, POST OP COMPLICATION OF AV NODAL ABLATION: ICD-10-CM

## 2024-02-16 DIAGNOSIS — I36.1 NONRHEUMATIC TRICUSPID VALVE REGURGITATION: ICD-10-CM

## 2024-02-16 RX ORDER — POTASSIUM CHLORIDE 750 MG/1
10 TABLET, FILM COATED, EXTENDED RELEASE ORAL 2 TIMES DAILY
Qty: 180 TABLET | Refills: 3 | Status: SHIPPED | OUTPATIENT
Start: 2024-02-16

## 2024-03-06 ENCOUNTER — TELEPHONE (OUTPATIENT)
Dept: INTERNAL MEDICINE | Facility: CLINIC | Age: 69
End: 2024-03-06
Payer: MEDICAID

## 2024-03-06 DIAGNOSIS — R51.9 NONINTRACTABLE HEADACHE, UNSPECIFIED CHRONICITY PATTERN, UNSPECIFIED HEADACHE TYPE: Primary | ICD-10-CM

## 2024-03-06 NOTE — TELEPHONE ENCOUNTER
Caller: Claude Spencer    Relationship: Self    Best call back number: [phone]     What medication are you requesting: IBUPROFEN  600 MG     If a prescription is needed, what is your preferred pharmacy and phone number: [facility] [id number] - [place] - [address] - [phone]  - [phone] FX     Additional notes: STATES TAKES AS NEEDED FOR HEADACHES AND BACK PAIN

## 2024-03-06 NOTE — TELEPHONE ENCOUNTER
Unable to set medication up since medication is not on his medication list, would you be okay with sending this in for him?

## 2024-03-14 NOTE — TELEPHONE ENCOUNTER
Caller: Claude Spencer    Relationship: Self    Best call back number: [phone]    Who are you requesting to speak with (clinical staff, provider,  specific staff member): CLINICAL    What was the call regarding: CHECKING STATUS OF REQUEST   PLEASE ADVISE

## 2024-03-15 RX ORDER — IBUPROFEN 600 MG/1
600 TABLET ORAL EVERY 8 HOURS PRN
Qty: 30 TABLET | Refills: 3 | Status: SHIPPED | OUTPATIENT
Start: 2024-03-15

## 2024-03-21 ENCOUNTER — HOSPITAL ENCOUNTER (EMERGENCY)
Facility: HOSPITAL | Age: 69
Discharge: HOME | End: 2024-03-21
Attending: STUDENT IN AN ORGANIZED HEALTH CARE EDUCATION/TRAINING PROGRAM
Payer: MEDICARE

## 2024-03-21 ENCOUNTER — APPOINTMENT (OUTPATIENT)
Dept: GENERAL RADIOLOGY | Facility: HOSPITAL | Age: 69
End: 2024-03-21
Payer: MEDICARE

## 2024-03-21 VITALS
OXYGEN SATURATION: 93 % | SYSTOLIC BLOOD PRESSURE: 111 MMHG | HEIGHT: 69 IN | TEMPERATURE: 98.7 F | WEIGHT: 199 LBS | RESPIRATION RATE: 18 BRPM | DIASTOLIC BLOOD PRESSURE: 82 MMHG | BODY MASS INDEX: 29.47 KG/M2 | HEART RATE: 80 BPM

## 2024-03-21 DIAGNOSIS — E87.1 HYPONATREMIA: ICD-10-CM

## 2024-03-21 DIAGNOSIS — J44.1 COPD EXACERBATION: Primary | ICD-10-CM

## 2024-03-21 DIAGNOSIS — R79.89 ELEVATED SERUM CREATININE: ICD-10-CM

## 2024-03-21 DIAGNOSIS — R06.00 DYSPNEA, UNSPECIFIED TYPE: ICD-10-CM

## 2024-03-21 LAB
ALBUMIN SERPL-MCNC: 4.2 G/DL (ref 3.5–5.2)
ALBUMIN/GLOB SERPL: 1.4 G/DL
ALP SERPL-CCNC: 65 U/L (ref 39–117)
ALT SERPL W P-5'-P-CCNC: 23 U/L (ref 1–41)
ANION GAP SERPL CALCULATED.3IONS-SCNC: 11.6 MMOL/L (ref 5–15)
AST SERPL-CCNC: 37 U/L (ref 1–40)
BASOPHILS # BLD AUTO: 0.04 10*3/MM3 (ref 0–0.2)
BASOPHILS NFR BLD AUTO: 0.2 % (ref 0–1.5)
BILIRUB SERPL-MCNC: 1 MG/DL (ref 0–1.2)
BUN SERPL-MCNC: 24 MG/DL (ref 8–23)
BUN/CREAT SERPL: 17.6 (ref 7–25)
CALCIUM SPEC-SCNC: 9.2 MG/DL (ref 8.6–10.5)
CHLORIDE SERPL-SCNC: 92 MMOL/L (ref 98–107)
CO2 SERPL-SCNC: 24.4 MMOL/L (ref 22–29)
CREAT SERPL-MCNC: 1.36 MG/DL (ref 0.76–1.27)
DEPRECATED RDW RBC AUTO: 54.8 FL (ref 37–54)
EGFRCR SERPLBLD CKD-EPI 2021: 57.4 ML/MIN/1.73
EOSINOPHIL # BLD AUTO: 0.01 10*3/MM3 (ref 0–0.4)
EOSINOPHIL NFR BLD AUTO: 0.1 % (ref 0.3–6.2)
ERYTHROCYTE [DISTWIDTH] IN BLOOD BY AUTOMATED COUNT: 17.2 % (ref 12.3–15.4)
FLUAV SUBTYP SPEC NAA+PROBE: NOT DETECTED
FLUBV RNA ISLT QL NAA+PROBE: NOT DETECTED
GLOBULIN UR ELPH-MCNC: 3.1 GM/DL
GLUCOSE SERPL-MCNC: 97 MG/DL (ref 65–99)
HCT VFR BLD AUTO: 46.5 % (ref 37.5–51)
HGB BLD-MCNC: 15 G/DL (ref 13–17.7)
HOLD SPECIMEN: NORMAL
HOLD SPECIMEN: NORMAL
IMM GRANULOCYTES # BLD AUTO: 0.1 10*3/MM3 (ref 0–0.05)
IMM GRANULOCYTES NFR BLD AUTO: 0.6 % (ref 0–0.5)
LYMPHOCYTES # BLD AUTO: 2.21 10*3/MM3 (ref 0.7–3.1)
LYMPHOCYTES NFR BLD AUTO: 13.7 % (ref 19.6–45.3)
MCH RBC QN AUTO: 28.6 PG (ref 26.6–33)
MCHC RBC AUTO-ENTMCNC: 32.3 G/DL (ref 31.5–35.7)
MCV RBC AUTO: 88.6 FL (ref 79–97)
MONOCYTES # BLD AUTO: 1.36 10*3/MM3 (ref 0.1–0.9)
MONOCYTES NFR BLD AUTO: 8.4 % (ref 5–12)
NEUTROPHILS NFR BLD AUTO: 12.43 10*3/MM3 (ref 1.7–7)
NEUTROPHILS NFR BLD AUTO: 77 % (ref 42.7–76)
NRBC BLD AUTO-RTO: 0 /100 WBC (ref 0–0.2)
NT-PROBNP SERPL-MCNC: 3619 PG/ML (ref 0–900)
PLATELET # BLD AUTO: 213 10*3/MM3 (ref 140–450)
PMV BLD AUTO: 10.1 FL (ref 6–12)
POTASSIUM SERPL-SCNC: 4.2 MMOL/L (ref 3.5–5.2)
PROT SERPL-MCNC: 7.3 G/DL (ref 6–8.5)
RBC # BLD AUTO: 5.25 10*6/MM3 (ref 4.14–5.8)
SARS-COV-2 RNA RESP QL NAA+PROBE: NOT DETECTED
SODIUM SERPL-SCNC: 128 MMOL/L (ref 136–145)
TROPONIN T SERPL HS-MCNC: 35 NG/L
TROPONIN T SERPL HS-MCNC: 42 NG/L
WBC NRBC COR # BLD AUTO: 16.15 10*3/MM3 (ref 3.4–10.8)
WHOLE BLOOD HOLD COAG: NORMAL
WHOLE BLOOD HOLD SPECIMEN: NORMAL

## 2024-03-21 PROCEDURE — 94799 UNLISTED PULMONARY SVC/PX: CPT

## 2024-03-21 PROCEDURE — 93005 ELECTROCARDIOGRAM TRACING: CPT

## 2024-03-21 PROCEDURE — 94640 AIRWAY INHALATION TREATMENT: CPT

## 2024-03-21 PROCEDURE — 85025 COMPLETE CBC W/AUTO DIFF WBC: CPT

## 2024-03-21 PROCEDURE — 84484 ASSAY OF TROPONIN QUANT: CPT | Performed by: STUDENT IN AN ORGANIZED HEALTH CARE EDUCATION/TRAINING PROGRAM

## 2024-03-21 PROCEDURE — 99284 EMERGENCY DEPT VISIT MOD MDM: CPT

## 2024-03-21 PROCEDURE — 25010000002 METHYLPREDNISOLONE PER 125 MG: Performed by: STUDENT IN AN ORGANIZED HEALTH CARE EDUCATION/TRAINING PROGRAM

## 2024-03-21 PROCEDURE — 84484 ASSAY OF TROPONIN QUANT: CPT

## 2024-03-21 PROCEDURE — 83880 ASSAY OF NATRIURETIC PEPTIDE: CPT

## 2024-03-21 PROCEDURE — 96374 THER/PROPH/DIAG INJ IV PUSH: CPT

## 2024-03-21 PROCEDURE — 71045 X-RAY EXAM CHEST 1 VIEW: CPT

## 2024-03-21 PROCEDURE — 80053 COMPREHEN METABOLIC PANEL: CPT

## 2024-03-21 PROCEDURE — 36415 COLL VENOUS BLD VENIPUNCTURE: CPT

## 2024-03-21 PROCEDURE — 93010 ELECTROCARDIOGRAM REPORT: CPT | Performed by: INTERNAL MEDICINE

## 2024-03-21 PROCEDURE — 87636 SARSCOV2 & INF A&B AMP PRB: CPT | Performed by: STUDENT IN AN ORGANIZED HEALTH CARE EDUCATION/TRAINING PROGRAM

## 2024-03-21 RX ORDER — ACETAMINOPHEN 500 MG
1000 TABLET ORAL ONCE
Status: COMPLETED | OUTPATIENT
Start: 2024-03-21 | End: 2024-03-21

## 2024-03-21 RX ORDER — METHYLPREDNISOLONE 4 MG/1
TABLET ORAL
Qty: 21 TABLET | Refills: 0 | Status: SHIPPED | OUTPATIENT
Start: 2024-03-21 | End: 2024-03-29

## 2024-03-21 RX ORDER — IPRATROPIUM BROMIDE AND ALBUTEROL SULFATE 2.5; .5 MG/3ML; MG/3ML
3 SOLUTION RESPIRATORY (INHALATION) ONCE
Status: COMPLETED | OUTPATIENT
Start: 2024-03-21 | End: 2024-03-21

## 2024-03-21 RX ORDER — AZITHROMYCIN 500 MG/1
500 TABLET, FILM COATED ORAL DAILY
Qty: 5 TABLET | Refills: 0 | Status: SHIPPED | OUTPATIENT
Start: 2024-03-21 | End: 2024-03-26

## 2024-03-21 RX ORDER — SODIUM CHLORIDE 0.9 % (FLUSH) 0.9 %
10 SYRINGE (ML) INJECTION AS NEEDED
Status: DISCONTINUED | OUTPATIENT
Start: 2024-03-21 | End: 2024-03-21 | Stop reason: HOSPADM

## 2024-03-21 RX ORDER — METHYLPREDNISOLONE SODIUM SUCCINATE 125 MG/2ML
125 INJECTION, POWDER, LYOPHILIZED, FOR SOLUTION INTRAMUSCULAR; INTRAVENOUS ONCE
Status: COMPLETED | OUTPATIENT
Start: 2024-03-21 | End: 2024-03-21

## 2024-03-21 RX ADMIN — METHYLPREDNISOLONE SODIUM SUCCINATE 125 MG: 125 INJECTION, POWDER, FOR SOLUTION INTRAMUSCULAR; INTRAVENOUS at 18:11

## 2024-03-21 RX ADMIN — ACETAMINOPHEN 1000 MG: 500 TABLET ORAL at 19:09

## 2024-03-21 RX ADMIN — IPRATROPIUM BROMIDE AND ALBUTEROL SULFATE 3 ML: .5; 2.5 SOLUTION RESPIRATORY (INHALATION) at 18:39
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
## 2024-03-25 ENCOUNTER — TELEPHONE (OUTPATIENT)
Dept: INTERNAL MEDICINE | Facility: CLINIC | Age: 69
End: 2024-03-25
Payer: MEDICARE

## 2024-03-25 LAB
QT INTERVAL: 344 MS
QTC INTERVAL: 397 MS

## 2024-03-25 NOTE — PROGRESS NOTES
RM:________     PCP: Zohreh Diop MD    : 1955  AGE: 68 y.o.  EST PATIENT     REASON FOR VISIT/  CC:        BP Readings from Last 3 Encounters:   24 111/82   24 140/80   24 (!) 159/104      Wt Readings from Last 3 Encounters:   24 90.3 kg (199 lb)   24 90.7 kg (200 lb)   24 90.7 kg (200 lb)        WT: ____________ BP: __________L __________R HR______    CHEST PAIN: _____________    SOA: _____________PALPS: _______________     LIGHTHEADED: ___________FATIGUE: ________________ EDEMA __________    ALLERGIES:Apixaban SMOKING HISTORY:  Social History     Tobacco Use    Smoking status: Former     Current packs/day: 1.00     Average packs/day: 1 pack/day for 30.0 years (30.0 ttl pk-yrs)     Types: Cigarettes     Passive exposure: Past    Smokeless tobacco: Current     Types: Chew    Tobacco comments:     quit 15 years ago, wife current smoker outside   Vaping Use    Vaping status: Never Used   Substance Use Topics    Alcohol use: Yes     Comment: Very rare 1 beer during ht holiday    Drug use: No     Types: Marijuana     CAFFEINE USE_________________  ALCOHOL ______________________

## 2024-03-25 NOTE — TELEPHONE ENCOUNTER
Tried making him a hospital f/u on 3/27 but he can only do Friday.  We have nothing this Friday and he wants Friday 4/5.  I advised him to return to the ER or dial 911 if he needs to.  He understands and he will return to the ER if he gets worse.

## 2024-03-26 ENCOUNTER — OFFICE VISIT (OUTPATIENT)
Dept: CARDIOLOGY | Facility: CLINIC | Age: 69
End: 2024-03-26
Payer: MEDICARE

## 2024-03-26 VITALS
HEIGHT: 69 IN | DIASTOLIC BLOOD PRESSURE: 70 MMHG | SYSTOLIC BLOOD PRESSURE: 130 MMHG | WEIGHT: 195 LBS | HEART RATE: 83 BPM | BODY MASS INDEX: 28.88 KG/M2

## 2024-03-26 DIAGNOSIS — I48.21 PERMANENT ATRIAL FIBRILLATION: Primary | ICD-10-CM

## 2024-03-26 DIAGNOSIS — I10 PRIMARY HYPERTENSION: ICD-10-CM

## 2024-03-26 DIAGNOSIS — I42.8 NICM (NONISCHEMIC CARDIOMYOPATHY): ICD-10-CM

## 2024-03-26 DIAGNOSIS — I34.0 NONRHEUMATIC MITRAL VALVE REGURGITATION: ICD-10-CM

## 2024-03-26 DIAGNOSIS — I97.190 AV BLOCK, COMPLETE, POST OP COMPLICATION OF AV NODAL ABLATION: ICD-10-CM

## 2024-03-26 DIAGNOSIS — I44.2 AV BLOCK, COMPLETE, POST OP COMPLICATION OF AV NODAL ABLATION: ICD-10-CM

## 2024-03-26 DIAGNOSIS — I27.20 PULMONARY HYPERTENSION: ICD-10-CM

## 2024-03-26 DIAGNOSIS — Z95.0 HISTORY OF PACEMAKER: ICD-10-CM

## 2024-03-26 DIAGNOSIS — I36.1 NONRHEUMATIC TRICUSPID VALVE REGURGITATION: ICD-10-CM

## 2024-03-26 PROCEDURE — 3075F SYST BP GE 130 - 139MM HG: CPT | Performed by: INTERNAL MEDICINE

## 2024-03-26 PROCEDURE — 93000 ELECTROCARDIOGRAM COMPLETE: CPT | Performed by: INTERNAL MEDICINE

## 2024-03-26 PROCEDURE — 99214 OFFICE O/P EST MOD 30 MIN: CPT | Performed by: INTERNAL MEDICINE

## 2024-03-26 PROCEDURE — 1159F MED LIST DOCD IN RCRD: CPT | Performed by: INTERNAL MEDICINE

## 2024-03-26 PROCEDURE — 1160F RVW MEDS BY RX/DR IN RCRD: CPT | Performed by: INTERNAL MEDICINE

## 2024-03-26 PROCEDURE — 3078F DIAST BP <80 MM HG: CPT | Performed by: INTERNAL MEDICINE

## 2024-03-26 NOTE — PROGRESS NOTES
Date of Office Visit: 24  Encounter Provider: Wilfred Draper MD  Place of Service: [facility] CARDIOLOGY  Patient Name: Claude Spencer  :1955    Chief Complaint   Patient presents with    Congestive Heart Failure   :     HPI:     Mr. Spencer is 68 y.o. and presents today in follow up. I have reviewed prior notes and there are no changes except for any new updates described below. I have also reviewed any information entered into the medical record by the patient or by ancillary staff.     We initially evaluated him in ; he presented with atrial fibrillation and diastolic heart failure. His EF was 55%. He underwent coronary angiography, which was essentially normal. He was then lost to follow up. He had issues with medication adherence, cost, and access to care. He has trouble travelling outside of this area. He was hospitalized at [facility] in 2023 and was found to have an EF of 25-30%.  He was diuresed and his medications were adjusted, but again, due to the socioeconomic factors mentioned above, he was repetitively admitted at other [facility] hospitals.  He was becoming more more difficult to manage given low blood pressures and rapid atrial fibrillation.  In 2023, he underwent AVJ ablation followed by implantation of a pacemaker with right ventricular and left bundle leads.  There were some initial issues with the pacemaker pocket healing, and he did require antibiotics, but thankfully this is no longer an issue.    He had been doing well until early 2023, but then presented with progressive weight gain and worsening shortness of breath.  An echo revealed an ejection fraction of 30% with moderately severe mitral regurgitation, moderate TR, and mild pulmonary hypertension.  We adjusted his medications. We have really tried to get him on dapagliflozin or empagliflozin but it's just not an option due to cost.  His chronic shortness of breath is stable  but he has no orthopnea, PND, or significant leg swelling.  He denies chest pain.  He really has terrible back pain.    Past Medical History:   Diagnosis Date    Atrial fibrillation 04/17/2021    COPD (chronic obstructive pulmonary disease)     Diverticulitis 08/26/2023    Gastroesophageal reflux disease 03/04/2016    Hyperlipidemia     Hypertension     Nonischemic cardiomyopathy     Nosebleed     Osteopenia 03/04/2016    Description: Her bone density August 2015 secondary to steroid use    Vitamin D deficiency 03/04/2016       Past Surgical History:   Procedure Laterality Date    ANKLE SURGERY      CARDIAC CATHETERIZATION Left 4/20/2021    Procedure: Coronary Angiography;  Surgeon: Rao Del Cid MD;  [facility];  Service: Cardiovascular;  Laterality: Left;    CARDIAC CATHETERIZATION N/A 4/20/2021    Procedure: Left Heart Cath;  Surgeon: Rao Del Cid MD;  Location:  [facility] CATH INVASIVE LOCATION;  Service: Cardiovascular;  Laterality: N/A;    CARDIAC ELECTROPHYSIOLOGY PROCEDURE N/A 9/22/2023    Procedure: Pacemaker DC new medtronic;  Surgeon: Mauro Goldsmith MD;  Location:  [facility] CATH INVASIVE LOCATION;  Service: Cardiovascular;  Laterality: N/A;    CARDIAC ELECTROPHYSIOLOGY PROCEDURE N/A 9/22/2023    Procedure: AV node ablation;  Surgeon: Mauro Goldsmith MD;  [facility];  Service: Cardiovascular;  Laterality: N/A;    INGUINAL HERNIA REPAIR Bilateral 7/1/2023    Procedure: INGUINAL HERNIA REPAIR LAPAROSCOPIC;  Surgeon: Ayanna Moreira DO;  Location: [facility] OR;  Service: General;  Laterality: Bilateral;    UMBILICAL HERNIA REPAIR N/A 7/1/2023    Procedure: UMBILICAL HERNIA REPAIR;  Surgeon: Ayanna Moreira DO;  Location:  [facility] OR;  Service: General;  Laterality: N/A;       Social History     Socioeconomic History    Marital status:    Tobacco Use    Smoking status: Former     Current packs/day: 1.00     Average  packs/day: 1 pack/day for 30.0 years (30.0 ttl pk-yrs)     Types: Cigarettes     Passive exposure: Past    Smokeless tobacco: Current     Types: Chew    Tobacco comments:     quit 15 years ago, wife current smoker outside   Vaping Use    Vaping status: Never Used   Substance and Sexual Activity    Alcohol use: Yes     Comment: Very rare 1 beer during hte holiday    Drug use: No     Types: Marijuana    Sexual activity: Yes     Partners: Female       Family History   Problem Relation Age of Onset    COPD Mother 78    Seizures Father     Vasculitis Father     Prostate cancer Maternal Uncle     Heart disease Brother        Review of Systems   Constitutional: Positive for malaise/fatigue.   Cardiovascular:  Positive for dyspnea on exertion and orthopnea.   Respiratory:  Positive for shortness of breath.    Musculoskeletal:  Positive for back pain.       Allergies   Allergen Reactions    Apixaban Other (See Comments)     Nose bleed         Current Outpatient Medications:     albuterol sulfate  (90 Base) MCG/ACT inhaler, Inhale 2 puffs Every 4 (Four) Hours As Needed for Wheezing., Disp: , Rfl:     azithromycin (ZITHROMAX) 500 MG tablet, Take 1 tablet by mouth Daily for 5 days., Disp: 5 tablet, Rfl: 0    budesonide-formoterol (Symbicort) 160-4.5 MCG/ACT inhaler, Inhale 2 puffs 2 (Two) Times a Day., Disp: 10.2 g, Rfl: 6    bumetanide (Bumex) 1 MG tablet, Take 2 tablets by mouth 2 (Two) Times a Day., Disp: 120 tablet, Rfl: 3    cholecalciferol (VITAMIN D3) 25 MCG (1000 UT) tablet, Take 1 tablet by mouth Daily., Disp: , Rfl:     ibuprofen (ADVIL,MOTRIN) 600 MG tablet, Take 1 tablet by mouth Every 8 (Eight) Hours As Needed for Mild Pain., Disp: 30 tablet, Rfl: 3    ipratropium-albuterol (DUO-NEB) 0.5-2.5 mg/3 ml nebulizer, Take 3 mL by nebulization Every 4 (Four) Hours As Needed for Wheezing or Shortness of Air., Disp: 360 mL, Rfl: 1    losartan (Cozaar) 25 MG tablet, Take 1 tablet by mouth Daily., Disp: 30 tablet, Rfl:  "3    methylPREDNISolone (MEDROL) 4 MG dose pack, Take as directed on package instructions., Disp: 21 tablet, Rfl: 0    metoprolol succinate XL (TOPROL-XL) 50 MG 24 hr tablet, Take 1 tablet by mouth Daily., Disp: 90 tablet, Rfl: 1    montelukast (SINGULAIR) 10 MG tablet, Take 1 tablet by mouth Every Night., Disp: 90 tablet, Rfl: 1    omeprazole (priLOSEC) 20 MG capsule, Take 1 capsule by mouth Daily., Disp: 90 capsule, Rfl: 3    potassium chloride 10 MEQ CR tablet, Take 1 tablet by mouth 2 (Two) Times a Day., Disp: 180 tablet, Rfl: 3    rosuvastatin (CRESTOR) 20 MG tablet, Take 1 tablet by mouth Every Night., Disp: 30 tablet, Rfl: 0    spironolactone (ALDACTONE) 25 MG tablet, Take 1 tablet by mouth Daily., Disp: 90 tablet, Rfl: 1    dapagliflozin (FARXIGA) 5 MG tablet tablet, Take 1 tablet by mouth Daily. (Patient not taking: Reported on 3/26/2024), Disp: 30 tablet, Rfl: 11      Objective:     Vitals:    03/26/24 1301   BP: 130/70   BP Location: Left arm   Pulse: 83   Weight: 88.5 kg (195 lb)   Height: 175.3 cm (69\")     Body mass index is 28.8 kg/m².    Vitals reviewed.   Constitutional:       Appearance: Well-developed and not in distress.   Eyes:      Conjunctiva/sclera: Conjunctivae normal.   HENT:      Head: Normocephalic.      Nose: Nose normal.         Comments: Edentulous  Neck:      Vascular: No JVD. JVD normal.      Lymphadenopathy: No cervical adenopathy.   Pulmonary:      Effort: Pulmonary effort is normal.      Breath sounds: Normal breath sounds. No rales.   Cardiovascular:      Normal rate. Regular rhythm.      Murmurs: There is a grade 2/6 systolic murmur.   Edema:     Ankle: bilateral trace edema of the ankle.     Feet: bilateral trace edema of the feet.  Abdominal:      General: There is no distension.      Palpations: Abdomen is soft.      Tenderness: There is no abdominal tenderness.   Musculoskeletal: Normal range of motion.      Cervical back: Normal range of motion. Skin:     General: Skin is " warm and dry.   Neurological:      General: No focal deficit present.      Mental Status: Oriented to person, place, and time.      Cranial Nerves: No cranial nerve deficit.   Psychiatric:         Behavior: Behavior normal.         Thought Content: Thought content normal.         Judgment: Judgment normal.         ECG 12 Lead    Date/Time: 3/26/2024 1:21 PM  Performed by: Wilfred Draper MD    Authorized by: Wilfred Draper MD  Comparison: compared with previous ECG   Similar to previous ECG  Rhythm: atrial fibrillation and paced  Conduction: non-specific intraventricular conduction delay  QRS axis: normal  Other findings: non-specific ST-T wave changes    Clinical impression: abnormal EKG          Assessment:       Diagnosis Plan   1. Permanent atrial fibrillation  ECG 12 Lead      2. AV block, complete, post op complication of AV patricia ablation        3. History of pacemaker        4. NICM (nonischemic cardiomyopathy)  Adult Transthoracic Echo Complete W/ Cont if Necessary Per Protocol      5. Primary hypertension        6. Nonrheumatic mitral valve regurgitation  Adult Transthoracic Echo Complete W/ Cont if Necessary Per Protocol      7. Nonrheumatic tricuspid valve regurgitation  Adult Transthoracic Echo Complete W/ Cont if Necessary Per Protocol      8. Pulmonary hypertension           Plan:       Mr Spencer has had progressively worsening nonischemic cardiomyopathy, which was multifactorial and due to ongoing atrial fibrillation as well as severe mitral regurgitation.  Also, his socioeconomic issues with travel and understanding medications and affording medications have complicated matters as well.  He is now status post AVJ ablation and permanent pacemaker implantation in September 2023.    With medication adjustment, we have achieved relatively good status.  He is on bumetanide, losartan, metoprolol, and spironolactone.  He absolutely cannot take any medications that have significant co-pays to him which  include sacubitril-valsartan, dapagliflozin, and empagliflozin.  We have really worked hard to make this a goal and it has been unsuccessful.    We will get an echocardiogram in 6 months.  Hopefully his valvular disease will have improved and his ejection fraction will have improved.  He does not have coronary disease to revascularize.    Regarding atrial fibrillation, he is no longer on anticoagulant and he has no interest in taking any anticoagulant.  He says it causes severe nosebleeds.  He understands the risk of cardioembolic events and stroke still declines oral anticoagulation.  This is unfortunate as his [id number] score is 3.    Sincerely,       Wilfred Draper MD

## 2024-03-27 ENCOUNTER — TELEPHONE (OUTPATIENT)
Dept: INTERNAL MEDICINE | Facility: CLINIC | Age: 69
End: 2024-03-27
Payer: MEDICARE

## 2024-03-27 NOTE — TELEPHONE ENCOUNTER
Left him a voicemail he now has apt with Dr. Diop on 3/29 @ 8 am let me know if he can keep this so I can cancel the 4/5.

## 2024-03-29 ENCOUNTER — OFFICE VISIT (OUTPATIENT)
Dept: INTERNAL MEDICINE | Facility: CLINIC | Age: 69
End: 2024-03-29
Payer: MEDICARE

## 2024-03-29 VITALS
HEART RATE: 82 BPM | WEIGHT: 196 LBS | BODY MASS INDEX: 29.03 KG/M2 | OXYGEN SATURATION: 95 % | SYSTOLIC BLOOD PRESSURE: 104 MMHG | HEIGHT: 69 IN | TEMPERATURE: 98.4 F | DIASTOLIC BLOOD PRESSURE: 76 MMHG

## 2024-03-29 DIAGNOSIS — R05.1 ACUTE COUGH: ICD-10-CM

## 2024-03-29 DIAGNOSIS — J43.9 PULMONARY EMPHYSEMA, UNSPECIFIED EMPHYSEMA TYPE: ICD-10-CM

## 2024-03-29 DIAGNOSIS — N28.89 RENAL MASS, RIGHT: Primary | ICD-10-CM

## 2024-03-29 DIAGNOSIS — N17.9 AKI (ACUTE KIDNEY INJURY): ICD-10-CM

## 2024-03-29 DIAGNOSIS — M54.50 ACUTE RIGHT-SIDED LOW BACK PAIN WITHOUT SCIATICA: ICD-10-CM

## 2024-03-29 DIAGNOSIS — R10.9 FLANK PAIN, ACUTE: ICD-10-CM

## 2024-03-29 LAB
BILIRUB BLD-MCNC: NEGATIVE MG/DL
CLARITY, POC: CLEAR
COLOR UR: YELLOW
EXPIRATION DATE: ABNORMAL
GLUCOSE UR STRIP-MCNC: ABNORMAL MG/DL
KETONES UR QL: NEGATIVE
LEUKOCYTE EST, POC: NEGATIVE
Lab: ABNORMAL
NITRITE UR-MCNC: NEGATIVE MG/ML
PH UR: 6 [PH] (ref 5–8)
PROT UR STRIP-MCNC: NEGATIVE MG/DL
RBC # UR STRIP: NEGATIVE /UL
SP GR UR: 1.01 (ref 1–1.03)
UROBILINOGEN UR QL: NORMAL

## 2024-03-29 RX ORDER — PREDNISONE 10 MG/1
TABLET ORAL
Qty: 30 TABLET | Refills: 0 | Status: SHIPPED | OUTPATIENT
Start: 2024-03-29 | End: 2024-04-10

## 2024-03-29 RX ORDER — TRAMADOL HYDROCHLORIDE 50 MG/1
50 TABLET ORAL EVERY 12 HOURS PRN
Qty: 30 TABLET | Refills: 0 | Status: SHIPPED | OUTPATIENT
Start: 2024-03-29

## 2024-03-29 RX ORDER — BENZONATATE 100 MG/1
100 CAPSULE ORAL 3 TIMES DAILY PRN
Qty: 90 CAPSULE | Refills: 0 | Status: SHIPPED | OUTPATIENT
Start: 2024-03-29

## 2024-03-29 NOTE — PROGRESS NOTES
"      Claude Spencer is a 68 y.o. male who presents with a chief complaint of   Chief Complaint   Patient presents with    Hospital Follow Up Visit    Back Pain    Cough    Shortness of Breath       Back Pain    Cough  Associated symptoms include shortness of breath.   Shortness of Breath         Lumbar back pain that has been bothering him for about 1 week.  Cannot urinate today.  Again states he wants no more operations.  \"I am done.\"      The following portions of the patient's history were reviewed and updated as appropriate: allergies, current medications, past family history, past medical history, past social history, past surgical history and problem list.      Current Outpatient Medications:     albuterol sulfate  (90 Base) MCG/ACT inhaler, Inhale 2 puffs Every 4 (Four) Hours As Needed for Wheezing., Disp: , Rfl:     budesonide-formoterol (Symbicort) 160-4.5 MCG/ACT inhaler, Inhale 2 puffs 2 (Two) Times a Day., Disp: 10.2 g, Rfl: 6    bumetanide (Bumex) 1 MG tablet, Take 2 tablets by mouth 2 (Two) Times a Day., Disp: 120 tablet, Rfl: 3    cholecalciferol (VITAMIN D3) 25 MCG (1000 UT) tablet, Take 1 tablet by mouth Daily., Disp: , Rfl:     ibuprofen (ADVIL,MOTRIN) 600 MG tablet, Take 1 tablet by mouth Every 8 (Eight) Hours As Needed for Mild Pain., Disp: 30 tablet, Rfl: 3    ipratropium-albuterol (DUO-NEB) 0.5-2.5 mg/3 ml nebulizer, Take 3 mL by nebulization Every 4 (Four) Hours As Needed for Wheezing or Shortness of Air., Disp: 360 mL, Rfl: 1    losartan (Cozaar) 25 MG tablet, Take 1 tablet by mouth Daily., Disp: 30 tablet, Rfl: 3    metoprolol succinate XL (TOPROL-XL) 50 MG 24 hr tablet, Take 1 tablet by mouth Daily., Disp: 90 tablet, Rfl: 1    montelukast (SINGULAIR) 10 MG tablet, Take 1 tablet by mouth Every Night., Disp: 90 tablet, Rfl: 1    omeprazole (priLOSEC) 20 MG capsule, Take 1 capsule by mouth Daily., Disp: 90 capsule, Rfl: 3    potassium chloride 10 MEQ CR tablet, Take 1 tablet by " "mouth 2 (Two) Times a Day., Disp: 180 tablet, Rfl: 3    rosuvastatin (CRESTOR) 20 MG tablet, Take 1 tablet by mouth Every Night., Disp: 30 tablet, Rfl: 0    spironolactone (ALDACTONE) 25 MG tablet, Take 1 tablet by mouth Daily., Disp: 90 tablet, Rfl: 1    dapagliflozin (FARXIGA) 5 MG tablet tablet, Take 1 tablet by mouth Daily. (Patient not taking: Reported on 3/26/2024), Disp: 30 tablet, Rfl: 11    predniSONE (DELTASONE) 10 MG tablet, Take 4 tablets by mouth Daily for 3 days, THEN 3 tablets Daily for 3 days, THEN 2 tablets Daily for 3 days, THEN 1 tablet Daily for 3 days., Disp: 30 tablet, Rfl: 0    traMADol (ULTRAM) 50 MG tablet, Take 1 tablet by mouth Every 12 (Twelve) Hours As Needed for Moderate Pain., Disp: 30 tablet, Rfl: 0            Physical Exam  /76 (BP Location: Left arm, Patient Position: Sitting, Cuff Size: Adult)   Pulse 82   Temp 98.4 °F (36.9 °C) (Infrared)   Ht 175.3 cm (69.02\")   Wt 88.9 kg (196 lb)   SpO2 95%   BMI 28.93 kg/m²     Physical Exam  Vitals reviewed.   Constitutional:       General: He is not in acute distress.     Appearance: Normal appearance.   HENT:      Head: Normocephalic and atraumatic.      Nose: Nose normal.      Mouth/Throat:      Mouth: Mucous membranes are moist.   Eyes:      Conjunctiva/sclera: Conjunctivae normal.   Cardiovascular:      Rate and Rhythm: Normal rate and regular rhythm.      Pulses: Normal pulses.      Heart sounds: Normal heart sounds.   Pulmonary:      Effort: Pulmonary effort is normal.      Comments: Decreased breath sounds throughout.   Abdominal:      Palpations: Abdomen is soft.      Tenderness: There is no abdominal tenderness.   Musculoskeletal:      Right lower leg: No edema.      Left lower leg: No edema.   Skin:     General: Skin is warm and dry.   Neurological:      General: No focal deficit present.      Mental Status: He is alert.   Psychiatric:         Mood and Affect: Mood normal.           Results for orders placed or " performed during the hospital encounter of 03/21/24   COVID-19 and FLU A/B PCR, 1 HR TAT - Swab, Nasopharynx    Specimen: Nasopharynx; Swab   Result Value Ref Range    COVID19 Not Detected Not Detected - Ref. Range    Influenza A PCR Not Detected Not Detected    Influenza B PCR Not Detected Not Detected   Comprehensive Metabolic Panel    Specimen: Blood   Result Value Ref Range    Glucose 97 65 - 99 mg/dL    BUN 24 (H) 8 - 23 mg/dL    Creatinine 1.36 (H) 0.76 - 1.27 mg/dL    Sodium 128 (L) 136 - 145 mmol/L    Potassium 4.2 3.5 - 5.2 mmol/L    Chloride 92 (L) 98 - 107 mmol/L    CO2 24.4 22.0 - 29.0 mmol/L    Calcium 9.2 8.6 - 10.5 mg/dL    Total Protein 7.3 6.0 - 8.5 g/dL    Albumin 4.2 3.5 - 5.2 g/dL    ALT (SGPT) 23 1 - 41 U/L    AST (SGOT) 37 1 - 40 U/L    Alkaline Phosphatase 65 39 - 117 U/L    Total Bilirubin 1.0 0.0 - 1.2 mg/dL    Globulin 3.1 gm/dL    A/G Ratio 1.4 g/dL    BUN/Creatinine Ratio 17.6 7.0 - 25.0    Anion Gap 11.6 5.0 - 15.0 mmol/L    eGFR 57.4 (L) >60.0 mL/min/1.73   BNP    Specimen: Blood   Result Value Ref Range    proBNP 3,619.0 (H) 0.0 - 900.0 pg/mL   Single High Sensitivity Troponin T    Specimen: Blood   Result Value Ref Range    HS Troponin T 42 (H) <22 ng/L   CBC Auto Differential    Specimen: Blood   Result Value Ref Range    WBC 16.15 (H) 3.40 - 10.80 10*3/mm3    RBC 5.25 4.14 - 5.80 10*6/mm3    Hemoglobin 15.0 13.0 - 17.7 g/dL    Hematocrit 46.5 37.5 - 51.0 %    MCV 88.6 79.0 - 97.0 fL    MCH 28.6 26.6 - 33.0 pg    MCHC 32.3 31.5 - 35.7 g/dL    RDW 17.2 (H) 12.3 - 15.4 %    RDW-SD 54.8 (H) 37.0 - 54.0 fl    MPV 10.1 6.0 - 12.0 fL    Platelets 213 140 - 450 10*3/mm3    Neutrophil % 77.0 (H) 42.7 - 76.0 %    Lymphocyte % 13.7 (L) 19.6 - 45.3 %    Monocyte % 8.4 5.0 - 12.0 %    Eosinophil % 0.1 (L) 0.3 - 6.2 %    Basophil % 0.2 0.0 - 1.5 %    Immature Grans % 0.6 (H) 0.0 - 0.5 %    Neutrophils, Absolute 12.43 (H) 1.70 - 7.00 10*3/mm3    Lymphocytes, Absolute 2.21 0.70 - 3.10 10*3/mm3     Monocytes, Absolute 1.36 (H) 0.10 - 0.90 10*3/mm3    Eosinophils, Absolute 0.01 0.00 - 0.40 10*3/mm3    Basophils, Absolute 0.04 0.00 - 0.20 10*3/mm3    Immature Grans, Absolute 0.10 (H) 0.00 - 0.05 10*3/mm3    nRBC 0.0 0.0 - 0.2 /100 WBC   Single High Sensitivity Troponin T    Specimen: Blood   Result Value Ref Range    HS Troponin T 35 (H) <22 ng/L   ECG 12 Lead ED Triage Standing Order; SOA   Result Value Ref Range    QT Interval 344 ms    QTC Interval 397 ms   Green Top (Gel)   Result Value Ref Range    Extra Tube Hold for add-ons.    Lavender Top   Result Value Ref Range    Extra Tube hold for add-on    Gold Top - SST   Result Value Ref Range    Extra Tube Hold for add-ons.    Light Blue Top   Result Value Ref Range    Extra Tube Hold for add-ons.            Diagnoses and all orders for this visit:    1. Renal mass, right (Primary)  -     CT Abdomen Pelvis Without Contrast    2. Flank pain, acute  -     CT Abdomen Pelvis Without Contrast  -     traMADol (ULTRAM) 50 MG tablet; Take 1 tablet by mouth Every 12 (Twelve) Hours As Needed for Moderate Pain.  Dispense: 30 tablet; Refill: 0    3. LYNN (acute kidney injury)  -     Comprehensive Metabolic Panel    4. Acute right-sided low back pain without sciatica  -     traMADol (ULTRAM) 50 MG tablet; Take 1 tablet by mouth Every 12 (Twelve) Hours As Needed for Moderate Pain.  Dispense: 30 tablet; Refill: 0    5. Pulmonary emphysema, unspecified emphysema type  -     predniSONE (DELTASONE) 10 MG tablet; Take 4 tablets by mouth Daily for 3 days, THEN 3 tablets Daily for 3 days, THEN 2 tablets Daily for 3 days, THEN 1 tablet Daily for 3 days.  Dispense: 30 tablet; Refill: 0      I feel like he had a cold from the kiddos in his home and then some level of CHF got exacerbated.  I am worried about the right flank pain and his history of the mass he has previously refused further imaging of.  With his new device, I cannot get an MRI now so I will repeat a CT to try and see if  there is anything going on given the location of this back pain he is having.    I cannot think of another pain option with his LYNN after the motrin increase.  Tylenol is not relieving the pain.  His pain is 8/10 today.  We will try temporary tramadol while we await the imaging.

## 2024-05-08 RX ORDER — MONTELUKAST SODIUM 10 MG/1
10 TABLET ORAL NIGHTLY
Qty: 30 TABLET | Refills: 0 | Status: SHIPPED | OUTPATIENT
Start: 2024-05-08

## 2024-05-30 DIAGNOSIS — Z95.0 HISTORY OF PACEMAKER: ICD-10-CM

## 2024-05-30 DIAGNOSIS — I34.0 NONRHEUMATIC MITRAL VALVE REGURGITATION: ICD-10-CM

## 2024-05-30 DIAGNOSIS — E78.00 HYPERCHOLESTEROLEMIA: ICD-10-CM

## 2024-05-30 DIAGNOSIS — I10 PRIMARY HYPERTENSION: ICD-10-CM

## 2024-05-30 DIAGNOSIS — I42.8 NICM (NONISCHEMIC CARDIOMYOPATHY): ICD-10-CM

## 2024-05-30 RX ORDER — LOSARTAN POTASSIUM 25 MG/1
25 TABLET ORAL DAILY
Qty: 90 TABLET | Refills: 1 | Status: SHIPPED | OUTPATIENT
Start: 2024-05-30

## 2024-05-30 NOTE — TELEPHONE ENCOUNTER
Rx Refill Note  Requested Prescriptions     Pending Prescriptions Disp Refills    losartan (Cozaar) 25 MG tablet 30 tablet 3     Sig: Take 1 tablet by mouth Daily.      Last office visit with prescribing clinician: 3/29/2024   Last telemedicine visit with prescribing clinician: Visit date not found   Next office visit with prescribing clinician: 10/4/2024                         Would you like a call back once the refill request has been completed: [] Yes [] No    If the office needs to give you a call back, can they leave a voicemail: [] Yes [] No    Umu Cordoab CMA  05/30/24, 09:42 EDT

## 2024-05-30 NOTE — TELEPHONE ENCOUNTER
Rx Refill Note  Requested Prescriptions     Pending Prescriptions Disp Refills    losartan (Cozaar) 25 MG tablet 30 tablet 3     Sig: Take 1 tablet by mouth Daily.      Last office visit with prescribing clinician: 3/29/2024   Last telemedicine visit with prescribing clinician: Visit date not found   Next office visit with prescribing clinician: 10/4/2024                         Would you like a call back once the refill request has been completed: [] Yes [] No    If the office needs to give you a call back, can they leave a voicemail: [] Yes [] No    Donald Ramos, PCT  05/30/24, 09:36 EDT

## 2024-06-01 DIAGNOSIS — I50.20 HEART FAILURE WITH REDUCED EJECTION FRACTION, NYHA CLASS III: ICD-10-CM

## 2024-06-01 DIAGNOSIS — I10 PRIMARY HYPERTENSION: ICD-10-CM

## 2024-06-01 NOTE — TELEPHONE ENCOUNTER
Normal serum sodium in past 12 months   Rx Refill Note  Requested Prescriptions     Pending Prescriptions Disp Refills    montelukast (SINGULAIR) 10 MG tablet [Pharmacy Med Name: MONTELUKAST SOD 10 MG TABLET] 30 tablet 0     Sig: TAKE ONE TABLET BY MOUTH ONCE NIGHTLY    spironolactone (ALDACTONE) 25 MG tablet [Pharmacy Med Name: SPIRONOLACTONE 25 MG TABLET] 30 tablet      Sig: Take 1 tablet by mouth Daily.      Last office visit with prescribing clinician: 3/29/2024   Last telemedicine visit with prescribing clinician: Visit date not found   Next office visit with prescribing clinician: 10/4/2024                         Would you like a call back once the refill request has been completed: [] Yes [] No    If the office needs to give you a call back, can they leave a voicemail: [] Yes [] No    Etta Nelson MA  06/01/24, 11:05 EDT

## 2024-06-04 RX ORDER — SPIRONOLACTONE 25 MG/1
25 TABLET ORAL DAILY
Qty: 90 TABLET | Refills: 1 | Status: SHIPPED | OUTPATIENT
Start: 2024-06-04

## 2024-06-04 RX ORDER — MONTELUKAST SODIUM 10 MG/1
10 TABLET ORAL NIGHTLY
Qty: 90 TABLET | Refills: 1 | Status: SHIPPED | OUTPATIENT
Start: 2024-06-04

## 2024-08-15 ENCOUNTER — TRANSCRIBE ORDERS (OUTPATIENT)
Dept: PULMONOLOGY | Facility: HOSPITAL | Age: 69
End: 2024-08-15
Payer: COMMERCIAL

## 2024-08-15 DIAGNOSIS — J44.9 CHRONIC OBSTRUCTIVE PULMONARY DISEASE, UNSPECIFIED COPD TYPE: Primary | ICD-10-CM

## 2024-09-08 DIAGNOSIS — E78.00 HYPERCHOLESTEROLEMIA: ICD-10-CM

## 2024-09-09 RX ORDER — ROSUVASTATIN CALCIUM 20 MG/1
20 TABLET, COATED ORAL NIGHTLY
Qty: 30 TABLET | Refills: 0 | Status: SHIPPED | OUTPATIENT
Start: 2024-09-09

## 2024-09-09 NOTE — TELEPHONE ENCOUNTER
Rx Refill Note  Requested Prescriptions     Pending Prescriptions Disp Refills    rosuvastatin (CRESTOR) 20 MG tablet [Pharmacy Med Name: ROSUVASTATIN CALCIUM 20 MG TAB] 30 tablet 0     Sig: TAKE ONE TABLET BY MOUTH ONCE NIGHTLY      Last office visit with prescribing clinician: 3/29/2024   Last telemedicine visit with prescribing clinician: Visit date not found   Next office visit with prescribing clinician: 10/4/2024                         Would you like a call back once the refill request has been completed: [] Yes [] No    If the office needs to give you a call back, can they leave a voicemail: [] Yes [] No    Rosaura Min MA  09/09/24, 08:21 EDT

## 2024-09-18 ENCOUNTER — TELEPHONE (OUTPATIENT)
Dept: CARDIOLOGY | Facility: CLINIC | Age: 69
End: 2024-09-18

## 2024-09-18 NOTE — TELEPHONE ENCOUNTER
Caller: Claude Spencer    Relationship to patient: Self    Best call back number: [phone]    Type of visit: DEVICE CHECK    Requested date: 9/26/24    Additional notes: PT IS RETURNING A MISSED CALL TO SCHEDULE A DEVICE CHECK FOR 6 MONTHS. PLEASE ADVISE, THERE ARE NO NOTES IN CHART. HE ALSO WANTS TO KNOW IF HE WILL BE CHARGED FOR THE ECHO AND DEVICE CHECK

## 2024-09-19 NOTE — TELEPHONE ENCOUNTER
09/19/2024    Called and left a voicemail for this patient to return call to schedule this appointment.     Thanks  Channing

## 2024-09-26 ENCOUNTER — HOSPITAL ENCOUNTER (OUTPATIENT)
Dept: CARDIOLOGY | Facility: HOSPITAL | Age: 69
Discharge: HOME | End: 2024-09-26
Admitting: INTERNAL MEDICINE
Payer: COMMERCIAL

## 2024-09-26 ENCOUNTER — OFFICE VISIT (OUTPATIENT)
Dept: CARDIOLOGY | Facility: CLINIC | Age: 69
End: 2024-09-26
Payer: COMMERCIAL

## 2024-09-26 ENCOUNTER — CLINICAL SUPPORT NO REQUIREMENTS (OUTPATIENT)
Dept: CARDIOLOGY | Facility: CLINIC | Age: 69
End: 2024-09-26
Payer: COMMERCIAL

## 2024-09-26 VITALS
HEIGHT: 68 IN | WEIGHT: 188.9 LBS | OXYGEN SATURATION: 95 % | BODY MASS INDEX: 28.63 KG/M2 | HEART RATE: 82 BPM | SYSTOLIC BLOOD PRESSURE: 120 MMHG | DIASTOLIC BLOOD PRESSURE: 88 MMHG

## 2024-09-26 VITALS
SYSTOLIC BLOOD PRESSURE: 126 MMHG | WEIGHT: 194 LBS | HEART RATE: 84 BPM | HEIGHT: 69 IN | DIASTOLIC BLOOD PRESSURE: 87 MMHG | BODY MASS INDEX: 28.73 KG/M2

## 2024-09-26 DIAGNOSIS — I97.190 AV BLOCK, COMPLETE, POST OP COMPLICATION OF AV NODAL ABLATION: Primary | ICD-10-CM

## 2024-09-26 DIAGNOSIS — I42.8 NICM (NONISCHEMIC CARDIOMYOPATHY): ICD-10-CM

## 2024-09-26 DIAGNOSIS — I42.8 NICM (NONISCHEMIC CARDIOMYOPATHY): Primary | ICD-10-CM

## 2024-09-26 DIAGNOSIS — I34.0 NONRHEUMATIC MITRAL VALVE REGURGITATION: ICD-10-CM

## 2024-09-26 DIAGNOSIS — Z95.0 HISTORY OF PACEMAKER: ICD-10-CM

## 2024-09-26 DIAGNOSIS — E78.00 HYPERCHOLESTEROLEMIA: ICD-10-CM

## 2024-09-26 DIAGNOSIS — I36.1 NONRHEUMATIC TRICUSPID VALVE REGURGITATION: ICD-10-CM

## 2024-09-26 DIAGNOSIS — I48.19 PERSISTENT ATRIAL FIBRILLATION: ICD-10-CM

## 2024-09-26 DIAGNOSIS — I44.2 AV BLOCK, COMPLETE, POST OP COMPLICATION OF AV NODAL ABLATION: Primary | ICD-10-CM

## 2024-09-26 LAB
AORTIC DIMENSIONLESS INDEX: 0.8 (DI)
BH CV ECHO LEFT VENTRICLE GLOBAL LONGITUDINAL STRAIN: -10.5 %
BH CV ECHO MEAS - AO MAX PG: 6 MMHG
BH CV ECHO MEAS - AO MEAN PG: 3 MMHG
BH CV ECHO MEAS - AO ROOT AREA (BSA CORRECTED): 1.9 CM2
BH CV ECHO MEAS - AO ROOT DIAM: 3.8 CM
BH CV ECHO MEAS - AO V2 MAX: 122 CM/SEC
BH CV ECHO MEAS - AO V2 VTI: 15 CM
BH CV ECHO MEAS - AVA(I,D): 3.4 CM2
BH CV ECHO MEAS - EDV(CUBED): 172.9 ML
BH CV ECHO MEAS - EDV(MOD-SP2): 94 ML
BH CV ECHO MEAS - EDV(MOD-SP4): 100 ML
BH CV ECHO MEAS - EF(MOD-BP): 32.7 %
BH CV ECHO MEAS - EF(MOD-SP2): 31.9 %
BH CV ECHO MEAS - EF(MOD-SP4): 33 %
BH CV ECHO MEAS - ESV(CUBED): 118.9 ML
BH CV ECHO MEAS - ESV(MOD-SP2): 64 ML
BH CV ECHO MEAS - ESV(MOD-SP4): 67 ML
BH CV ECHO MEAS - FS: 11.7 %
BH CV ECHO MEAS - IVS/LVPW: 1.05 CM
BH CV ECHO MEAS - IVSD: 0.84 CM
BH CV ECHO MEAS - LV DIASTOLIC VOL/BSA (35-75): 49.1 CM2
BH CV ECHO MEAS - LV MASS(C)D: 168.6 GRAMS
BH CV ECHO MEAS - LV MAX PG: 3.4 MMHG
BH CV ECHO MEAS - LV MEAN PG: 1.61 MMHG
BH CV ECHO MEAS - LV SYSTOLIC VOL/BSA (12-30): 32.9 CM2
BH CV ECHO MEAS - LV V1 MAX: 91.6 CM/SEC
BH CV ECHO MEAS - LV V1 VTI: 13.4 CM
BH CV ECHO MEAS - LVIDD: 5.6 CM
BH CV ECHO MEAS - LVIDS: 4.9 CM
BH CV ECHO MEAS - LVOT AREA: 3.8 CM2
BH CV ECHO MEAS - LVOT DIAM: 2.2 CM
BH CV ECHO MEAS - LVPWD: 0.8 CM
BH CV ECHO MEAS - MR MAX PG: 86.3 MMHG
BH CV ECHO MEAS - MR MAX VEL: 464.6 CM/SEC
BH CV ECHO MEAS - MR MEAN PG: 52.5 MMHG
BH CV ECHO MEAS - MR MEAN VEL: 335.1 CM/SEC
BH CV ECHO MEAS - MR VTI: 131 CM
BH CV ECHO MEAS - MV DEC SLOPE: 267.3 CM/SEC2
BH CV ECHO MEAS - MV MAX PG: 1.85 MMHG
BH CV ECHO MEAS - MV MEAN PG: 0.63 MMHG
BH CV ECHO MEAS - MV P1/2T: 78 MSEC
BH CV ECHO MEAS - MV V2 VTI: 13.8 CM
BH CV ECHO MEAS - MVA(P1/2T): 2.8 CM2
BH CV ECHO MEAS - MVA(VTI): 3.7 CM2
BH CV ECHO MEAS - PA ACC TIME: 0.06 SEC
BH CV ECHO MEAS - RAP SYSTOLE: 3 MMHG
BH CV ECHO MEAS - RV MAX PG: 1.12 MMHG
BH CV ECHO MEAS - RV V1 MAX: 52.9 CM/SEC
BH CV ECHO MEAS - RV V1 VTI: 9.5 CM
BH CV ECHO MEAS - RVSP: 25.8 MMHG
BH CV ECHO MEAS - SV(LVOT): 51 ML
BH CV ECHO MEAS - SV(MOD-SP2): 30 ML
BH CV ECHO MEAS - SV(MOD-SP4): 33 ML
BH CV ECHO MEAS - SVI(LVOT): 25.1 ML/M2
BH CV ECHO MEAS - SVI(MOD-SP2): 14.7 ML/M2
BH CV ECHO MEAS - SVI(MOD-SP4): 16.2 ML/M2
BH CV ECHO MEAS - TR MAX PG: 22.8 MMHG
BH CV ECHO MEAS - TR MAX VEL: 238.8 CM/SEC
BH CV XLRA - RV BASE: 4.7 CM
BH CV XLRA - RV LENGTH: 8 CM
BH CV XLRA - RV MID: 3.8 CM
BH CV XLRA - TDI S': 20.4 CM/SEC
LEFT ATRIUM VOLUME INDEX: 39.5 ML/M2
SINUS: 3.7 CM

## 2024-09-26 PROCEDURE — 93306 TTE W/DOPPLER COMPLETE: CPT

## 2024-09-26 PROCEDURE — 93356 MYOCRD STRAIN IMG SPCKL TRCK: CPT

## 2024-10-05 DIAGNOSIS — I50.20 HEART FAILURE WITH REDUCED EJECTION FRACTION, NYHA CLASS III: ICD-10-CM

## 2024-10-07 RX ORDER — BUMETANIDE 1 MG/1
2 TABLET ORAL 2 TIMES DAILY
Qty: 120 TABLET | Refills: 3 | Status: SHIPPED | OUTPATIENT
Start: 2024-10-07

## 2024-10-07 NOTE — TELEPHONE ENCOUNTER
Refill Protocol Failed, Requesting Bumetanide 1mg, 2 tabs QD    LOV w/ you 9/26/24  FU w/ 9/30/25  Last Labs- 3/29/24    Please review and sign.    GIANNA Hannon

## 2024-10-11 DIAGNOSIS — E78.00 HYPERCHOLESTEROLEMIA: ICD-10-CM

## 2024-10-11 RX ORDER — METOPROLOL SUCCINATE 50 MG/1
50 TABLET, EXTENDED RELEASE ORAL DAILY
Qty: 30 TABLET | Refills: 3 | Status: SHIPPED | OUTPATIENT
Start: 2024-10-11

## 2024-10-14 RX ORDER — ROSUVASTATIN CALCIUM 20 MG/1
20 TABLET, COATED ORAL NIGHTLY
Qty: 30 TABLET | Refills: 2 | Status: SHIPPED | OUTPATIENT
Start: 2024-10-14

## 2024-10-14 NOTE — TELEPHONE ENCOUNTER
Rx Refill Note  Requested Prescriptions     Pending Prescriptions Disp Refills    rosuvastatin (CRESTOR) 20 MG tablet [Pharmacy Med Name: ROSUVASTATIN CALCIUM 20 MG TAB] 30 tablet 2     Sig: TAKE ONE TABLET BY MOUTH ONCE NIGHTLY      Last office visit with prescribing clinician: 3/29/2024   Last telemedicine visit with prescribing clinician: Visit date not found   Next office visit with prescribing clinician: 1/24/2025                         Would you like a call back once the refill request has been completed: [] Yes [] No    If the office needs to give you a call back, can they leave a voicemail: [] Yes [] No    Irene Cardona MA  10/14/24, 16:21 EDT

## 2024-11-06 DIAGNOSIS — K21.9 GASTROESOPHAGEAL REFLUX DISEASE WITHOUT ESOPHAGITIS: ICD-10-CM

## 2024-11-25 NOTE — PLAN OF CARE
Goal Outcome Evaluation:  Plan of Care Reviewed With: patient        Progress: improving  Outcome Evaluation: Pt s/p PPM & AV node ablation. OOB 2000. Ambulated to bathroom, voids per urinal. RUC incision with skin glue. Tentative plan for DC on 9/23/23. VSS, will continue to monitor          3 = A little assistance

## 2024-12-09 DIAGNOSIS — I10 PRIMARY HYPERTENSION: ICD-10-CM

## 2024-12-09 DIAGNOSIS — I44.2 AV BLOCK, COMPLETE, POST OP COMPLICATION OF AV NODAL ABLATION: ICD-10-CM

## 2024-12-09 DIAGNOSIS — E78.00 HYPERCHOLESTEROLEMIA: ICD-10-CM

## 2024-12-09 DIAGNOSIS — Z95.0 HISTORY OF PACEMAKER: ICD-10-CM

## 2024-12-09 DIAGNOSIS — I97.190 AV BLOCK, COMPLETE, POST OP COMPLICATION OF AV NODAL ABLATION: ICD-10-CM

## 2024-12-09 DIAGNOSIS — I42.8 NICM (NONISCHEMIC CARDIOMYOPATHY): ICD-10-CM

## 2024-12-09 DIAGNOSIS — I50.20 HEART FAILURE WITH REDUCED EJECTION FRACTION, NYHA CLASS III: ICD-10-CM

## 2024-12-09 DIAGNOSIS — K21.9 GASTROESOPHAGEAL REFLUX DISEASE WITHOUT ESOPHAGITIS: ICD-10-CM

## 2024-12-09 DIAGNOSIS — I34.0 NONRHEUMATIC MITRAL VALVE REGURGITATION: ICD-10-CM

## 2024-12-09 DIAGNOSIS — I48.21 PERMANENT ATRIAL FIBRILLATION: ICD-10-CM

## 2024-12-09 DIAGNOSIS — I36.1 NONRHEUMATIC TRICUSPID VALVE REGURGITATION: ICD-10-CM

## 2024-12-11 RX ORDER — MONTELUKAST SODIUM 10 MG/1
10 TABLET ORAL NIGHTLY
Qty: 30 TABLET | Refills: 2 | Status: SHIPPED | OUTPATIENT
Start: 2024-12-11

## 2024-12-11 RX ORDER — LOSARTAN POTASSIUM 25 MG/1
25 TABLET ORAL DAILY
Qty: 30 TABLET | Refills: 2 | Status: SHIPPED | OUTPATIENT
Start: 2024-12-11

## 2024-12-11 NOTE — TELEPHONE ENCOUNTER
Normal serum sodium in past 12 months   Potassium last filled by another provider, ok to take over?

## 2024-12-11 NOTE — TELEPHONE ENCOUNTER
Caller: Claude Spencer    Relationship: Self    Best call back number: [phone]    Requested Prescriptions:   Requested Prescriptions     Pending Prescriptions Disp Refills    spironolactone (ALDACTONE) 25 MG tablet [Pharmacy Med Name: SPIRONOLACTONE 25 MG TABLET] 30 tablet      Sig: TAKE 1 TABLET BY MOUTH DAILY    montelukast (SINGULAIR) 10 MG tablet [Pharmacy Med Name: MONTELUKAST SOD 10 MG TABLET] 30 tablet      Sig: TAKE ONE TABLET BY MOUTH ONCE NIGHTLY    losartan (COZAAR) 25 MG tablet [Pharmacy Med Name: LOSARTAN POTASSIUM 25 MG TAB] 30 tablet      Sig: TAKE 1 TABLET BY MOUTH DAILY    potassium chloride 10 MEQ CR tablet 180 tablet 3     Sig: Take 1 tablet by mouth 2 (Two) Times a Day.    omeprazole (priLOSEC) 20 MG capsule 90 capsule 3     Sig: Take 1 capsule by mouth Daily.        Pharmacy where request should be sent: [facility] [id number] [place], [place] - [address] - [phone] PH - [phone] FX     Last office visit with prescribing clinician: 3/29/2024   Last telemedicine visit with prescribing clinician: Visit date not found   Next office visit with prescribing clinician: 1/24/2025     Additional details provided by patient: PATIENT STATES THAT HE IS OUT OF THESE MEDICATIONS.     Does the patient have less than a 3 day supply:  [x] Yes  [] No    Would you like a call back once the refill request has been completed: [] Yes [x] No    If the office needs to give you a call back, can they leave a voicemail: [] Yes [x] No    Yobani Reynolds Rep   12/11/24 10:29 EST

## 2024-12-13 RX ORDER — POTASSIUM CHLORIDE 750 MG/1
10 TABLET, EXTENDED RELEASE ORAL 2 TIMES DAILY
Qty: 180 TABLET | Refills: 1 | Status: SHIPPED | OUTPATIENT
Start: 2024-12-13

## 2024-12-13 RX ORDER — SPIRONOLACTONE 25 MG/1
25 TABLET ORAL DAILY
Qty: 90 TABLET | Refills: 1 | Status: SHIPPED | OUTPATIENT
Start: 2024-12-13

## 2025-01-05 NOTE — CASE MANAGEMENT/SOCIAL WORK
Case Management Discharge Note      Final Note: home         Selected Continued Care - Discharged on 9/23/2023 Admission date: 9/22/2023 - Discharge disposition: Home or Self Care      Destination    No services have been selected for the patient.                Durable Medical Equipment    No services have been selected for the patient.                Dialysis/Infusion    No services have been selected for the patient.                Home Medical Care    No services have been selected for the patient.                Therapy    No services have been selected for the patient.                Community Resources    No services have been selected for the patient.                Community & DME    No services have been selected for the patient.                    Transportation Services  Private: Car    Final Discharge Disposition Code: 01 - home or self-care   Not applicable

## 2025-01-16 DIAGNOSIS — E78.00 HYPERCHOLESTEROLEMIA: ICD-10-CM

## 2025-01-16 NOTE — TELEPHONE ENCOUNTER
KATHLEEN gutierrez cosign protocol failed    Rx Refill Note  Requested Prescriptions     Pending Prescriptions Disp Refills    rosuvastatin (CRESTOR) 20 MG tablet [Pharmacy Med Name: ROSUVASTATIN CALCIUM 20 MG TAB] 30 tablet 2     Sig: TAKE ONE TABLET BY MOUTH ONCE NIGHTLY      Last office visit with prescribing clinician: 3/29/2024   Last telemedicine visit with prescribing clinician: Visit date not found   Next office visit with prescribing clinician: 1/24/2025                         Would you like a call back once the refill request has been completed: [] Yes [] No    If the office needs to give you a call back, can they leave a voicemail: [] Yes [] No    Emily Kennedy MA  01/16/25, 12:44 EST

## 2025-01-17 RX ORDER — ROSUVASTATIN CALCIUM 20 MG/1
20 TABLET, COATED ORAL NIGHTLY
Qty: 90 TABLET | Refills: 1 | Status: SHIPPED | OUTPATIENT
Start: 2025-01-17

## 2025-01-24 ENCOUNTER — OFFICE VISIT (OUTPATIENT)
Dept: INTERNAL MEDICINE | Facility: CLINIC | Age: 70
End: 2025-01-24
Payer: COMMERCIAL

## 2025-01-24 VITALS
OXYGEN SATURATION: 93 % | DIASTOLIC BLOOD PRESSURE: 78 MMHG | WEIGHT: 191.6 LBS | SYSTOLIC BLOOD PRESSURE: 110 MMHG | BODY MASS INDEX: 29.04 KG/M2 | HEART RATE: 82 BPM | HEIGHT: 68 IN | TEMPERATURE: 98 F

## 2025-01-24 DIAGNOSIS — R05.1 ACUTE COUGH: ICD-10-CM

## 2025-01-24 DIAGNOSIS — J18.9 WALKING PNEUMONIA: ICD-10-CM

## 2025-01-24 DIAGNOSIS — E78.00 HYPERCHOLESTEROLEMIA: ICD-10-CM

## 2025-01-24 DIAGNOSIS — I42.8 NICM (NONISCHEMIC CARDIOMYOPATHY): ICD-10-CM

## 2025-01-24 DIAGNOSIS — I10 PRIMARY HYPERTENSION: ICD-10-CM

## 2025-01-24 DIAGNOSIS — R73.9 HYPERGLYCEMIA: ICD-10-CM

## 2025-01-24 DIAGNOSIS — Z00.00 ANNUAL PHYSICAL EXAM: Primary | ICD-10-CM

## 2025-01-24 PROCEDURE — 99397 PER PM REEVAL EST PAT 65+ YR: CPT | Performed by: INTERNAL MEDICINE

## 2025-01-24 PROCEDURE — 99214 OFFICE O/P EST MOD 30 MIN: CPT | Performed by: INTERNAL MEDICINE

## 2025-01-24 RX ORDER — AZITHROMYCIN 250 MG/1
TABLET, FILM COATED ORAL
Qty: 6 TABLET | Refills: 0 | Status: SHIPPED | OUTPATIENT
Start: 2025-01-24

## 2025-01-24 RX ORDER — ALBUTEROL SULFATE 0.83 MG/ML
SOLUTION RESPIRATORY (INHALATION)
COMMUNITY
Start: 2024-12-17

## 2025-01-24 NOTE — PROGRESS NOTES
"Chief Complaint  Annual Exam and Ankle Pain (Had knot on his ankle 2 weeks ago and it was painful, its gone now)    Subjective        Claude Spencer presents to [facility]  History of Present Illness    Coughing with yellow sputum x1 month.  Has been using his symbicort and albuterol nebulizer.  No fevers, GI symptoms, +SOA.     Objective   Vital Signs:  /78 (BP Location: Left arm, Patient Position: Sitting, Cuff Size: Large Adult)   Pulse 82   Temp 98 °F (36.7 °C) (Infrared)   Ht 172.7 cm (68\")   Wt 86.9 kg (191 lb 9.6 oz)   SpO2 93%   BMI 29.13 kg/m²   Estimated body mass index is 29.13 kg/m² as calculated from the following:    Height as of this encounter: 172.7 cm (68\").    Weight as of this encounter: 86.9 kg (191 lb 9.6 oz).    BMI is >= 25 and <30. (Overweight) The following options were offered after discussion;: exercise counseling/recommendations and nutrition counseling/recommendations      Physical Exam  Vitals reviewed.   Constitutional:       General: He is not in acute distress.     Appearance: Normal appearance.   HENT:      Head: Normocephalic and atraumatic.      Nose: Nose normal.      Mouth/Throat:      Mouth: Mucous membranes are moist.   Eyes:      Conjunctiva/sclera: Conjunctivae normal.   Cardiovascular:      Rate and Rhythm: Normal rate and regular rhythm.      Pulses: Normal pulses.      Heart sounds: Normal heart sounds.   Pulmonary:      Effort: Pulmonary effort is normal.      Breath sounds: Wheezing present.   Abdominal:      Palpations: Abdomen is soft.      Tenderness: There is no abdominal tenderness.   Musculoskeletal:      Right lower leg: No edema.      Left lower leg: No edema.      Comments: Varicose veins b/l, but no swelling.   Skin:     General: Skin is warm and dry.   Neurological:      General: No focal deficit present.      Mental Status: He is alert.   Psychiatric:         Mood and Affect: Mood normal.        Result Review " ":  The following data was reviewed by: Zohreh Diop MD on 01/24/2025:  Common labs          3/21/2024    18:05 1/24/2025    14:52   Common Labs   Glucose 97  83    BUN 24  15    Creatinine 1.36  1.04    Sodium 128  138    Potassium 4.2  3.9    Chloride 92  100    Calcium 9.2  9.1    Total Protein  6.9    Albumin 4.2  4.1    Total Bilirubin 1.0  0.7    Alkaline Phosphatase 65  69    AST (SGOT) 37  40    ALT (SGPT) 23  26    WBC 16.15  8.24    Hemoglobin 15.0  14.2    Hematocrit 46.5  41.5    Platelets 213  225    Total Cholesterol  199    Triglycerides  110    HDL Cholesterol  43    LDL Cholesterol   136    Hemoglobin A1C  5.60              Assessment and Plan   Diagnoses and all orders for this visit:    1. Annual physical exam (Primary)    2. Acute cough  -     azithromycin (Zithromax Z-Hola) 250 MG tablet; Take 2 tablets by mouth on day 1, then 1 tablet daily on days 2-5  Dispense: 6 tablet; Refill: 0    3. Hypercholesterolemia  -     Lipid Panel With LDL / HDL Ratio    4. Primary hypertension  -     Comprehensive Metabolic Panel  -     CBC (No Diff)  -     Urinalysis With Microscopic - Urine, Clean Catch    5. Walking pneumonia  -     azithromycin (Zithromax Z-Hola) 250 MG tablet; Take 2 tablets by mouth on day 1, then 1 tablet daily on days 2-5  Dispense: 6 tablet; Refill: 0    6. NICM (nonischemic cardiomyopathy)  -     Comprehensive Metabolic Panel  -     CBC (No Diff)  -     Lipid Panel With LDL / HDL Ratio    7. Hyperglycemia  -     Hemoglobin A1c        He refuses f/u imaging of the mass in his abdomen again.  He says he \"does not want to know\".     Varicose veins are present in his b/l ankles.      Treat respirator symptoms with Z-pack.          Follow Up   F/u in 6 months  Patient was given instructions and counseling regarding his condition or for health maintenance advice. Please see specific information pulled into the AVS if appropriate.             "

## 2025-01-25 LAB
ALBUMIN SERPL-MCNC: 4.1 G/DL (ref 3.5–5.2)
ALBUMIN/GLOB SERPL: 1.5 G/DL
ALP SERPL-CCNC: 69 U/L (ref 39–117)
ALT SERPL-CCNC: 26 U/L (ref 1–41)
AST SERPL-CCNC: 40 U/L (ref 1–40)
BILIRUB SERPL-MCNC: 0.7 MG/DL (ref 0–1.2)
BUN SERPL-MCNC: 15 MG/DL (ref 8–23)
BUN/CREAT SERPL: 14.4 (ref 7–25)
CALCIUM SERPL-MCNC: 9.1 MG/DL (ref 8.6–10.5)
CHLORIDE SERPL-SCNC: 100 MMOL/L (ref 98–107)
CHOLEST SERPL-MCNC: 199 MG/DL (ref 0–200)
CO2 SERPL-SCNC: 25.7 MMOL/L (ref 22–29)
CREAT SERPL-MCNC: 1.04 MG/DL (ref 0.76–1.27)
EGFRCR SERPLBLD CKD-EPI 2021: 78.7 ML/MIN/1.73
ERYTHROCYTE [DISTWIDTH] IN BLOOD BY AUTOMATED COUNT: 12.1 % (ref 12.3–15.4)
GLOBULIN SER CALC-MCNC: 2.8 GM/DL
GLUCOSE SERPL-MCNC: 83 MG/DL (ref 65–99)
HBA1C MFR BLD: 5.6 % (ref 4.8–5.6)
HCT VFR BLD AUTO: 41.5 % (ref 37.5–51)
HDLC SERPL-MCNC: 43 MG/DL (ref 40–60)
HGB BLD-MCNC: 14.2 G/DL (ref 13–17.7)
LDLC SERPL CALC-MCNC: 136 MG/DL (ref 0–100)
LDLC/HDLC SERPL: 3.12 {RATIO}
MCH RBC QN AUTO: 33.3 PG (ref 26.6–33)
MCHC RBC AUTO-ENTMCNC: 34.2 G/DL (ref 31.5–35.7)
MCV RBC AUTO: 97.2 FL (ref 79–97)
PLATELET # BLD AUTO: 225 10*3/MM3 (ref 140–450)
POTASSIUM SERPL-SCNC: 3.9 MMOL/L (ref 3.5–5.2)
PROT SERPL-MCNC: 6.9 G/DL (ref 6–8.5)
RBC # BLD AUTO: 4.27 10*6/MM3 (ref 4.14–5.8)
SODIUM SERPL-SCNC: 138 MMOL/L (ref 136–145)
TRIGL SERPL-MCNC: 110 MG/DL (ref 0–150)
VLDLC SERPL CALC-MCNC: 20 MG/DL (ref 5–40)
WBC # BLD AUTO: 8.24 10*3/MM3 (ref 3.4–10.8)

## 2025-02-10 ENCOUNTER — TELEPHONE (OUTPATIENT)
Dept: INTERNAL MEDICINE | Facility: CLINIC | Age: 70
End: 2025-02-10

## 2025-02-10 NOTE — TELEPHONE ENCOUNTER
Caller: Claude Spencer    Relationship: Self    Best call back number: [phone]    Who are you requesting to speak with (clinical staff, provider,  specific staff member): CLINICAL STAFF    What was the call regarding: STATED THAT THEY WERE TOLD AT THEIR VISIT ON 1/24/25 THAT IF THEY DID NOT GET ANY BETTER WITH THE MEDICATION THEY WERE BEING PRESCRIBED THEN THEY SHOULD CALL BACK AND LET DR. BHATTI KNOW. STATED THAT THEY STILL ARE NOT HAVING AN PRODUCTIVE COUGHING SO THEY STILL HAVE NOT CLEARED UP ANY. PLEASE CALL AND ADVISE

## 2025-02-11 ENCOUNTER — TELEPHONE (OUTPATIENT)
Dept: INTERNAL MEDICINE | Facility: CLINIC | Age: 70
End: 2025-02-11

## 2025-02-11 DIAGNOSIS — J18.9 WALKING PNEUMONIA: ICD-10-CM

## 2025-02-11 DIAGNOSIS — R05.1 ACUTE COUGH: ICD-10-CM

## 2025-02-11 RX ORDER — AZITHROMYCIN 250 MG/1
TABLET, FILM COATED ORAL
Qty: 6 TABLET | Refills: 0 | Status: SHIPPED | OUTPATIENT
Start: 2025-02-11

## 2025-02-11 NOTE — TELEPHONE ENCOUNTER
Caller: Claude Spencer    Relationship: Self    Best call back number: [phone]    Which medication are you concerned about:     azithromycin (Zithromax Z-Hola) 250 MG tablet       What are your concerns: PATIENT STATES THAT THIS IS NOT HELPING SYMPTOMS. PATIENT WOULD LIKE TO HAVE ANOTHER MEDICATION CALLED IN TO REPLACE THIS.     PREFERRED PHARMACY: [facility] [id number]  [place] - [address] - [phone]  - [phone]

## 2025-02-19 DIAGNOSIS — I50.20 HEART FAILURE WITH REDUCED EJECTION FRACTION, NYHA CLASS III: ICD-10-CM

## 2025-02-19 RX ORDER — BUMETANIDE 1 MG/1
2 TABLET ORAL 2 TIMES DAILY
Qty: 120 TABLET | Refills: 8 | Status: SHIPPED | OUTPATIENT
Start: 2025-02-19

## 2025-02-19 RX ORDER — METOPROLOL SUCCINATE 50 MG/1
50 TABLET, EXTENDED RELEASE ORAL DAILY
Qty: 30 TABLET | Refills: 8 | Status: SHIPPED | OUTPATIENT
Start: 2025-02-19

## 2025-02-20 ENCOUNTER — TELEPHONE (OUTPATIENT)
Dept: INTERNAL MEDICINE | Facility: CLINIC | Age: 70
End: 2025-02-20
Payer: COMMERCIAL

## 2025-02-20 NOTE — TELEPHONE ENCOUNTER
Caller Name: Claude Spencer      Relationship: Self      Best Contact Number: [phone]       Patient is requesting samples of budesonide-formoterol (Symbicort) 160-4.5 MCG/ACT inhaler       How many days of medication do you have left? ZERO        Additional Information: PATIENT STATES THAT HE IS OUT OF MEDICATION, AND PHARMACY WILL NOT REFILL UNTIL 3/10/25. WANTED TO KNOW IF OFFICE HAD SAMPLES AVAILABLE. PLEASE CALL TO FOLLOW UP.

## 2025-02-21 NOTE — TELEPHONE ENCOUNTER
Caller Name: Claude Spencer        Relationship: Self        Best Contact Number: [phone]         Patient is requesting samples of budesonide-formoterol (Symbicort) 160-4.5 MCG/ACT inhaler         How many days of medication do you have left? ZERO           Additional Information: PATIENT CALLING AGAIN ABOUT SAMPLE.  HE HAS BEEN WITHOUT THIS MEDICATION FOR 2 OR 3 DAYS AND HAS HAD TO USE OXYGEN AND NEBULIZER TO HELP HIS BREATHING PATIENT STATES THAT HE IS OUT OF MEDICATION, AND PHARMACY WILL NOT REFILL UNTIL 3/10/25. WANTED TO KNOW IF OFFICE HAD SAMPLES AVAILABLE. PLEASE CALL TO FOLLOW UP.

## 2025-02-21 NOTE — TELEPHONE ENCOUNTER
Called Janiya, prescription was picked up on 2/3.  Insurance says 18 out of 30 and will not refill.  But request was sent to prescribing provider and they have not heard from them to be able to use discount card.    Called [facility] and spoke with Nael, she stated there is no request for the med. She is putting in high priority and will tell the MA.  States provider is in office.    Advised patient.  He states he doesn't think the inhaler was fill.  He takes 4 times a day like normal.

## 2025-03-19 DIAGNOSIS — I10 PRIMARY HYPERTENSION: ICD-10-CM

## 2025-03-19 DIAGNOSIS — I42.8 NICM (NONISCHEMIC CARDIOMYOPATHY): ICD-10-CM

## 2025-03-19 DIAGNOSIS — E78.00 HYPERCHOLESTEROLEMIA: ICD-10-CM

## 2025-03-19 DIAGNOSIS — Z95.0 HISTORY OF PACEMAKER: ICD-10-CM

## 2025-03-19 DIAGNOSIS — I34.0 NONRHEUMATIC MITRAL VALVE REGURGITATION: ICD-10-CM

## 2025-03-19 RX ORDER — MONTELUKAST SODIUM 10 MG/1
10 TABLET ORAL NIGHTLY
Qty: 90 TABLET | Refills: 1 | Status: SHIPPED | OUTPATIENT
Start: 2025-03-19

## 2025-03-19 NOTE — TELEPHONE ENCOUNTER
Very High  Drug-Drug: spironolactone and losartan    Rx Refill Note  Requested Prescriptions     Pending Prescriptions Disp Refills    losartan (COZAAR) 25 MG tablet [Pharmacy Med Name: LOSARTAN POTASSIUM 25 MG TAB] 30 tablet 2     Sig: TAKE 1 TABLET BY MOUTH DAILY      Last office visit with prescribing clinician: 1/24/2025   Last telemedicine visit with prescribing clinician: Visit date not found   Next office visit with prescribing clinician: 7/24/2025                         Would you like a call back once the refill request has been completed: [] Yes [] No    If the office needs to give you a call back, can they leave a voicemail: [] Yes [] No    Irene Cardona MA  03/19/25, 15:03 EDT

## 2025-03-20 ENCOUNTER — TELEPHONE (OUTPATIENT)
Age: 70
End: 2025-03-20
Payer: COMMERCIAL

## 2025-03-20 RX ORDER — LOSARTAN POTASSIUM 25 MG/1
25 TABLET ORAL DAILY
Qty: 90 TABLET | Refills: 1 | Status: SHIPPED | OUTPATIENT
Start: 2025-03-20

## 2025-03-20 NOTE — TELEPHONE ENCOUNTER
FYI:    Pt with pacer that has a 3830 lead that is LBB placed and is in the RA port with RV backup.     Pt is dependent with AVN ablation done in 2023.     3 NSVT events since 12/18/24.    2 of these events only lasted between 7-8 beats.     Other event that occurred on 3/2/25 lasted for 13 beats.             It looks like pt has had shorter NSVT events in the past but wanted to make you all aware of this longer event.     Pt has a device check scheduled for 4/10/25.    Appt with Dr. Draper on 9/30/25.

## 2025-03-21 ENCOUNTER — OFFICE VISIT (OUTPATIENT)
Dept: INTERNAL MEDICINE | Facility: CLINIC | Age: 70
End: 2025-03-21
Payer: COMMERCIAL

## 2025-03-21 VITALS
DIASTOLIC BLOOD PRESSURE: 82 MMHG | TEMPERATURE: 98.1 F | RESPIRATION RATE: 20 BRPM | HEART RATE: 88 BPM | OXYGEN SATURATION: 91 % | SYSTOLIC BLOOD PRESSURE: 122 MMHG | BODY MASS INDEX: 28.97 KG/M2 | WEIGHT: 195.6 LBS | HEIGHT: 69 IN

## 2025-03-21 DIAGNOSIS — U07.1 COVID-19 VIRUS INFECTION: Primary | ICD-10-CM

## 2025-03-21 DIAGNOSIS — R05.9 COUGH, UNSPECIFIED TYPE: ICD-10-CM

## 2025-03-21 DIAGNOSIS — E78.00 HYPERCHOLESTEROLEMIA: ICD-10-CM

## 2025-03-21 LAB
EXPIRATION DATE: ABNORMAL
EXPIRATION DATE: NORMAL
FLUAV AG NPH QL: NEGATIVE
FLUBV AG NPH QL: NEGATIVE
INTERNAL CONTROL: ABNORMAL
INTERNAL CONTROL: NORMAL
Lab: ABNORMAL
Lab: NORMAL
SARS-COV-2 AG UPPER RESP QL IA.RAPID: DETECTED

## 2025-03-21 RX ORDER — DEXTROMETHORPHAN HYDROBROMIDE AND PROMETHAZINE HYDROCHLORIDE 15; 6.25 MG/5ML; MG/5ML
5 SYRUP ORAL NIGHTLY PRN
Qty: 240 ML | Refills: 1 | Status: SHIPPED | OUTPATIENT
Start: 2025-03-21

## 2025-03-21 RX ORDER — ROSUVASTATIN CALCIUM 20 MG/1
20 TABLET, COATED ORAL NIGHTLY
Qty: 90 TABLET | Refills: 1 | Status: SHIPPED | OUTPATIENT
Start: 2025-03-21

## 2025-03-21 NOTE — PROGRESS NOTES
"Chief Complaint  Nasal Congestion, Cough (Productive with green mucus x 2-3 days ), and Chills    Subjective        Claude Spencer presents to [facility]  History of Present Illness    Feeling sick x2-3 days.  No measured fevers, but +chills.     Objective   Vital Signs:  /82 (BP Location: Left arm, Patient Position: Sitting, Cuff Size: Adult)   Pulse 88   Temp 98.1 °F (36.7 °C) (Oral)   Resp 20   Ht 175.3 cm (69\")   Wt 88.7 kg (195 lb 9.6 oz)   SpO2 91%   BMI 28.89 kg/m²   Estimated body mass index is 28.89 kg/m² as calculated from the following:    Height as of this encounter: 175.3 cm (69\").    Weight as of this encounter: 88.7 kg (195 lb 9.6 oz).            Physical Exam  Vitals reviewed.   Constitutional:       General: He is not in acute distress.     Appearance: Normal appearance.   HENT:      Head: Normocephalic and atraumatic.      Nose: Nose normal.      Mouth/Throat:      Mouth: Mucous membranes are moist.   Eyes:      Conjunctiva/sclera: Conjunctivae normal.   Cardiovascular:      Rate and Rhythm: Normal rate and regular rhythm.      Heart sounds: Normal heart sounds.   Pulmonary:      Effort: Pulmonary effort is normal.      Comments: Breath sounds diminished and intermittent wheezing  Skin:     General: Skin is warm and dry.   Neurological:      General: No focal deficit present.      Mental Status: He is alert.   Psychiatric:         Mood and Affect: Mood normal.        Result Review :           Assessment and Plan   Diagnoses and all orders for this visit:    1. COVID-19 virus infection (Primary)  -     promethazine-dextromethorphan (PROMETHAZINE-DM) 6.25-15 MG/5ML syrup; Take 5 mL by mouth At Night As Needed for Cough.  Dispense: 240 mL; Refill: 1  -     Nirmatrelvir & Ritonavir, 300mg/100mg, (PAXLOVID); Take 2 tablets by mouth 2 (Two) Times a Day for 5 days. Stop your Crestor (Rosuvastatin) while on the Paxlovid.  Dispense: 20 tablet; Refill: 0    2. " Cough, unspecified type  -     POC Influenza A / B  -     POCT KELLY SARS-CoV-2 Antigen EDDIE    3. Hypercholesterolemia  -     rosuvastatin (CRESTOR) 20 MG tablet; Take 1 tablet by mouth Every Night.  Dispense: 90 tablet; Refill: 1      Stop statin while on Paxlovid.  Advised on SOA or fever continuing longer than 10 days.          Follow Up     Patient was given instructions and counseling regarding his condition or for health maintenance advice. Please see specific information pulled into the AVS if appropriate.

## 2025-04-10 ENCOUNTER — CLINICAL SUPPORT NO REQUIREMENTS (OUTPATIENT)
Dept: CARDIOLOGY | Facility: CLINIC | Age: 70
End: 2025-04-10
Payer: COMMERCIAL

## 2025-04-10 DIAGNOSIS — I48.19 PERSISTENT ATRIAL FIBRILLATION: Primary | ICD-10-CM

## 2025-05-23 ENCOUNTER — OFFICE VISIT (OUTPATIENT)
Dept: INTERNAL MEDICINE | Facility: CLINIC | Age: 70
End: 2025-05-23
Payer: COMMERCIAL

## 2025-05-23 ENCOUNTER — HOSPITAL ENCOUNTER (OUTPATIENT)
Dept: GENERAL RADIOLOGY | Facility: HOSPITAL | Age: 70
Discharge: HOME | End: 2025-05-23
Admitting: INTERNAL MEDICINE
Payer: COMMERCIAL

## 2025-05-23 VITALS
TEMPERATURE: 98.7 F | WEIGHT: 194.2 LBS | HEART RATE: 81 BPM | DIASTOLIC BLOOD PRESSURE: 78 MMHG | HEIGHT: 69 IN | RESPIRATION RATE: 18 BRPM | BODY MASS INDEX: 28.76 KG/M2 | OXYGEN SATURATION: 94 % | SYSTOLIC BLOOD PRESSURE: 118 MMHG

## 2025-05-23 DIAGNOSIS — R09.3 ABNORMAL AMOUNT OF SPUTUM: ICD-10-CM

## 2025-05-23 DIAGNOSIS — J18.9 PNEUMONIA DUE TO INFECTIOUS ORGANISM, UNSPECIFIED LATERALITY, UNSPECIFIED PART OF LUNG: Primary | ICD-10-CM

## 2025-05-23 DIAGNOSIS — J43.9 PULMONARY EMPHYSEMA, UNSPECIFIED EMPHYSEMA TYPE: ICD-10-CM

## 2025-05-23 DIAGNOSIS — Z87.891 HISTORY OF SMOKING 30 OR MORE PACK YEARS: ICD-10-CM

## 2025-05-23 PROCEDURE — 71046 X-RAY EXAM CHEST 2 VIEWS: CPT

## 2025-05-23 PROCEDURE — 99214 OFFICE O/P EST MOD 30 MIN: CPT | Performed by: INTERNAL MEDICINE

## 2025-05-23 RX ORDER — AMOXICILLIN 500 MG/1
1000 CAPSULE ORAL 3 TIMES DAILY
Qty: 30 CAPSULE | Refills: 0 | Status: SHIPPED | OUTPATIENT
Start: 2025-05-23 | End: 2025-05-28

## 2025-05-23 RX ORDER — AZITHROMYCIN 250 MG/1
TABLET, FILM COATED ORAL
Qty: 6 TABLET | Refills: 0 | Status: SHIPPED | OUTPATIENT
Start: 2025-05-23

## 2025-05-23 NOTE — PROGRESS NOTES
"Patient or patient representative verbalized consent for the use of Ambient Listening during the visit with  Zohreh Doip MD for chart documentation. 5/23/2025  10:25 EDT    Chief Complaint  Cough (Productive with yellow sputum x 2 weeks)    Subjective        Claude Spencer presents to [facility]    History of Present Illness  The patient presents for evaluation of a cough.    He has been experiencing a persistent cough for the past 2 weeks, accompanied by yellowish sputum. Despite taking allergy medication, he has not found relief. He reports no fever, vomiting, or diarrhea. He also experiences shortness of breath when her nasal passages are congested but does not have any facial pain. Previous treatments with Z-Hola and steroids were ineffective. He is currently on Singulair and uses a Symbicort inhaler twice daily. He has a history of smoking for approximately 37 years but has since quit. He occasionally uses marijuana. 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    SOCIAL HISTORY  The patient does not smoke cigarettes anymore but admits to using a little weed. The patient smoked cigarettes for about 37 years before quitting.       Objective   Vital Signs:  /78 (BP Location: Left arm, Patient Position: Sitting, Cuff Size: Large Adult)   Pulse 81   Temp 98.7 °F (37.1 °C) (Infrared)   Resp 18   Ht 175.3 cm (69\")   Wt 88.1 kg (194 lb 3.2 oz)   SpO2 94%   BMI 28.68 kg/m²   Estimated body mass index is 28.68 kg/m² as calculated from the following:    Height as of this encounter: 175.3 cm (69\").    Weight as of this encounter: 88.1 kg (194 lb 3.2 oz).            Physical Exam  Vitals reviewed.   Constitutional:       General: He is not in acute distress.     Appearance: Normal appearance.   HENT:      Head: Normocephalic and atraumatic.      Nose: Nose normal.      Mouth/Throat:      Mouth: Mucous membranes are moist. "   Eyes:      Conjunctiva/sclera: Conjunctivae normal.   Pulmonary:      Effort: Pulmonary effort is normal. No respiratory distress.      Breath sounds: Wheezing present.   Skin:     General: Skin is warm and dry.   Neurological:      General: No focal deficit present.      Mental Status: He is alert.   Psychiatric:         Mood and Affect: Mood normal.          Physical Exam         Result Review :  The following data was reviewed by: Zohreh Diop MD on 05/23/2025:  Common labs          1/24/2025    14:52   Common Labs   Glucose 83    BUN 15    Creatinine 1.04    Sodium 138    Potassium 3.9    Chloride 100    Calcium 9.1    Albumin 4.1    Total Bilirubin 0.7    Alkaline Phosphatase 69    AST (SGOT) 40    ALT (SGPT) 26    WBC 8.24    Hemoglobin 14.2    Hematocrit 41.5    Platelets 225    Total Cholesterol 199    Triglycerides 110    HDL Cholesterol 43    LDL Cholesterol  136    Hemoglobin A1C 5.60      Data reviewed : no new data to review          Results            Assessment and Plan   Diagnoses and all orders for this visit:    1. Pneumonia due to infectious organism, unspecified laterality, unspecified part of lung (Primary)  -     XR Chest PA & Lateral  -     amoxicillin (AMOXIL) 500 MG capsule; Take 2 capsules by mouth 3 (Three) Times a Day for 5 days.  Dispense: 30 capsule; Refill: 0  -     azithromycin (Zithromax Z-Hola) 250 MG tablet; Take 2 tablets by mouth on day 1, then 1 tablet daily on days 2-5  Dispense: 6 tablet; Refill: 0    2. Abnormal amount of sputum  -     XR Chest PA & Lateral    3. History of smoking 30 or more pack years  -     CT Chest Low Dose Wo    4. Pulmonary emphysema, unspecified emphysema type  -     Complete PFT - Pre & Post Bronchodilator        Assessment & Plan  1. Cough possibly 2/2 PNA, CXR pending  - Symptoms have persisted for approximately 2 weeks, including productive cough with yellow sputum and shortness of breath.  - Previous treatments with cough medicine, allergy  pills, Z-Hola, and steroids were ineffective.  - A chest x-ray will be conducted today to evaluate his lungs.  - Two antibiotics will be prescribed to be taken concurrently. If the chest x-ray results indicate a different course of action, he will be contacted. A CT scan of his lungs will also be ordered due to the recurrent nature of her symptoms. He is advised to continue using his Symbicort inhaler twice daily and to follow up with her pulmonologist, Dr. Garcia.            Follow Up   Keep July 24th appointment  Patient was given instructions and counseling regarding his condition or for health maintenance advice. Please see specific information pulled into the AVS if appropriate.

## 2025-05-27 ENCOUNTER — RESULTS FOLLOW-UP (OUTPATIENT)
Dept: INTERNAL MEDICINE | Facility: CLINIC | Age: 70
End: 2025-05-27
Payer: COMMERCIAL

## 2025-05-30 NOTE — PROGRESS NOTES
Tried to call patient to discuss results but was unable to reach and left a voicemail for the patient to call back to discuss.

## 2025-06-17 LAB
MC_CV_MDC_IDC_RATE_1: 150
MC_CV_MDC_IDC_RATE_1: 171
MC_CV_MDC_IDC_ZONE_ID: 2
MC_CV_MDC_IDC_ZONE_ID: 6
MDC_IDC_MSMT_BATTERY_REMAINING_LONGEVITY: 104 MO
MDC_IDC_MSMT_BATTERY_RRT_TRIGGER: 2.62
MDC_IDC_MSMT_BATTERY_STATUS: NORMAL
MDC_IDC_MSMT_BATTERY_VOLTAGE: 3
MDC_IDC_MSMT_LEADCHNL_RA_IMPEDANCE_VALUE: 570
MDC_IDC_MSMT_LEADCHNL_RA_SENSING_INTR_AMPL: 27.62
MDC_IDC_MSMT_LEADCHNL_RV_IMPEDANCE_VALUE: 380
MDC_IDC_MSMT_LEADCHNL_RV_SENSING_INTR_AMPL: 19.62
MDC_IDC_PG_IMPLANT_DTM: NORMAL
MDC_IDC_PG_MFG: NORMAL
MDC_IDC_PG_MODEL: NORMAL
MDC_IDC_PG_SERIAL: NORMAL
MDC_IDC_PG_TYPE: NORMAL
MDC_IDC_SESS_DTM: NORMAL
MDC_IDC_SESS_TYPE: NORMAL
MDC_IDC_SET_BRADY_AT_MODE_SWITCH_RATE: 171
MDC_IDC_SET_BRADY_LOWRATE: 80
MDC_IDC_SET_BRADY_MAX_SENSOR_RATE: 130
MDC_IDC_SET_BRADY_MAX_TRACKING_RATE: 130
MDC_IDC_SET_BRADY_MODE: NORMAL
MDC_IDC_SET_BRADY_PAV_DELAY: 200
MDC_IDC_SET_BRADY_SAV_DELAY: 150
MDC_IDC_SET_LEADCHNL_RA_PACING_AMPLITUDE: 2.5
MDC_IDC_SET_LEADCHNL_RA_PACING_POLARITY: NORMAL
MDC_IDC_SET_LEADCHNL_RA_PACING_PULSEWIDTH: 0.4
MDC_IDC_SET_LEADCHNL_RA_SENSING_POLARITY: NORMAL
MDC_IDC_SET_LEADCHNL_RA_SENSING_SENSITIVITY: 0.6
MDC_IDC_SET_LEADCHNL_RV_PACING_AMPLITUDE: 2.5
MDC_IDC_SET_LEADCHNL_RV_PACING_POLARITY: NORMAL
MDC_IDC_SET_LEADCHNL_RV_PACING_PULSEWIDTH: 0.4
MDC_IDC_SET_LEADCHNL_RV_SENSING_POLARITY: NORMAL
MDC_IDC_SET_LEADCHNL_RV_SENSING_SENSITIVITY: 0.9
MDC_IDC_SET_ZONE_STATUS: NORMAL
MDC_IDC_SET_ZONE_STATUS: NORMAL
MDC_IDC_SET_ZONE_TYPE: NORMAL
MDC_IDC_SET_ZONE_TYPE: NORMAL
MDC_IDC_STAT_AT_BURDEN_PERCENT: 0
MDC_IDC_STAT_BRADY_RA_PERCENT_PACED: 97.68
MDC_IDC_STAT_BRADY_RV_PERCENT_PACED: 33.61

## 2025-07-03 DIAGNOSIS — I50.20 HEART FAILURE WITH REDUCED EJECTION FRACTION, NYHA CLASS III: ICD-10-CM

## 2025-07-03 DIAGNOSIS — I10 PRIMARY HYPERTENSION: ICD-10-CM

## 2025-07-03 RX ORDER — SPIRONOLACTONE 25 MG/1
25 TABLET ORAL DAILY
Qty: 90 TABLET | Refills: 1 | Status: SHIPPED | OUTPATIENT
Start: 2025-07-03

## 2025-07-03 NOTE — TELEPHONE ENCOUNTER
Very High  Drug-Drug: spironolactone and potassium chloridePotassium salts (eg, Potassium Preparations) may enhance the hyperkalemic effect of spironolactone.  Details  Override reason        spironolactone (ALDACTONE) 25 MG tablet [Pharmacy Med Name: SPIRONOLACTONE 25 MG TABLET]  Prescription. Reordered. Long-term.  potassium chloride 10 MEQ CR tabletPrescription. Active.  Discontinue  Very High  Drug-Drug: spironolactone and losartanAngiotensin II receptor blockers, such as Angiotensin II Receptor Antagonists, may enhance the hyperkalemic effect of potassium-sparing diuretics (eg, Potassium-Sparing Diuretics).  Details    Rx Refill Note  Requested Prescriptions     Pending Prescriptions Disp Refills    spironolactone (ALDACTONE) 25 MG tablet [Pharmacy Med Name: SPIRONOLACTONE 25 MG TABLET] 30 tablet      Sig: TAKE 1 TABLET BY MOUTH DAILY      Last office visit with prescribing clinician: 5/23/2025   Last telemedicine visit with prescribing clinician: Visit date not found   Next office visit with prescribing clinician: 7/24/2025                         Would you like a call back once the refill request has been completed: [] Yes [] No    If the office needs to give you a call back, can they leave a voicemail: [] Yes [] No    Irene Cardona MA  07/03/25, 14:41 EDT

## 2025-08-07 ENCOUNTER — NURSE TRIAGE (OUTPATIENT)
Dept: CALL CENTER | Facility: HOSPITAL | Age: 70
End: 2025-08-07
Payer: COMMERCIAL

## (undated) DEVICE — Device: Brand: THERMOCOOL SMARTTOUCH SF

## (undated) DEVICE — PINNACLE INTRODUCER SHEATH: Brand: PINNACLE

## (undated) DEVICE — PK CATH CARD 40

## (undated) DEVICE — INTRO SHEATH PRELUDE SNAP .038 9F 13CM W/SDPRT BLK

## (undated) DEVICE — GLIDESHEATH SLENDER STAINLESS STEEL KIT: Brand: GLIDESHEATH SLENDER

## (undated) DEVICE — LOU PACE DEFIB: Brand: MEDLINE INDUSTRIES, INC.

## (undated) DEVICE — CATH DIAG IMPULSE PIG 5F 100CM

## (undated) DEVICE — SLITTER CATH GUIDE ATTAIN ADJ

## (undated) DEVICE — GW EMR FIX EXCHG J STD .035 3MM 260CM

## (undated) DEVICE — PERCLOSE™ PROSTYLE™ SUTURE-MEDIATED CLOSURE AND REPAIR SYSTEM: Brand: PERCLOSE™ PROSTYLE™

## (undated) DEVICE — KT MANIFLD CARDIAC

## (undated) DEVICE — INTRO SHEATH PRELUDE SNAP .038 7F 13CM W/SDPRT

## (undated) DEVICE — CATH DEFLECT SELECTSITE 9F 43CM W/ART HNDL

## (undated) DEVICE — Device: Brand: REFERENCE PATCH CARTO 3

## (undated) DEVICE — CATH DIAG IMPULSE FL3.5 5F 100CM

## (undated) DEVICE — Device: Brand: SMARTABLATE

## (undated) DEVICE — Device

## (undated) DEVICE — INTRO STEER AGILIS NXT MED/CURL 8.5F

## (undated) DEVICE — CATH DIAG IMPULSE FR5 5F 100CM

## (undated) DEVICE — TBG PENCL TELESCP MEGADYNE SMOKE EVAC 10FT